# Patient Record
Sex: MALE | Race: WHITE | NOT HISPANIC OR LATINO | Employment: OTHER | URBAN - METROPOLITAN AREA
[De-identification: names, ages, dates, MRNs, and addresses within clinical notes are randomized per-mention and may not be internally consistent; named-entity substitution may affect disease eponyms.]

---

## 2020-03-04 ENCOUNTER — TELEPHONE (OUTPATIENT)
Dept: UROLOGY | Facility: MEDICAL CENTER | Age: 85
End: 2020-03-04

## 2020-03-04 NOTE — TELEPHONE ENCOUNTER
Call placed to daughter, scheduled for first available new patient visit with Dr Silva sOborn  Daughter is having records faxed to our office

## 2020-03-04 NOTE — TELEPHONE ENCOUNTER
Complaint/diagnosis:artificial sphincter issues    Insurance Medicare A and B and [employer]    History of Cancer Prostate and Skin Cancer    Previous Urologist Daughter does not remember name but will get records sent to [phone]    Outside testing/where    what kind of test    Records requested/where    Preferred Location [facility]

## 2020-04-27 ENCOUNTER — TELEPHONE (OUTPATIENT)
Dept: UROLOGY | Facility: AMBULATORY SURGERY CENTER | Age: 85
End: 2020-04-27

## 2020-04-29 ENCOUNTER — TELEMEDICINE (OUTPATIENT)
Dept: UROLOGY | Facility: AMBULATORY SURGERY CENTER | Age: 85
End: 2020-04-29
Payer: MEDICARE

## 2020-04-29 DIAGNOSIS — N39.3 STRESS INCONTINENCE: Primary | ICD-10-CM

## 2020-04-29 PROCEDURE — 99205 OFFICE O/P NEW HI 60 MIN: CPT | Performed by: UROLOGY

## 2020-05-06 ENCOUNTER — TELEPHONE (OUTPATIENT)
Dept: UROLOGY | Facility: AMBULATORY SURGERY CENTER | Age: 85
End: 2020-05-06

## 2020-05-07 ENCOUNTER — TELEPHONE (OUTPATIENT)
Dept: UROLOGY | Facility: AMBULATORY SURGERY CENTER | Age: 85
End: 2020-05-07

## 2022-10-19 ENCOUNTER — APPOINTMENT (EMERGENCY)
Dept: RADIOLOGY | Facility: HOSPITAL | Age: 87
End: 2022-10-19
Payer: MEDICARE

## 2022-10-19 ENCOUNTER — APPOINTMENT (EMERGENCY)
Dept: CT IMAGING | Facility: HOSPITAL | Age: 87
End: 2022-10-19
Payer: MEDICARE

## 2022-10-19 ENCOUNTER — HOSPITAL ENCOUNTER (EMERGENCY)
Facility: HOSPITAL | Age: 87
Discharge: HOME | End: 2022-10-19
Attending: SURGERY
Payer: MEDICARE

## 2022-10-19 VITALS
TEMPERATURE: 98 F | RESPIRATION RATE: 17 BRPM | DIASTOLIC BLOOD PRESSURE: 67 MMHG | WEIGHT: 153.66 LBS | OXYGEN SATURATION: 96 % | SYSTOLIC BLOOD PRESSURE: 144 MMHG | HEART RATE: 60 BPM | BODY MASS INDEX: 23.36 KG/M2

## 2022-10-19 DIAGNOSIS — S01.81XA FOREHEAD LACERATION, INITIAL ENCOUNTER: ICD-10-CM
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
## 2022-10-19 LAB
BASE EXCESS BLDA CALC-SCNC: 2 MMOL/L (ref -2–3)
CA-I BLD-SCNC: 1.06 MMOL/L (ref 1.12–1.32)
GLUCOSE SERPL-MCNC: 165 MG/DL (ref 65–140)
HCO3 BLDA-SCNC: 26.7 MMOL/L (ref 24–30)
HCT VFR BLD CALC: 37 % (ref 36.5–49.3)
HGB BLDA-MCNC: 12.6 G/DL (ref 12–17)
PCO2 BLD: 28 MMOL/L (ref 21–32)
PCO2 BLD: 41.2 MM HG (ref 42–50)
PH BLD: 7.42 [PH] (ref 7.3–7.4)
PO2 BLD: 24 MM HG (ref 35–45)
POTASSIUM BLD-SCNC: 4.3 MMOL/L (ref 3.5–5.3)
SAO2 % BLD FROM PO2: 45 % (ref 60–85)
SODIUM BLD-SCNC: 138 MMOL/L (ref 136–145)
SPECIMEN SOURCE: ABNORMAL

## 2022-10-19 PROCEDURE — 93308 TTE F-UP OR LMTD: CPT | Performed by: SURGERY

## 2022-10-19 PROCEDURE — 70450 CT HEAD/BRAIN W/O DYE: CPT

## 2022-10-19 PROCEDURE — 71045 X-RAY EXAM CHEST 1 VIEW: CPT

## 2022-10-19 PROCEDURE — 72125 CT NECK SPINE W/O DYE: CPT

## 2022-10-19 PROCEDURE — 82947 ASSAY GLUCOSE BLOOD QUANT: CPT

## 2022-10-19 PROCEDURE — G0168 WOUND CLOSURE BY ADHESIVE: HCPCS | Performed by: SURGERY

## 2022-10-19 PROCEDURE — 82330 ASSAY OF CALCIUM: CPT

## 2022-10-19 PROCEDURE — 99284 EMERGENCY DEPT VISIT MOD MDM: CPT

## 2022-10-19 PROCEDURE — 99284 EMERGENCY DEPT VISIT MOD MDM: CPT | Performed by: SURGERY

## 2022-10-19 PROCEDURE — NC001 PR NO CHARGE: Performed by: EMERGENCY MEDICINE

## 2022-10-19 PROCEDURE — 85014 HEMATOCRIT: CPT

## 2022-10-19 PROCEDURE — 84132 ASSAY OF SERUM POTASSIUM: CPT

## 2022-10-19 PROCEDURE — 84295 ASSAY OF SERUM SODIUM: CPT

## 2022-10-19 PROCEDURE — 76705 ECHO EXAM OF ABDOMEN: CPT | Performed by: SURGERY

## 2022-10-19 PROCEDURE — 82803 BLOOD GASES ANY COMBINATION: CPT

## 2022-10-19 PROCEDURE — 72131 CT LUMBAR SPINE W/O DYE: CPT

## 2022-10-19 NOTE — PROCEDURES
POC FAST US    Date/Time: 10/19/2022 4:29 PM  Performed by: Crissy Muller PA-C  Authorized by: Crissy Muller PA-C     Patient location:  Trauma  Procedure details:     Exam Type:  Diagnostic    Indications: blunt abdominal trauma and blunt chest trauma      Assess for:  Intra-abdominal fluid and pericardial effusion    Technique: FAST      Views obtained:  Heart - Pericardial sac, LUQ - Splenorenal space, Suprapubic - Pouch of En and RUQ - Gong's Pouch    Image quality: diagnostic      Image availability:  Images available in PACS and video obtained  FAST Findings:     RUQ (Hepatorenal) free fluid: absent      LUQ (Splenorenal) free fluid: absent      Suprapubic free fluid: absent      Cardiac wall motion: identified      Pericardial effusion: absent    Interpretation:     Impressions: negative    Laceration repair    Date/Time: 10/19/2022 4:29 PM  Performed by: Crissy Muller PA-C  Authorized by: Crissy Muller PA-C   Patient identity confirmed: verbally with patient  Body area: head/neck  Location details: forehead      Procedure Details:  Skin closure: glue

## 2022-10-19 NOTE — ED PROVIDER NOTES
Emergency Department Airway Evaluation and Management Form    History  Obtained from:  Patient  Sulfa antibiotics  No chief complaint on file  49-year-old male presents after falling at home  Patient was leaning forward to reach toilet paper when he struck his head in the bathroom  No reported loss of consciousness  Patient has a small laceration to the medial forehead and takes Eliquis  History provided by:  Patient and medical records      Past Medical History:   Diagnosis Date   • Anxiety    • Arthritis    • Coronary artery disease    • Hiatal hernia    • Hypertension    • Irregular heart beat    • Myasthenia gravis [facility])      Past Surgical History:   Procedure Laterality Date   • EYE SURGERY  2014    cataracts   • HERNIA REPAIR  1971    right inguinal hernia repair   • AK LAP,SURG,COLECTOMY, PARTIAL, W/ANAST N/A 6/3/2016    Procedure: LAPAROSCOPIC SIGMOID RESECTION ;  Surgeon: Clary Bradley MD;  [facility];  Service: Colorectal   • AK LAP,SURG,COLECTOMY,W/ANAST N/A 6/3/2016    Procedure: COLECTOMY LAPAROSCOPIC ASSISTED;  Surgeon: Clary Bradley MD;  [facility];  Service: Colorectal   • AK SIGMOIDOSCOPY,DIAGNOSTIC N/A 6/3/2016    Procedure: Fernando Brownus;  Surgeon: Clary Bradley MD;  [facility];  Service: Colorectal   • PROSTATECTOMY     • REPLACEMENT TOTAL KNEE Right    • URINARY SPHINCTER IMPLANT  2001    s/p prostatectomy- pt had urinary leakage issues     Family History   Problem Relation Age of Onset   • Diabetes Brother      Social History     Tobacco Use   • Smoking status: Former Smoker   • Tobacco comment: quit smoking when 23 y/o   Substance Use Topics   • Alcohol use: No   • Drug use: No     I have reviewed and agree with the history as documented  Review of Systems    Physical Exam  BP (!) 182/84   Pulse 64   Temp 98 °F (36 7 °C) (Oral)   Resp 18   Wt 69 7 kg (153 lb 10 6 oz)   SpO2 96%   BMI 23 36 kg/m²     Physical Exam GCS 15  Pupils are unequal, baseline per patient  Neck is supple and nontender  Equal bilateral breath sounds noted  Airway intact  2+ dorsal pedal and posterior tibial pulses  ED Medications  Medications - No data to display    Intubation  Procedures    Notes  No acute airway intervention indicated    Care relinquished to the trauma team    Final Diagnosis  Final diagnoses:   None       ED Provider  Electronically Signed by     Vinod Fonseca DO  10/19/22 5989

## 2022-10-19 NOTE — H&P
H&P - Trauma   Nelda Esquivel 80 y o  male MRN: [id number]  Unit/Bed#: ED-02 Encounter: [id number]    Trauma Alert: Level B   Model of Arrival: Self    Trauma Team: Attending Jeanine Alberto and KATHIE [address] [place] [address]  Consultants:     None     Assessment/Plan   Active Problems / Assessment:   - Mechanical fall  - Head strike, no LOC, on Eliquis  - Forehead abrasion/ laceration requiring glue repair  - Lumbar tenderness     Plan:   - CT head, C-spine, Lumbar spine no acute traumatic injuries  - Lac repair with glue  - Ambulate, tolerate a diet and discharge home with family    Spoke with patient's daughter at bedside and updated her regarding negative imaging and medically cleared for discharge home  Cervical Collar Clearance: The patient had a CT scan of the cervical spine demonstrating no acute injury  On exam, the patient had no midline point tenderness or paresthesias/numbness/weakness in the extremities  The patient had full range of motion (was then able to flex, extend, and rotate head laterally) without pain  There were no distracting injuries and the patient was not intoxicated  The patient's cervical spine was cleared radiologically and clinically  Cervical collar removed at this time  Shonda Murry  10/19/2022 5:35 PM       History of Present Illness     Chief Complaint: Fall  Mechanism:Fall     HPI:    Nelda Esquivel is a 80 y o  male who presents after a fall forehead with head abrasion/ laceration  He reports he was leaning forward to get something when he fell into the wall, striking his face  He denies loss of consciousness and reports that he takes Eliquis daily  Denies pain  Review of Systems   HENT: Positive for facial swelling and hearing loss  Eyes: Negative for photophobia and visual disturbance  Respiratory: Negative for shortness of breath  Cardiovascular: Negative for chest pain  Gastrointestinal: Negative for abdominal pain and nausea     Musculoskeletal: Negative for back pain and neck pain  Skin: Positive for wound  Neurological: Negative for dizziness, syncope, weakness, light-headedness, numbness and headaches  Psychiatric/Behavioral: Negative for confusion  All other systems reviewed and are negative  12-point, complete review of systems was reviewed and negative except as stated above  Historical Information     Past Medical History:   Diagnosis Date   • Anxiety    • Arthritis    • Coronary artery disease    • Hiatal hernia    • Hypertension    • Irregular heart beat    • Myasthenia gravis [facility])      Past Surgical History:   Procedure Laterality Date   • EYE SURGERY  2014    cataracts   • HERNIA REPAIR  1971    right inguinal hernia repair   • [place] LAP,SURG,COLECTOMY, PARTIAL, W/ANAST N/A 6/3/2016    Procedure: LAPAROSCOPIC SIGMOID RESECTION ;  Surgeon: Marija Hubbard MD;  [facility];  Service: Colorectal   • TN LAP,SURG,COLECTOMY,W/ANAST N/A 6/3/2016    Procedure: COLECTOMY LAPAROSCOPIC ASSISTED;  Surgeon: Marija Hubbard MD;  [facility];  Service: Colorectal   • TN SIGMOIDOSCOPY,DIAGNOSTIC N/A 6/3/2016    Procedure: Cherylyn Every;  Surgeon: Marija Hubbard MD;  [facility];  Service: Colorectal   • PROSTATECTOMY     • REPLACEMENT TOTAL KNEE Right    • URINARY SPHINCTER IMPLANT  2001    s/p prostatectomy- pt had urinary leakage issues        Social History     Tobacco Use   • Smoking status: Former Smoker   • Tobacco comment: quit smoking when 23 y/o   Substance Use Topics   • Alcohol use: No   • Drug use: No     Immunization History   Administered Date(s) Administered   • COVID-19 PFIZER VACCINE 0 3 ML IM 02/24/2021, 03/17/2021     Last Tetanus: reports up to date  Family History: Non-contributory    1  Before the illness or injury that brought you to the Emergency, did you need someone to help you on a regular basis? 1=Yes   2   Since the illness or injury that brought you to the Emergency, have you needed more help than usual to take care of yourself? 0=No   3  Have you been hospitalized for one or more nights during the past 6 months (excluding a stay in the Emergency Department)? 0=No   4  In general, do you see well? 1=No   5  In general, do you have serious problems with your memory? 1=Yes   6  Do you take more than three different medications everyday? 1=Yes   TOTAL   4     Did you order a geriatric consult if the score was 2 or greater?: no bc patient is discharged     Meds/Allergies   all current active meds have been reviewed     Allergies   Allergen Reactions   • Sulfa Antibiotics Rash       Objective   Initial Vitals:   Temperature: 98 °F (36 7 °C) (10/19/22 1500)  Pulse: 64 (10/19/22 1500)  Respirations: 18 (10/19/22 1500)  Blood Pressure: (!) 182/84 (10/19/22 1500)    Primary Survey:   Airway:        Status: patent;        Pre-hospital Interventions: none        Hospital Interventions: none  Breathing:        Pre-hospital Interventions: none       Effort: normal       Right breath sounds: normal       Left breath sounds: normal  Circulation:        Rhythm: regular       Rate: regular   Right Pulses Left Pulses    R radial: 2+  R femoral: 2+  R pedal: 2+  R carotid: 2+  R popliteal: 2+ L radial: 2+  L femoral: 2+  L pedal: 2+  L carotid: 2+  L popliteal: 2+   Disability:        GCS: Eye: 4; Verbal: 5 Motor: 6 Total: 15       Right Pupil: 2 mm;  round;  reactive         Left Pupil:  4 mm;  round;  reactive      R Motor Strength L Motor Strength    R : 5/5  R dorsiflex: 5/5  R plantarflex: 5/5 L : 5/5  L dorsiflex: 5/5  L plantarflex: 5/5        Sensory:  No sensory deficit  Exposure:       Completed: Yes      Secondary Survey:  Physical Exam  Vitals reviewed  Constitutional:       General: He is not in acute distress  Appearance: Normal appearance  HENT:      Head: Normocephalic        Comments: Facial ecchymosis, abrasion on forehead     Right Ear: External ear normal       Left Ear: External ear normal       Nose: Nose normal       Mouth/Throat:      Mouth: Mucous membranes are moist       Pharynx: Oropharynx is clear  Eyes:      Extraocular Movements: Extraocular movements intact  Conjunctiva/sclera: Conjunctivae normal       Pupils: Pupils are equal, round, and reactive to light  Cardiovascular:      Rate and Rhythm: Normal rate and regular rhythm  Pulses: Normal pulses  Heart sounds: Normal heart sounds  Pulmonary:      Effort: Pulmonary effort is normal  No respiratory distress  Breath sounds: Normal breath sounds  Chest:      Chest wall: No tenderness  Abdominal:      General: Abdomen is flat  Bowel sounds are normal  There is no distension  Palpations: Abdomen is soft  Tenderness: There is no abdominal tenderness  There is no guarding  Musculoskeletal:         General: No swelling, tenderness, deformity or signs of injury  Normal range of motion  Cervical back: No tenderness  Comments: Lumbar tenderness on exam   Skin:     General: Skin is warm and dry  Capillary Refill: Capillary refill takes less than 2 seconds  Findings: Bruising and lesion present  Neurological:      General: No focal deficit present  Mental Status: He is alert and oriented to person, place, and time  Mental status is at baseline  Sensory: No sensory deficit  Motor: No weakness  Psychiatric:         Mood and Affect: Mood normal          Behavior: Behavior normal          Invasive Devices  Report    Drain  Duration           Urethral Catheter Double-lumen 16 Fr  2329 days              Lab Results: Results: I have personally reviewed all pertinent laboratory/tests results    Imaging Results: I have personally reviewed pertinent reports      Chest Xray(s): negative for acute findings   FAST exam(s): negative for acute findings   CT Scan(s): negative for acute findings   Additional Xray(s): N/A     Other Studies:   TRAUMA - CT head wo contrast   Final Result by Chantel Bolanos MD (10/19 1534)      No acute intracranial abnormality  I personally discussed this study with Emely Mosqueda on 10/19/2022 at 3:33 PM I          Workstation performed: [id number]         TRAUMA - CT spine cervical wo contrast   Final Result by Chantel Bolanos MD (10/19 1534)      No cervical spine fracture or traumatic malalignment  I personally discussed this study with Emely Salazarese on 10/19/2022 at 3:33 PM I       Workstation performed: [id number]         CT spine lumbar without contrast   Final Result by Chantel Bolanos MD (10/19 1534)      No acute abnormality  Multilevel degenerative disc changes  I personally discussed this study with Emely Mosqueda on 10/19/2022 at 3:33 PM I                      Workstation performed: [id number]         XR trauma multiple    (Results Pending)   XR chest 1 view    (Results Pending)         Code Status: Prior  Advance Directive and Living Will:      Power of :    POLST:    I have spent 30 minutes with Patient and family today in which greater than 50% of this time was spent in counseling/coordination of care regarding Diagnostic results, Prognosis, Risks and benefits of tx options, Intructions for management, Patient and family education, Importance of tx compliance, Risk factor reductions and Impressions

## 2022-10-19 NOTE — CASE MANAGEMENT
CM responded to trauma alert  Patient arrived through ED waiting room and then transferred to [facility] for further evaluation  Patient responsive to medical team questions and instructions  Patient reported that daughter Gertrudis Masters in waiting room  Cm met with daughter- [phone] and placed in family ED waiting room  Daughter informed Cm that patient lives with son in a ranch style home  Daughter informed Cm that patient ambulates with RW and that brother assists patient with bathing and some dressing  Daughter explained that she has a list of patient's current meds since they moved patient from [place] to [place] and slowly working to get him part of the [employer]  Patient last known hx of inpatient rehab in 2016 and he had [facility] services after knee replacements per daughter  Cm provided information to Trauma AP  No current identified CM needs  CM will follow and update screening, assessment, and discharge planning as appropriate

## 2022-12-11 ENCOUNTER — APPOINTMENT (EMERGENCY)
Dept: RADIOLOGY | Facility: HOSPITAL | Age: 87
End: 2022-12-11

## 2022-12-11 ENCOUNTER — HOSPITAL ENCOUNTER (INPATIENT)
Facility: HOSPITAL | Age: 87
LOS: 6 days | Discharge: NURSING, REHAB OR LONG-TERM CARE | End: 2022-12-17
Attending: INTERNAL MEDICINE | Admitting: FAMILY MEDICINE

## 2022-12-11 ENCOUNTER — HOSPITAL ENCOUNTER (EMERGENCY)
Facility: HOSPITAL | Age: 87
End: 2022-12-11
Attending: EMERGENCY MEDICINE

## 2022-12-11 ENCOUNTER — APPOINTMENT (EMERGENCY)
Dept: CT IMAGING | Facility: HOSPITAL | Age: 87
End: 2022-12-11

## 2022-12-11 VITALS
RESPIRATION RATE: 16 BRPM | BODY MASS INDEX: 21.22 KG/M2 | TEMPERATURE: 97.8 F | WEIGHT: 140 LBS | HEIGHT: 68 IN | OXYGEN SATURATION: 97 % | SYSTOLIC BLOOD PRESSURE: 186 MMHG | HEART RATE: 58 BPM | DIASTOLIC BLOOD PRESSURE: 80 MMHG

## 2022-12-11 DIAGNOSIS — N39.0 URINARY TRACT INFECTION: ICD-10-CM

## 2022-12-11 DIAGNOSIS — R04.2 HEMOPTYSIS: ICD-10-CM

## 2022-12-11 DIAGNOSIS — N17.9 ACUTE KIDNEY INJURY SUPERIMPOSED ON CHRONIC KIDNEY DISEASE (HCC): ICD-10-CM

## 2022-12-11 DIAGNOSIS — T83.128A: Primary | ICD-10-CM

## 2022-12-11 DIAGNOSIS — R31.9 HEMATURIA: ICD-10-CM

## 2022-12-11 DIAGNOSIS — T83.9XXA: ICD-10-CM

## 2022-12-11 DIAGNOSIS — N18.9 ACUTE KIDNEY INJURY SUPERIMPOSED ON CHRONIC KIDNEY DISEASE (HCC): ICD-10-CM

## 2022-12-11 DIAGNOSIS — F41.9 ANXIETY: ICD-10-CM

## 2022-12-11 PROBLEM — F32.A DEPRESSION: Status: ACTIVE | Noted: 2022-12-11

## 2022-12-11 PROBLEM — I51.89 DIASTOLIC DYSFUNCTION: Status: ACTIVE | Noted: 2022-12-11

## 2022-12-11 PROBLEM — N30.01 ACUTE CYSTITIS WITH HEMATURIA: Status: ACTIVE | Noted: 2022-12-11

## 2022-12-11 LAB
ALBUMIN SERPL BCP-MCNC: 3.5 G/DL (ref 3.5–5)
ALP SERPL-CCNC: 65 U/L (ref 34–104)
ALT SERPL W P-5'-P-CCNC: 20 U/L (ref 7–52)
ANION GAP SERPL CALCULATED.3IONS-SCNC: 5 MMOL/L (ref 4–13)
APTT PPP: 47 SECONDS (ref 23–37)
AST SERPL W P-5'-P-CCNC: 28 U/L (ref 13–39)
BACTERIA UR QL AUTO: ABNORMAL /HPF
BASOPHILS # BLD AUTO: 0.09 THOUSANDS/ÂΜL (ref 0–0.1)
BASOPHILS NFR BLD AUTO: 2 % (ref 0–1)
BILIRUB SERPL-MCNC: 0.48 MG/DL (ref 0.2–1)
BILIRUB UR QL STRIP: NEGATIVE
BUN SERPL-MCNC: 24 MG/DL (ref 5–25)
CALCIUM SERPL-MCNC: 8.9 MG/DL (ref 8.4–10.2)
CHLORIDE SERPL-SCNC: 106 MMOL/L (ref 96–108)
CLARITY UR: ABNORMAL
CO2 SERPL-SCNC: 27 MMOL/L (ref 21–32)
COLOR UR: ABNORMAL
CREAT SERPL-MCNC: 1.22 MG/DL (ref 0.6–1.3)
EOSINOPHIL # BLD AUTO: 0.03 THOUSAND/ÂΜL (ref 0–0.61)
EOSINOPHIL NFR BLD AUTO: 1 % (ref 0–6)
ERYTHROCYTE [DISTWIDTH] IN BLOOD BY AUTOMATED COUNT: 13.9 % (ref 11.6–15.1)
GFR SERPL CREATININE-BSD FRML MDRD: 50 ML/MIN/1.73SQ M
GLUCOSE SERPL-MCNC: 142 MG/DL (ref 65–140)
GLUCOSE SERPL-MCNC: 156 MG/DL (ref 65–140)
GLUCOSE SERPL-MCNC: 163 MG/DL (ref 65–140)
GLUCOSE UR STRIP-MCNC: NEGATIVE MG/DL
HCT VFR BLD AUTO: 37.7 % (ref 36.5–49.3)
HGB BLD-MCNC: 12.1 G/DL (ref 12–17)
HGB UR QL STRIP.AUTO: ABNORMAL
IMM GRANULOCYTES # BLD AUTO: 0.02 THOUSAND/UL (ref 0–0.2)
IMM GRANULOCYTES NFR BLD AUTO: 0 % (ref 0–2)
INR PPP: 1.32 (ref 0.84–1.19)
KETONES UR STRIP-MCNC: NEGATIVE MG/DL
LEUKOCYTE ESTERASE UR QL STRIP: ABNORMAL
LYMPHOCYTES # BLD AUTO: 1.07 THOUSANDS/ÂΜL (ref 0.6–4.47)
LYMPHOCYTES NFR BLD AUTO: 17 % (ref 14–44)
MCH RBC QN AUTO: 28.9 PG (ref 26.8–34.3)
MCHC RBC AUTO-ENTMCNC: 32.1 G/DL (ref 31.4–37.4)
MCV RBC AUTO: 90 FL (ref 82–98)
MONOCYTES # BLD AUTO: 0.5 THOUSAND/ÂΜL (ref 0.17–1.22)
MONOCYTES NFR BLD AUTO: 8 % (ref 4–12)
NEUTROPHILS # BLD AUTO: 4.49 THOUSANDS/ÂΜL (ref 1.85–7.62)
NEUTS SEG NFR BLD AUTO: 72 % (ref 43–75)
NITRITE UR QL STRIP: NEGATIVE
NON-SQ EPI CELLS URNS QL MICRO: ABNORMAL /HPF
NRBC BLD AUTO-RTO: 0 /100 WBCS
PH UR STRIP.AUTO: 5 [PH]
PLATELET # BLD AUTO: 176 THOUSANDS/UL (ref 149–390)
PMV BLD AUTO: 10.2 FL (ref 8.9–12.7)
POTASSIUM SERPL-SCNC: 4.7 MMOL/L (ref 3.5–5.3)
PROT SERPL-MCNC: 6.6 G/DL (ref 6.4–8.4)
PROT UR STRIP-MCNC: ABNORMAL MG/DL
PROTHROMBIN TIME: 16.6 SECONDS (ref 11.6–14.5)
RBC # BLD AUTO: 4.19 MILLION/UL (ref 3.88–5.62)
RBC #/AREA URNS AUTO: ABNORMAL /HPF
SODIUM SERPL-SCNC: 138 MMOL/L (ref 135–147)
SP GR UR STRIP.AUTO: 1.02 (ref 1–1.03)
UROBILINOGEN UR STRIP-ACNC: <2 MG/DL
WBC # BLD AUTO: 6.2 THOUSAND/UL (ref 4.31–10.16)
WBC #/AREA URNS AUTO: ABNORMAL /HPF
WBC CLUMPS # UR AUTO: PRESENT /UL

## 2022-12-11 RX ORDER — FAMOTIDINE 20 MG/1
1 TABLET, FILM COATED ORAL 2 TIMES DAILY
COMMUNITY
Start: 2022-11-16

## 2022-12-11 RX ORDER — SUCRALFATE ORAL 1 G/10ML
1 SUSPENSION ORAL
Status: ON HOLD | COMMUNITY
Start: 2022-12-02 | End: 2022-12-23

## 2022-12-11 RX ORDER — ISOSORBIDE MONONITRATE 60 MG/1
60 TABLET, EXTENDED RELEASE ORAL DAILY
COMMUNITY

## 2022-12-11 RX ORDER — PANTOPRAZOLE SODIUM 20 MG/1
20 TABLET, DELAYED RELEASE ORAL 2 TIMES DAILY
Status: DISCONTINUED | OUTPATIENT
Start: 2022-12-11 | End: 2022-12-17 | Stop reason: HOSPADM

## 2022-12-11 RX ORDER — INSULIN LISPRO 100 [IU]/ML
1-5 INJECTION, SOLUTION INTRAVENOUS; SUBCUTANEOUS
Status: DISCONTINUED | OUTPATIENT
Start: 2022-12-11 | End: 2022-12-17 | Stop reason: HOSPADM

## 2022-12-11 RX ORDER — POTASSIUM CHLORIDE 20 MEQ/1
1 TABLET, EXTENDED RELEASE ORAL DAILY
COMMUNITY
Start: 2022-11-21 | End: 2022-12-17

## 2022-12-11 RX ORDER — PRAVASTATIN SODIUM 20 MG
20 TABLET ORAL DAILY
Status: DISCONTINUED | OUTPATIENT
Start: 2022-12-11 | End: 2022-12-17 | Stop reason: HOSPADM

## 2022-12-11 RX ORDER — ALPRAZOLAM 0.25 MG/1
0.25 TABLET ORAL 2 TIMES DAILY PRN
Status: DISCONTINUED | OUTPATIENT
Start: 2022-12-11 | End: 2022-12-17 | Stop reason: HOSPADM

## 2022-12-11 RX ORDER — AMIODARONE HYDROCHLORIDE 200 MG/1
TABLET ORAL
COMMUNITY
End: 2022-12-11 | Stop reason: SDUPTHER

## 2022-12-11 RX ORDER — PYRIDOSTIGMINE BROMIDE 60 MG/1
60 TABLET ORAL 4 TIMES DAILY
Status: DISCONTINUED | OUTPATIENT
Start: 2022-12-11 | End: 2022-12-17 | Stop reason: HOSPADM

## 2022-12-11 RX ORDER — FLUOXETINE HYDROCHLORIDE 20 MG/1
40 CAPSULE ORAL DAILY
Status: DISCONTINUED | OUTPATIENT
Start: 2022-12-11 | End: 2022-12-17 | Stop reason: HOSPADM

## 2022-12-11 RX ORDER — ISOSORBIDE MONONITRATE 60 MG/1
60 TABLET, EXTENDED RELEASE ORAL DAILY
Status: DISCONTINUED | OUTPATIENT
Start: 2022-12-11 | End: 2022-12-17 | Stop reason: HOSPADM

## 2022-12-11 RX ORDER — LEVOTHYROXINE SODIUM 88 UG/1
88 TABLET ORAL
Status: DISCONTINUED | OUTPATIENT
Start: 2022-12-12 | End: 2022-12-17 | Stop reason: HOSPADM

## 2022-12-11 RX ORDER — PYRAZINAMIDE 500 MG/1
TABLET ORAL
COMMUNITY
Start: 2022-12-09 | End: 2022-12-17

## 2022-12-11 RX ORDER — INSULIN LISPRO 100 [IU]/ML
1-6 INJECTION, SOLUTION INTRAVENOUS; SUBCUTANEOUS
Status: DISCONTINUED | OUTPATIENT
Start: 2022-12-11 | End: 2022-12-17 | Stop reason: HOSPADM

## 2022-12-11 RX ORDER — FAMOTIDINE 20 MG/1
20 TABLET, FILM COATED ORAL 2 TIMES DAILY
Status: DISCONTINUED | OUTPATIENT
Start: 2022-12-11 | End: 2022-12-17 | Stop reason: HOSPADM

## 2022-12-11 RX ORDER — LEVOTHYROXINE SODIUM 88 UG/1
88 TABLET ORAL
COMMUNITY
Start: 2022-11-02 | End: 2023-01-31

## 2022-12-11 RX ORDER — DICYCLOMINE HCL 20 MG
20 TABLET ORAL EVERY 6 HOURS
COMMUNITY
End: 2022-12-17

## 2022-12-11 RX ORDER — FUROSEMIDE 20 MG/1
20 TABLET ORAL DAILY
Status: DISCONTINUED | OUTPATIENT
Start: 2022-12-11 | End: 2022-12-14

## 2022-12-11 RX ORDER — DICYCLOMINE HCL 20 MG
20 TABLET ORAL 2 TIMES DAILY PRN
Status: DISCONTINUED | OUTPATIENT
Start: 2022-12-11 | End: 2022-12-17 | Stop reason: HOSPADM

## 2022-12-11 RX ORDER — PYRIDOSTIGMINE BROMIDE 60 MG/1
TABLET ORAL
COMMUNITY
End: 2022-12-17

## 2022-12-11 RX ORDER — ONDANSETRON 2 MG/ML
4 INJECTION INTRAMUSCULAR; INTRAVENOUS EVERY 6 HOURS PRN
Status: DISCONTINUED | OUTPATIENT
Start: 2022-12-11 | End: 2022-12-17 | Stop reason: HOSPADM

## 2022-12-11 RX ORDER — SUCRALFATE 1 G/1
1 TABLET ORAL
Status: DISCONTINUED | OUTPATIENT
Start: 2022-12-11 | End: 2022-12-17 | Stop reason: HOSPADM

## 2022-12-11 RX ORDER — AMIODARONE HYDROCHLORIDE 200 MG/1
200 TABLET ORAL DAILY
Status: DISCONTINUED | OUTPATIENT
Start: 2022-12-11 | End: 2022-12-17 | Stop reason: HOSPADM

## 2022-12-11 RX ORDER — PANTOPRAZOLE SODIUM 40 MG/1
20 TABLET, DELAYED RELEASE ORAL
COMMUNITY
End: 2022-12-11 | Stop reason: SDUPTHER

## 2022-12-11 RX ORDER — ACETAMINOPHEN 325 MG/1
650 TABLET ORAL EVERY 6 HOURS PRN
Status: DISCONTINUED | OUTPATIENT
Start: 2022-12-11 | End: 2022-12-17 | Stop reason: HOSPADM

## 2022-12-11 RX ORDER — FLUOXETINE HYDROCHLORIDE 40 MG/1
CAPSULE ORAL
COMMUNITY
End: 2022-12-11 | Stop reason: SDUPTHER

## 2022-12-11 RX ADMIN — SUCRALFATE 1 G: 1 TABLET ORAL at 18:13

## 2022-12-11 RX ADMIN — PYRIDOSTIGMINE BROMIDE 60 MG: 60 TABLET ORAL at 22:36

## 2022-12-11 RX ADMIN — PANTOPRAZOLE SODIUM 20 MG: 20 TABLET, DELAYED RELEASE ORAL at 18:13

## 2022-12-11 RX ADMIN — FAMOTIDINE 20 MG: 20 TABLET ORAL at 18:13

## 2022-12-11 RX ADMIN — FLUOXETINE 40 MG: 20 CAPSULE ORAL at 18:12

## 2022-12-11 RX ADMIN — IOHEXOL 100 ML: 350 INJECTION, SOLUTION INTRAVENOUS at 13:22

## 2022-12-11 RX ADMIN — PYRIDOSTIGMINE BROMIDE 60 MG: 60 TABLET ORAL at 18:13

## 2022-12-11 RX ADMIN — PRAVASTATIN SODIUM 20 MG: 20 TABLET ORAL at 18:13

## 2022-12-11 RX ADMIN — INSULIN LISPRO 1 UNITS: 100 INJECTION, SOLUTION INTRAVENOUS; SUBCUTANEOUS at 18:21

## 2022-12-11 RX ADMIN — CEFTRIAXONE 1000 MG: 1 INJECTION, POWDER, FOR SOLUTION INTRAMUSCULAR; INTRAVENOUS at 13:53

## 2022-12-11 RX ADMIN — SUCRALFATE 1 G: 1 TABLET ORAL at 22:36

## 2022-12-11 NOTE — H&P (VIEW-ONLY)
Consult - Urology   Berny Staton 6/28/1927, 80 y o  male MRN: [id number]    Unit/Bed#: ATTILA Encounter: [id number]      Displacement of artificial urinary sphincter   · Continue antibiotics   · Will require cardiac clearance   · Hold Eliquis   · Will plan for OR Tuesday after clearance obtained and discussion with family   · WBC normal, afebrile     UTI  · Per family has recurrent UTIs  · Continue antibiotics   · Urine culture pending     Subjective: Berny Staton is a 80year old male with a pmh of afib, CAD, HTN, myasthenia gravis and history of prostate cancer sp prostatectomy in 2001  Post surgery patient had significant incontinence and had AUS placed in 2002  Patient presented to our office in April of 2022 for nonfunctioning AUS and at which point it was recommended to remove device and replace  Patient was going to require cardiac clearance and taken off his blood thinners  Ultimately, family and patient decided again surgery and patient has not followed up since  Patient presented today to the ED with complaints of 2-3 episode of hematuria with no clots and 1 episode of blood streaked sputum  Patient also reported that he felt is AUS wires coming out of his scrotum  Son is at bedside and both him and patient are reporting the device has been displaced for several months  He denies any pain, fever or chills  Objective:  Vitals: Blood pressure (!) 186/80, pulse 58, temperature 97 8 °F (36 6 °C), resp  rate 16, height 5' 8" (1 727 m), weight 63 5 kg (140 lb), SpO2 97 %  ,Body mass index is 21 29 kg/m²  Physical Exam  Vitals reviewed  Constitutional:       Appearance: Normal appearance  HENT:      Head: Normocephalic and atraumatic  Cardiovascular:      Rate and Rhythm: Normal rate  Pulmonary:      Effort: Pulmonary effort is normal    Abdominal:      General: Bowel sounds are normal       Palpations: Abdomen is soft     Genitourinary:     Comments: Scab on scrotum which has  Been there and looked at by PCP    AUS protruding from right scrotum   Musculoskeletal:      Cervical back: Normal range of motion  Neurological:      Mental Status: He is alert  Imaging:  CT Right inguinal implant is identified with extension into the right inguinal canal and subsequently the scrotum with the distal aspect of the implant external to the scrotum  Imaging reviewed - both report and images personally reviewed  Labs:  Recent Labs     12/11/22  1137   WBC 6 20     Recent Labs     12/11/22  1137   HGB 12 1     Recent Labs     12/11/22  1137   CREATININE 1 22       Microbiology:  Culture pending     History:  Social History     Socioeconomic History   • Marital status:       Spouse name: None   • Number of children: None   • Years of education: None   • Highest education level: None   Occupational History   • None   Tobacco Use   • Smoking status: Former   • Smokeless tobacco: None   • Tobacco comments:     quit smoking when 25 y/o   Substance and Sexual Activity   • Alcohol use: No   • Drug use: No   • Sexual activity: None   Other Topics Concern   • None   Social History Narrative   • None     Social Determinants of Health     Financial Resource Strain: Not on file   Food Insecurity: Not on file   Transportation Needs: Not on file   Physical Activity: Not on file   Stress: Not on file   Social Connections: Not on file   Intimate Partner Violence: Not on file   Housing Stability: Not on file     Past Medical History:   Diagnosis Date   • Anxiety    • Arthritis    • Coronary artery disease    • Hiatal hernia    • Hypertension    • Irregular heart beat    • Myasthenia gravis ([facility] [address] )      Past Surgical History:   Procedure Laterality Date   • EYE SURGERY  2014    cataracts   • HERNIA REPAIR  1971    right inguinal hernia repair   • [place] LAPAROSCOPY COLECTOMY PARTIAL W/ANASTOMOSIS N/A 6/3/2016    Procedure: LAPAROSCOPIC SIGMOID RESECTION ;  Surgeon: David Tomlin MD;  [facility];  Service: Colorectal   • TN LAPS COLECTOMY PRTL W/COLOPXTSTMY LW ANAST N/A 6/3/2016    Procedure: COLECTOMY LAPAROSCOPIC ASSISTED;  Surgeon: Kanchan Balderas MD;  [facility];  Service: Colorectal   • TN SIGMOIDOSCOPY FLX DX W/COLLJ SPEC BR/WA IF PFRMD N/A 6/3/2016    Procedure: Eulalio Gallagher;  Surgeon: Kanchan Balderas MD;  [facility];  Service: Colorectal   • PROSTATECTOMY     • REPLACEMENT TOTAL KNEE Right    • URINARY SPHINCTER IMPLANT  2001    s/p prostatectomy- pt had urinary leakage issues     Family History   Problem Relation Age of Onset   • Diabetes Brother        Cortney CHARLES Rivers  Date: 12/11/2022 Time: 2:29 PM

## 2022-12-11 NOTE — CONSULTS
Consult - Urology   Ginger Rush 6/28/1927, 80 y o  male MRN: [id number]    Unit/Bed#: ATTILA Encounter: [id number]      Displacement of artificial urinary sphincter   · Continue antibiotics   · Will require cardiac clearance   · Hold Eliquis   · Will plan for OR Tuesday after clearance obtained and discussion with family   · WBC normal, afebrile     UTI  · Per family has recurrent UTIs  · Continue antibiotics   · Urine culture pending     Subjective: Ginger Rush is a 80year old male with a pmh of afib, CAD, HTN, myasthenia gravis and history of prostate cancer sp prostatectomy in 2001  Post surgery patient had significant incontinence and had AUS placed in 2002  Patient presented to our office in April of 2022 for nonfunctioning AUS and at which point it was recommended to remove device and replace  Patient was going to require cardiac clearance and taken off his blood thinners  Ultimately, family and patient decided again surgery and patient has not followed up since  Patient presented today to the ED with complaints of 2-3 episode of hematuria with no clots and 1 episode of blood streaked sputum  Patient also reported that he felt is AUS wires coming out of his scrotum  Son is at bedside and both him and patient are reporting the device has been displaced for several months  He denies any pain, fever or chills  Objective:  Vitals: Blood pressure (!) 186/80, pulse 58, temperature 97 8 °F (36 6 °C), resp  rate 16, height 5' 8" (1 727 m), weight 63 5 kg (140 lb), SpO2 97 %  ,Body mass index is 21 29 kg/m²  Physical Exam  Vitals reviewed  Constitutional:       Appearance: Normal appearance  HENT:      Head: Normocephalic and atraumatic  Cardiovascular:      Rate and Rhythm: Normal rate  Pulmonary:      Effort: Pulmonary effort is normal    Abdominal:      General: Bowel sounds are normal       Palpations: Abdomen is soft     Genitourinary:     Comments: Scab on scrotum which has  Been there and looked at by PCP    AUS protruding from right scrotum   Musculoskeletal:      Cervical back: Normal range of motion  Neurological:      Mental Status: He is alert  Imaging:  CT Right inguinal implant is identified with extension into the right inguinal canal and subsequently the scrotum with the distal aspect of the implant external to the scrotum  Imaging reviewed - both report and images personally reviewed  Labs:  Recent Labs     12/11/22  1137   WBC 6 20     Recent Labs     12/11/22  1137   HGB 12 1     Recent Labs     12/11/22  1137   CREATININE 1 22       Microbiology:  Culture pending     History:  Social History     Socioeconomic History   • Marital status:       Spouse name: None   • Number of children: None   • Years of education: None   • Highest education level: None   Occupational History   • None   Tobacco Use   • Smoking status: Former   • Smokeless tobacco: None   • Tobacco comments:     quit smoking when 23 y/o   Substance and Sexual Activity   • Alcohol use: No   • Drug use: No   • Sexual activity: None   Other Topics Concern   • None   Social History Narrative   • None     Social Determinants of Health     Financial Resource Strain: Not on file   Food Insecurity: Not on file   Transportation Needs: Not on file   Physical Activity: Not on file   Stress: Not on file   Social Connections: Not on file   Intimate Partner Violence: Not on file   Housing Stability: Not on file     Past Medical History:   Diagnosis Date   • Anxiety    • Arthritis    • Coronary artery disease    • Hiatal hernia    • Hypertension    • Irregular heart beat    • Myasthenia gravis ([facility] [address] )      Past Surgical History:   Procedure Laterality Date   • EYE SURGERY  2014    cataracts   • HERNIA REPAIR  1971    right inguinal hernia repair   • [place] LAPAROSCOPY COLECTOMY PARTIAL W/ANASTOMOSIS N/A 6/3/2016    Procedure: LAPAROSCOPIC SIGMOID RESECTION ;  Surgeon: Citlali Jacobson MD;  [facility];  Service: Colorectal   • [place] LAPS COLECTOMY PRTL W/COLOPXTSTMY LW ANAST N/A 6/3/2016    Procedure: COLECTOMY LAPAROSCOPIC ASSISTED;  Surgeon: Jeff Cortes MD;  [facility];  Service: Colorectal   • [place] SIGMOIDOSCOPY FLX DX W/COLLJ SPEC BR/WA IF PFRMD N/A 6/3/2016    Procedure: Bunny Alcala;  Surgeon: Jeff Cortes MD;  [facility];  Service: Colorectal   • PROSTATECTOMY     • REPLACEMENT TOTAL KNEE Right    • URINARY SPHINCTER IMPLANT  2001    s/p prostatectomy- pt had urinary leakage issues     Family History   Problem Relation Age of Onset   • Diabetes Brother        Riki CHARLES Wright  Date: 12/11/2022 Time: 2:29 PM

## 2022-12-11 NOTE — ED NOTES
Transfer Information:    Ambulance Squad: Ehsan Ireland Time: [address]   Accepting Physician: Melissa Stauffer   Report Number: [phone]         Katheryn Ellis RN  12/11/22 8614

## 2022-12-11 NOTE — ASSESSMENT & PLAN NOTE
Patient complained of one isolated incident of blood tinged sputum this morning, which has resolved  Negative pneumonia    Monitor

## 2022-12-11 NOTE — ASSESSMENT & PLAN NOTE
Patient with h/o prostate cancer s/p prostatectomy, resulting in urinary incontinence, s/p urinary sphincter placement 20 years ago  Patient presented to ED with c/o hematuria and wire protruding out of his scrotum  CT scan reveals: Right inguinal implant with leads extending into the right inguinal canal and subsequently into the scrotum, distal aspect of the device is external to the scrotum    Plan for removal of the artifical sphincter on Tuesday  UA positive for UTI  Start IV rocephin  Pending final UCx  Cardiology consult for OR risk stratification, pending echocardiogram, pending EKG

## 2022-12-11 NOTE — EMTALA/ACUTE CARE TRANSFER
[address] [place] [address]  Dept: [phone]      EMTALA TRANSFER CONSENT    NAME Regina Muro                                         1927                              MRN [id number]    I have been informed of my rights regarding examination, treatment, and transfer   by Dr Shelbie Mcdaniels DO    Benefits: Specialized equipment and/or services available at the receiving facility (Include comment)________________________ (urology)    Risks: Potential for delay in receiving treatment, Potential deterioration of medical condition, Loss of IV, Increased discomfort during transfer, Possible worsening of condition or death during transfer      Transfer Request   I acknowledge that my medical condition has been evaluated and explained to me by the emergency department physician or other qualified medical person and/or my attending physician who has recommended and offered to me further medical examination and treatment  I understand the Hospital's obligation with respect to the treatment and stabilization of my emergency medical condition  I nevertheless request to be transferred  I release the Hospital, the doctor, and any other persons caring for me from all responsibility or liability for any injury or ill effects that may result from my transfer and agree to accept all responsibility for the consequences of my choice to transfer, rather than receive stabilizing treatment at the Hospital  I understand that because the transfer is my request, my insurance may not provide reimbursement for the services  The Hospital will assist and direct me and my family in how to make arrangements for transfer, but the hospital is not liable for any fees charged by the transport service    In spite of this understanding, I refuse to consent to further medical examination and treatment which has been offered to me, and request transfer to Accepting Facility Name, [address] : [facility]  I authorize the performance of emergency medical procedures and treatments upon me in both transit and upon arrival at the receiving facility  Additionally, I authorize the release of any and all medical records to the receiving facility and request they be transported with me, if possible  I authorize the performance of emergency medical procedures and treatments upon me in both transit and upon arrival at the receiving facility  Additionally, I authorize the release of any and all medical records to the receiving facility and request they be transported with me, if possible  I understand that the safest mode of transportation during a medical emergency is an ambulance and that the Hospital advocates the use of this mode of transport  Risks of traveling to the receiving facility by car, including absence of medical control, life sustaining equipment, such as oxygen, and medical personnel has been explained to me and I fully understand them  (MARCIE CORRECT BOX BELOW)  [  ]  I consent to the stated transfer and to be transported by ambulance/helicopter  [  ]  I consent to the stated transfer, but refuse transportation by ambulance and accept full responsibility for my transportation by car  I understand the risks of non-ambulance transfers and I exonerate the Hospital and its staff from any deterioration in my condition that results from this refusal     X___________________________________________    DATE  22  TIME________  Signature of patient or legally responsible individual signing on patient behalf           RELATIONSHIP TO PATIENT_________________________          Provider Certification    NAME Leonor Sneed                                        [facility] 1927                              MRN [id number]    A medical screening exam was performed on the above named patient    Based on the examination:    Condition Necessitating Transfer The primary encounter diagnosis was Displacement of artificial urinary sphincter ([facility] [address] )  Diagnoses of Hematuria, Hemoptysis, Complication of urinary sphincter implant ([facility] [address] ), and Urinary tract infection were also pertinent to this visit  Patient Condition: The patient has been stabilized such that within reasonable medical probability, no material deterioration of the patient condition or the condition of the unborn child(joo) is likely to result from the transfer    Reason for Transfer: Level of Care needed not available at this facility    Transfer Requirements: Facility [facility] [place]   · Space available and qualified personnel available for treatment as acknowledged by    · Agreed to accept transfer and to provide appropriate medical treatment as acknowledged by       Dr Humaira Graves  · Appropriate medical records of the examination and treatment of the patient are provided at the time of transfer   [address] [facility], [address] _______  · Transfer will be performed by qualified personnel from    and appropriate transfer equipment as required, including the use of necessary and appropriate life support measures      Provider Certification: I have examined the patient and explained the following risks and benefits of being transferred/refusing transfer to the patient/family:  Unanticipated needs of medical equipment and personnel during transport, General risk, such as traffic hazards, adverse weather conditions, rough terrain or turbulence, possible failure of equipment (including vehicle or aircraft), or consequences of actions of persons outside the control of the transport personnel, The possibility of a transport vehicle being unavailable, Risk of worsening condition, Consent was not obtained as patient is committed to psychiatric facility and transfer is mandated      Based on these reasonable risks and benefits to the patient and/or the unborn child(joo), and based upon the information available at the time of the patient’s examination, I certify that the medical benefits reasonably to be expected from the provision of appropriate medical treatments at another medical facility outweigh the increasing risks, if any, to the individual’s medical condition, and in the case of labor to the unborn child, from effecting the transfer      X____________________________________________ DATE 12/11/22        TIME_______      ORIGINAL - SEND TO MEDICAL RECORDS   COPY - SEND WITH PATIENT DURING TRANSFER

## 2022-12-11 NOTE — ED ATTENDING ATTESTATION
12/11/2022  IFaisal DO, saw and evaluated the patient  I have discussed the patient with the resident/non-physician practitioner and agree with the resident's/non-physician practitioner's findings, Plan of Care, and MDM as documented in the resident's/non-physician practitioner's note, except where noted  All available labs and Radiology studies were reviewed  I was present for key portions of any procedure(s) performed by the resident/non-physician practitioner and I was immediately available to provide assistance  At this point I agree with the current assessment done in the Emergency Department  I have conducted an independent evaluation of this patient a history and physical is as follows:    Patient is a 80-year-old male with a history of 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 who presents with hematuria and hemoptysis  Patient states that he had not an episode of coughing this morning and noted blood in his sputum  He denies any additional episodes of coughing or hemoptysis  He denies chest pain or shortness of breath at this time  He is also noted blood and urine in his diaper  Patient family state that his artificial urinary sphincter has not been functioning for about 2 years  Urology initially recommended that it be removed  However his cardiologist and PCP recommended against any surgical procedure given his comorbidities  Therefore the device remains in place, but patient is incontinent of urine once again  He denies abdominal or flank pain  He denies fevers, chills, nausea, vomiting  On exam, patient is in no acute distress  Heart is irregularly irregular  Breath sounds normal   Abdomen is soft, nontender, nondistended  No rebound or guarding  Foul-smelling urine in diaper  Compression device and wires exit right hemiscrotum  Scrotum is not swollen, erythematous or tender to palpation      Discussed case with urology who recommends transfer due to device malfunction and risk of infection  Antibiotics started  Patient and family agreeable to transfer  Portions of the above record have been created with voice recognition software  Occasional wrong word or "sound alike" substitutions may have occurred due to the inherent limitations of voice recognition software  Read the chart carefully and recognize, using context, where substitutions may have occurred        ED Course         Critical Care Time  Procedures

## 2022-12-11 NOTE — ED PROVIDER NOTES
History  Chief Complaint   Patient presents with   • Blood in Urine     Pt complains of waking up this morning to urinating some blood as well as coughing up a scant amount of blood  Thomas Stakes is a 66-year-old male with history of A fib on Eliquis 2 5 mg bid, prostatectomy with urinary sphincter implant in 2001 presenting to the ED due to hematuria and hemoptysis which started this morning  Patient reports 2-3 episodes of hematuria, no blood clots  Also noted 1 episode of blood-streaked sputum this morning  Patient states a few weeks ago, he felt wires coming out of his scrotum  Pt went to urologist in [facility], said the urinary sphincter implant was degenerating however pt was not surgical candidate according to his PCP and cardiologist due to underlying medical conditions  Denies prior episodes  Denies chest pain, shortness of breath, nausea, vomiting, diarrhea, abdominal pain, fevers, chills  Denies dark/tarry or bloody stools  No history of kidney stones or AAA  MDM: UA showing UTI, pt given Rocephin IV 1 g, pending urine culture  Urology was consulted as patient's urinary sphincter implant was located external to the right hemiscrotum  Urology was requesting CT abdomen and pelvis which revealed right inguinal implant with leads extending into the right inguinal canal and subsequently into the scrotum, distal aspect of the device is external to the scrotum  Urology asked for pt to be transferred over to [facility] for OR on Tuesday  Urology placed cardio consult for pre-op clearance  Uro requests to hold Elliquis in the meantime  Prior to Admission Medications   Prescriptions Last Dose Informant Patient Reported? Taking? ALPRAZolam (XANAX) 0 25 mg tablet Not Taking  Yes No   Sig: Take 0 25 mg by mouth 2 (two) times a day as needed for anxiety  Patient not taking: Reported on 12/11/2022   FLUoxetine (PROzac) 40 MG capsule 12/11/2022  Yes Yes   Sig: Take 40 mg by mouth daily     Vitamin D, Cholecalciferol, 1000 UNITS CAPS 12/11/2022  Yes Yes   Sig: Take 2,000 Units by mouth daily in the early morning  acetaminophen-codeine (TYLENOL with CODEINE #3) 300-30 MG per tablet Past Week  Yes Yes   Sig: TAKE 1 TABLET BY MOUTH EVERY SIX HOURS AS NEEDED FOR PAIN   aluminum-magnesium hydroxide 200-200 MG/5ML suspension Past Week  Yes Yes   Sig: Take 15 mL by mouth every 6 (six) hours as needed for heartburn  amLODIPine (NORVASC) 10 mg tablet Not Taking  Yes No   Sig: Take 10 mg by mouth daily  Patient not taking: Reported on 12/11/2022   amiodarone 200 mg tablet 12/11/2022  Yes Yes   Sig: Take 200 mg by mouth daily  apixaban (ELIQUIS) 2 5 mg 12/11/2022  Yes Yes   Sig: Take 1 tablet by mouth every 12 (twelve) hours   dicyclomine (BENTYL) 20 mg tablet Past Week  Yes Yes   Sig: Take 20 mg by mouth every 6 (six) hours   diphenhydrAMINE (SOMINEX) 25 MG tablet Not Taking  Yes No   Sig: Take 25 mg by mouth daily at bedtime as needed for sleep  Patient not taking: Reported on 12/11/2022   famotidine (PEPCID) 20 mg tablet 12/11/2022  Yes Yes   Sig: Take 1 tablet by mouth 2 (two) times a day   furosemide (LASIX) 20 mg tablet Past Week  Yes Yes   Sig: Take 20 mg by mouth daily  imiquimod (ALDARA) 5 % cream Not Taking  Yes No   Sig: Apply 1 packet topically daily at bedtime Indications: mix with tazorac cream  Wash hands prior to and following application  Patient not taking: Reported on 12/11/2022   isosorbide mononitrate (IMDUR) 60 mg 24 hr tablet 12/11/2022  Yes Yes   Sig: Take 60 mg by mouth daily   levothyroxine 88 mcg tablet 12/11/2022  Yes Yes   Sig: Take 88 mcg by mouth   lidocaine (LIDODERM) 5 % Not Taking  Yes No   Sig: Place 1 patch on the skin daily as needed for mild pain  Remove & Discard patch within 12 hours or as directed by MD   Patient not taking: Reported on 12/11/2022   losartan (COZAAR) 50 mg tablet Not Taking  Yes No   Sig: Take 50 mg by mouth daily     Patient not taking: Reported on 2022   oxyCODONE-acetaminophen (PERCOCET) 5-325 mg per tablet Past Week  Yes Yes   Sig: Take 1 tablet by mouth every 6 (six) hours as needed for moderate pain  pantoprazole (PROTONIX) 40 mg tablet 2022  Yes Yes   Sig: Take 20 mg by mouth 2 (two) times a day  potassium chloride (K-DUR,KLOR-CON) 20 mEq tablet 2022  Yes Yes   Sig: Take 1 tablet by mouth daily   pravastatin (PRAVACHOL) 20 mg tablet 12/10/2022  Yes Yes   Sig: Take 20 mg by mouth daily  pyridostigmine (MESTINON) 60 mg tablet 2022  Yes Yes   Sig: Take 60 mg by mouth 4 (four) times a day  pyridostigmine (MESTINON) 60 mg tablet 2022  Yes Yes   Sig: Take by mouth   ranitidine (ZANTAC) 150 MG capsule Not Taking  Yes No   Sig: Take 150 mg by mouth 2 (two) times a day as needed for indigestion or heartburn  Patient not taking: Reported on 2022   saxagliptin (ONGLYZA) 2 5 MG tablet 2022  Yes Yes   Si tablet daily   sucralfate (CARAFATE) 1 g/10 mL suspension Past Week  Yes Yes   Sig: Take 1 g by mouth   tazarotene (TAZORAC) 0 05 % cream Not Taking  Yes No   Sig: Apply 1 application topically daily at bedtime Indications: mix with imiquimod  Patient not taking: Reported on 2022   warfarin (COUMADIN) 1 mg tablet Not Taking  Yes No   Sig: Take 1 mg by mouth daily     Patient not taking: Reported on 2022      Facility-Administered Medications: None       Past Medical History:   Diagnosis Date   • Anxiety    • Arthritis    • Coronary artery disease    • Hiatal hernia    • Hypertension    • Irregular heart beat    • Myasthenia gravis [facility])        Past Surgical History:   Procedure Laterality Date   • EYE SURGERY      cataracts   • HERNIA REPAIR  1971    right inguinal hernia repair   • VT LAPAROSCOPY COLECTOMY PARTIAL W/ANASTOMOSIS N/A 6/3/2016    Procedure: LAPAROSCOPIC SIGMOID RESECTION ;  Surgeon: Demetria Gosselin, MD;  [facility];  Service: Colorectal   • VT LAPS COLECTOMY PRTL W/COLOPXTSTMY LW ANAST N/A 6/3/2016    Procedure: COLECTOMY LAPAROSCOPIC ASSISTED;  Surgeon: Calvin Josue MD;  [facility];  Service: Colorectal   • WV SIGMOIDOSCOPY FLX DX W/COLLJ SPEC BR/WA IF PFRMD N/A 6/3/2016    Procedure: Latoya Montiel;  Surgeon: Calvin Josue MD;  [facility];  Service: Colorectal   • PROSTATECTOMY     • REPLACEMENT TOTAL KNEE Right    • URINARY SPHINCTER IMPLANT  2001    s/p prostatectomy- pt had urinary leakage issues       Family History   Problem Relation Age of Onset   • Diabetes Brother      I have reviewed and agree with the history as documented  E-Cigarette/Vaping     E-Cigarette/Vaping Substances     Social History     Tobacco Use   • Smoking status: Former   • Tobacco comments:     quit smoking when 25 y/o   Substance Use Topics   • Alcohol use: No   • Drug use: No        Review of Systems   Constitutional: Negative for chills and fever  HENT: Negative for ear pain and sore throat  Eyes: Negative for pain and visual disturbance  Respiratory: Negative for cough and shortness of breath  Hemoptysis   Cardiovascular: Negative for chest pain and palpitations  Gastrointestinal: Negative for abdominal pain and vomiting  Genitourinary: Positive for hematuria  Negative for dysuria  Musculoskeletal: Negative for arthralgias and back pain  Skin: Negative for color change and rash  Neurological: Negative for seizures and syncope  All other systems reviewed and are negative        Physical Exam  ED Triage Vitals   Temperature Pulse Respirations Blood Pressure SpO2   12/11/22 1027 12/11/22 1027 12/11/22 1027 12/11/22 1030 12/11/22 1027   97 8 °F (36 6 °C) 60 16 (!) 187/82 96 %      Temp src Heart Rate Source Patient Position - Orthostatic VS BP Location FiO2 (%)   -- 12/11/22 1354 12/11/22 1027 12/11/22 1027 --    Monitor Sitting Right arm       Pain Score       12/11/22 1354       No Pain             Orthostatic Vital Signs  Vitals:    12/11/22 1027 12/11/22 1030 12/11/22 1354   BP:  (!) 187/82 (!) 186/80   Pulse: 60  58   Patient Position - Orthostatic VS: Sitting  Sitting       Physical Exam  Vitals and nursing note reviewed  Exam conducted with a chaperone present  Constitutional:       General: He is not in acute distress  Appearance: He is well-developed  HENT:      Head: Normocephalic and atraumatic  Mouth/Throat:      Mouth: Mucous membranes are moist       Pharynx: Oropharynx is clear  Eyes:      Conjunctiva/sclera: Conjunctivae normal       Pupils: Pupils are equal, round, and reactive to light  Cardiovascular:      Rate and Rhythm: Normal rate and regular rhythm  Pulses: Normal pulses  Radial pulses are 2+ on the right side and 2+ on the left side  Dorsalis pedis pulses are 2+ on the right side and 2+ on the left side  Heart sounds: Normal heart sounds, S1 normal and S2 normal  No murmur heard  Pulmonary:      Effort: Pulmonary effort is normal  No respiratory distress  Breath sounds: Normal breath sounds and air entry  Abdominal:      Palpations: Abdomen is soft  Tenderness: There is no abdominal tenderness  Comments: Ab soft nontender nonperitoneal, no CVA tenderness bilaterally   Genitourinary:     Penis: Normal and circumcised  Testes: Normal  Cremasteric reflex is present  Comments: Urinary sphincter implant pump and 2 wires are external/visible from R scrotum  Musculoskeletal:         General: No swelling  Cervical back: Neck supple  Right lower leg: No edema  Left lower leg: No edema  Skin:     General: Skin is warm and dry  Capillary Refill: Capillary refill takes less than 2 seconds  Neurological:      Mental Status: He is alert     Psychiatric:         Mood and Affect: Mood normal          ED Medications  Medications   ceftriaxone (ROCEPHIN) 1 g/50 mL in dextrose IVPB (0 mg Intravenous Stopped 12/11/22 1427) iohexol (OMNIPAQUE) 350 MG/ML injection (SINGLE-DOSE) 100 mL (100 mL Intravenous Given 12/11/22 1322)       Diagnostic Studies  Results Reviewed     Procedure Component Value Units Date/Time    Urine Microscopic [[id number]]  (Abnormal) Collected: 12/11/22 1132    Lab Status: Final result Specimen: Urine, Clean Catch Updated: 12/11/22 1233     RBC, UA 10-20 /hpf      WBC, UA Innumerable /hpf      Epithelial Cells Occasional /hpf      Bacteria, UA Innumerable /hpf      WBC Clumps Present    Urine culture [[id number]] Collected: 12/11/22 1132    Lab Status:  In process Specimen: Urine, Clean Catch Updated: 12/11/22 1233    Protime-INR [[id number]]  (Abnormal) Collected: 12/11/22 1137    Lab Status: Final result Specimen: Blood from Arm, Right Updated: 12/11/22 1220     Protime 16 6 seconds      INR 1 32    APTT [[id number]]  (Abnormal) Collected: 12/11/22 1137    Lab Status: Final result Specimen: Blood from Arm, Right Updated: 12/11/22 1220     PTT 47 seconds     Comprehensive metabolic panel [[id number]]  (Abnormal) Collected: 12/11/22 1137    Lab Status: Final result Specimen: Blood from Arm, Right Updated: 12/11/22 1213     Sodium 138 mmol/L      Potassium 4 7 mmol/L      Chloride 106 mmol/L      CO2 27 mmol/L      ANION GAP 5 mmol/L      BUN 24 mg/dL      Creatinine 1 22 mg/dL      Glucose 163 mg/dL      Calcium 8 9 mg/dL      AST 28 U/L      ALT 20 U/L      Alkaline Phosphatase 65 U/L      Total Protein 6 6 g/dL      Albumin 3 5 g/dL      Total Bilirubin 0 48 mg/dL      eGFR 50 ml/min/1 73sq m     Narrative:      [place] guidelines for Chronic Kidney Disease (CKD):   •  Stage 1 with normal or high GFR (GFR > 90 mL/min/1 73 square meters)  •  Stage 2 Mild CKD (GFR = 60-89 mL/min/1 73 square meters)  •  Stage 3A Moderate CKD (GFR = 45-59 mL/min/1 73 square meters)  •  Stage 3B Moderate CKD (GFR = 30-44 mL/min/1 73 square meters)  •  Stage 4 Severe CKD (GFR = 15-29 mL/min/1 73 square meters)  •  Stage 5 End Stage CKD (GFR <15 mL/min/1 73 square meters)  Note: GFR calculation is accurate only with a steady state creatinine    UA w Reflex to Microscopic w Reflex to Culture [[id number]]  (Abnormal) Collected: 12/11/22 1132    Lab Status: Final result Specimen: Urine, Clean Catch Updated: 12/11/22 1154     Color, UA Light Yellow     Clarity, UA Turbid     Specific [place], UA 1 019     pH, UA 5 0     Leukocytes, UA Large     Nitrite, UA Negative     Protein, UA Trace mg/dl      Glucose, UA Negative mg/dl      Ketones, UA Negative mg/dl      Urobilinogen, UA <2 0 mg/dl      Bilirubin, UA Negative     Occult Blood, UA Small    CBC and differential [[id number]]  (Abnormal) Collected: 12/11/22 1137    Lab Status: Final result Specimen: Blood from Arm, Right Updated: 12/11/22 1153     WBC 6 20 Thousand/uL      RBC 4 19 Million/uL      Hemoglobin 12 1 g/dL      Hematocrit 37 7 %      MCV 90 fL      MCH 28 9 pg      MCHC 32 1 g/dL      RDW 13 9 %      MPV 10 2 fL      Platelets 823 Thousands/uL      nRBC 0 /100 WBCs      Neutrophils Relative 72 %      Immat GRANS % 0 %      Lymphocytes Relative 17 %      Monocytes Relative 8 %      Eosinophils Relative 1 %      Basophils Relative 2 %      Neutrophils Absolute 4 49 Thousands/µL      Immature Grans Absolute 0 02 Thousand/uL      Lymphocytes Absolute 1 07 Thousands/µL      Monocytes Absolute 0 50 Thousand/µL      Eosinophils Absolute 0 03 Thousand/µL      Basophils Absolute 0 09 Thousands/µL                  CT abdomen pelvis with contrast   Final Result by Sonam Cuellar MD (12/11 3731)      Right inguinal implant with leads extending into the right inguinal canal and subsequently into the scrotum, distal aspect of the device is external to the scrotum              Workstation performed: [id number]         XR chest 2 views   ED Interpretation by Raul Avila MD (12/11 8050)   No acute cardiopulmonary disease            Procedures  Procedures      ED Course  ED Course as of 12/11/22 1547   Sun Dec 11, 2022   1150 Urology AP texted for graham [place])    Leukocytes, UA(!): Large  Most recent urine culture is under CareEverywhere from 9/15/2021 with >100,000 CFU/mL of enterobacter Aerogenes  Susceptible to everything but Nitrofurantoin  Will use this urine culture for sensitivities as it is most recent one   1154 Blood, UA(!): Small   1234 Bacteria, UA(!): Innumerable   1234 WBC, UA(!): Innumerable   1238 Urology requesting CT of ab/pelvis  Transfer to [facility] for OR tomorrow  Abx and [facility] admit   1410 CT ab/pelvis: Right inguinal implant with leads extending into the right inguinal canal and subsequently into the scrotum, distal aspect of the device is external to the scrotum     [address] form signed and on pt's clipboard                                       MDM  Number of Diagnoses or Management Options     Amount and/or Complexity of Data Reviewed  Clinical lab tests: ordered and reviewed  Tests in the radiology section of CPT®: ordered and reviewed  Tests in the medicine section of CPT®: reviewed and ordered  Obtain history from someone other than the patient: yes  Review and summarize past medical records: yes  Discuss the patient with other providers: yes  Independent visualization of images, tracings, or specimens: yes    Risk of Complications, Morbidity, and/or Mortality  Presenting problems: moderate  Diagnostic procedures: moderate  Management options: moderate    Patient Progress  Patient progress: stable      Disposition  Final diagnoses:   Hematuria   Hemoptysis   Complication of urinary sphincter implant ([address] )   Urinary tract infection     Time reflects when diagnosis was documented in both MDM as applicable and the Disposition within this note     Time User Action Codes Description Comment    12/11/2022 12:37 PM Andreas Uriarte Add [X06 365A] Displacement of artificial urinary sphincter ([address] )     12/11/2022  2:15 PM Sandie Francisco 729 [R31 9] Hematuria     12/11/2022  2:15 PM Baldo Marvin Add [R04 2] Hemoptysis     12/11/2022  2:15 PM Baldo Marvin Add [T83  9XXA] Complication of urinary sphincter implant ([address] )     12/11/2022  2:15 PM Mayte Francisco Fulling Add [N39 0] Urinary tract infection       ED Disposition     ED Disposition   Transfer to Another Facility-In Network    Condition   --    Date/Time   Sun Dec 11, 2022  2:15 PM    Comment   [place] should be transferred out to [facility]             MD Documentation    Alyx Shore Most Recent Value   Patient Condition The patient has been stabilized such that within reasonable medical probability, no material deterioration of the patient condition or the condition of the unborn child(joo) is likely to result from the transfer   Reason for Transfer Level of Care needed not available at this facility   Benefits of Transfer Specialized equipment and/or services available at the receiving facility (Include comment)________________________  [urology]   Risks of Transfer Potential for delay in receiving treatment, Potential deterioration of medical condition, Loss of IV, Increased discomfort during transfer, Possible worsening of condition or death during transfer   Accepting Physician Dr Scott Pringle, [facility] MD Christina Grier MD, PGY-2   Provider Certification Unanticipated needs of medical equipment and personnel during transport, General risk, such as traffic hazards, adverse weather conditions, rough terrain or turbulence, possible failure of equipment (including vehicle or aircraft), or consequences of actions of persons outside the control of the transport personnel, The possibility of a transport vehicle being unavailable, Risk of worsening condition, Consent was not obtained as patient is committed to psychiatric facility and transfer is mandated      RN Documentation    [address] Jennyfer Pringle, [facility] [place], [place]      Follow-up Information    None         Discharge Medication List as of 12/11/2022  3:26 PM      CONTINUE these medications which have NOT CHANGED    Details   acetaminophen-codeine (TYLENOL with CODEINE #3) 300-30 MG per tablet TAKE 1 TABLET BY MOUTH EVERY SIX HOURS AS NEEDED FOR PAIN, Historical Med      aluminum-magnesium hydroxide 200-200 MG/5ML suspension Take 15 mL by mouth every 6 (six) hours as needed for heartburn   , Historical Med      amiodarone 200 mg tablet Take 200 mg by mouth daily  , Historical Med      apixaban (ELIQUIS) 2 5 mg Take 1 tablet by mouth every 12 (twelve) hours, Starting Mon 11/21/2022, Historical Med      dicyclomine (BENTYL) 20 mg tablet Take 20 mg by mouth every 6 (six) hours, Historical Med      famotidine (PEPCID) 20 mg tablet Take 1 tablet by mouth 2 (two) times a day, Starting Wed 11/16/2022, Historical Med      FLUoxetine (PROzac) 40 MG capsule Take 40 mg by mouth daily  , Historical Med      furosemide (LASIX) 20 mg tablet Take 20 mg by mouth daily  , Historical Med      isosorbide mononitrate (IMDUR) 60 mg 24 hr tablet Take 60 mg by mouth daily, Historical Med      levothyroxine 88 mcg tablet Take 88 mcg by mouth, Starting Wed 11/2/2022, Until Tue 1/31/2023 at 2359, Historical Med      oxyCODONE-acetaminophen (PERCOCET) 5-325 mg per tablet Take 1 tablet by mouth every 6 (six) hours as needed for moderate pain   , Historical Med      pantoprazole (PROTONIX) 40 mg tablet Take 20 mg by mouth 2 (two) times a day   , Historical Med      potassium chloride (K-DUR,KLOR-CON) 20 mEq tablet Take 1 tablet by mouth daily, Starting Mon 11/21/2022, Historical Med      pravastatin (PRAVACHOL) 20 mg tablet Take 20 mg by mouth daily  , Historical Med      !! pyridostigmine (MESTINON) 60 mg tablet Take 60 mg by mouth 4 (four) times a day , Historical Med      !! pyridostigmine (MESTINON) 60 mg tablet Take by mouth, Historical Med      saxagliptin (ONGLYZA) 2 5 MG tablet 1 tablet daily, Starting Thu 12/8/2022, Historical Med      sucralfate (CARAFATE) 1 g/10 mL suspension Take 1 g by mouth, Starting Fri 12/2/2022, Until Sun 1/1/2023 at 2359, Historical Med      Vitamin D, Cholecalciferol, 1000 UNITS CAPS Take 2,000 Units by mouth daily in the early morning   , Historical Med      ALPRAZolam (XANAX) 0 25 mg tablet Take 0 25 mg by mouth 2 (two) times a day as needed for anxiety  , Historical Med      amLODIPine (NORVASC) 10 mg tablet Take 10 mg by mouth daily  , Historical Med      diphenhydrAMINE (SOMINEX) 25 MG tablet Take 25 mg by mouth daily at bedtime as needed for sleep , Historical Med      imiquimod (ALDARA) 5 % cream Apply 1 packet topically daily at bedtime Indications: mix with tazorac cream  Wash hands prior to and following application  , Until Discontinued, Historical Med      lidocaine (LIDODERM) 5 % Place 1 patch on the skin daily as needed for mild pain  Remove & Discard patch within 12 hours or as directed by MD, Historical Med      losartan (COZAAR) 50 mg tablet Take 50 mg by mouth daily  , Historical Med      ranitidine (ZANTAC) 150 MG capsule Take 150 mg by mouth 2 (two) times a day as needed for indigestion or heartburn , Historical Med      tazarotene (TAZORAC) 0 05 % cream Apply 1 application topically daily at bedtime Indications: mix with imiquimod  , Until Discontinued, Historical Med      warfarin (COUMADIN) 1 mg tablet Take 1 mg by mouth daily  , Historical Med       !! - Potential duplicate medications found  Please discuss with provider  No discharge procedures on file  PDMP Review       Value Time User    PDMP Reviewed  Yes 10/19/2022  4:32 PM Annemarie Canavan, PA-C           ED Provider  Attending physically available and evaluated Valerie Salguero  JOSEFA managed the patient along with the ED Attending      Electronically Signed by         Edgardo Alexander MD  12/11/22 8231

## 2022-12-12 ENCOUNTER — APPOINTMENT (INPATIENT)
Dept: NON INVASIVE DIAGNOSTICS | Facility: HOSPITAL | Age: 87
End: 2022-12-12

## 2022-12-12 PROBLEM — E11.9 TYPE 2 DIABETES MELLITUS, WITHOUT LONG-TERM CURRENT USE OF INSULIN (HCC): Status: ACTIVE | Noted: 2022-12-12

## 2022-12-12 PROBLEM — I10 HYPERTENSION: Status: ACTIVE | Noted: 2022-12-12

## 2022-12-12 LAB
ANION GAP SERPL CALCULATED.3IONS-SCNC: 3 MMOL/L (ref 4–13)
AORTIC ROOT: 2.9 CM
AORTIC VALVE MEAN VELOCITY: 22.9 M/S
APICAL FOUR CHAMBER EJECTION FRACTION: 56 %
ATRIAL RATE: 58 BPM
AV LVOT MEAN GRADIENT: 1 MMHG
AV LVOT PEAK GRADIENT: 2 MMHG
AV MEAN GRADIENT: 23 MMHG
AV PEAK GRADIENT: 37 MMHG
AV VELOCITY RATIO: 0.26
BUN SERPL-MCNC: 19 MG/DL (ref 5–25)
CALCIUM SERPL-MCNC: 8.7 MG/DL (ref 8.3–10.1)
CHLORIDE SERPL-SCNC: 107 MMOL/L (ref 96–108)
CO2 SERPL-SCNC: 26 MMOL/L (ref 21–32)
CREAT SERPL-MCNC: 1.19 MG/DL (ref 0.6–1.3)
DOP CALC AO PEAK VEL: 3.04 M/S
DOP CALC AO VTI: 68.87 CM
DOP CALC LVOT PEAK VEL VTI: 18.78 CM
DOP CALC LVOT PEAK VEL: 0.78 M/S
E WAVE DECELERATION TIME: 218 MS
ERYTHROCYTE [DISTWIDTH] IN BLOOD BY AUTOMATED COUNT: 13.6 % (ref 11.6–15.1)
FRACTIONAL SHORTENING: 33 % (ref 28–44)
GFR SERPL CREATININE-BSD FRML MDRD: 51 ML/MIN/1.73SQ M
GLUCOSE SERPL-MCNC: 136 MG/DL (ref 65–140)
GLUCOSE SERPL-MCNC: 148 MG/DL (ref 65–140)
GLUCOSE SERPL-MCNC: 171 MG/DL (ref 65–140)
GLUCOSE SERPL-MCNC: 195 MG/DL (ref 65–140)
GLUCOSE SERPL-MCNC: 224 MG/DL (ref 65–140)
HCT VFR BLD AUTO: 36.6 % (ref 36.5–49.3)
HGB BLD-MCNC: 12 G/DL (ref 12–17)
INTERVENTRICULAR SEPTUM IN DIASTOLE (PARASTERNAL SHORT AXIS VIEW): 0.9 CM
INTERVENTRICULAR SEPTUM: 0.9 CM (ref 0.6–1.1)
LAAS-AP2: 18.5 CM2
LAAS-AP4: 23.5 CM2
LEFT ATRIUM SIZE: 4.7 CM
LEFT INTERNAL DIMENSION IN SYSTOLE: 2.8 CM (ref 2.1–4)
LEFT VENTRICULAR INTERNAL DIMENSION IN DIASTOLE: 4.2 CM (ref 3.5–6)
LEFT VENTRICULAR POSTERIOR WALL IN END DIASTOLE: 1 CM
LEFT VENTRICULAR STROKE VOLUME: 52 ML
LVSV (TEICH): 52 ML
MCH RBC QN AUTO: 29.6 PG (ref 26.8–34.3)
MCHC RBC AUTO-ENTMCNC: 32.8 G/DL (ref 31.4–37.4)
MCV RBC AUTO: 90 FL (ref 82–98)
MV E'TISSUE VEL-LAT: 8 CM/S
MV E'TISSUE VEL-SEP: 6 CM/S
MV PEAK A VEL: 0.83 M/S
MV PEAK E VEL: 127 CM/S
MV STENOSIS PRESSURE HALF TIME: 63 MS
MV VALVE AREA P 1/2 METHOD: 3.49 CM2
PLATELET # BLD AUTO: 179 THOUSANDS/UL (ref 149–390)
PMV BLD AUTO: 10.5 FL (ref 8.9–12.7)
POTASSIUM SERPL-SCNC: 4.2 MMOL/L (ref 3.5–5.3)
PR INTERVAL: 360 MS
PROCALCITONIN SERPL-MCNC: 0.06 NG/ML
QRS AXIS: 111 DEGREES
QRSD INTERVAL: 96 MS
QT INTERVAL: 506 MS
QTC INTERVAL: 484 MS
RA PRESSURE ESTIMATED: 3 MMHG
RBC # BLD AUTO: 4.06 MILLION/UL (ref 3.88–5.62)
RIGHT ATRIUM AREA SYSTOLE A4C: 16.5 CM2
SL CV LEFT ATRIUM LENGTH A2C: 5.5 CM
SL CV LV EF: 55
SL CV PED ECHO LEFT VENTRICLE DIASTOLIC VOLUME (MOD BIPLANE) 2D: 81 ML
SL CV PED ECHO LEFT VENTRICLE SYSTOLIC VOLUME (MOD BIPLANE) 2D: 29 ML
SODIUM SERPL-SCNC: 136 MMOL/L (ref 135–147)
T WAVE AXIS: 79 DEGREES
VENTRICULAR RATE: 55 BPM
WBC # BLD AUTO: 7.61 THOUSAND/UL (ref 4.31–10.16)

## 2022-12-12 RX ORDER — HYDRALAZINE HYDROCHLORIDE 20 MG/ML
5 INJECTION INTRAMUSCULAR; INTRAVENOUS EVERY 6 HOURS PRN
Status: DISCONTINUED | OUTPATIENT
Start: 2022-12-12 | End: 2022-12-17 | Stop reason: HOSPADM

## 2022-12-12 RX ADMIN — SUCRALFATE 1 G: 1 TABLET ORAL at 05:55

## 2022-12-12 RX ADMIN — PYRIDOSTIGMINE BROMIDE 60 MG: 60 TABLET ORAL at 08:35

## 2022-12-12 RX ADMIN — PRAVASTATIN SODIUM 20 MG: 20 TABLET ORAL at 08:34

## 2022-12-12 RX ADMIN — PYRIDOSTIGMINE BROMIDE 60 MG: 60 TABLET ORAL at 21:56

## 2022-12-12 RX ADMIN — FUROSEMIDE 20 MG: 20 TABLET ORAL at 08:34

## 2022-12-12 RX ADMIN — INSULIN LISPRO 2 UNITS: 100 INJECTION, SOLUTION INTRAVENOUS; SUBCUTANEOUS at 11:52

## 2022-12-12 RX ADMIN — PANTOPRAZOLE SODIUM 20 MG: 20 TABLET, DELAYED RELEASE ORAL at 08:33

## 2022-12-12 RX ADMIN — AMIODARONE HYDROCHLORIDE 200 MG: 200 TABLET ORAL at 08:33

## 2022-12-12 RX ADMIN — ISOSORBIDE MONONITRATE 60 MG: 60 TABLET, EXTENDED RELEASE ORAL at 08:34

## 2022-12-12 RX ADMIN — SUCRALFATE 1 G: 1 TABLET ORAL at 11:53

## 2022-12-12 RX ADMIN — PYRIDOSTIGMINE BROMIDE 60 MG: 60 TABLET ORAL at 11:56

## 2022-12-12 RX ADMIN — SUCRALFATE 1 G: 1 TABLET ORAL at 21:55

## 2022-12-12 RX ADMIN — INSULIN LISPRO 1 UNITS: 100 INJECTION, SOLUTION INTRAVENOUS; SUBCUTANEOUS at 17:14

## 2022-12-12 RX ADMIN — CEFTRIAXONE 1000 MG: 1 INJECTION, POWDER, FOR SOLUTION INTRAMUSCULAR; INTRAVENOUS at 13:56

## 2022-12-12 RX ADMIN — LEVOTHYROXINE SODIUM 88 MCG: 88 TABLET ORAL at 05:54

## 2022-12-12 RX ADMIN — INSULIN LISPRO 1 UNITS: 100 INJECTION, SOLUTION INTRAVENOUS; SUBCUTANEOUS at 21:58

## 2022-12-12 RX ADMIN — FAMOTIDINE 20 MG: 20 TABLET ORAL at 17:15

## 2022-12-12 RX ADMIN — PANTOPRAZOLE SODIUM 20 MG: 20 TABLET, DELAYED RELEASE ORAL at 17:15

## 2022-12-12 RX ADMIN — FAMOTIDINE 20 MG: 20 TABLET ORAL at 08:33

## 2022-12-12 RX ADMIN — PYRIDOSTIGMINE BROMIDE 60 MG: 60 TABLET ORAL at 17:15

## 2022-12-12 RX ADMIN — SUCRALFATE 1 G: 1 TABLET ORAL at 17:14

## 2022-12-12 RX ADMIN — FLUOXETINE 40 MG: 20 CAPSULE ORAL at 08:34

## 2022-12-12 NOTE — ASSESSMENT & PLAN NOTE
· Denies any chest pain or shortness of breath   · Continue statin   · Cardiology consulted for pre-operative clearance  · ECHO ordered, follow up results

## 2022-12-12 NOTE — ASSESSMENT & PLAN NOTE
No results found for: HGBA1C    Recent Labs     12/11/22  1619 12/11/22  2229 12/12/22  0556 12/12/22  1032   POCGLU 156* 142* 136 224*       Blood Sugar Average: Last 72 hrs:  (P) 180   · Pt maintained on Onglyza 2 5 mg daily as an outpatient, on hold here  · Continue SSI coverage while inpatient   · QID glucose checks  · Check hgbA1c  · Monitor and adjust regimen as needed

## 2022-12-12 NOTE — ASSESSMENT & PLAN NOTE
· UA positive for leukocyte, bacteria, pending final urine culture  · Continue IV rocephin for now pending final results and sensitivities   · Monitor, appreciate urology recommendations

## 2022-12-12 NOTE — UTILIZATION REVIEW
Initial Clinical Review    Admission: Date/Time/Statement:   Admission Orders (From admission, onward)     Ordered        12/11/22 1545  Inpatient Admission  Once                      Orders Placed This Encounter   Procedures   • Inpatient Admission     Standing Status:   Standing     Number of Occurrences:   1     Order Specific Question:   Level of Care     Answer:   Med Surg [16]     Order Specific Question:   Estimated length of stay     Answer:   More than 2 Midnights     Order Specific Question:   Certification     Answer:   I certify that inpatient services are medically necessary for this patient for a duration of greater than two midnights  See H&P and MD Progress Notes for additional information about the patient's course of treatment  ED Arrival Information     Patient not seen in ED                       Initial Presentation: 80 y o  male, transfer from [facility] ED for Urology intervention  Presented for hematuria and a wire protruding out of the scrotum  Previously Urology recommended pt for his removal, however at the time he was not cleared by Cardiology and Medicine team to undergo surgery for being high risk  However today he returns with UTI, hematuria, and worsening symptoms  He is also c/o 1 episode of isolated hemoptysis with blood-tinged sputum which has since resolved  PMH for Prostate cancer s/p post prostatectomy 20 yrs ago, resulting in 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  Diastolic dysfunction, Depression, CAD, Myasthenia gravis and Chronic A-fib on Eliquis  '  Admit Inpatient level of care for Displacement of artificial urinary sphincter, Acute cystitis with hematuria, Hemoptysis  Plan for removal of the artifical sphincter on Tuesday  UA positive for UTI  Start Iv antibiotics  Final cultures pending  Cardiology consult for preop clearance  Date: 12/12   Day 2:   Cardiology cons; Pt low risk for preop     Increased but not prohibitive cardiac risk for planned urology procedure  Paroxysmal atrial fibrillation - continue rhythm control strategy with amiodarone 200 mg daily  Contine periop  OK to hold Eliquis for 48 hours pre-op, resume after based on hemostatic considerations  Echo pending  EKG with NSR with 1st degrees AV block  HTN - goal /90, presently above goal  On Imdur 60 mg daily and lasix 20 mg daily  BP has been >160 SBP  No need to get to goal pre-op, consider adding amlodipine 5 mg daily post-op  HFpEF - relatively euvolemic, on lasix 20 mg daily at home, continue  Cannot add beta blocker due to baseline bradycardia  Progress notes; Continue Iv antibiotics  F/u urine culture  Hold Eliquis  Check Procalcitonin  Continue Lasix 20 mg daily  BP elevated throughout hospitalization  Currently on imdur 60 mg daily and lasix 20 mg daily, continue  Add PRN hydralazine for SBP > 180  Unable to add BB in setting of baseline bradycardia  Tentative for OR tomorrow 12/12 for Artificial urinary sphincter removal      12/12 Tentative OR for REMOVAL ARTIFICIAL URINARY SPINCTER BILATERAL (Bladder)   CYSTOSCOPY (Bladder)   INSERTION SUPRAPUBIC CATHETER PERCUTANEOUS (Bladder)       Vitals   Temperature Pulse Respirations Blood Pressure SpO2   12/11/22 1548 12/11/22 1548 12/11/22 1548 12/11/22 1548 12/11/22 1548   98 2 °F (36 8 °C) (!) 54 16 161/72 96 %      Temp src Heart Rate Source Patient Position - Orthostatic VS BP Location FiO2 (%)   -- -- -- -- --             Pain Score       12/11/22 1545       No Pain          Wt Readings from Last 1 Encounters:   12/12/22 63 5 kg (140 lb)     Additional Vital Signs:   12/12/22 1021 -- 59 -- 176/74   Abnormal  -- --   12/12/22 07:55:52 98 9 °F (37 2 °C) 59 -- 176/74   Abnormal  108 96 %   12/11/22 21:04:40 100 °F (37 8 °C) 62 16 166/75 105 96 %     Pertinent Labs/Diagnostic Test Results:   12/11  CXR - No acute cardiopulmonary disease      CT Abd/Pelvis - Right inguinal implant with leads extending into the right inguinal canal and subsequently into the scrotum, distal aspect of the device is external to the scrotum      EKG - Sinus bradycardia with 1st degree A-V block         Results from last 7 days   Lab Units 12/12/22  0448 12/11/22  1137   WBC Thousand/uL 7 61 6 20   HEMOGLOBIN g/dL 12 0 12 1   HEMATOCRIT % 36 6 37 7   PLATELETS Thousands/uL 179 176   NEUTROS ABS Thousands/µL  --  4 49         Results from last 7 days   Lab Units 12/12/22  0448 12/11/22  1137   SODIUM mmol/L 136 138   POTASSIUM mmol/L 4 2 4 7   CHLORIDE mmol/L 107 106   CO2 mmol/L 26 27   ANION GAP mmol/L 3* 5   BUN mg/dL 19 24   CREATININE mg/dL 1 19 1 22   EGFR ml/min/1 73sq m 51 50   CALCIUM mg/dL 8 7 8 9     Results from last 7 days   Lab Units 12/11/22  1137   AST U/L 28   ALT U/L 20   ALK PHOS U/L 65   TOTAL PROTEIN g/dL 6 6   ALBUMIN g/dL 3 5   TOTAL BILIRUBIN mg/dL 0 48     Results from last 7 days   Lab Units 12/12/22  1032 12/12/22  0556 12/11/22  2229 12/11/22  1619   POC GLUCOSE mg/dl 224* 136 142* 156*     Results from last 7 days   Lab Units 12/12/22  0448 12/11/22  1137   GLUCOSE RANDOM mg/dL 148* 163*       Results from last 7 days   Lab Units 12/11/22  1137   PROTIME seconds 16 6*   INR  1 32*   PTT seconds 47*         Results from last 7 days   Lab Units 12/12/22  0448   PROCALCITONIN ng/ml 0 06         Results from last 7 days   Lab Units 12/11/22  1132   CLARITY UA  Turbid   COLOR UA  Light Yellow   SPEC GRAV UA  1 019   PH UA  5 0   GLUCOSE UA mg/dl Negative   KETONES UA mg/dl Negative   BLOOD UA  Small*   PROTEIN UA mg/dl Trace*   NITRITE UA  Negative   BILIRUBIN UA  Negative   UROBILINOGEN UA (BE) mg/dl <2 0   LEUKOCYTES UA  Large*   WBC UA /hpf Innumerable*   RBC UA /hpf 10-20*   BACTERIA UA /hpf Innumerable*   EPITHELIAL CELLS WET PREP /hpf Occasional       Results from last 7 days   Lab Units 12/11/22  1132   URINE CULTURE  >100,000 cfu/ml Gram Negative Shahzad Enteric Like*  <10,000 cfu/ml Proteus species*       Past Medical History:   Diagnosis Date   • Anxiety    • Arthritis    • Coronary artery disease    • Hiatal hernia    • Hypertension    • Irregular heart beat    • Myasthenia gravis ([address] )      Present on Admission:  • CAD (coronary artery disease)  • Chronic a-fib (HCC)  • Myasthenia gravis (HCC)      Admitting Diagnosis: Displacement of artificial urinary sphincter (HCC)  Age/Sex: 80 y o  male     Admission Orders:  Scheduled Medications:  amiodarone, 200 mg, Oral, Daily  cefTRIAXone, 1,000 mg, Intravenous, Q24H  famotidine, 20 mg, Oral, BID  FLUoxetine, 40 mg, Oral, Daily  furosemide, 20 mg, Oral, Daily  insulin lispro, 1-5 Units, Subcutaneous, HS  insulin lispro, 1-6 Units, Subcutaneous, TID AC  isosorbide mononitrate, 60 mg, Oral, Daily  levothyroxine, 88 mcg, Oral, Early Morning  pantoprazole, 20 mg, Oral, BID  pravastatin, 20 mg, Oral, Daily  pyridostigmine, 60 mg, Oral, 4x Daily  sucralfate, 1 g, Oral, 4x Daily (AC & HS)      Continuous IV Infusions: None     PRN Meds:  acetaminophen, 650 mg, Oral, Q6H PRN  ALPRAZolam, 0 25 mg, Oral, BID PRN  dicyclomine, 20 mg, Oral, BID PRN  hydrALAZINE, 5 mg, Intravenous, Q6H PRN  ondansetron, 4 mg, Intravenous, Q6H PRN        IP CONSULT TO CARDIOLOGY    Network Utilization Review Department  ATTENTION: Please call with any questions or concerns to [phone] and carefully listen to the prompts so that you are directed to the right person  All voicemails are confidential   Teresa Michaels all requests for admission clinical reviews, approved or denied determinations and any other requests to dedicated fax number below belonging to the campus where the patient is receiving treatment   List of dedicated fax numbers for the Facilities:  [address] DENIALS (Administrative/Medical Necessity) [phone]   [address] (Maternity/NICU/Pediatrics) [phone]   [address]istry[address] [phone]   [facility] [phone] [address] [facility] [place] [address] Brett [address] [facility] [address] [facility] 310 [facility] [address] [phone]

## 2022-12-12 NOTE — ASSESSMENT & PLAN NOTE
· Continue rate control with amiodarone 200 mg daily   · Eliquis currently on hold in setting of hematuria and preparation for urologic procedure/surgery tomorrow  · Monitor

## 2022-12-12 NOTE — ASSESSMENT & PLAN NOTE
· No recent echocardiogram noted     · Obtain ECHO here, follow up results   · Currently appears euvolemic  · Maintained on lasix 20 mg daily, continue   · Appreciate cardiology recommendations

## 2022-12-12 NOTE — ASSESSMENT & PLAN NOTE
· Patient with h/o prostate cancer s/p prostatectomy, resulting in urinary incontinence, s/p artiricial urinary sphincter placement 20 years ago  · Patient presented to ED with c/o hematuria and wire protruding out of his scrotum  CT scan reveals: Right inguinal implant with leads extending into the right inguinal canal and subsequently into the scrotum, distal aspect of the device is external to the scrotum    · Transferred from [facility] to [facility] for urologic procedure  · Urology following,  · Plan for removal of the artifical sphincter tomorrow  · Cardiology consulted for clearance   · Continue IV ceftriaxone in setting of (+) UA, follow up urine culture results   · Hold Eliquis

## 2022-12-12 NOTE — CONSULTS
Consultation - Cardiology Team One  Delynn Goodell 80 y o  male MRN: [id number]  Unit/Bed#: -01 Encounter: [id number]    Inpatient consult to Cardiology  Consult performed by: CHARLES Cantu  Consult ordered by: Martha Barragan MD      Physician Requesting Consult: Farhan Cai MD  Reason for Consult / Principal Problem: pre op risk stratification     Assessment  1  Preoperative risk evaluation  No anginal sounding symptoms at baseline functional capacity (<4 METs)  Reports occasional orthopnea  No other signs or symptoms of heart failure  ECG reviewed- NSR with first-degree AV block, no concerning ischemic findings  Echocardiogram to be completed today  Claribel [place] citizen risk score 0 7% 30 day risk of MI or cardiac arrest  RCRI- 1=6% 30 day risk of death, MI, or cardiac arrest   2  UTI with urinary sphincter implant displacement  Evaluated by an outside urologist and device explantation not offered at that time  Now presenting with hematuria and UTI  Per urology's notes, would require an extensive operation for removal of the device  Patient reluctant to have surgery  On IV antibiotics  3  PAF   Maintaining NSR on amiodarone 200 mg daily  Heart rates are bradycardic at baseline (50s-60s) and patient is not on any AV beni blockers  Anticoagulation with Eliquis 2 5 mg twice daily is held preoperatively  4  HTN-elevated, average /77  On Imdur 60 mg daily and Lasix 20 mg daily  5  HLD- , HDL 59, LDL 87  On pravastatin 20 mg daily  6  Reported CAD- on medical therapy with Imdur 60 mg daily and statin  No beta blocker due to baseline bradycardia and not on ASA due to anticoagulation with Eliquis  7  Type 2 DM- A1c 7 9% in August 2022  8  CKD- Cr  stable, 1 19    Plan  1  Increased but not prohibitive cardiac risk for planned urology procedure  No anginal complaints or signs of decompensated heart failure  No ischemic changes on ECG   Will follow up on echocardiogram results but no further testing needed as it will not alter his risk  2  Continue amiodarone 200 mg daily  3  No hx of CVA, please resume Eliquis when safe to do so from a surgical standpoint  4  Continue statin and Imdur  5  Cannot add beta blocker due to baseline bradycardia     History of Present Illness   HPI: Laura Vega is a 80y o  year old male with moderate CAD on medical therapy, PAF, HTN, HLD, myasthenia gravis, and hx of prostate cancer  He tells me he does follow with a cardiologist but cannot tell me her name  He has had prior cardiac testing but does not remember exactly what or when  He denies any history of MI or CHF  He lives at home with his son and ambulates with a walker but only short distances  He denies any chest pain or SOB with exertion  He gets occasional labored breathing at night that improves with sitting up  He has no other cardiac symptoms  He presented to the [facility] ED on 12/11/22 with hematuria and hemoptysis  He had also felt wires coming out of his scrotum a few weeks ago and went to his urologist who said his urinary sphincter implant was degenerating but that he was not a surgical candidate  In the ED, his UA was positive for infection and he has been started on IV antibiotics  Urology was consulted and is planning for removal of the device on Tuesday 12/13/22  Cardiology is consulted for pre operative risk stratification  Labs: BUN 19, Cr 1 19, CBC unremarkable, INR 1 32  EKG reviewed personally: 12/11 NSR with first degree AV block    Telemetry reviewed personally: n/a    Review of Systems   Constitutional: Negative for chills, decreased appetite, diaphoresis, malaise/fatigue and weight gain  Cardiovascular: Positive for orthopnea (occasional)  Negative for chest pain, dyspnea on exertion, leg swelling, palpitations and syncope  Respiratory: Positive for hemoptysis (1 episode)  Negative for cough, shortness of breath, sleep disturbances due to breathing and sputum production  Gastrointestinal: Negative for bloating, nausea and vomiting  Genitourinary: Positive for hematuria  Neurological: Negative for dizziness, light-headedness and weakness  Psychiatric/Behavioral: Negative for altered mental status  All other systems reviewed and are negative      Historical Information   Past Medical History:   Diagnosis Date   • Anxiety    • Arthritis    • Coronary artery disease    • Hiatal hernia    • Hypertension    • Irregular heart beat    • Myasthenia gravis ([address] )      Past Surgical History:   Procedure Laterality Date   • EYE SURGERY  2014    cataracts   • HERNIA REPAIR  1971    right inguinal hernia repair   • IA LAPAROSCOPY COLECTOMY PARTIAL W/ANASTOMOSIS N/A 6/3/2016    Procedure: LAPAROSCOPIC SIGMOID RESECTION ;  Surgeon: Al Cuevas MD;  [facility];  Service: Colorectal   • IA LAPS COLECTOMY PRTL W/COLOPXTSTMY LW ANAST N/A 6/3/2016    Procedure: COLECTOMY LAPAROSCOPIC ASSISTED;  Surgeon: Al Cuevas MD;  [facility];  Service: Colorectal   • IA SIGMOIDOSCOPY FLX DX W/COLLJ SPEC BR/WA IF PFRMD N/A 6/3/2016    Procedure: Leroy Dawson;  Surgeon: Al Cuevas MD;  [facility];  Service: Colorectal   • PROSTATECTOMY     • REPLACEMENT TOTAL KNEE Right    • URINARY SPHINCTER IMPLANT  2001    s/p prostatectomy- pt had urinary leakage issues     Social History     Substance and Sexual Activity   Alcohol Use No     Social History     Substance and Sexual Activity   Drug Use No     Social History     Tobacco Use   Smoking Status Former   Smokeless Tobacco Not on file   Tobacco Comments    quit smoking when 25 y/o     Family History:   Family History   Problem Relation Age of Onset   • Diabetes Brother      Meds/Allergies   all current active meds have been reviewed and current meds:   Current Facility-Administered Medications   Medication Dose Route Frequency   • acetaminophen (TYLENOL) tablet 650 mg  650 mg Oral Q6H PRN   • ALPRAZolam Roxy Dee) tablet 0 25 mg  0 25 mg Oral BID PRN   • amiodarone tablet 200 mg  200 mg Oral Daily   • cefTRIAXone (ROCEPHIN) 1,000 mg in dextrose 5 % 50 mL IVPB  1,000 mg Intravenous Q24H   • dicyclomine (BENTYL) tablet 20 mg  20 mg Oral BID PRN   • famotidine (PEPCID) tablet 20 mg  20 mg Oral BID   • FLUoxetine (PROzac) capsule 40 mg  40 mg Oral Daily   • furosemide (LASIX) tablet 20 mg  20 mg Oral Daily   • insulin lispro (HumaLOG) 100 units/mL subcutaneous injection 1-5 Units  1-5 Units Subcutaneous HS   • insulin lispro (HumaLOG) 100 units/mL subcutaneous injection 1-6 Units  1-6 Units Subcutaneous TID AC   • isosorbide mononitrate (IMDUR) 24 hr tablet 60 mg  60 mg Oral Daily   • levothyroxine tablet 88 mcg  88 mcg Oral Early Morning   • ondansetron (ZOFRAN) injection 4 mg  4 mg Intravenous Q6H PRN   • pantoprazole (PROTONIX) EC tablet 20 mg  20 mg Oral BID   • pravastatin (PRAVACHOL) tablet 20 mg  20 mg Oral Daily   • pyridostigmine (MESTINON) tablet 60 mg  60 mg Oral 4x Daily   • sucralfate (CARAFATE) tablet 1 g  1 g Oral 4x Daily (AC & HS)          Allergies   Allergen Reactions   • Sulfa Antibiotics Rash     Objective   Vitals: Blood pressure (!) 176/74, pulse 59, temperature 98 9 °F (37 2 °C), resp  rate 16, SpO2 96 %  , There is no height or weight on file to calculate BMI ,     Systolic (53PWJ), TYC:998 , Min:161 , GUX:120     Diastolic (00ZCQ), JNQ:86, Min:72, Max:82      No intake or output data in the 24 hours ending 12/12/22 0852  Wt Readings from Last 3 Encounters:   12/11/22 63 5 kg (140 lb)   10/19/22 69 7 kg (153 lb 10 6 oz)   06/03/16 65 3 kg (144 lb)     Invasive Devices     Peripheral Intravenous Line  Duration           Peripheral IV 12/11/22 Right Antecubital <1 day              Physical Exam  Vitals reviewed  Constitutional:       General: He is not in acute distress  Comments: Pleasant, frail-appearing elderly male resting comfortably in bed on room air     Neck:      Vascular: No hepatojugular reflux or JVD  Cardiovascular:      Rate and Rhythm: Regular rhythm  Bradycardia present  Pulses: Normal pulses  Heart sounds: Murmur heard  Systolic murmur is present  No friction rub  No gallop  Pulmonary:      Effort: Pulmonary effort is normal  No respiratory distress  Breath sounds: No rales  Comments: Clear, room air  Abdominal:      General: Bowel sounds are normal  There is no distension  Palpations: Abdomen is soft  Tenderness: There is no abdominal tenderness  Musculoskeletal:         General: No tenderness  Normal range of motion  Cervical back: Neck supple  Right lower leg: No edema  Left lower leg: No edema  Skin:     General: Skin is warm and dry  Capillary Refill: Capillary refill takes less than 2 seconds  Findings: No erythema  Neurological:      Mental Status: He is alert and oriented to person, place, and time     Psychiatric:         Mood and Affect: Mood normal      LABORATORY RESULTS:      CBC with diff:   Results from last 7 days   Lab Units 12/12/22  0448 12/11/22  1137   WBC Thousand/uL 7 61 6 20   HEMOGLOBIN g/dL 12 0 12 1   HEMATOCRIT % 36 6 37 7   MCV fL 90 90   PLATELETS Thousands/uL 179 176   MCH pg 29 6 28 9   MCHC g/dL 32 8 32 1   RDW % 13 6 13 9   MPV fL 10 5 10 2   NRBC AUTO /100 WBCs  --  0     CMP:  Results from last 7 days   Lab Units 12/12/22  0448 12/11/22  1137   POTASSIUM mmol/L 4 2 4 7   CHLORIDE mmol/L 107 106   CO2 mmol/L 26 27   BUN mg/dL 19 24   CREATININE mg/dL 1 19 1 22   CALCIUM mg/dL 8 7 8 9   AST U/L  --  28   ALT U/L  --  20   ALK PHOS U/L  --  65   EGFR ml/min/1 73sq m 51 50     BMP:  Results from last 7 days   Lab Units 12/12/22  0448 12/11/22  1137   POTASSIUM mmol/L 4 2 4 7   CHLORIDE mmol/L 107 106   CO2 mmol/L 26 27   BUN mg/dL 19 24   CREATININE mg/dL 1 19 1 22   CALCIUM mg/dL 8 7 8 9     Lab Results   Component Value Date    CREATININE 1 19 12/12/2022    CREATININE 1 22 12/11/2022    CREATININE 0 90 06/08/2016     Results from last 7 days   Lab Units 12/11/22  1137   INR  1 32*     Imaging: I have personally reviewed pertinent reports  and I have personally reviewed pertinent films in PACS  XR chest 2 views    Result Date: 12/12/2022  Narrative: CHEST INDICATION:   hemoptysis  COMPARISON:  10/19/2022 EXAM PERFORMED/VIEWS:  XR CHEST PA & LATERAL Images: 2 FINDINGS: Cardiomediastinal silhouette appears unremarkable  The lungs are clear  An azygos lobe is noted incidentally  No pneumothorax or pleural effusion  Osseous structures appear within normal limits for patient age  Impression: No acute cardiopulmonary disease  Workstation performed: [id number]     CT abdomen pelvis with contrast    Result Date: 12/11/2022  Narrative: CT ABDOMEN AND PELVIS WITH IV CONTRAST INDICATION:   history of urinary sphincter implant  now with hematuria and implant external to scrotum  CT requested by urology  COMPARISON:  12/22/2015 TECHNIQUE:  CT examination of the abdomen and pelvis was performed  Axial, sagittal, and coronal 2D reformatted images were created from the source data and submitted for interpretation  Radiation dose length product (DLP) for this visit:  353 mGy-cm   This examination, like all CT scans performed in the [facility], was performed utilizing techniques to minimize radiation dose exposure, including the use of iterative reconstruction and automated exposure control  IV Contrast:  100 mL of iohexol (OMNIPAQUE) Enteric Contrast:  Enteric contrast was not administered  FINDINGS: ABDOMEN LOWER CHEST:  No clinically significant abnormality identified in the visualized lower chest  LIVER/BILIARY TREE:  Unremarkable  GALLBLADDER:  No calcified gallstones  No pericholecystic inflammatory change  SPLEEN:  Unremarkable  PANCREAS:  Unremarkable  ADRENAL GLANDS:  Unremarkable  KIDNEYS/URETERS:  No hydronephrosis or urinary tract calculus    One or more sharply circumscribed subcentimeter renal hypodensities are present, too small to accurately characterize, and statistically most likely benign findings  According to recent literature (Radiology 2019) no further workup of these findings is recommended  STOMACH AND BOWEL:  There is colonic diverticulosis without evidence of acute diverticulitis  APPENDIX:  No findings to suggest appendicitis  ABDOMINOPELVIC CAVITY:  No ascites  No pneumoperitoneum  No lymphadenopathy  VESSELS:  Unremarkable for patient's age  PELVIS REPRODUCTIVE ORGANS:  Right inguinal implant is identified with extension into the right inguinal canal and subsequently the scrotum with the distal aspect of the implant external to the scrotum  Patient is status post prostatectomy  URINARY BLADDER:  Unremarkable  ABDOMINAL WALL/INGUINAL REGIONS:  Unremarkable  OSSEOUS STRUCTURES:  No acute fracture or destructive osseous lesion  Impression: Right inguinal implant with leads extending into the right inguinal canal and subsequently into the scrotum, distal aspect of the device is external to the scrotum  Workstation performed: [id number]     Counseling / Coordination of Care  Total floor / unit time spent today 45 minutes  Greater than 50% of total time was spent with the patient and / or family counseling and / or coordination of care  A description of the counseling / coordination of care: Review of history, current assessment, development of a plan  Code Status: Level 1 - Full Code    ** Please Note: Dragon 360 Dictation voice to text software may have been used in the creation of this document   ** partnered

## 2022-12-12 NOTE — PROGRESS NOTES
Progress Note - Urology  Charis Silence 6/28/1927, 80 y o  male MRN: [id number]    Unit/Bed#: -01 Encounter: [id number]    Assessment & Plan  Prostate cancer s/p radical prostatectomy 2001  Stress urinary incontinence s/p artificial urinary sphincter device implanted 2004  Device has been nonfunctional/deactivated for at least the past 3 years, he wears pads now  Erosion of the device to the scrotal skin over the past several months  New gross hematuria with concern for urethral erosion of device, currently passively voided urine is clear yellow on the pad  Do not put in anything per urethra    NPO for OR tomorrow Tuesday 12/13/22 for explantation of device and all products via perineal incision, also planned cystourethroscopy to evaluate urethral integrity  Will discuss further with family for informed consent  Subjective: No complaints today other than leaking and condom catheter did not help because it fell off  AUS with pump eroded through scrotal skin noted which has been nonfunctional for several years  He cannot tell me the last time he cycled it  He has been wearing about 4 pads per day instead  He's not sure if he wants to have surgery, in fact he seemed to not know that was a possibility  Hematuria and hemoptysis that initially prompted hospital visit have both resolved  Review of Systems   Constitutional: Negative  Respiratory: Positive for cough  Cardiovascular: Negative  Genitourinary: Positive for hematuria  Negative for decreased urine volume, difficulty urinating, dysuria, flank pain, frequency, scrotal swelling, testicular pain and urgency  Musculoskeletal: Negative  Objective:  Vitals: Blood pressure (!) 176/74, pulse 59, temperature 98 9 °F (37 2 °C), resp  rate 16, height 5' 8" (1 727 m), weight 63 5 kg (140 lb), SpO2 96 %  ,Body mass index is 21 29 kg/m²    No intake or output data in the 24 hours ending 12/12/22 1303  Invasive Devices     Peripheral Intravenous Line  Duration           Peripheral IV 12/11/22 Right Antecubital 1 day                Physical Exam  Vitals and nursing note reviewed  Constitutional:       General: He is not in acute distress  Appearance: Normal appearance  He is well-developed  He is not diaphoretic  HENT:      Head: Normocephalic and atraumatic  Cardiovascular:      Rate and Rhythm: Normal rate and regular rhythm  Heart sounds: Normal heart sounds  No murmur heard  Pulmonary:      Effort: Pulmonary effort is normal       Breath sounds: Normal breath sounds  Comments: No cough or audible wheeze  Abdominal:      General: Bowel sounds are normal  There is no distension  Palpations: Abdomen is soft  Tenderness: There is no abdominal tenderness  There is no right CVA tenderness or left CVA tenderness  Genitourinary:     Comments: Normal phallus there is some passively draining yellow urine from patent urethral meatus  Testes descended into scrotum bilaterally  In the right scrotum there is the pump of the AUS device eroded through the skin what appears to be a chronic tract without significant drainage  Musculoskeletal:         General: Normal range of motion  Right lower leg: No edema  Left lower leg: No edema  Skin:     General: Skin is warm and dry  Capillary Refill: Capillary refill takes less than 2 seconds  Coloration: Skin is not pale  Neurological:      Mental Status: He is alert     Psychiatric:         Speech: Speech normal          Behavior: Behavior normal          Labs:  Recent Labs     12/11/22  1137 12/12/22  0448   WBC 6 20 7 61     Recent Labs     12/11/22  1137 12/12/22  0448   HGB 12 1 12 0       Recent Labs     12/11/22  1137 12/12/22  0448   CREATININE 1 22 1 19       History:    Past Medical History:   Diagnosis Date   • Anxiety    • Arthritis    • Coronary artery disease    • Hiatal hernia    • Hypertension    • Irregular heart beat    • Myasthenia gravis ([address] )      Past Surgical History:   Procedure Laterality Date   • EYE SURGERY  2014    cataracts   • HERNIA REPAIR  1971    right inguinal hernia repair   • HI LAPAROSCOPY COLECTOMY PARTIAL W/ANASTOMOSIS N/A 6/3/2016    Procedure: LAPAROSCOPIC SIGMOID RESECTION ;  Surgeon: Shobha Santiago MD;  [facility];  Service: Colorectal   • HI LAPS COLECTOMY PRTL W/COLOPXTSTMY LW ANAST N/A 6/3/2016    Procedure: COLECTOMY LAPAROSCOPIC ASSISTED;  Surgeon: Shobha Santiago MD;  [facility];  Service: Colorectal   • HI SIGMOIDOSCOPY FLX DX W/COLLJ SPEC BR/WA IF PFRMD N/A 6/3/2016    Procedure: Juanell Line;  Surgeon: Shobha Santiago MD;  [facility];  Service: Colorectal   • PROSTATECTOMY     • REPLACEMENT TOTAL KNEE Right    • URINARY SPHINCTER IMPLANT  2001    s/p prostatectomy- pt had urinary leakage issues     Family History   Problem Relation Age of Onset   • Diabetes Brother      Social History     Socioeconomic History   • Marital status:       Spouse name: Not on file   • Number of children: Not on file   • Years of education: Not on file   • Highest education level: Not on file   Occupational History   • Not on file   Tobacco Use   • Smoking status: Former   • Smokeless tobacco: Not on file   • Tobacco comments:     quit smoking when 23 y/o   Substance and Sexual Activity   • Alcohol use: No   • Drug use: No   • Sexual activity: Not on file   Other Topics Concern   • Not on file   Social History Narrative   • Not on file     Social Determinants of Health     Financial Resource Strain: Not on file   Food Insecurity: Not on file   Transportation Needs: Not on file   Physical Activity: Not on file   Stress: Not on file   Social Connections: Not on file   Intimate Partner Violence: Not on file   Housing Stability: Not on file         Russell Acosta  Date: 12/12/2022 Time: 1:03 PM

## 2022-12-12 NOTE — ASSESSMENT & PLAN NOTE
· Patient complained of one isolated incident of blood tinged sputum, which has resolved    · CXR negative  · Check procal  · Continue abx as above for UTI  · Continue to hold Eliquis as above

## 2022-12-13 ENCOUNTER — ANESTHESIA (INPATIENT)
Dept: PERIOP | Facility: HOSPITAL | Age: 87
End: 2022-12-13

## 2022-12-13 ENCOUNTER — ANESTHESIA (INPATIENT)
Dept: RADIOLOGY | Facility: HOSPITAL | Age: 87
End: 2022-12-13

## 2022-12-13 ENCOUNTER — APPOINTMENT (INPATIENT)
Dept: RADIOLOGY | Facility: HOSPITAL | Age: 87
End: 2022-12-13

## 2022-12-13 ENCOUNTER — ANESTHESIA EVENT (INPATIENT)
Dept: RADIOLOGY | Facility: HOSPITAL | Age: 87
End: 2022-12-13

## 2022-12-13 ENCOUNTER — ANESTHESIA EVENT (INPATIENT)
Dept: PERIOP | Facility: HOSPITAL | Age: 87
End: 2022-12-13

## 2022-12-13 LAB
ABO GROUP BLD: NORMAL
ANION GAP SERPL CALCULATED.3IONS-SCNC: 5 MMOL/L (ref 4–13)
BACTERIA UR CULT: ABNORMAL
BACTERIA UR CULT: ABNORMAL
BLD GP AB SCN SERPL QL: NEGATIVE
BUN SERPL-MCNC: 16 MG/DL (ref 5–25)
CALCIUM SERPL-MCNC: 9 MG/DL (ref 8.3–10.1)
CHLORIDE SERPL-SCNC: 106 MMOL/L (ref 96–108)
CO2 SERPL-SCNC: 26 MMOL/L (ref 21–32)
CREAT SERPL-MCNC: 1.36 MG/DL (ref 0.6–1.3)
ERYTHROCYTE [DISTWIDTH] IN BLOOD BY AUTOMATED COUNT: 13.5 % (ref 11.6–15.1)
EST. AVERAGE GLUCOSE BLD GHB EST-MCNC: 160 MG/DL
GFR SERPL CREATININE-BSD FRML MDRD: 43 ML/MIN/1.73SQ M
GLUCOSE SERPL-MCNC: 159 MG/DL (ref 65–140)
GLUCOSE SERPL-MCNC: 161 MG/DL (ref 65–140)
GLUCOSE SERPL-MCNC: 166 MG/DL (ref 65–140)
GLUCOSE SERPL-MCNC: 173 MG/DL (ref 65–140)
GLUCOSE SERPL-MCNC: 188 MG/DL (ref 65–140)
GLUCOSE SERPL-MCNC: 249 MG/DL (ref 65–140)
HBA1C MFR BLD: 7.2 %
HCT VFR BLD AUTO: 38.2 % (ref 36.5–49.3)
HGB BLD-MCNC: 12.5 G/DL (ref 12–17)
MCH RBC QN AUTO: 28.5 PG (ref 26.8–34.3)
MCHC RBC AUTO-ENTMCNC: 32.7 G/DL (ref 31.4–37.4)
MCV RBC AUTO: 87 FL (ref 82–98)
PLATELET # BLD AUTO: 200 THOUSANDS/UL (ref 149–390)
PMV BLD AUTO: 10.6 FL (ref 8.9–12.7)
POTASSIUM SERPL-SCNC: 4.1 MMOL/L (ref 3.5–5.3)
RBC # BLD AUTO: 4.38 MILLION/UL (ref 3.88–5.62)
RH BLD: POSITIVE
SODIUM SERPL-SCNC: 137 MMOL/L (ref 135–147)
SPECIMEN EXPIRATION DATE: NORMAL
WBC # BLD AUTO: 8.55 THOUSAND/UL (ref 4.31–10.16)

## 2022-12-13 PROCEDURE — 0T9B30Z DRAINAGE OF BLADDER WITH DRAINAGE DEVICE, PERCUTANEOUS APPROACH: ICD-10-PCS | Performed by: RADIOLOGY

## 2022-12-13 PROCEDURE — 0TJD8ZZ INSPECTION OF URETHRA, VIA NATURAL OR ARTIFICIAL OPENING ENDOSCOPIC: ICD-10-PCS | Performed by: UROLOGY

## 2022-12-13 PROCEDURE — 0TPD0LZ REMOVAL OF ARTIFICIAL SPHINCTER FROM URETHRA, OPEN APPROACH: ICD-10-PCS | Performed by: UROLOGY

## 2022-12-13 RX ORDER — LIDOCAINE HYDROCHLORIDE 10 MG/ML
INJECTION, SOLUTION EPIDURAL; INFILTRATION; INTRACAUDAL; PERINEURAL AS NEEDED
Status: DISCONTINUED | OUTPATIENT
Start: 2022-12-13 | End: 2022-12-13

## 2022-12-13 RX ORDER — SODIUM CHLORIDE, SODIUM LACTATE, POTASSIUM CHLORIDE, CALCIUM CHLORIDE 600; 310; 30; 20 MG/100ML; MG/100ML; MG/100ML; MG/100ML
20 INJECTION, SOLUTION INTRAVENOUS CONTINUOUS
Status: DISCONTINUED | OUTPATIENT
Start: 2022-12-13 | End: 2022-12-14

## 2022-12-13 RX ORDER — FENTANYL CITRATE 50 UG/ML
INJECTION, SOLUTION INTRAMUSCULAR; INTRAVENOUS AS NEEDED
Status: DISCONTINUED | OUTPATIENT
Start: 2022-12-13 | End: 2022-12-13

## 2022-12-13 RX ORDER — FENTANYL CITRATE/PF 50 MCG/ML
25 SYRINGE (ML) INJECTION
Status: DISCONTINUED | OUTPATIENT
Start: 2022-12-13 | End: 2022-12-13 | Stop reason: HOSPADM

## 2022-12-13 RX ORDER — LOPERAMIDE HYDROCHLORIDE 2 MG/1
2 CAPSULE ORAL ONCE
Status: COMPLETED | OUTPATIENT
Start: 2022-12-13 | End: 2022-12-13

## 2022-12-13 RX ORDER — ONDANSETRON 2 MG/ML
INJECTION INTRAMUSCULAR; INTRAVENOUS AS NEEDED
Status: DISCONTINUED | OUTPATIENT
Start: 2022-12-13 | End: 2022-12-13

## 2022-12-13 RX ORDER — BUPIVACAINE HYDROCHLORIDE AND EPINEPHRINE 2.5; 5 MG/ML; UG/ML
INJECTION, SOLUTION EPIDURAL; INFILTRATION; INTRACAUDAL; PERINEURAL AS NEEDED
Status: DISCONTINUED | OUTPATIENT
Start: 2022-12-13 | End: 2022-12-13 | Stop reason: HOSPADM

## 2022-12-13 RX ORDER — LIDOCAINE HYDROCHLORIDE 10 MG/ML
INJECTION, SOLUTION EPIDURAL; INFILTRATION; INTRACAUDAL; PERINEURAL AS NEEDED
Status: COMPLETED | OUTPATIENT
Start: 2022-12-13 | End: 2022-12-13

## 2022-12-13 RX ORDER — PROPOFOL 10 MG/ML
INJECTION, EMULSION INTRAVENOUS AS NEEDED
Status: DISCONTINUED | OUTPATIENT
Start: 2022-12-13 | End: 2022-12-13

## 2022-12-13 RX ORDER — SODIUM CHLORIDE, SODIUM LACTATE, POTASSIUM CHLORIDE, CALCIUM CHLORIDE 600; 310; 30; 20 MG/100ML; MG/100ML; MG/100ML; MG/100ML
INJECTION, SOLUTION INTRAVENOUS CONTINUOUS PRN
Status: DISCONTINUED | OUTPATIENT
Start: 2022-12-13 | End: 2022-12-13

## 2022-12-13 RX ORDER — ONDANSETRON 2 MG/ML
4 INJECTION INTRAMUSCULAR; INTRAVENOUS ONCE AS NEEDED
Status: DISCONTINUED | OUTPATIENT
Start: 2022-12-13 | End: 2022-12-13

## 2022-12-13 RX ORDER — HYDROMORPHONE HCL IN WATER/PF 6 MG/30 ML
0.2 PATIENT CONTROLLED ANALGESIA SYRINGE INTRAVENOUS
Status: DISCONTINUED | OUTPATIENT
Start: 2022-12-13 | End: 2022-12-13 | Stop reason: HOSPADM

## 2022-12-13 RX ORDER — PROPOFOL 10 MG/ML
INJECTION, EMULSION INTRAVENOUS CONTINUOUS PRN
Status: DISCONTINUED | OUTPATIENT
Start: 2022-12-13 | End: 2022-12-13

## 2022-12-13 RX ORDER — ONDANSETRON 2 MG/ML
4 INJECTION INTRAMUSCULAR; INTRAVENOUS ONCE AS NEEDED
Status: DISCONTINUED | OUTPATIENT
Start: 2022-12-13 | End: 2022-12-13 | Stop reason: HOSPADM

## 2022-12-13 RX ORDER — FENTANYL CITRATE/PF 50 MCG/ML
25 SYRINGE (ML) INJECTION
Status: DISCONTINUED | OUTPATIENT
Start: 2022-12-13 | End: 2022-12-13

## 2022-12-13 RX ORDER — MAGNESIUM HYDROXIDE 1200 MG/15ML
LIQUID ORAL AS NEEDED
Status: DISCONTINUED | OUTPATIENT
Start: 2022-12-13 | End: 2022-12-13 | Stop reason: HOSPADM

## 2022-12-13 RX ADMIN — SODIUM CHLORIDE, SODIUM LACTATE, POTASSIUM CHLORIDE, AND CALCIUM CHLORIDE: .6; .31; .03; .02 INJECTION, SOLUTION INTRAVENOUS at 13:34

## 2022-12-13 RX ADMIN — LEVOTHYROXINE SODIUM 88 MCG: 88 TABLET ORAL at 05:33

## 2022-12-13 RX ADMIN — PYRIDOSTIGMINE BROMIDE 60 MG: 60 TABLET ORAL at 08:42

## 2022-12-13 RX ADMIN — ALPRAZOLAM 0.25 MG: 0.25 TABLET ORAL at 01:14

## 2022-12-13 RX ADMIN — SUCRALFATE 1 G: 1 TABLET ORAL at 21:01

## 2022-12-13 RX ADMIN — PROPOFOL 10 MCG/KG/MIN: 10 INJECTION, EMULSION INTRAVENOUS at 13:41

## 2022-12-13 RX ADMIN — VANCOMYCIN HYDROCHLORIDE 750 MG: 750 INJECTION, SOLUTION INTRAVENOUS at 09:10

## 2022-12-13 RX ADMIN — PHENYLEPHRINE HYDROCHLORIDE 30 MCG/MIN: 10 INJECTION INTRAVENOUS at 13:50

## 2022-12-13 RX ADMIN — CEFTRIAXONE 1000 MG: 1 INJECTION, POWDER, FOR SOLUTION INTRAMUSCULAR; INTRAVENOUS at 16:30

## 2022-12-13 RX ADMIN — LOPERAMIDE HYDROCHLORIDE 2 MG: 2 CAPSULE ORAL at 01:42

## 2022-12-13 RX ADMIN — FAMOTIDINE 20 MG: 20 TABLET ORAL at 17:02

## 2022-12-13 RX ADMIN — PROPOFOL 20 MG: 10 INJECTION, EMULSION INTRAVENOUS at 13:45

## 2022-12-13 RX ADMIN — PANTOPRAZOLE SODIUM 20 MG: 20 TABLET, DELAYED RELEASE ORAL at 17:02

## 2022-12-13 RX ADMIN — FENTANYL CITRATE 25 MCG: 50 INJECTION INTRAMUSCULAR; INTRAVENOUS at 10:47

## 2022-12-13 RX ADMIN — INSULIN LISPRO 2 UNITS: 100 INJECTION, SOLUTION INTRAVENOUS; SUBCUTANEOUS at 21:04

## 2022-12-13 RX ADMIN — SODIUM CHLORIDE, SODIUM LACTATE, POTASSIUM CHLORIDE, AND CALCIUM CHLORIDE: .6; .31; .03; .02 INJECTION, SOLUTION INTRAVENOUS at 08:48

## 2022-12-13 RX ADMIN — PHENYLEPHRINE HYDROCHLORIDE 40 MCG/MIN: 10 INJECTION INTRAVENOUS at 09:00

## 2022-12-13 RX ADMIN — FENTANYL CITRATE 12.5 MCG: 50 INJECTION INTRAMUSCULAR; INTRAVENOUS at 09:10

## 2022-12-13 RX ADMIN — FENTANYL CITRATE 12.5 MCG: 50 INJECTION INTRAMUSCULAR; INTRAVENOUS at 08:56

## 2022-12-13 RX ADMIN — PYRIDOSTIGMINE BROMIDE 60 MG: 60 TABLET ORAL at 17:02

## 2022-12-13 RX ADMIN — PIPERACILLIN AND TAZOBACTAM 2.25 G: 2; .25 INJECTION, POWDER, FOR SOLUTION INTRAVENOUS at 10:50

## 2022-12-13 RX ADMIN — FENTANYL CITRATE 12.5 MCG: 50 INJECTION INTRAMUSCULAR; INTRAVENOUS at 13:43

## 2022-12-13 RX ADMIN — ONDANSETRON 4 MG: 2 INJECTION INTRAMUSCULAR; INTRAVENOUS at 09:38

## 2022-12-13 RX ADMIN — LIDOCAINE HYDROCHLORIDE 10 ML: 10 INJECTION, SOLUTION EPIDURAL; INFILTRATION; INTRACAUDAL; PERINEURAL at 13:56

## 2022-12-13 RX ADMIN — ACETAMINOPHEN 650 MG: 325 TABLET, FILM COATED ORAL at 20:53

## 2022-12-13 RX ADMIN — FENTANYL CITRATE 25 MCG: 50 INJECTION INTRAMUSCULAR; INTRAVENOUS at 11:27

## 2022-12-13 RX ADMIN — LIDOCAINE HYDROCHLORIDE 50 MG: 10 INJECTION, SOLUTION EPIDURAL; INFILTRATION; INTRACAUDAL at 13:41

## 2022-12-13 RX ADMIN — FENTANYL CITRATE 12.5 MCG: 50 INJECTION INTRAMUSCULAR; INTRAVENOUS at 13:37

## 2022-12-13 RX ADMIN — PROPOFOL 20 MG: 10 INJECTION, EMULSION INTRAVENOUS at 08:56

## 2022-12-13 RX ADMIN — SUCRALFATE 1 G: 1 TABLET ORAL at 17:02

## 2022-12-13 RX ADMIN — PYRIDOSTIGMINE BROMIDE 60 MG: 60 TABLET ORAL at 21:01

## 2022-12-13 RX ADMIN — INSULIN LISPRO 1 UNITS: 100 INJECTION, SOLUTION INTRAVENOUS; SUBCUTANEOUS at 16:58

## 2022-12-13 RX ADMIN — FENTANYL CITRATE 25 MCG: 50 INJECTION INTRAMUSCULAR; INTRAVENOUS at 09:35

## 2022-12-13 RX ADMIN — IOHEXOL 10 ML: 300 INJECTION, SOLUTION INTRAVENOUS at 14:16

## 2022-12-13 RX ADMIN — PIPERACILLIN AND TAZOBACTAM 2.25 G: 2; .25 INJECTION, POWDER, FOR SOLUTION INTRAVENOUS at 08:50

## 2022-12-13 NOTE — DISCHARGE INSTR - OTHER ORDERS
Skin care Plan:  1-Cleanse sacro-buttocks with soap and water  Apply Allevyn foam  [place] with T for treatment  Change every three days and PRN  2-Turn/reposition q2h or when medically stable for pressure re-distribution on skin   3-Elevate heels to offload pressure  4-Moisturize skin daily with skin nourishing cream  5-Ehob cushion in chair when out of bed  6-Hydraguard to bilateral heels BID and PRN  7-Irrigate perineum and scrotum with normal saline, gently pack 1 inch plain packing, secure tail on skin, cover with ABD, secure with hospital underwear  Change daily and PRN soilage/displacement

## 2022-12-13 NOTE — INTERVAL H&P NOTE
H&P reviewed  After examining the patient I find no changes in the patients condition since the H&P had been written  Vitals:    12/12/22 2214   BP: 136/76   Pulse: 62   Resp: 16   Temp: 98 °F (36 7 °C)   SpO2: 96%     2 OR for explantation of artificial urinary sphincter, flexible cystoscopy, and suprapubic tube placement  Informed consent obtained  We will plan for a perineal incision and a counterincision of the scrotum, along with a washout and close suction drain placement  I have ordered perioperative vancomycin and Zosyn

## 2022-12-13 NOTE — CONSULTS
e-Consult (IP[facility])  - Interventional Radiology  Delynn Goodell 80 y o  male MRN: [id number]  Unit/Bed#: OR [facility] Encounter: [id number]          Interventional Radiology has been consulted to evaluate Delynn Goodell    We were consulted by Keiko Peng concerning this patient with urethra stricture  IR Consult-(For Para, Thora, LP, PICC(for GFR>/=45) use the specific ordersets  Consult performed by: Rafael Murrieta PA-C  Consult ordered by: Ugo Pearson MD        12/13/22    Assessment/Recommendation:   Patient is a 26-year-old male with past medical history of CAD, A  fib diabetes, state cancer s/p prostatectomy s/p official urinary sphincter placement presents with erosion of the device scrotal skin hematuria  Patient to OR today for removal of device  Successful removal of infected urinary sphincter with erosion into urethra, however, unable to place urethral tube in OR due to stricture in urethra  Patient is appropriate for placement of SPT today     -Plan for placement of SPT today  -Patient to remain NPO    5-10 minutes, >50% of the total time devoted to medical consultative verbal/EMR discussion between providers  Written report will be generated in the EMR  Thank you for allowing Interventional Radiology to participate in the care of Delynn Goodell  Please don't hesitate to call or TigerText us with any questions       Rafael Murrieta PA-C

## 2022-12-13 NOTE — DISCHARGE INSTRUCTIONS
Suprapubic Cystostomy     WHAT YOU NEED TO KNOW:   Suprapubic cystostomy is surgery to create a stoma (opening) through your abdomen into your bladder  This opening is where a catheter is inserted to drain urine  You may need a cystostomy if your urine flow is blocked  DISCHARGE INSTRUCTIONS:   Resume your normal diet  Small sips of flat soda will help with mild nausea  Follow up with your healthcare provider as directed: You may need to return to have your suprapubic catheter changed or removed  Write down your questions so you remember to ask them during your visits  Empty your urine drainage bag: Empty your urine drainage bag when it is ½ to ? full, or every 8 hours  If you have a smaller leg bag, empty it every 3 to 4 hours  Do the following when you empty your urine drainage bag:  Hold the urine bag over a toilet or large container  Remove the drain spout from its sleeve at the bottom of the urine bag  Do not touch the tip of the drain spout  Open the slide valve on the spout  Let the urine flow out of the urine bag into the toilet or container  Do not let the drainage tube touch anything  Clean the end of the drain spout with alcohol when the bag is empty  Ask which cleaning solution is best to use  Close the slide valve and put the drain spout into its sleeve at the bottom of the urine bag  Write down how much urine was in your bag if you were asked to keep a record  Prevent an infection:   Clean the stoma and skin around it daily  Wash your hands before and after cystostomy care  Put on a new pair of clean medical gloves  Change your urine bag or clean reusable bags  Ask how often you need to change or clean your urine drainage bag  You may need to change your reusable bag at least once a week  Keep the bag below your waist  This will prevent urine from flowing back into your bladder and causing an infection or other problems   Also, keep the tube free of kinks so the urine will drain properly  Do not pull on the catheter  This can cause pain and bleeding and may cause the catheter to come out  Contact Interventional Radiology at [phone] Jun PATIENTS: Contact Interventional Radiology at [phone]) [facility] PATIENTS: Contact Interventional Radiology at [phone]) if any of the following occur: You have a fever or chills  Persistent nausea or vomiting  You have severe pain  You have a burning pain in your stoma  Your stoma is red, swollen, or draining pus  You have blood in your urine  You have questions or concerns about your condition or care  Seek care immediately or call 911 if:   No urine is draining into the urine bag

## 2022-12-13 NOTE — ASSESSMENT & PLAN NOTE
UA positive for leukocyte, bacteria  · Continue IV rocephin for now, culture shows Klebsiella and very low colony count of proteus  · Monitor, appreciate urology recommendations

## 2022-12-13 NOTE — ANESTHESIA PREPROCEDURE EVALUATION
Procedure:  IR SUPRAPUBIC CATHETER PLACEMENT    Relevant Problems   CARDIO   (+) CAD (coronary artery disease)   (+) Chronic a-fib (HCC)      ENDO   (+) Type 2 diabetes mellitus, without long-term current use of insulin (HCC)      MUSCULOSKELETAL   (+) Myasthenia gravis (HCC)      NEURO/PSYCH   (+) Depression      Other   (+) Diastolic dysfunction      Denies diploplia or dysphagia  Reports no ongoing sx related to MG  AM pyridostigmine dose administered earlier today  NPO STATUS CONFIRMED    1  Preoperative risk evaluation  [place] risk score 0 7% 30 day risk of MI or cardiac arrest  RCRI- 1=6% 30 day risk of death, MI, or cardiac arrest   2  UTI with urinary sphincter implant displacement  3  PAF   Maintaining NSR on amiodarone 200 mg daily  Heart rates are bradycardic at baseline (50s-60s) and patient is not on any AV beni blockers  Anticoagulation with Eliquis 2 5 mg twice daily is held preoperatively  4  HTN-elevated, average /77  On Imdur 60 mg daily and Lasix 20 mg daily  5  HLD- , HDL 59, LDL 87  On pravastatin 20 mg daily  6  Reported CAD- on medical therapy with Imdur 60 mg daily and statin  No beta blocker due to baseline bradycardia and not on ASA due to anticoagulation with Eliquis  7  Type 2 DM- A1c 7 9% in August 2022  8  CKD-   9  Myasthenia gravis, daily mestinon dose 60 qid; initial sx of dysphagia resolved/controlled    TTE 12/13/2022:  •  Left Ventricle: Left ventricular cavity size is normal  Wall thickness is normal  The left ventricular ejection fraction is 55%  Wall motion is normal  Diastolic function is moderately abnormal, consistent with grade II (pseudonormal) relaxation  •  Right Ventricle: Right ventricular cavity size is normal  Systolic function is normal   •  Left Atrium: The atrium is mildly dilated (35-41 mL/m2)  •  Aortic Valve: There is mild regurgitation  There is moderate stenosis  The aortic valve peak velocity is 3 04 m/s   The aortic valve peak gradient is 37 mmHg  The dimensionless velocity index is 0 26   •  Mitral Valve: There is trace regurgitation  EKG 12/11/2022:  Sinus bradycardia with 1st degree A-V block  Right axis deviation  Prolonged QT  Abnormal ECG      Lab Results   Component Value Date    WBC 8 55 12/13/2022    HGB 12 5 12/13/2022    HCT 38 2 12/13/2022    MCV 87 12/13/2022     12/13/2022     Lab Results   Component Value Date    SODIUM 137 12/13/2022    K 4 1 12/13/2022     12/13/2022    CO2 26 12/13/2022    BUN 16 12/13/2022    CREATININE 1 36 (H) 12/13/2022    GLUC 173 (H) 12/13/2022    CALCIUM 9 0 12/13/2022     Lab Results   Component Value Date    INR 1 32 (H) 12/11/2022    INR 1 29 (H) 06/08/2016    INR 1 27 (H) 06/07/2016    PROTIME 16 6 (H) 12/11/2022    PROTIME 15 8 (H) 06/08/2016    PROTIME 15 6 (H) 06/07/2016     Lab Results   Component Value Date    HGBA1C 7 2 (H) 12/12/2022          Physical Exam    Airway    Mallampati score: III         Dental   No notable dental hx     Cardiovascular  Cardiovascular exam normal    Pulmonary  Pulmonary exam normal     Other Findings        Anesthesia Plan  ASA Score- 4     Anesthesia Type- IV sedation with anesthesia with ASA Monitors  Additional Monitors:   Airway Plan:           Plan Factors-    Chart reviewed  EKG reviewed  Existing labs reviewed  Patient summary reviewed  Patient instructed to abstain from smoking on day of procedure  Induction- intravenous  Postoperative Plan- Plan for postoperative opioid use  Planned trial extubation    Informed Consent- Anesthetic plan and risks discussed with patient and son (Consent obtained from patient and his son)  I personally reviewed this patient with the CRNA  Discussed and agreed on the Anesthesia Plan with the CRNA  Dae Mendoza

## 2022-12-13 NOTE — ANESTHESIA PREPROCEDURE EVALUATION
Procedure:  REMOVAL ARTIFICIAL URINARY SPINCTER BILATERAL (Bladder)  CYSTOSCOPY (Bladder)  INSERTION SUPRAPUBIC CATHETER PERCUTANEOUS (Bladder)    Relevant Problems   CARDIO   (+) CAD (coronary artery disease)   (+) Chronic a-fib (HCC)   (+) Hypertension      ENDO   (+) Type 2 diabetes mellitus, without long-term current use of insulin (HCC)      MUSCULOSKELETAL   (+) Myasthenia gravis (HCC)      NEURO/PSYCH   (+) Depression      Denies diploplia or dysphagia  Reports no ongoing sx related to MG     NPO STATUS CONFIRMED    1  Preoperative risk evaluation  [place] risk score 0 7% 30 day risk of MI or cardiac arrest  RCRI- 1=6% 30 day risk of death, MI, or cardiac arrest   2  UTI with urinary sphincter implant displacement  3  PAF   Maintaining NSR on amiodarone 200 mg daily  Heart rates are bradycardic at baseline (50s-60s) and patient is not on any AV beni blockers  Anticoagulation with Eliquis 2 5 mg twice daily is held preoperatively  4  HTN-elevated, average /77  On Imdur 60 mg daily and Lasix 20 mg daily  5  HLD- , HDL 59, LDL 87  On pravastatin 20 mg daily  6  Reported CAD- on medical therapy with Imdur 60 mg daily and statin  No beta blocker due to baseline bradycardia and not on ASA due to anticoagulation with Eliquis  7  Type 2 DM- A1c 7 9% in August 2022  8  CKD-   9   Myasthenia gravis, daily mestinon dose 60 qid; initial sx of dysphagia resolved/controlled    Lab Results   Component Value Date    SODIUM 137 12/13/2022    K 4 1 12/13/2022    BUN 16 12/13/2022    CREATININE 1 36 (H) 12/13/2022    EGFR 43 12/13/2022    GLUCOSE 165 (H) 10/19/2022     Lab Results   Component Value Date    HGBA1C 7 2 (H) 12/12/2022       Lab Results   Component Value Date    HGB 12 5 12/13/2022    HGB 12 0 12/12/2022    HGB 12 1 12/11/2022     12/13/2022     12/12/2022     12/11/2022     Lab Results   Component Value Date    WBC 8 55 12/13/2022       Lab Results   Component Value Date CREATININE 1 36 (H) 12/13/2022    CREATININE 1 19 12/12/2022    CREATININE 1 22 12/11/2022       Lab Results   Component Value Date    INR 1 32 (H) 12/11/2022    INR 1 29 (H) 06/08/2016    INR 1 27 (H) 06/07/2016     Lab Results   Component Value Date    PTT 47 (H) 12/11/2022       No results found for: LACTATE      Type and Screen  A    History    Chronic Afib, CAD, Diastolic Dysfunction     Interpretation Summary       •  Left Ventricle: Left ventricular cavity size is normal  Wall thickness is normal  The left ventricular ejection fraction is 55%  Wall motion is normal  Diastolic function is moderately abnormal, consistent with grade II (pseudonormal) relaxation  •  Right Ventricle: Right ventricular cavity size is normal  Systolic function is normal   •  Left Atrium: The atrium is mildly dilated (35-41 mL/m2)  •  Aortic Valve: There is mild regurgitation  There is moderate stenosis  The aortic valve peak velocity is 3 04 m/s  The aortic valve peak gradient is 37 mmHg  The dimensionless velocity index is 0 26   •  Mitral Valve: There is trace regurgitation  Physical Exam    Airway    Mallampati score: III         Dental   No notable dental hx     Cardiovascular      Pulmonary      Other Findings        Anesthesia Plan  ASA Score- 4     Anesthesia Type- general with ASA Monitors  Additional Monitors:   Airway Plan: LMA  Comment:  MARCY Hung , have personally seen and evaluated the patient prior to anesthetic care  I have reviewed the pre-anesthetic record, and other medical records if appropriate to the anesthetic care  If a CRNA is involved in the case, I have reviewed the CRNA assessment, if present, and agree  Risks/benefits and alternatives discussed with patient including possible PONV, sore throat, and possibility of rare anesthetic and surgical emergencies  Will give AM pyrodostigmine dose now          Plan Factors-Exercise tolerance (METS): >4 METS     Chart reviewed  Existing labs reviewed  Patient summary reviewed  Patient instructed to abstain from smoking on day of procedure  There is medical exclusion for perioperative obstructive sleep apnea risk education  Induction- intravenous  Postoperative Plan- Plan for postoperative opioid use  Planned trial extubation    Informed Consent- Anesthetic plan and risks discussed with patient  I personally reviewed this patient with the CRNA  Discussed and agreed on the Anesthesia Plan with the CRNA  Skye Hernandez

## 2022-12-13 NOTE — ASSESSMENT & PLAN NOTE
Lab Results   Component Value Date    HGBA1C 7 2 (H) 12/12/2022       Recent Labs     12/12/22  1032 12/12/22  1701 12/12/22 2056 12/13/22  0612   POCGLU 224* 171* 195* 159*       Blood Sugar Average: Last 72 hrs:  (P) 177   · Pt maintained on Onglyza 2 5 mg daily as an outpatient, on hold here  · Continue SSI coverage while inpatient   · QID glucose checks  · A1C 7 2  · Monitor and adjust regimen as needed

## 2022-12-13 NOTE — ASSESSMENT & PLAN NOTE
Patient with h/o prostate cancer s/p prostatectomy, resulting in urinary incontinence, s/p artiricial urinary sphincter placement 20 years ago  · Patient presented to ED with c/o hematuria and wire protruding out of his scrotum  CT scan revealed: Right inguinal implant with leads extending into the right inguinal canal and subsequently into the scrotum, distal aspect of the device is external to the scrotum    · Transferred from [facility] to [facility] for urologic procedure  · Urology following  · Plan for removal of the artifical sphincter 12/13 with possible suprapubic cath placement  · Cardiology consulted for clearance   · Continue IV ceftriaxone in setting of (+) UA, follow up urine culture results   · Hold Eliquis--resume when ok with urology

## 2022-12-13 NOTE — ASSESSMENT & PLAN NOTE
Reported CAD- on medical therapy with Imdur 60 mg daily and statin  No beta blocker due to baseline bradycardia and not on ASA due to anticoagulation with Eliquis    · Cardiology consulted for pre-operative clearance, was cleared   · ECHO showed EF 50%, G2 diastolic dysfunction, moderate aortic stenosis

## 2022-12-13 NOTE — ASSESSMENT & PLAN NOTE
· No recent echocardiogram noted     · Echo reviewed   · Currently appears euvolemic  · Maintained on lasix 20 mg daily, continue   · Appreciate cardiology recommendations

## 2022-12-13 NOTE — CONSULTS
Consultation - Infectious Disease   Ginger Rush 80 y o  male MRN: [id number]  Unit/Bed#: -01 Encounter: [id number]      IMPRESSION & RECOMMENDATIONS:   1  Asymptomatic bacteriuria with hematuria-in the setting of an artificial urinary sphincter device that has eroded antibiotics now removed  He has had no systemic illness, has no leukocytosis, and no urinary symptoms other than the gross hematuria  The device now has been completely removed and the patient has good urinary output and his suprapubic catheter that was just placed  Urine culture is growing 2 gram-negative rods  -Monitor patient overnight for the development of any symptoms  -Continue ceftriaxone for now with a tentative plan to discontinue all antibiotics tomorrow  -Follow-up urine culture  -Recheck CBC with differential and BMP    2  Displacement of artificial urinary sphincter-no reported evidence of a complicating soft tissue infection  The device has now been removed   -No directed antibiotics for this problem  -We will further discuss with urology    3  Myasthenia gravis- patient being restarted on pyridostigmine    4  Urinary retention- now with a suprapubic catheter in place    Have discussed the above management plan in detail with the primary service    Extensive review of the medical records in epic including review of the notes, radiographs, and laboratory results     HISTORY OF PRESENT ILLNESS:  Reason for Consult: UTI  HPI: Ginger Rush is a 80y o  year old male with atrial fibrillation, coronary artery disease, hypertension, myasthenia gravis 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 device placement in 2002 admitted [facility] in [place] after suffering from erosion of the device through his scrotum we are asked to assist with management of antibiotics    He had a device placed 20 years ago and had been doing fine until he developed hematuria and therefore went to the ER for further evaluation  He apparently had reported to his son previously that he felt something funny on the side of his scrotum  In the emergency department the patient was found to have erosion of the wires through the scrotum and therefore he was transferred to [place] [address] in [facility] for definitive surgical management  He has undergone complete explantation of the device and a placement of a suprapubic catheter  He initially had urine cultures obtained and was started on ceftriaxone and received perioperative dose of vancomycin and Zosyn  He has not have any fever chills or sweats, no nausea vomiting or diarrhea, no cough or shortness of breath  He has not been on any antibiotics recently  He is currently somnolent and therefore much of the history is through discussion with the patient's children and the urology service  He apparently has not been having any recent dysuria  He has not a recent fever or chills  REVIEW OF SYSTEMS:  A complete review of systems is negative other than that noted in the HPI      PAST MEDICAL HISTORY:  Past Medical History:   Diagnosis Date   • Anxiety    • Arthritis    • Coronary artery disease    • Hiatal hernia    • Hypertension    • Irregular heart beat    • Myasthenia gravis [facility])      Past Surgical History:   Procedure Laterality Date   • EYE SURGERY  2014    cataracts   • HERNIA REPAIR  1971    right inguinal hernia repair   • OK LAP,SURG,COLECTOMY, PARTIAL, W/ANAST N/A 6/3/2016    Procedure: LAPAROSCOPIC SIGMOID RESECTION ;  Surgeon: Diania Mcburney, MD;  Location: [facility];  Service: Colorectal   • OK LAP,SURG,COLECTOMY,W/ANAST N/A 6/3/2016    Procedure: COLECTOMY LAPAROSCOPIC ASSISTED;  Surgeon: Diania Mcburney, MD;  Location: [facility];  Service: Colorectal   • OK SIGMOIDOSCOPY FLX DX W/COLLJ SPEC BR/WA IF PFRMD N/A 6/3/2016    Procedure: Robin Champagne;  Surgeon: Diania Mcburney, MD;  Location: [facility];  Service: Colorectal   • PROSTATECTOMY     • REPLACEMENT TOTAL KNEE Right    • URINARY SPHINCTER IMPLANT      s/p prostatectomy- pt had urinary leakage issues       FAMILY HISTORY:  Non-contributory    SOCIAL HISTORY:  Social History   Social History     Substance and Sexual Activity   Alcohol Use No     Social History     Substance and Sexual Activity   Drug Use No     Social History     Tobacco Use   Smoking Status Former   Smokeless Tobacco Not on file   Tobacco Comments    quit smoking when 23 y/o       ALLERGIES:  Allergies   Allergen Reactions   • Sulfa Antibiotics Rash       MEDICATIONS:  All current active medications have been reviewed  Abxs:CTX, 1 dose vanc/zosyn    PHYSICAL EXAM:  Temp:  [97 8 °F (36 6 °C)-98 °F (36 7 °C)] 97 8 °F (36 6 °C)  HR:  [52-62] 54  Resp:  [16-24] 17  BP: (136-201)/(65-80) 172/73  SpO2:  [95 %-100 %] 100 %  Temp (24hrs), Av 9 °F (36 6 °C), Min:97 8 °F (36 6 °C), Max:98 °F (36 7 °C)  Current: Temperature: 97 8 °F (36 6 °C)    Intake/Output Summary (Last 24 hours) at 2022 1539  Last data filed at 2022 1405  Gross per 24 hour   Intake 450 ml   Output 425 ml   Net 25 ml       General Appearance:  Elderly, somnolent, nontoxic, and in no distress   Head:  Normocephalic, without obvious abnormality, atraumatic   Eyes:  Conjunctiva pink and sclera anicteric, both eyes   Nose: Nares normal, mucosa normal, no drainage   Throat: Oropharynx moist without lesions   Neck: Supple, symmetrical, no adenopathy, no tenderness/mass/nodules   Back:   Symmetric, no curvature, ROM normal, no CVA tenderness   Lungs:   Clear to auscultation bilaterally, respirations unlabored   Chest Wall:  No tenderness or deformity   Heart:  Bharathi; no murmur, rub or gallop   Abdomen:   Soft, non-tender, non-distended, positive bowel sounds  SP tube In place  Drains in place   Extremities: No cyanosis, clubbing or edema   Skin: No rashes or lesions  No draining wounds noted     Lymph nodes: Cervical, supraclavicular nodes normal   Neurologic: Alert and oriented times 3, extremity strength 5/5 and symmetric       LABS, IMAGING, & OTHER STUDIES:  Lab Results:  I have personally reviewed pertinent labs    Results from last 7 days   Lab Units 12/13/22 0456 12/12/22 0448 12/11/22  1137   WBC Thousand/uL 8 55 7 61 6 20   HEMOGLOBIN g/dL 12 5 12 0 12 1   PLATELETS Thousands/uL 200 179 176     Results from last 7 days   Lab Units 12/13/22 0456 12/12/22 0448 12/11/22  1137   SODIUM mmol/L 137 136 138   POTASSIUM mmol/L 4 1 4 2 4 7   CHLORIDE mmol/L 106 107 106   CO2 mmol/L 26 26 27   BUN mg/dL 16 19 24   CREATININE mg/dL 1 36* 1 19 1 22   EGFR ml/min/1 73sq m 43 51 50   CALCIUM mg/dL 9 0 8 7 8 9   AST U/L  --   --  28   ALT U/L  --   --  20   ALK PHOS U/L  --   --  65     Results from last 7 days   Lab Units 12/11/22  1132   URINE CULTURE  >100,000 cfu/ml Klebsiella aerogenes*  <10,000 cfu/ml Proteus species*     Results from last 7 days   Lab Units 12/12/22  0448   PROCALCITONIN ng/ml 0 06                   Imaging Studies:     CT abd/pelvis-right inguinal implant with leads extending out of scrotum    Images personally reviewed by me in PACS

## 2022-12-13 NOTE — ANESTHESIA POSTPROCEDURE EVALUATION
Post-Op Assessment Note    CV Status:  Stable  Pain Score: 0    Pain management: adequate     Mental Status:  Alert and awake   Hydration Status:  Euvolemic   PONV Controlled:  Controlled   Airway Patency:  Patent      Post Op Vitals Reviewed: Yes      Staff: CRNA   Comments: Pt awake, alert, able to maintain own airway, VSS, report to recovery RN        No notable events documented      BP   201/80   Temp   97 8   Pulse  55   Resp   16   SpO2   98% RA

## 2022-12-13 NOTE — ANESTHESIA POSTPROCEDURE EVALUATION
Post-Op Assessment Note    CV Status:  Stable    Pain management: adequate     Mental Status:  Alert and awake   Hydration Status:  Euvolemic   PONV Controlled:  Controlled   Airway Patency:  Patent      Post Op Vitals Reviewed: Yes            No notable events documented      BP   152/74   Temp      Pulse  49   Resp   14   SpO2   98% 4L fm

## 2022-12-13 NOTE — CASE MANAGEMENT
Case Management Assessment & Discharge Planning Note    Patient name Charis Skinner  Location [address] [place] [address] 764/-75 MRN [id number]  : 1927 Date 2022       Current Admission Date: 2022  Current Admission Diagnosis:Displacement of artificial urinary sphincter [facility])   Patient Active Problem List    Diagnosis Date Noted   • Type 2 diabetes mellitus, without long-term current use of insulin ([place] [address] ) 2022   • Displacement of artificial urinary sphincter ([place] [address] ) 2022   • Depression    • Diastolic dysfunction    • Hemoptysis 2022   • Acute cystitis with hematuria 2022   • Chronic a-fib ([place] [address] ) 2016   • Myasthenia gravis ([place] [address] ) 2016   • CAD (coronary artery disease) 2016   • Hypomagnesemia 2016      LOS (days): 2  Geometric Mean LOS (GMLOS) (days): 3 20  Days to GMLOS:1 2     OBJECTIVE:    Risk of Unplanned Readmission Score: 15 04         Current admission status: Inpatient       Preferred Pharmacy:   [facility] [address]  [facility]  [address] [place] [address]  Phone: [phone] Fax: [phone]    Primary Care Provider: Eliana Quiñonez MD    Primary Insurance: [facility]  Secondary Insurance:     ASSESSMENT:  James Iglesias Proxies    There are no active Health Care Proxies on file                   Readmission Root Cause  30 Day Readmission: No    Patient Information  Admitted from[de-identified] Home  Mental Status: Alert  During Assessment patient was accompanied by: Daughter (Called daughter Patricia Brody)  [address] [facility] Name[de-identified] Mary Anne Francis  Caregiver's Relationship to Patient[de-identified] Family Member  Caregiver's Telephone Number[de-identified] [phone]  Support Systems: Daughter, Children  Type of Current Residence: [facility]  In the last 12 months, was there a time when you were not able to pay the mortgage or rent on time?: No  In the last 12 months, how many places have you lived?: 2  In the last 12 months, was there a time when you did not have a steady place to sleep or slept in a shelter (including now)?: No  Homeless/housing insecurity resource given?: N/A  Living Arrangements: Lives w/ Son  Is patient a ?: Yes  Is patient active with [facility])?: Yes  Is patient service connected?:  (unknown)    Activities of Daily Living Prior to Admission  Functional Status: Assistance  Completes ADLs independently?: No  Ambulates independently?: Yes  Does patient use assisted devices?: Yes  Assisted Devices (DME) used: Reda Dotter  Does patient currently own DME?: Yes  What DME does the patient currently own?: Reda Dotter  Does patient have a history of Outpatient Therapy (PT/OT)?: No  Does the patient have a history of Short-Term Rehab?: Yes  Does patient have a history of HHC?: No  Does patient currently have [facility]?: No                   Means of Transportation  Means of Transport to Appts[de-identified] Family transport  Was application for public transport provided?: N/A        DISCHARGE DETAILS:    Discharge planning discussed with[de-identified] Nick Bustillo (daughter)  Freedom of Choice: No  Comments - Freedom of Choice: Daughter requesting VNA CM explained referrals would need to be made to find out which agencies can accept  Rerrals sent in Aidin  CM contacted family/caregiver?: Yes  Were Treatment Team discharge recommendations reviewed with patient/caregiver?: Yes  Did patient/caregiver verbalize understanding of patient care needs?: Yes  Were patient/caregiver advised of the risks associated with not following Treatment Team discharge recommendations?: Yes    Contacts  Patient Contacts:  Nick Bustillo daughter  Relationship to Patient[de-identified] Family  Contact Method: Phone  Phone Number: [phone]  Reason/Outcome: Continuity of Care, Emergency Contact, Discharge [address]         Is the patient interested in [facility] at discharge?: Yes  [address] requested[de-identified] Nursing, [address], Physical [address] Provider[de-identified] PCP  Home Health Services Needed[de-identified] Strengthening/Theraputic Exercises to Improve Function, Evaluate Functional Status and Safety  Homebound Criteria Met[de-identified] Requires the Assistance of Another Person for Safe Ambulation or to Leave the Home, Uses an Assist Device (i e  cane, walker, etc)  Supporting Clincal Findings[de-identified] Limited Endurance                   Treatment Team Recommendation: Home with [address] notified Pt's daughter that when the patient see's his [facility] doctor at his next appointment to find out if the patient is Service connected and inquire if the patient qualifies for a A

## 2022-12-13 NOTE — ASSESSMENT & PLAN NOTE
Patient complained of one isolated incident of blood tinged sputum, which has resolved    · CXR negative  · procal negative   · Continue abx as above for UTI  · Continue to hold Eliquis as above, resume when ok with urology

## 2022-12-13 NOTE — BRIEF OP NOTE (RAD/CATH)
INTERVENTIONAL RADIOLOGY PROCEDURE NOTE    Date: 12/13/2022    Procedure: IR SUPRAPUBIC CATHETER PLACEMENT     Preoperative diagnosis:   1  Displacement of artificial urinary sphincter (HCC)         Postoperative diagnosis: Same  Surgeon: Jose Ramon Montano MD     Assistant: None  No qualified resident was available  Blood loss: minimal    Specimens: none    Findings: successful suprapubic tube placement    Complications: None immediate      Anesthesia: MAC sedation

## 2022-12-13 NOTE — SEDATION DOCUMENTATION
Suprapubic catheter placement completed by Dr Job Martinez without complications  Anesthesia present for procedure  Gauze and paper tape over insertion site  Bedrest start time 6770  Report given to bedside RN

## 2022-12-13 NOTE — UTILIZATION REVIEW
Continued Stay Review    Date: 12/13                         Current Patient Class: Inpatient  Current Level of Care: Med Surg    HPI:89 y o  male initially admitted on 12/11    Assessment/Plan:   12/13  OR - S/p 1) explantation of eroded artificial urinary sphincter  2  Scrotal exploration  3  Diagnostic cystoscopy  FINDINGS:  -Diagnostic cystoscopy at the beginning of the case did demonstrate significant erosion of urethra with a large amount of visualized left material present within the urethra  -The entirety of the artificial urinary sphincter including the reasonable cuff, scrotal pump which had eroded through the skin, and the pressure regulating balloon in the right preperitoneal space were completely removed        12/13   S/p IR;  Successful suprapubic tube placement      Vital Signs:   12/13/22 14:42:17 -- 54   Abnormal  -- 172/73   Abnormal  106 100 % -- --   12/13/22 1245 -- 56 17 161/65 96 97 % None (Room air) X   12/13/22 1230 -- 54   Abnormal  22 151/66 95 -- None (Room air) X   12/13/22 1215 -- 52   Abnormal  23   Abnormal  167/75 128 95 % None (Room air) X   12/13/22 1200 97 8 °F (36 6 °C) 56 24   Abnormal  201/80   Abnormal  136 100 % None (Room air) WDL     Pertinent Labs/Diagnostic Results:       Results from last 7 days   Lab Units 12/13/22  0456 12/12/22  0448 12/11/22  1137   WBC Thousand/uL 8 55 7 61 6 20   HEMOGLOBIN g/dL 12 5 12 0 12 1   HEMATOCRIT % 38 2 36 6 37 7   PLATELETS Thousands/uL 200 179 176   NEUTROS ABS Thousands/µL  --   --  4 49         Results from last 7 days   Lab Units 12/13/22  0456 12/12/22  0448 12/11/22  1137   SODIUM mmol/L 137 136 138   POTASSIUM mmol/L 4 1 4 2 4 7   CHLORIDE mmol/L 106 107 106   CO2 mmol/L 26 26 27   ANION GAP mmol/L 5 3* 5   BUN mg/dL 16 19 24   CREATININE mg/dL 1 36* 1 19 1 22   EGFR ml/min/1 73sq m 43 51 50   CALCIUM mg/dL 9 0 8 7 8 9     Results from last 7 days   Lab Units 12/11/22  1137   AST U/L 28   ALT U/L 20   ALK PHOS U/L 65   TOTAL PROTEIN g/dL 6 6   ALBUMIN g/dL 3 5   TOTAL BILIRUBIN mg/dL 0 48     Results from last 7 days   Lab Units 12/13/22  1253 12/13/22  1203 12/13/22  0612 12/12/22  2056 12/12/22  1701 12/12/22  1032 12/12/22  0556 12/11/22  2229 12/11/22  1619   POC GLUCOSE mg/dl 188* 166* 159* 195* 171* 224* 136 142* 156*     Results from last 7 days   Lab Units 12/13/22  0456 12/12/22  0448 12/11/22  1137   GLUCOSE RANDOM mg/dL 173* 148* 163*         Results from last 7 days   Lab Units 12/12/22  0448   HEMOGLOBIN A1C % 7 2*   EAG mg/dl 160       Results from last 7 days   Lab Units 12/11/22  1137   PROTIME seconds 16 6*   INR  1 32*   PTT seconds 47*         Results from last 7 days   Lab Units 12/12/22  0448   PROCALCITONIN ng/ml 0 06           Results from last 7 days   Lab Units 12/11/22  1132   CLARITY UA  Turbid   COLOR UA  Light Yellow   SPEC GRAV UA  1 019   PH UA  5 0   GLUCOSE UA mg/dl Negative   KETONES UA mg/dl Negative   BLOOD UA  Small*   PROTEIN UA mg/dl Trace*   NITRITE UA  Negative   BILIRUBIN UA  Negative   UROBILINOGEN UA (BE) mg/dl <2 0   LEUKOCYTES UA  Large*   WBC UA /hpf Innumerable*   RBC UA /hpf 10-20*   BACTERIA UA /hpf Innumerable*   EPITHELIAL CELLS WET PREP /hpf Occasional         Results from last 7 days   Lab Units 12/11/22  1132   URINE CULTURE  >100,000 cfu/ml Klebsiella aerogenes*  <10,000 cfu/ml Proteus species*       Medications:   Scheduled Medications:  amiodarone, 200 mg, Oral, Daily  cefTRIAXone, 1,000 mg, Intravenous, Q24H  famotidine, 20 mg, Oral, BID  FLUoxetine, 40 mg, Oral, Daily  furosemide, 20 mg, Oral, Daily  insulin lispro, 1-5 Units, Subcutaneous, HS  insulin lispro, 1-6 Units, Subcutaneous, TID AC  isosorbide mononitrate, 60 mg, Oral, Daily  levothyroxine, 88 mcg, Oral, Early Morning  pantoprazole, 20 mg, Oral, BID  pravastatin, 20 mg, Oral, Daily  pyridostigmine, 60 mg, Oral, 4x Daily  sucralfate, 1 g, Oral, 4x Daily (AC & HS)      Continuous IV Infusions:  lactated ringers, 20 mL/hr, Intravenous, Continuous      PRN Meds:  acetaminophen, 650 mg, Oral, Q6H PRN  ALPRAZolam, 0 25 mg, Oral, BID PRN  dicyclomine, 20 mg, Oral, BID PRN  hydrALAZINE, 5 mg, Intravenous, Q6H PRN  ondansetron, 4 mg, Intravenous, Q6H PRN        Discharge Plan: D    Network Utilization Review Department  ATTENTION: Please call with any questions or concerns to [phone] and carefully listen to the prompts so that you are directed to the right person  All voicemails are confidential   Rosa Kapoor all requests for admission clinical reviews, approved or denied determinations and any other requests to dedicated fax number below belonging to the campus where the patient is receiving treatment   List of dedicated fax numbers for the Facilities:  [address] DENIALS (Administrative/Medical Necessity) [phone]   [address] (Maternity/NICU/Pediatrics) [phone]   [address] [phone]   [facility]  [phone]   [address] [place] [address] [address] [address] [address] [phone]   [address] [place] [facility] [employer] [address] [phone]

## 2022-12-13 NOTE — OP NOTE
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, and urethra  2) remote history of prostate cancer status post open radical retropubic prostatectomy    PROCEDURES PERFORMED:  1) explantation of eroded artificial urinary sphincter  2  Scrotal exploration  3  Diagnostic cystoscopy    SURGEON:  Brandyn Payne MD    FINDINGS:  -Diagnostic cystoscopy at the beginning of the case did demonstrate significant erosion of urethra with a large amount of visualized left material present within the urethra  -The entirety of the artificial urinary sphincter including the reasonable cuff, scrotal pump which had eroded through the skin, and the pressure regulating balloon in the right preperitoneal space were completely removed   -No gross purulence was encountered in the scrotum, perineum, or preperitoneal space  -I did perform cystoscopy in an attempt to place a suprapubic tube under vision to divert the patient's urine above his newly traumatized urethra which I do not believe is salvageable  However a dense stricture and heavy edema were present in this location I was unable to visualize the proximal urethra or bladder    As such the patient was sent to interventional radiology for percutaneous image guided suprapubic tube placement  -All surgical compartments were thoroughly irrigated and to close suction drains were placed at the conclusion of the case, positioned in the right subinguinal location, and the left periurethral location      Specimen(s):  ID Type Source Tests Collected by Time Destination   1 : eroded artificial urinary spincter, completely explanted Tissue Foreign body TISSUE EXAM Corby Mayo MD 12/13/2022 1055    A : would culture adjacent to prosthetic Tissue Other ANAEROBIC CULTURE AND GRAM STAIN, CULTURE, TISSUE AND GRAM STAIN Corby Mayo MD 12/13/2022 1031              NOTE:  There were no qualified teaching residents to assist with this case    ANESTHESIA: General     COMPLICATIONS:   None    ANTIBIOTICS:  Comycin, Zosyn    INTRAOPERATIVE THROMBOEMBOLISM PROPHYLAXIS:  Pneumatic compression stockings     INDICATIONS FOR PROCEDURE:  Silvio Ford is an 80 y o  old male with a remote history of a radical prostatectomy for prostate cancer followed by an 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  His procedures were performed at outside facilities  The patient was brought to the emergency department by his son who noticed that a portion of the AUS had completely eroded through the scrotal skin  Patient had no signs of sepsis  I evaluated him with a CT both to evaluate for any pelvic fluid collections and also for surgical planning  He has been evaluated in a multidisciplinary fashion including with cardiology for perioperative optimization and clearance  I recommended proceeding to the operating room for complete explantation of the artificial urinary sphincter is standard of care in the scenario along with cystoscopy and ideally suprapubic tube placement  Discussed with the patient and his family that the suprapubic tube would likely be a permanent modality both to mitigate his risk of perineal infection and also to help manage his expected postprocedural incontinence  Patient did elect to move forward the procedure and provided informed consent  We discussed the procedure in detail, the alternatives, and the risks, and they signed informed consent to proceed  PROCEDURE IN DETAIL:   The patient was identified and brought to the OR  Antibiotic prophylaxis and DVT prophylaxis were administered    They were placed in the comfortable supine position with care to pad all pressure points  The scrotal hair was clipped and they were prepped and draped in the usual sterile fashion using ChloraPrep  A surgical time out was performed with all in the room in agreement with the correct patient, procedure, indications, and laterality  Exam under anesthesia confirmed complete erosion of the scrotal pump through the skin of the right anterior hemiscrotum  The pressure regulating balloon and tubing were palpable in the right lower quadrant  Began with a cystoscopy using a flexible cystoscope  Clearly visualized within the bulbar urethral is a high degree of erosion  Specifically there is a significant amount of foreign body visualized within the urethra  I estimate that greater than 180 degrees of urethra has been completely eroded from this device  I began by introducing a perineal incision  This was carried down deeply through the subcutaneous fat  I was able to identify the urethra  I mobilized the periurethral tissue which was divided using electrocautery, exposed to the urethral cuff and its connecting tubing  The urethral cuff was deactivated, and subsequently unfurled and released completely from the urethra  Hemostasis is assured using electrocautery  Introduced a counterincision in the right hemiscrotum superior to the site of erosion  I mobilized the connection tubing proximally towards the pressure regulating balloon which was removed completely following desufflate and approximately half of its contents  The extruded scrotal pump was removed as well through the same clean incision  I confirmed on the back table, and photographed, that all of the standard components of the artificial urinary sphincter, and verified with the patient CT scan, were removed completely with no risk for retained foreign body      Prior to removing the implants, I did collect cultures from the periprosthetic space around the urethral cuff and the pressure regulating balloon  At this point extensive washout of all surgical quadrants was performed  No purulence was encountered in any location  I turned my attention back to the cystoscopic portion of the case  I attempted to advance the cystoscope past the region of erosion/transection of the urethra however there was significant edema and stenosis present in this location  I also gently attempted to pass the endoscope through the perineal incision into the urethra but this also was unsuccessful due to the significant amount of trauma that the urethra had sustained from erosion of this device  At this point it was clear that the most prudent course of action would be to consult with interventional radiology to place a suprapubic tube percutaneously  Closed suction drains were placed in the above named locations  I did close the clean perineal incision in multiple layers, and loosely reapproximated the counterincision within the scrotum using interrupted nylon sutures  I did not close the tract through which the scrotal pump had extruded  Instead this area after copiously irrigating was packed with iodoform gauze  All needle and instrument counts were correct  The patient was placed back supine, awakened from general anesthesia and brought to recovery room in stable condition  ESTIMATED BLOOD LOSS:  Minimal      DRAINS:   Closed/Suction Drain Right Groin Bulb (Active)   Site Description Unable to view 12/13/22 1230   Dressing Status Clean;Dry; Intact 12/13/22 1230   Status Suction-low continuous 12/13/22 1230       Closed/Suction Drain  Bulb (Active)   Site Description Unable to view 12/13/22 1230   Dressing Status Clean;Dry; Intact 12/13/22 1230   Drainage Appearance Bloody 12/13/22 1201   Status Suction-low continuous 12/13/22 1230   Output (mL) 75 mL 12/13/22 1341       Suprapubic Catheter 18 Fr  (Active)   Site Assessment Clean; Intact 12/13/22 1404   Dressing Status Clean;Dry; Intact 12/13/22 1404   Dressing Type Dry dressing 12/13/22 1404   Collection Container Standard drainage bag 26/88/35 7182       COMPLICATIONS:   None    DISPOSITION: PACU      PLAN:  Findings of the time of surgery discussed with patient's family in the recovery room  Plan will be to place a suprapubic catheter and I have spoken with interventional radiology about this  We will plan to maintain this indefinitely  I will place consultations to infectious disease as well as wound care    Ultimately I suspect that his close suction LOGAN drains can be removed prior to discharge

## 2022-12-13 NOTE — PROGRESS NOTES
[facility]  Progress Note - Cinthia Dumas 6/28/1927, 80 y o  male MRN: [id number]  Unit/Bed#: [facility] [place] Encounter: [id number]  Primary Care Provider: Carmella Mchugh MD   Date and time admitted to hospital: 12/11/2022  3:36 PM    * Displacement of artificial urinary sphincter [facility])  Assessment & Plan  Patient with h/o prostate cancer s/p prostatectomy, resulting in urinary incontinence, s/p artiricial urinary sphincter placement 20 years ago  · Patient presented to ED with c/o hematuria and wire protruding out of his scrotum  CT scan revealed: Right inguinal implant with leads extending into the right inguinal canal and subsequently into the scrotum, distal aspect of the device is external to the scrotum    · Transferred from [address] [place] [address] to [facility] for urologic procedure  · Urology following  · Plan for removal of the artifical sphincter 12/13 with possible suprapubic cath placement  · Cardiology consulted for clearance   · Continue IV ceftriaxone in setting of (+) UA, follow up urine culture results   · Hold Eliquis--resume when ok with urology    Acute cystitis with hematuria  Assessment & Plan  UA positive for leukocyte, bacteria  · Continue IV rocephin for now, culture shows Klebsiella and very low colony count of proteus  · Monitor, appreciate urology recommendations    Type 2 diabetes mellitus, without long-term current use of insulin [facility])  Assessment & Plan  Lab Results   Component Value Date    HGBA1C 7 2 (H) 12/12/2022       Recent Labs     12/12/22  1032 12/12/22  1701 12/12/22  2056 12/13/22  0612   POCGLU 224* 171* 195* 159*       Blood Sugar Average: Last 72 hrs:  (P) 177   · Pt maintained on Onglyza 2 5 mg daily as an outpatient, on hold here  · Continue SSI coverage while inpatient   · QID glucose checks  · A1C 7 2  · Monitor and adjust regimen as needed    Hemoptysis  Assessment & Plan  Patient complained of one isolated incident of blood tinged sputum, which has resolved  · CXR negative  · procal negative   · Continue abx as above for UTI  · Continue to hold Eliquis as above, resume when ok with urology    Diastolic dysfunction  Assessment & Plan  · No recent echocardiogram noted  · Echo reviewed   · Currently appears euvolemic  · Maintained on lasix 20 mg daily, continue   · Appreciate cardiology recommendations    Depression  Assessment & Plan  · Mood currently stable   · Continue Prozac 40 mg daily and PRN Xanax  · Monitor     CAD (coronary artery disease)  Assessment & Plan  Reported CAD- on medical therapy with Imdur 60 mg daily and statin  No beta blocker due to baseline bradycardia and not on ASA due to anticoagulation with Eliquis  · Cardiology consulted for pre-operative clearance, was cleared   · ECHO showed EF 22%, G2 diastolic dysfunction, moderate aortic stenosis    Myasthenia gravis ([facility] [address] )  Assessment & Plan  · Continue pyridostigmine 60 mg QID  · Currently stable     Chronic a-fib (HCC)  Assessment & Plan  Continue rate control with amiodarone 200 mg daily   · Eliquis currently on hold in setting of hematuria and preparation for urologic procedure/surgery--resume when ok from urology standpoint   · Monitor         VTE Pharmacologic Prophylaxis: VTE Score: 5 eliquis held, resume when ok with urology      Patient Centered Rounds: I performed bedside rounds with nursing staff today  Discussions with Specialists or Other Care Team Provider: appreciate urology input, d/w CM    Education and Discussions with Family / Patient: d/w son and daughter at bedside   Time Spent for Care: 30 minutes  More than 50% of total time spent on counseling and coordination of care as described above      Current Length of Stay: 2 day(s)  Current Patient Status: Inpatient   Certification Statement: The patient will continue to require additional inpatient hospital stay due to post procedure monitoring, resuming of [facility], PT/OT evaluations   Discharge Plan: Anticipate discharge in 48-72 hrs to discharge location to be determined pending rehab evaluations  Code Status: Level 1 - Full Code    Subjective:   Seen post procedure  Went well per family's discussion with urology and IR  Objective:     Vitals:   Temp (24hrs), Av 9 °F (36 6 °C), Min:97 8 °F (36 6 °C), Max:98 °F (36 7 °C)    Temp:  [97 8 °F (36 6 °C)-98 °F (36 7 °C)] 97 8 °F (36 6 °C)  HR:  [52-67] 54  Resp:  [16-24] 17  BP: (136-201)/(65-80) 172/73  SpO2:  [95 %-100 %] 100 %  Body mass index is 21 29 kg/m²  Input and Output Summary (last 24 hours): Intake/Output Summary (Last 24 hours) at 2022 1512  Last data filed at 2022 1405  Gross per 24 hour   Intake 450 ml   Output 425 ml   Net 25 ml       Physical Exam:   Physical Exam  Vitals and nursing note reviewed  Constitutional:       General: He is not in acute distress  Comments: On facemask, sats stable  [place], awakens to voice    Cardiovascular:      Rate and Rhythm: Normal rate  Abdominal:      General: There is no distension  Comments: SPT in place    Musculoskeletal:      Right lower leg: No edema  Left lower leg: No edema  Additional Data:     Labs:  Results from last 7 days   Lab Units 22  1137   WBC Thousand/uL 8 55   < > 6 20   HEMOGLOBIN g/dL 12 5   < > 12 1   HEMATOCRIT % 38 2   < > 37 7   PLATELETS Thousands/uL 200   < > 176   NEUTROS PCT %  --   --  72   LYMPHS PCT %  --   --  17   MONOS PCT %  --   --  8   EOS PCT %  --   --  1    < > = values in this interval not displayed       Results from last 7 days   Lab Units 22  1137   SODIUM mmol/L 137   < > 138   POTASSIUM mmol/L 4 1   < > 4 7   CHLORIDE mmol/L 106   < > 106   CO2 mmol/L 26   < > 27   BUN mg/dL 16   < > 24   CREATININE mg/dL 1 36*   < > 1 22   ANION GAP mmol/L 5   < > 5   CALCIUM mg/dL 9 0   < > 8 9   ALBUMIN g/dL  --   --  3 5   TOTAL BILIRUBIN mg/dL  --   --  0 48 ALK PHOS U/L  --   --  65   ALT U/L  --   --  20   AST U/L  --   --  28   GLUCOSE RANDOM mg/dL 173*   < > 163*    < > = values in this interval not displayed  Results from last 7 days   Lab Units 12/11/22  1137   INR  1 32*     Results from last 7 days   Lab Units 12/13/22  1253 12/13/22  1203 12/13/22  0612 12/12/22  2056 12/12/22  1701 12/12/22  1032 12/12/22  0556 12/11/22  2229 12/11/22  1619   POC GLUCOSE mg/dl 188* 166* 159* 195* 171* 224* 136 142* 156*     Results from last 7 days   Lab Units 12/12/22  0448   HEMOGLOBIN A1C % 7 2*     Results from last 7 days   Lab Units 12/12/22  0448   PROCALCITONIN ng/ml 0 06       Lines/Drains:  Invasive Devices     Peripheral Intravenous Line  Duration           Peripheral IV 12/11/22 Right Antecubital 2 days    Peripheral IV 12/13/22 Left Arm <1 day          Drain  Duration           Closed/Suction Drain  Bulb <1 day    Closed/Suction Drain Right Groin Bulb <1 day    Suprapubic Catheter 18 Fr  <1 day                      Imaging: No pertinent imaging reviewed      Recent Cultures (last 7 days):   Results from last 7 days   Lab Units 12/11/22  1132   URINE CULTURE  >100,000 cfu/ml Klebsiella aerogenes*  <10,000 cfu/ml Proteus species*       Last 24 Hours Medication List:   Current Facility-Administered Medications   Medication Dose Route Frequency Provider Last Rate   • acetaminophen  650 mg Oral Q6H PRN Falguni Jacob MD     • ALPRAZolam  0 25 mg Oral BID PRN Falguni Jacob MD     • amiodarone  200 mg Oral Daily Falguni Jacob MD     • cefTRIAXone  1,000 mg Intravenous Q24H Falguni Jacob MD 1,000 mg (12/12/22 1356)   • dicyclomine  20 mg Oral BID PRHADLEY Jacob MD     • famotidine  20 mg Oral BID Falguni Jacob MD     • FLUoxetine  40 mg Oral Daily Falguni Jacob MD     • furosemide  20 mg Oral Daily Falguni Jacob MD     • hydrALAZINE  5 mg Intravenous Q6H PRHADLEY Jacob MD     • insulin lispro  1-5 Units Subcutaneous HS Antoinette Lauren MD     • insulin lispro  1-6 Units Subcutaneous TID AC Antoinette Lauren MD     • isosorbide mononitrate  60 mg Oral Daily Antoinette Lauren MD     • lactated ringers  20 mL/hr Intravenous Continuous Colten Godwin MD     • levothyroxine  88 mcg Oral Early Morning Antoinette Lauren MD     • ondansetron  4 mg Intravenous Q6H PRN Antoinette Lauren MD     • pantoprazole  20 mg Oral BID Antoinette Lauren MD     • pravastatin  20 mg Oral Daily Antoinette Lauren MD     • pyridostigmine  60 mg Oral 4x Daily Antoinette Lauren MD     • sucralfate  1 g Oral 4x Daily (AC & HS) Antoinette Lauren MD          Today, Patient Was Seen By: Delores Sanchez PA-C    **Please Note: This note may have been constructed using a voice recognition system  **

## 2022-12-13 NOTE — WOUND OSTOMY CARE
Consult Note - Wound   Regina Muro 80 y o  male MRN: [id number]  Unit/Bed#: -01 Encounter: [id number]        History and Present Illness:  Patient is a 81 yo male that was admitted to the hospital for treatment of displacement of artifical urinary sphincter  Patient has a PMH of afib, CAD, HTN, myasthenia gravis and history of prostate cancer sp prostatectomy in 2001  Patient is a moderate assist for turning and repositioning  Family present at bedside for assessment  Patient is incontinent at times of bowel and bladder managed by suprapubic catheter that was placed on 12/13  On assessment, patient is lying supine on regular mattress  Wound Care was consulted for buttocks wound and scrotal packing  Assessment Findings:   B/L heels are dry intact and fili with no skin loss or wounds present  Recommend preventative Hydraguard Cream and proper offloading/ repositioning  Dr Jacquelin Shell contacted and verbal orders placed for scrotal packing  1  POA Left Sacro- Buttocks Healing Stage 3 Pressure Injury: Wound ed is partial thickness skin loss with a non-blanchable erythematic wound bed  Randi-wound is hyperpigmented  Right buttocks with area of intact scar tissue surrounded by hyperpigmentation  No drainage noted  Allevyn Foam applied to area  No induration, fluctuance, odor, warmth/temperature differences, redness, or purulence noted to the above noted wounds and skin areas assessed  New dressings applied per orders listed below  Patient tolerated well- no s/s of non-verbal pain or discomfort observed during the encounter  Bedside nurse aware of plan of care  See flow sheets for more detailed assessment findings  Orders listed below and wound care will continue to follow, call or tiger text with questions  Skin care Plan:  1-Cleanse sacro-buttocks with soap and water  Apply Allevyn foam  [place] with T for treatment  Change every three days and PRN     2-Turn/reposition q2h or when medically stable for pressure re-distribution on skin   3-Elevate heels to offload pressure  4-Moisturize skin daily with skin nourishing cream  5-Ehob cushion in chair when out of bed  6-Hydraguard to bilateral heels BID and PRN  7-Irrigate perineum and scrotum with normal saline, gently pack 1 inch plain packing, secure tail on skin, cover with ABD, secure with hospital underwear  Change daily and PRN soilage/displacement  Wounds:  Wound 12/12/22 Pressure Injury Sacrum Left (Active)   Wound Image   12/13/22 1604   Wound Description Non-blanchable erythema;Granulation tissue 12/13/22 1604   Pressure Injury Stage 3 12/13/22 1604   Randi-wound Assessment Hyperpigmented 12/13/22 1604   Wound Length (cm) 1 5 cm 12/13/22 1604   Wound Width (cm) 0 2 cm 12/13/22 1604   Wound Depth (cm) 0 1 cm 12/13/22 1604   Wound Surface Area (cm^2) 0 3 cm^2 12/13/22 1604   Wound Volume (cm^3) 0 03 cm^3 12/13/22 1604   Calculated Wound Volume (cm^3) 0 03 cm^3 12/13/22 1604   Drainage Amount None 12/13/22 1604   Non-staged Wound Description Partial thickness 12/13/22 1604   Treatments Cleansed;Site care 12/13/22 1604   Dressing Foam, Silicon (eg  Allevyn, etc) 12/13/22 1604   Dressing Changed New 12/13/22 1604   Patient Tolerance Tolerated well 12/13/22 1604   Dressing Status Clean;Dry; Intact 12/13/22 1604                   Bethanie Sharma RN, BSN

## 2022-12-13 NOTE — PLAN OF CARE
Problem: Potential for Falls  Goal: Patient will remain free of falls  Description: INTERVENTIONS:  - Educate patient/family on patient safety including physical limitations  - Instruct patient to call for assistance with activity   - Consult OT/PT to assist with strengthening/mobility   - Keep Call bell within reach  - Keep bed low and locked with side rails adjusted as appropriate  - Keep care items and personal belongings within reach  - Initiate and maintain comfort rounds  - Make Fall Risk Sign visible to staff  - Offer Toileting every 1 Hours, in advance of need  - Initiate/Maintain bed alarm  - Apply yellow socks and bracelet for high fall risk patients  - Consider moving patient to room near nurses station  Outcome: Progressing

## 2022-12-13 NOTE — ASSESSMENT & PLAN NOTE
Continue rate control with amiodarone 200 mg daily   · Eliquis currently on hold in setting of hematuria and preparation for urologic procedure/surgery--resume when ok from urology standpoint   · Monitor

## 2022-12-14 ENCOUNTER — TELEPHONE (OUTPATIENT)
Dept: UROLOGY | Facility: CLINIC | Age: 87
End: 2022-12-14

## 2022-12-14 PROBLEM — N18.9 CKD (CHRONIC KIDNEY DISEASE): Status: ACTIVE | Noted: 2022-12-14

## 2022-12-14 PROBLEM — R79.89 ELEVATED SERUM CREATININE: Status: ACTIVE | Noted: 2022-12-14

## 2022-12-14 LAB
ANION GAP SERPL CALCULATED.3IONS-SCNC: 11 MMOL/L (ref 4–13)
BASOPHILS # BLD AUTO: 0.11 THOUSANDS/ÂΜL (ref 0–0.1)
BASOPHILS NFR BLD AUTO: 1 % (ref 0–1)
BUN SERPL-MCNC: 21 MG/DL (ref 5–25)
CALCIUM SERPL-MCNC: 8.7 MG/DL (ref 8.3–10.1)
CHLORIDE SERPL-SCNC: 104 MMOL/L (ref 96–108)
CO2 SERPL-SCNC: 23 MMOL/L (ref 21–32)
CREAT SERPL-MCNC: 1.54 MG/DL (ref 0.6–1.3)
EOSINOPHIL # BLD AUTO: 0 THOUSAND/ÂΜL (ref 0–0.61)
EOSINOPHIL NFR BLD AUTO: 0 % (ref 0–6)
ERYTHROCYTE [DISTWIDTH] IN BLOOD BY AUTOMATED COUNT: 14 % (ref 11.6–15.1)
GFR SERPL CREATININE-BSD FRML MDRD: 37 ML/MIN/1.73SQ M
GLUCOSE SERPL-MCNC: 139 MG/DL (ref 65–140)
GLUCOSE SERPL-MCNC: 176 MG/DL (ref 65–140)
GLUCOSE SERPL-MCNC: 194 MG/DL (ref 65–140)
GLUCOSE SERPL-MCNC: 205 MG/DL (ref 65–140)
GLUCOSE SERPL-MCNC: 206 MG/DL (ref 65–140)
HCT VFR BLD AUTO: 41.7 % (ref 36.5–49.3)
HGB BLD-MCNC: 13.2 G/DL (ref 12–17)
IMM GRANULOCYTES # BLD AUTO: 0.07 THOUSAND/UL (ref 0–0.2)
IMM GRANULOCYTES NFR BLD AUTO: 1 % (ref 0–2)
LYMPHOCYTES # BLD AUTO: 2.05 THOUSANDS/ÂΜL (ref 0.6–4.47)
LYMPHOCYTES NFR BLD AUTO: 14 % (ref 14–44)
MCH RBC QN AUTO: 28.7 PG (ref 26.8–34.3)
MCHC RBC AUTO-ENTMCNC: 31.7 G/DL (ref 31.4–37.4)
MCV RBC AUTO: 91 FL (ref 82–98)
MONOCYTES # BLD AUTO: 1.49 THOUSAND/ÂΜL (ref 0.17–1.22)
MONOCYTES NFR BLD AUTO: 10 % (ref 4–12)
NEUTROPHILS # BLD AUTO: 10.93 THOUSANDS/ÂΜL (ref 1.85–7.62)
NEUTS SEG NFR BLD AUTO: 74 % (ref 43–75)
NRBC BLD AUTO-RTO: 0 /100 WBCS
PLATELET # BLD AUTO: 221 THOUSANDS/UL (ref 149–390)
PMV BLD AUTO: 10.7 FL (ref 8.9–12.7)
POTASSIUM SERPL-SCNC: 3.8 MMOL/L (ref 3.5–5.3)
RBC # BLD AUTO: 4.6 MILLION/UL (ref 3.88–5.62)
SODIUM SERPL-SCNC: 138 MMOL/L (ref 135–147)
WBC # BLD AUTO: 14.65 THOUSAND/UL (ref 4.31–10.16)

## 2022-12-14 RX ORDER — SODIUM CHLORIDE 9 MG/ML
50 INJECTION, SOLUTION INTRAVENOUS CONTINUOUS
Status: DISPENSED | OUTPATIENT
Start: 2022-12-14 | End: 2022-12-14

## 2022-12-14 RX ORDER — INSULIN LISPRO 100 [IU]/ML
3 INJECTION, SOLUTION INTRAVENOUS; SUBCUTANEOUS
Status: DISCONTINUED | OUTPATIENT
Start: 2022-12-14 | End: 2022-12-17 | Stop reason: HOSPADM

## 2022-12-14 RX ADMIN — APIXABAN 2.5 MG: 2.5 TABLET, FILM COATED ORAL at 17:30

## 2022-12-14 RX ADMIN — CEFTRIAXONE 1000 MG: 1 INJECTION, POWDER, FOR SOLUTION INTRAMUSCULAR; INTRAVENOUS at 13:20

## 2022-12-14 RX ADMIN — PANTOPRAZOLE SODIUM 20 MG: 20 TABLET, DELAYED RELEASE ORAL at 17:30

## 2022-12-14 RX ADMIN — PYRIDOSTIGMINE BROMIDE 60 MG: 60 TABLET ORAL at 08:28

## 2022-12-14 RX ADMIN — SODIUM CHLORIDE 75 ML/HR: 0.9 INJECTION, SOLUTION INTRAVENOUS at 09:21

## 2022-12-14 RX ADMIN — FAMOTIDINE 20 MG: 20 TABLET ORAL at 08:28

## 2022-12-14 RX ADMIN — AMIODARONE HYDROCHLORIDE 200 MG: 200 TABLET ORAL at 08:27

## 2022-12-14 RX ADMIN — SUCRALFATE 1 G: 1 TABLET ORAL at 21:09

## 2022-12-14 RX ADMIN — PYRIDOSTIGMINE BROMIDE 60 MG: 60 TABLET ORAL at 21:13

## 2022-12-14 RX ADMIN — SUCRALFATE 1 G: 1 TABLET ORAL at 17:30

## 2022-12-14 RX ADMIN — PANTOPRAZOLE SODIUM 20 MG: 20 TABLET, DELAYED RELEASE ORAL at 08:28

## 2022-12-14 RX ADMIN — FLUOXETINE 40 MG: 20 CAPSULE ORAL at 08:28

## 2022-12-14 RX ADMIN — FAMOTIDINE 20 MG: 20 TABLET ORAL at 17:30

## 2022-12-14 RX ADMIN — INSULIN LISPRO 3 UNITS: 100 INJECTION, SOLUTION INTRAVENOUS; SUBCUTANEOUS at 17:29

## 2022-12-14 RX ADMIN — INSULIN LISPRO 2 UNITS: 100 INJECTION, SOLUTION INTRAVENOUS; SUBCUTANEOUS at 08:27

## 2022-12-14 RX ADMIN — LEVOTHYROXINE SODIUM 88 MCG: 88 TABLET ORAL at 06:35

## 2022-12-14 RX ADMIN — APIXABAN 2.5 MG: 2.5 TABLET, FILM COATED ORAL at 11:36

## 2022-12-14 RX ADMIN — PRAVASTATIN SODIUM 20 MG: 20 TABLET ORAL at 08:27

## 2022-12-14 RX ADMIN — PYRIDOSTIGMINE BROMIDE 60 MG: 60 TABLET ORAL at 17:31

## 2022-12-14 RX ADMIN — SUCRALFATE 1 G: 1 TABLET ORAL at 11:36

## 2022-12-14 RX ADMIN — ALPRAZOLAM 0.25 MG: 0.25 TABLET ORAL at 21:08

## 2022-12-14 RX ADMIN — INSULIN LISPRO 1 UNITS: 100 INJECTION, SOLUTION INTRAVENOUS; SUBCUTANEOUS at 21:11

## 2022-12-14 RX ADMIN — INSULIN LISPRO 2 UNITS: 100 INJECTION, SOLUTION INTRAVENOUS; SUBCUTANEOUS at 17:28

## 2022-12-14 RX ADMIN — ACETAMINOPHEN 650 MG: 325 TABLET, FILM COATED ORAL at 21:09

## 2022-12-14 RX ADMIN — SUCRALFATE 1 G: 1 TABLET ORAL at 08:37

## 2022-12-14 RX ADMIN — PYRIDOSTIGMINE BROMIDE 60 MG: 60 TABLET ORAL at 11:36

## 2022-12-14 NOTE — PROGRESS NOTES
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, and urethral erosion    He is now postop day 1 explantation of artificial urinary sphincter, scrotal exploration, cystoscopy  A suprapubic tube was inserted by IR immediately afterward as an alternate to manage his urinary incontinence moving forward  No fevers or chills  Monitoring his leukocytosis postoperatively  Discussed with the infectious disease team no further antibiotics or plans, operative site appears clean without purulence in any perioperative stage  Also discussed with the internal medicine team, he may resume his blood thinners  We will plan to remove his two LOGAN drains from the scrotum tomorrow, prior to discharge (total outputs in past 24 hrs: right subinguinal 20cc, left periurethral 125cc)    Subjective: no complaints this morning  Comfortable  Urine draining via SPT, becoming familiar with his new situation  Review of Systems   Constitutional: Negative  Respiratory: Negative  Cardiovascular: Negative  Genitourinary: Negative for decreased urine volume, dysuria, flank pain, frequency, hematuria, penile discharge, penile pain, penile swelling, scrotal swelling and urgency  Musculoskeletal: Negative  Objective:  Vitals: Blood pressure (!) 105/45, pulse 74, temperature 99 2 °F (37 3 °C), resp  rate 17, height 5' 8" (1 727 m), weight 63 5 kg (140 lb), SpO2 93 %  ,Body mass index is 21 29 kg/m²      Intake/Output Summary (Last 24 hours) at 12/14/2022 1147  Last data filed at 12/14/2022 1001  Gross per 24 hour   Intake 500 ml   Output 945 ml   Net -445 ml     Invasive Devices     Peripheral Intravenous Line  Duration           Peripheral IV 12/11/22 Right Antecubital 3 days    Peripheral IV 12/13/22 Left Arm 1 day          Drain  Duration           Closed/Suction Drain  Bulb 1 day    Closed/Suction Drain Right Groin Bulb 1 day    Suprapubic Catheter 18 Fr  <1 day                Physical Exam  Vitals and nursing note reviewed  Constitutional:       Appearance: He is well-developed  HENT:      Head: Normocephalic and atraumatic  Cardiovascular:      Rate and Rhythm: Normal rate and regular rhythm  Heart sounds: Normal heart sounds  No murmur heard  Pulmonary:      Effort: Pulmonary effort is normal       Breath sounds: Normal breath sounds  Abdominal:      General: Bowel sounds are normal       Palpations: Abdomen is soft  Genitourinary:     Comments: Circumcised penis, normal phallus  No bleeding or discharge per urethra  Scrotum also no edema or erythema  Right subinguinal incision is well approximated with a LOGAN bulb drain in place, scant sanguinous output  Perineal incision well approximated, intact  Left periurethral LOGAN bulb drain also with scant sanguinous output  Musculoskeletal:         General: Normal range of motion  Skin:     General: Skin is warm and dry  Capillary Refill: Capillary refill takes less than 2 seconds  Coloration: Skin is not pale  Neurological:      Mental Status: He is alert and oriented to person, place, and time           Labs:  Recent Labs     12/12/22  0448 12/13/22  0456 12/14/22  0500   WBC 7 61 8 55 14 65*     Recent Labs     12/12/22  0448 12/13/22  0456 12/14/22  0500   HGB 12 0 12 5 13 2       Recent Labs     12/12/22  0448 12/13/22  0456 12/14/22  0500   CREATININE 1 19 1 36* 1 54*       History:    Past Medical History:   Diagnosis Date   • Anxiety    • Arthritis    • Coronary artery disease    • Hiatal hernia    • Hypertension    • Irregular heart beat    • Myasthenia gravis [facility])      Past Surgical History:   Procedure Laterality Date   • EYE SURGERY  2014    cataracts   • HERNIA REPAIR  1971    right inguinal hernia repair   • IR SUPRAPUBIC CATHETER PLACEMENT  12/13/2022   • LA LAP,SURG,COLECTOMY, PARTIAL, W/ANAST N/A 6/3/2016    Procedure: LAPAROSCOPIC SIGMOID RESECTION ;  Surgeon: Odin Manley MD;  [facility];  Service: Colorectal   • LA LAP,SURG,COLECTOMY,W/ANAST N/A 6/3/2016    Procedure: COLECTOMY LAPAROSCOPIC ASSISTED;  Surgeon: Odin Manley MD;  [facility];  Service: Colorectal   • LA SIGMOIDOSCOPY FLX DX W/COLLJ SPEC BR/WA IF PFRMD N/A 6/3/2016    Procedure: Blossom Harden;  Surgeon: Odin Manley MD;  [facility];  Service: Colorectal   • PROSTATECTOMY     • REPLACEMENT TOTAL KNEE Right    • URINARY SPHINCTER IMPLANT  2001    s/p prostatectomy- pt had urinary leakage issues     Family History   Problem Relation Age of Onset   • Diabetes Brother      Social History     Socioeconomic History   • Marital status:       Spouse name: None   • Number of children: None   • Years of education: None   • Highest education level: None   Occupational History   • None   Tobacco Use   • Smoking status: Former   • Smokeless tobacco: None   • Tobacco comments:     quit smoking when 25 y/o   Substance and Sexual Activity   • Alcohol use: No   • Drug use: No   • Sexual activity: None   Other Topics Concern   • None   Social History Narrative   • None     Social Determinants of Health     Financial Resource Strain: Not on file   Food Insecurity: Not on file   Transportation Needs: Not on file   Physical Activity: Not on file   Stress: Not on file   Social Connections: Not on file   Intimate Partner Violence: Not on file   Housing Stability: Low Risk    • Unable to Pay for Housing in the Last Year: No   • Number of [place] in the Last Year: 2   • Unstable Housing in the Last Year: No         Russell Jimenez  Date: 12/14/2022 Time: 11:47 AM

## 2022-12-14 NOTE — PROGRESS NOTES
Progress Note - Infectious Disease   Edy Husbands 80 y o  male MRN: [id number]  Unit/Bed#: -01 Encounter: [id number]      Impression/Plan:  1  Asymptomatic bacteriuria with hematuria-in the setting of an artificial urinary sphincter device that has eroded antibiotics now removed  He has had no systemic illness, has no leukocytosis, and no urinary symptoms other than the gross hematuria  The device now has been completely removed and the patient has good urinary output and his suprapubic catheter that was just placed  Urine culture is growing 2 gram-negative rods  -Monitor patient overnight for the development of any symptoms  -Discontinue ceftriaxone  -Follow-up final urine culture  -Recheck CBC with differential and BMP     2  Displacement of artificial urinary sphincter-no reported evidence of a complicating soft tissue infection  The device has now been removed   -No directed antibiotics for this problem  -We will further discuss with urology     3  Myasthenia gravis- patient being restarted on pyridostigmine     4   Urinary retention- now with a suprapubic catheter in place    Discussed the above management plan with the primary service    Antibiotics:  Ceftriaxone  POD 1    Subjective:  Patient has no fever, chills, sweats; no nausea, vomiting, diarrhea; no cough, shortness of breath; no pain  No new symptoms  Objective:  Vitals:  Temp:  [97 8 °F (36 6 °C)-99 2 °F (37 3 °C)] 99 2 °F (37 3 °C)  HR:  [52-76] 74  Resp:  [17-24] 17  BP: ()/(45-80) 105/45  SpO2:  [92 %-100 %] 93 %  Temp (24hrs), Av 5 °F (36 9 °C), Min:97 8 °F (36 6 °C), Max:99 2 °F (37 3 °C)  Current: Temperature: 99 2 °F (37 3 °C)    Physical Exam:   General Appearance:  Alert, interactive, nontoxic, no acute distress  Throat: Oropharynx moist without lesions      Lungs:   Clear to auscultation bilaterally; no wheezes, rhonchi or rales; respirations unlabored   Heart:  RRR; no murmur, rub or gallop   Abdomen:   Soft, non-tender, non-distended, positive bowel sounds  Extremities: No clubbing, cyanosis or edema   Skin: No new rashes or lesions  No draining wounds noted         Labs, Imaging, & Other studies:   All pertinent labs and imaging studies were personally reviewed  Results from last 7 days   Lab Units 12/14/22  0500 12/13/22  0456 12/12/22  0448   WBC Thousand/uL 14 65* 8 55 7 61   HEMOGLOBIN g/dL 13 2 12 5 12 0   PLATELETS Thousands/uL 221 200 179     Results from last 7 days   Lab Units 12/14/22  0500 12/13/22  0456 12/12/22  0448 12/11/22  1137   SODIUM mmol/L 138 137 136 138   POTASSIUM mmol/L 3 8 4 1 4 2 4 7   CHLORIDE mmol/L 104 106 107 106   CO2 mmol/L 23 26 26 27   BUN mg/dL 21 16 19 24   CREATININE mg/dL 1 54* 1 36* 1 19 1 22   EGFR ml/min/1 73sq m 37 43 51 50   CALCIUM mg/dL 8 7 9 0 8 7 8 9   AST U/L  --   --   --  28   ALT U/L  --   --   --  20   ALK PHOS U/L  --   --   --  65     Results from last 7 days   Lab Units 12/13/22  1031 12/11/22  1132   GRAM STAIN RESULT  Rare Polys  No bacteria seen  --    URINE CULTURE   --  >100,000 cfu/ml Klebsiella aerogenes*  <10,000 cfu/ml Proteus species*     Results from last 7 days   Lab Units 12/12/22  0448   PROCALCITONIN ng/ml 0 06

## 2022-12-14 NOTE — ASSESSMENT & PLAN NOTE
Lab Results   Component Value Date    HGBA1C 7 2 (H) 12/12/2022       Recent Labs     12/13/22  1253 12/13/22  1639 12/13/22  2101 12/14/22  0617   POCGLU 188* 161* 249* 205*       Blood Sugar Average: Last 72 hrs:  (P) 185 4   · Pt maintained on Onglyza 2 5 mg daily as an outpatient, on hold here  · Continue SSI coverage while inpatient   · QID glucose checks  · A1C 7 2  · Monitor and adjust regimen as needed

## 2022-12-14 NOTE — ASSESSMENT & PLAN NOTE
· No recent echocardiogram noted     · Echo reviewed   · Currently appears euvolemic  · Maintained on lasix 20 mg daily--will hold 12/14 given rise in creatinine  · Appreciate cardiology recommendations

## 2022-12-14 NOTE — PLAN OF CARE
Problem: Nutrition/Hydration-ADULT  Goal: Nutrient/Hydration intake appropriate for improving, restoring or maintaining nutritional needs  Description: Monitor and assess patient's nutrition/hydration status for malnutrition  Collaborate with interdisciplinary team and initiate plan and interventions as ordered  Monitor patient's weight and dietary intake as ordered or per policy  Utilize nutrition screening tool and intervene as necessary  Determine patient's food preferences and provide high-protein, high-caloric foods as appropriate       INTERVENTIONS:  - Monitor oral intake, urinary output, labs, and treatment plans  - Assess nutrition and hydration status and recommend course of action  - Evaluate amount of meals eaten  - Assist patient with eating if necessary   - Allow adequate time for meals  - Recommend/ encourage appropriate diets, oral nutritional supplements, and vitamin/mineral supplements  - Order, calculate, and assess calorie counts as needed  - Recommend, monitor, and adjust tube feedings and TPN/PPN based on assessed needs  - Assess need for intravenous fluids  - Provide specific nutrition/hydration education as appropriate  - Include patient/family/caregiver in decisions related to nutrition  Outcome: Progressing     Problem: MOBILITY - ADULT  Goal: Maintain or return to baseline ADL function  Description: INTERVENTIONS:  -  Assess patient's ability to carry out ADLs; assess patient's baseline for ADL function and identify physical deficits which impact ability to perform ADLs (bathing, care of mouth/teeth, toileting, grooming, dressing, etc )  - Assess/evaluate cause of self-care deficits   - Assess range of motion  - Assess patient's mobility; develop plan if impaired  - Assess patient's need for assistive devices and provide as appropriate  - Encourage maximum independence but intervene and supervise when necessary  - Involve family in performance of ADLs  - Assess for home care needs following discharge   - Consider OT consult to assist with ADL evaluation and planning for discharge  - Provide patient education as appropriate  Outcome: Progressing  Goal: Maintains/Returns to pre admission functional level  Description: INTERVENTIONS:  - Perform BMAT or MOVE assessment daily    - Set and communicate daily mobility goal to care team and patient/family/caregiver  - Collaborate with rehabilitation services on mobility goals if consulted  - Perform Range of Motion      times a day  - Reposition patient every      hours    - Dangle patient    times a day  - Stand patient    times a day  - Ambulate patient      times a day  - Out of bed to chair      times a day   - Out of bed for meals    times a day  - Out of bed for toileting  - Record patient progress and toleration of activity level   Outcome: Progressing     Problem: Potential for Falls  Goal: Patient will remain free of falls  Description: INTERVENTIONS:  - Educate patient/family on patient safety including physical limitations  - Instruct patient to call for assistance with activity   - Consult OT/PT to assist with strengthening/mobility   - Keep Call bell within reach  - Keep bed low and locked with side rails adjusted as appropriate  - Keep care items and personal belongings within reach  - Initiate and maintain comfort rounds  - Make Fall Risk Sign visible to staff  - Offer Toileting every    Hours, in advance of need  - Initiate/Maintain     alarm  - Obtain necessary fall risk management equipment:  - Apply yellow socks and bracelet for high fall risk patients  - Consider moving patient to room near nurses station  Outcome: Progressing     Problem: Prexisting or High Potential for Compromised Skin Integrity  Goal: Skin integrity is maintained or improved  Description: INTERVENTIONS:  - Identify patients at risk for skin breakdown  - Assess and monitor skin integrity  - Assess and monitor nutrition and hydration status  - Monitor labs   - Assess for incontinence   - Turn and reposition patient  - Assist with mobility/ambulation  - Relieve pressure over bony prominences  - Avoid friction and shearing  - Provide appropriate hygiene as needed including keeping skin clean and dry  - Evaluate need for skin moisturizer/barrier cream  - Collaborate with interdisciplinary team   - Patient/family teaching  - Consider wound care consult   Outcome: Progressing

## 2022-12-14 NOTE — ASSESSMENT & PLAN NOTE
Patient complained of one isolated incident of blood tinged sputum, which has resolved    · CXR negative  · procal negative   · Continue to hold Eliquis as above, resume when ok with urology

## 2022-12-14 NOTE — ASSESSMENT & PLAN NOTE
Reported CAD- on medical therapy with Imdur 60 mg daily and statin  No beta blocker due to baseline bradycardia and not on ASA due to anticoagulation with Eliquis    · Cardiology consulted for pre-operative clearance, was cleared   · ECHO showed EF 97%, G2 diastolic dysfunction, moderate aortic stenosis

## 2022-12-14 NOTE — OCCUPATIONAL THERAPY NOTE
Occupational Therapy Evaluation     Patient Name: Sadie Gong  XMCUS'I Date: 12/14/2022  Problem List  Principal Problem:    Displacement of artificial urinary sphincter ([facility] [address] )  Active Problems:    Chronic a-fib (HCC)    Myasthenia gravis ([facility] [address] )    CAD (coronary artery disease)    Depression    Diastolic dysfunction    Hemoptysis    Asymptomatic Bacteriuria with hematuria     Type 2 diabetes mellitus, without long-term current use of insulin (HCC)    CKD (chronic kidney disease)    Past Medical History  Past Medical History:   Diagnosis Date    Anxiety     Arthritis     Coronary artery disease     Hiatal hernia     Hypertension     Irregular heart beat     Myasthenia gravis ([facility] [address] )      Past Surgical History  Past Surgical History:   Procedure Laterality Date    EYE SURGERY  2014    cataracts    HERNIA REPAIR  1971    right inguinal hernia repair    IR SUPRAPUBIC CATHETER PLACEMENT  12/13/2022    OH LAP,SURG,COLECTOMY, PARTIAL, W/ANAST N/A 6/3/2016    Procedure: LAPAROSCOPIC SIGMOID RESECTION ;  Surgeon: Shonna Guardado MD;  [facility];  Service: Colorectal    OH LAP,SURG,COLECTOMY,W/ANAST N/A 6/3/2016    Procedure: COLECTOMY LAPAROSCOPIC ASSISTED;  Surgeon: Shonna Guardado MD;  [facility];  Service: Colorectal    OH SIGMOIDOSCOPY FLX DX W/COLLJ SPEC BR/WA IF PFRMD N/A 6/3/2016    Procedure: Nick Ahumada;  Surgeon: Shonna Guardado MD;  [facility];  Service: Colorectal    PROSTATECTOMY      REPLACEMENT TOTAL KNEE Right     URINARY SPHINCTER IMPLANT  2001    s/p prostatectomy- pt had urinary leakage issues         12/14/22 1007   OT Last Visit   OT Visit Date 12/14/22   Note Type   Note type Evaluation   Pain Assessment   Pain Assessment Tool FLACC   Pain Location/Orientation Location: Groin   Effect of Pain on Daily Activities Impacts ability to engage in valued occupations   Hospital Pain Intervention(s) Repositioned; Ambulation/increased activity; Emotional support Pain Rating: FLACC (Rest) - Face 0   Pain Rating: FLACC (Rest) - Legs 0   Pain Rating: FLACC (Rest) - Activity 0   Pain Rating: FLACC (Rest) - Cry 0   Pain Rating: FLACC (Rest) - Consolability 0   Score: FLACC (Rest) 0   Pain Rating: FLACC (Activity) - Face 1   Pain Rating: FLACC (Activity) - Legs 0   Pain Rating: FLACC (Activity) - Activity 0   Pain Rating: FLACC (Activity) - Cry 1   Pain Rating: FLACC (Activity) - Consolability 0   Score: FLACC (Activity) 2   Restrictions/Precautions   Weight Bearing Precautions Per Order No   Other Precautions Bed Alarm;Multiple lines; Fall Risk;Pain;Hard of hearing  (2 LOGAN drains, cardenas)   Home Living   Type of [address] [place] [address] One level;Performs ADLs on one level; Able to live on main level with bedroom/bathroom; Access   Bathroom Shower/Tub Tub/shower unit   Bathroom Equipment Shower chair   [address] [facility]   Additional Comments Per chart review, pt resides in [address] [facility] home w/ his son; reports using RW for functional mobility prn, reporting "not as much as I should "   Prior Function   Level of [place] Needs assistance with ADLs; Needs assistance with functional mobility; Needs assistance with IADLS   Lives With Son   Receives Help From Family   IADLs Family/Friend/Other provides medication management; Family/Friend/Other provides meals; Family/Friend/Other provides transportation   Falls in the last 6 months 0   Vocational Retired   Comments (-) driving; pt reporting that his son assists w/ all ADLs/IADLs   Lifestyle   Autonomy PTA, pt reports requiring assistance w/ ADLs, IADLs, and functional mobility w/ RW; (-) driving   Reciprocal Relationships Lives with his son   Service to Others Retired, reports previously working as    Intrinsic Gratification Enjoys cooking   Subjective   Subjective "My son helps me with everything "   ADL   Where Assessed Edge of bed   Eating Assistance 5  Supervision/Setup   Grooming Assistance 5  Supervision/Setup   UB Bathing Assistance 4  Minimal Assistance   LB Bathing Assistance 3  Moderate Assistance   UB Dressing Assistance 4  Minimal [address]  Moderate Assistance   Toileting Assistance  4  Minimal Assistance   Functional Assistance 4  Minimal Assistance   Bed Mobility   Supine to Sit 3  Moderate assistance   Additional items Assist x 1;HOB elevated; Bedrails; Increased time required;Verbal cues;LE management   Sit to Supine 3  Moderate assistance   Additional items Assist x 1;HOB elevated; Bedrails; Increased time required;Verbal cues;LE management   Additional Comments Pt performing supine <> sit with Mod A x1 for UB postural support/LE management and sat EOB with varying CGA/supervision level; @ end of session, pt left lying supine in bed with all functional needs in reach   Transfers   Sit to Stand 3  Moderate assistance   Additional items Assist x 1; Increased time required;Verbal cues   Stand to Sit 3  Moderate assistance   Additional items Assist x 1; Increased time required;Verbal cues   Toilet transfer 3  Moderate assistance   Additional items Assist x 1; Increased time required;Verbal cues;Standard toilet   Additional Comments w/ RW for safety and support; verbal cues for proper technique/hand placement/safety   Functional Mobility   Functional Mobility 3  Moderate assistance   Additional Comments Pt completed functional mobility <> bathroom w/ Mod A x1 w/ RW for safety and support   Additional items Rolling walker   Balance   Static Sitting Fair   Dynamic Sitting Fair -   Static Standing Poor +   Dynamic Standing Poor   Ambulatory Poor   Activity Tolerance   Activity Tolerance Patient tolerated treatment well;Patient limited by pain   Medical Staff Made Aware CM updated   RUE Assessment   RUE Assessment WFL  (grossly 3-/5 MMT, observed during functional activity)   LUE Assessment   LUE Assessment WFL  (grossly 3-/5 MMT, observed during functional activity) Hand Function   Gross Motor Coordination Functional   Fine Motor Coordination Functional   Vision - Complex Assessment   Ocular Range of Motion Intact   Psychosocial   Psychosocial (WDL)    Perception   Inattention/Neglect Appears intact   Cognition   Overall Cognitive Status WFL   Arousal/Participation Cooperative   Attention Attends with cues to redirect   Orientation Level Oriented X4  (Simultaneous filing  User may not have seen previous data )   Memory Within functional limits   Following Commands Follows one step commands with increased time or repetition   Comments Pt overall pleasant and cooperative; hard of hearing; brightened affect   Assessment   Limitation Decreased ADL status; Decreased UE strength;Decreased endurance;Decreased self-care trans;Decreased high-level ADLs   Prognosis Fair   Assessment Pt is a 79 yo male who presented to [address] [place] [address] with hematuria and wire protruding out of his scrotum in which pt transferred to [facility] for further management  Pt dx w/ displacement of artificial urinary sphincter  Pt s/p removal artifical urinary sphincter bilateral, cystoscopy on 12/13  Pt  has a past medical history of Anxiety, Arthritis, Coronary artery disease, Hiatal hernia, Hypertension, Irregular heart beat, and Myasthenia gravis ([facility] [address] )  Pt with active OT orders and OT consulted to assess pt's functional status and occupational performance to determine safe d/c needs  Pt lives with his son in a 1 story ranch home  PTA, pt requires assistance w/ all ADLs/IADLs and functional mobility w/ RW  (-) driving  Currently, pt performing bed mobility w/ Mod A, functional transfers/mobility w  Mod A x1 w/ RW, UB ADLs w/ Min A, and LB ADLs w/ Mod A  Pt demonstrates the following limitations/impairments which impact the pt's ability to engage in valued occupations: balance, endurance/activity tolerance, standing tolerance, functional reach, postural/trunk control, strength, pain, and safety awareness   From an OT standpoint, recommend discharge to post-acute rehab, pending functional progress and availability of support/assistance @ home to meet current functional needs, once medically stable  The patient's raw score on the AM-PAC Daily Activity inpatient short form is 17, standardized score is 37 26, less than 39 4  Patients at this level are likely to benefit from discharge to post-acute rehabilitation services  Please refer to the recommendation of the Occupational Therapist for safe discharge planning  Pt would benefit from skilled OT services 2-4x/wk to address immediate acute care needs and underlying performance skills to promote safety, decrease fall risk, and enhance occupational performance to return to OF  Goals to be met within the next 10-14 days  Goals   Patient Goals To go to the bathroom   LTG Time Frame 10-14   Long Term Goal #1 See OT goals listed below   Plan   Treatment Interventions ADL retraining;Functional transfer training;UE strengthening/ROM; Endurance training;Patient/family training;Equipment evaluation/education; Compensatory technique education;Continued evaluation; Energy conservation; Activityengagement   Goal Expiration Date 12/28/22   OT Frequency Other (comment)  (2-4x/wk)   Recommendation   OT Discharge Recommendation Post acute rehabilitation services  (Pending functional progress/availablity/support @ home to meet current functional needs)   Equipment Recommended Other (comment)  (TBD)   AM-PAC Daily Activity Inpatient   Lower Body Dressing 2   Bathing 2   Toileting 2   Upper Body Dressing 3   Grooming 4   Eating 4   Daily Activity Raw Score 17   Daily Activity Standardized Score (Calc for Raw Score >=11) 37 26   AM-PAC Applied Cognition Inpatient   Following a Speech/Presentation 2   Understanding Ordinary Conversation 4   Taking Medications 3   Remembering Where Things Are Placed or Put Away 3   Remembering List of 4-5 Errands 3   Taking Care of Complicated Tasks 3   Applied Cognition Raw Score 18   Applied Cognition Standardized Score 38 07   End of Consult   Education Provided Yes   Patient Position at End of Consult Supine;Bed/Chair alarm activated; All needs within reach   Nurse Communication Nurse aware of consult       OT GOALS:    Pt will improve functional mobility during ADL/IADL/leisure tasks with S using AE/DME prn  Pt will improve activity tolerance/functional endurance during ADL/IADL/leisure tasks for at least 20 minutes to improve occupational performance and engagement in valued occupations using AE/DME prn  Pt will engage in ongoing functional/formal cognitive assessments to assist with safe d/c planning and increase safety during functional tasks  Pt will participate in simulated IADL management task w/ Min A to increase independence and engagement in valued occupations w/ good balance/safety  Pt will improve dynamic standing balance for at least 10 minutes with S during functional tasks to decrease fall risk and improve independence and engagement in ADL/IADL/leisure activities  Pt will follow 100% of multi-step commands in ADL/IADL/leisure activities to improve functional cognition used in functional daily routines  Pt will complete functional transfers on and off all surfaces used in daily routines with S for safety to maximize functional/occupational performance  Pt will complete all bed mobility tasks with Mod I to serve as a prerequisite for EOB/OOB ADL/IADL/leisure tasks, optimize positioning/comfort, and increase functional independence  Pt will independently demonstrate good carryover of safety precautions and education/training during ADL/IADL/leisure tasks with energy conservation techniques s/p skilled instruction without verbal cues  Pt will complete UB ADL tasks with Mod I using AE/DME prn to increase functional independence in ADL/IADL/leisure tasks      Pt will complete LB ADL tasks with S using AE/DME prn to increase functional independence in ADL/IADL/leisure tasks  Pt will complete toileting tasks with S and good hygiene/thoroughness using AE/DME prn to increase functional independence  Pt will independently identify and utilize 2-3 positive coping strategies to enhance overall wellbeing and engagement in valued occupations      Chaitanya Burger MS, OTR/L

## 2022-12-14 NOTE — ASSESSMENT & PLAN NOTE
UA positive for leukocyte, bacteria however patient without any systemic illness   · Continue IV rocephin for now, culture shows Klebsiella and very low colony count of proteus  · Monitor, appreciate urology recommendations  · Appreciate ID input

## 2022-12-14 NOTE — ASSESSMENT & PLAN NOTE
Patient's baseline creatinine in Care[facility]where appears around 1 3-1 5  · Upon arrival creat was 1 2 and has increased to 1 5 which appears still within baseline  · Will hold eliquis today given soft BPs and mild rise  · Place on gentle IVF x12 hours  · Recheck AM BMP  · Ensure suprapubic functioning well

## 2022-12-14 NOTE — PLAN OF CARE
Problem: Nutrition/Hydration-ADULT  Goal: Nutrient/Hydration intake appropriate for improving, restoring or maintaining nutritional needs  Description: Monitor and assess patient's nutrition/hydration status for malnutrition  Collaborate with interdisciplinary team and initiate plan and interventions as ordered  Monitor patient's weight and dietary intake as ordered or per policy  Utilize nutrition screening tool and intervene as necessary  Determine patient's food preferences and provide high-protein, high-caloric foods as appropriate       INTERVENTIONS:  - Monitor oral intake, urinary output, labs, and treatment plans  - Assess nutrition and hydration status and recommend course of action  - Evaluate amount of meals eaten  - Assist patient with eating if necessary   - Allow adequate time for meals  - Recommend/ encourage appropriate diets, oral nutritional supplements, and vitamin/mineral supplements  - Order, calculate, and assess calorie counts as needed  - Recommend, monitor, and adjust tube feedings and TPN/PPN based on assessed needs  - Assess need for intravenous fluids  - Provide specific nutrition/hydration education as appropriate  - Include patient/family/caregiver in decisions related to nutrition  12/14/2022 0055 by Candice Garza RN  Outcome: Progressing  12/14/2022 0049 by Candice Garza RN  Outcome: Progressing     Problem: MOBILITY - ADULT  Goal: Maintain or return to baseline ADL function  Description: INTERVENTIONS:  -  Assess patient's ability to carry out ADLs; assess patient's baseline for ADL function and identify physical deficits which impact ability to perform ADLs (bathing, care of mouth/teeth, toileting, grooming, dressing, etc )  - Assess/evaluate cause of self-care deficits   - Assess range of motion  - Assess patient's mobility; develop plan if impaired  - Assess patient's need for assistive devices and provide as appropriate  - Encourage maximum independence but intervene and supervise when necessary  - Involve family in performance of ADLs  - Assess for home care needs following discharge   - Consider OT consult to assist with ADL evaluation and planning for discharge  - Provide patient education as appropriate  12/14/2022 0055 by Marbella Mares RN  Outcome: Progressing  12/14/2022 0049 by Marbella Mares RN  Outcome: Progressing  Goal: Maintains/Returns to pre admission functional level  Description: INTERVENTIONS:  - Perform BMAT or MOVE assessment daily    - Set and communicate daily mobility goal to care team and patient/family/caregiver  - Collaborate with rehabilitation services on mobility goals if consulted  - Perform Range of Motion    times a day  - Reposition patient every    hours    - Dangle patient      times a day  - Stand patient      times a day  - Ambulate patient    times a day  - Out of bed to chair      times a day   - Out of bed for meals      times a day  - Out of bed for toileting  - Record patient progress and toleration of activity level   12/14/2022 0055 by Marbella Mares RN  Outcome: Progressing  12/14/2022 0049 by Marbella Mares RN  Outcome: Progressing     Problem: Potential for Falls  Goal: Patient will remain free of falls  Description: INTERVENTIONS:  - Educate patient/family on patient safety including physical limitations  - Instruct patient to call for assistance with activity   - Consult OT/PT to assist with strengthening/mobility   - Keep Call bell within reach  - Keep bed low and locked with side rails adjusted as appropriate  - Keep care items and personal belongings within reach  - Initiate and maintain comfort rounds  - Make Fall Risk Sign visible to staff  - Offer Toileting every    Hours, in advance of need  - Initiate/Maintain alarm  - Obtain necessary fall risk management equipment:     - Apply yellow socks and bracelet for high fall risk patients  - Consider moving patient to room near nurses station  12/14/2022 0055 by Marbella Mares RN  Outcome: Progressing  12/14/2022 0049 by Graham Singh RN  Outcome: Progressing     Problem: Prexisting or High Potential for Compromised Skin Integrity  Goal: Skin integrity is maintained or improved  Description: INTERVENTIONS:  - Identify patients at risk for skin breakdown  - Assess and monitor skin integrity  - Assess and monitor nutrition and hydration status  - Monitor labs   - Assess for incontinence   - Turn and reposition patient  - Assist with mobility/ambulation  - Relieve pressure over bony prominences  - Avoid friction and shearing  - Provide appropriate hygiene as needed including keeping skin clean and dry  - Evaluate need for skin moisturizer/barrier cream  - Collaborate with interdisciplinary team   - Patient/family teaching  - Consider wound care consult   12/14/2022 0055 by Graham Singh RN  Outcome: Progressing  12/14/2022 0049 by Graham Singh RN  Outcome: Progressing

## 2022-12-14 NOTE — PROGRESS NOTES
[address] [facility]  Progress Note - Regina Muro 6/28/1927, 80 y o  male MRN: [id number]  Unit/Bed#: -Hunter Encounter: [id number]  Primary Care Provider: Mario Phalen, MD   Date and time admitted to hospital: 12/11/2022  3:36 PM    Per d/w daughter, patient does not take lasix daily  Takes about 3x weekly  Also d/w urology, ok to resume patient's lasix today  * Displacement of artificial urinary sphincter ([facility] [address] )  Assessment & Plan  Patient with h/o prostate cancer s/p prostatectomy, resulting in urinary incontinence, s/p artiricial urinary sphincter placement 20 years ago  · Patient presented to ED with c/o hematuria and wire protruding out of his scrotum  CT scan revealed: Right inguinal implant with leads extending into the right inguinal canal and subsequently into the scrotum, distal aspect of the device is external to the scrotum    · Transferred from [address] [place] [address] to [facility] for urologic procedure  · Urology following  · S/P removal of eroded artifical urinary sphincter, scrotal exploration, diagnostic cysto and suprapubic catheter placement on 12/13  · Cardiology consulted for clearance   · Continue IV ceftriaxone in setting of (+) Urine cx however per ID most likely represents asymptomatic bacteriuria  · Appreciate ID input   · Hold Eliquis--resume when ok with urology--reached out 12/14, awaiting response  · PT/OT pending     CKD (chronic kidney disease)  Assessment & Plan  [facility]  · Upon arrival creat was 1 2 and has increased to 1 5 which appears still within baseline  · Will hold eliquis today given soft BPs and mild rise  · Place on gentle IVF x12 hours  · Recheck AM BMP  · Ensure suprapubic functioning well     Asymptomatic Bacteriuria with hematuria   Assessment & Plan  UA positive for leukocyte, bacteria however patient without any systemic illness   · Continue IV rocephin for now, culture shows Klebsiella and very low colony count of proteus  · Monitor, appreciate urology recommendations  · Appreciate ID input     Type 2 diabetes mellitus, without long-term current use of insulin [facility])  Assessment & Plan  Lab Results   Component Value Date    HGBA1C 7 2 (H) 12/12/2022       Recent Labs     12/13/22  1253 12/13/22  1639 12/13/22  2101 12/14/22  0617   POCGLU 188* 161* 249* 205*       Blood Sugar Average: Last 72 hrs:  (P) 185 4   · Pt maintained on Onglyza 2 5 mg daily as an outpatient, on hold here  · Continue SSI coverage while inpatient   · QID glucose checks  · A1C 7 2  · Monitor and adjust regimen as needed    Hemoptysis  Assessment & Plan  Patient complained of one isolated incident of blood tinged sputum, which has resolved  · CXR negative  · procal negative   · Continue to hold Eliquis as above, resume when ok with urology    Diastolic dysfunction  Assessment & Plan  · No recent echocardiogram noted  · Echo reviewed   · Currently appears euvolemic  · Maintained on lasix 20 mg daily--will hold 12/14 given rise in creatinine  · Appreciate cardiology recommendations    Depression  Assessment & Plan  · Mood currently stable   · Continue Prozac 40 mg daily and PRN Xanax  · Monitor     CAD (coronary artery disease)  Assessment & Plan  Reported CAD- on medical therapy with Imdur 60 mg daily and statin  No beta blocker due to baseline bradycardia and not on ASA due to anticoagulation with Eliquis    · Cardiology consulted for pre-operative clearance, was cleared   · ECHO showed EF 35%, G2 diastolic dysfunction, moderate aortic stenosis    Myasthenia gravis ([facility] [address] )  Assessment & Plan  Continue pyridostigmine 60 mg QID  · Currently stable     Chronic a-fib (HCC)  Assessment & Plan  Continue rate control with amiodarone 200 mg daily   · Eliquis was placed on hold in setting of urologic procedure/surgery--resume when ok from urology standpoint   · Monitor           VTE Pharmacologic Prophylaxis: VTE Score: 5 Eliquis held--awaiting urology input on whether or not we can resume     Patient Centered Rounds: I performed bedside rounds with nursing staff today  Discussions with Specialists or Other Care Team Provider: appreciate urology and ID input  KAREN JONES    Education and Discussions with Family / Patient: Updated  (daughter) via phone  Time Spent for Care: 20 minutes  More than 50% of total time spent on counseling and coordination of care as described above  Current Length of Stay: 3 day(s)  Current Patient Status: Inpatient   Certification Statement: The patient will continue to require additional inpatient hospital stay due to ongoing urologic input, ID input, PT/OT, lab monitoring   Discharge Plan: Anticipate discharge tomorrow to discharge location to be determined pending rehab evaluations  Code Status: Level 1 - Full Code    Subjective:   Pt reports feeling well today  Has no acute complaints  SPC draining well  Objective:     Vitals:   Temp (24hrs), Av 5 °F (36 9 °C), Min:97 8 °F (36 6 °C), Max:99 2 °F (37 3 °C)    Temp:  [97 8 °F (36 6 °C)-99 2 °F (37 3 °C)] 99 2 °F (37 3 °C)  HR:  [52-76] 74  Resp:  [17-24] 17  BP: ()/(45-80) 105/45  SpO2:  [92 %-100 %] 93 %  Body mass index is 21 29 kg/m²  Input and Output Summary (last 24 hours): Intake/Output Summary (Last 24 hours) at 2022 0929  Last data filed at 2022 0504  Gross per 24 hour   Intake 450 ml   Output 945 ml   Net -495 ml       Physical Exam:   Physical Exam  Vitals and nursing note reviewed  Constitutional:       General: He is not in acute distress  Comments: [place], appears younger than stated age    Cardiovascular:      Rate and Rhythm: Normal rate  Rhythm irregular  Pulmonary:      Effort: No respiratory distress  Breath sounds: No wheezing  Abdominal:      General: There is no distension  Tenderness: There is no abdominal tenderness     Genitourinary:     Comments: Suprapubic cathter draining dark yellow urine, no clots/blood   Musculoskeletal:      Right lower leg: No edema  Left lower leg: No edema  Skin:     Coloration: Skin is pale  Neurological:      Mental Status: He is oriented to person, place, and time  Psychiatric:         Mood and Affect: Mood normal       Comments: Pleasant           Additional Data:     Labs:  Results from last 7 days   Lab Units 12/14/22  0500   WBC Thousand/uL 14 65*   HEMOGLOBIN g/dL 13 2   HEMATOCRIT % 41 7   PLATELETS Thousands/uL 221   NEUTROS PCT % 74   LYMPHS PCT % 14   MONOS PCT % 10   EOS PCT % 0     Results from last 7 days   Lab Units 12/14/22  0500 12/12/22  0448 12/11/22  1137   SODIUM mmol/L 138   < > 138   POTASSIUM mmol/L 3 8   < > 4 7   CHLORIDE mmol/L 104   < > 106   CO2 mmol/L 23   < > 27   BUN mg/dL 21   < > 24   CREATININE mg/dL 1 54*   < > 1 22   ANION GAP mmol/L 11   < > 5   CALCIUM mg/dL 8 7   < > 8 9   ALBUMIN g/dL  --   --  3 5   TOTAL BILIRUBIN mg/dL  --   --  0 48   ALK PHOS U/L  --   --  65   ALT U/L  --   --  20   AST U/L  --   --  28   GLUCOSE RANDOM mg/dL 194*   < > 163*    < > = values in this interval not displayed       Results from last 7 days   Lab Units 12/11/22  1137   INR  1 32*     Results from last 7 days   Lab Units 12/14/22  0617 12/13/22  2101 12/13/22  1639 12/13/22  1253 12/13/22  1203 12/13/22  0612 12/12/22  2056 12/12/22  1701 12/12/22  1032 12/12/22  0556 12/11/22  2229 12/11/22  1619   POC GLUCOSE mg/dl 205* 249* 161* 188* 166* 159* 195* 171* 224* 136 142* 156*     Results from last 7 days   Lab Units 12/12/22  0448   HEMOGLOBIN A1C % 7 2*     Results from last 7 days   Lab Units 12/12/22  0448   PROCALCITONIN ng/ml 0 06       Lines/Drains:  Invasive Devices     Peripheral Intravenous Line  Duration           Peripheral IV 12/11/22 Right Antecubital 2 days    Peripheral IV 12/13/22 Left Arm 1 day          Drain  Duration           Closed/Suction Drain  Bulb <1 day    Closed/Suction Drain Right Groin Bulb <1 day    Suprapubic Catheter 18 Fr  <1 day                      Imaging: No pertinent imaging reviewed      Recent Cultures (last 7 days):   Results from last 7 days   Lab Units 12/13/22  1031 12/11/22  1132   GRAM STAIN RESULT  Rare Polys  No bacteria seen  --    URINE CULTURE   --  >100,000 cfu/ml Klebsiella aerogenes*  <10,000 cfu/ml Proteus species*       Last 24 Hours Medication List:   Current Facility-Administered Medications   Medication Dose Route Frequency Provider Last Rate   • acetaminophen  650 mg Oral Q6H PRN Elba Simms MD     • ALPRAZolam  0 25 mg Oral BID PRN Elba Simms MD     • amiodarone  200 mg Oral Daily Elba Simms MD     • cefTRIAXone  1,000 mg Intravenous Q24H Elba Simms MD 1,000 mg (12/13/22 1630)   • dicyclomine  20 mg Oral BID PRN Elba Simms MD     • famotidine  20 mg Oral BID Elba Simms MD     • FLUoxetine  40 mg Oral Daily Elba Simms MD     • hydrALAZINE  5 mg Intravenous Q6H PRN Elba Simms MD     • insulin lispro  1-5 Units Subcutaneous HS Elba Simms MD     • insulin lispro  1-6 Units Subcutaneous TID AC Elba Simms MD     • insulin lispro  3 Units Subcutaneous TID With Meals Rashaun Cline PA-C     • isosorbide mononitrate  60 mg Oral Daily Elba Simms MD     • lactated ringers  20 mL/hr Intravenous Continuous Alex Merritt MD     • levothyroxine  88 mcg Oral Early Morning Elba Simms MD     • ondansetron  4 mg Intravenous Q6H PRN Elba Simms MD     • pantoprazole  20 mg Oral BID Elba Simms MD     • pravastatin  20 mg Oral Daily Elba Simms MD     • pyridostigmine  60 mg Oral 4x Daily Elba Simms MD     • sodium chloride  50 mL/hr Intravenous Continuous Rashaun Cline PA-C 75 mL/hr (12/14/22 3401)   • sucralfate  1 g Oral 4x Daily (AC & HS) Elba Simms MD          Today, Patient Was Seen By: Robert Farrar PA-C    **Please Note: This note may have been constructed using a voice recognition system  **

## 2022-12-14 NOTE — PLAN OF CARE
Problem: OCCUPATIONAL THERAPY ADULT  Goal: Performs self-care activities at highest level of function for planned discharge setting  See evaluation for individualized goals  Description: Treatment Interventions: ADL retraining, Functional transfer training, UE strengthening/ROM, Endurance training, Patient/family training, Equipment evaluation/education, Compensatory technique education, Continued evaluation, Energy conservation, Activityengagement  Equipment Recommended: Other (comment) (TBD)       See flowsheet documentation for full assessment, interventions and recommendations  Note: Limitation: Decreased ADL status, Decreased UE strength, Decreased endurance, Decreased self-care trans, Decreased high-level ADLs  Prognosis: Fair  Assessment: Pt is a 79 yo male who presented to [address] [place] [address] with hematuria and wire protruding out of his scrotum in which pt transferred to [facility] for further management  Pt dx w/ displacement of artificial urinary sphincter  Pt s/p removal artifical urinary sphincter bilateral, cystoscopy on 12/13  Pt  has a past medical history of Anxiety, Arthritis, Coronary artery disease, Hiatal hernia, Hypertension, Irregular heart beat, and Myasthenia gravis ([facility] [address] )  Pt with active OT orders and OT consulted to assess pt's functional status and occupational performance to determine safe d/c needs  Pt lives with his son in a 1 story ranch home  PTA, pt requires assistance w/ all ADLs/IADLs and functional mobility w/ RW  (-) driving  Currently, pt performing bed mobility w/ Mod A, functional transfers/mobility w  Mod A x1 w/ RW, UB ADLs w/ Min A, and LB ADLs w/ Mod A  Pt demonstrates the following limitations/impairments which impact the pt's ability to engage in valued occupations: balance, endurance/activity tolerance, standing tolerance, functional reach, postural/trunk control, strength, pain, and safety awareness   From an OT standpoint, recommend discharge to post-acute rehab, pending functional progress and availability of support/assistance @ home to meet current functional needs, once medically stable  The patient's raw score on the AM-PAC Daily Activity inpatient short form is 17, standardized score is 37 26, less than 39 4  Patients at this level are likely to benefit from discharge to post-acute rehabilitation services  Please refer to the recommendation of the Occupational Therapist for safe discharge planning  Pt would benefit from skilled OT services 2-4x/wk to address immediate acute care needs and underlying performance skills to promote safety, decrease fall risk, and enhance occupational performance to return to PLOF  Goals to be met within the next 10-14 days       OT Discharge Recommendation: Post acute rehabilitation services (Pending functional progress/availablity/support @ home to meet current functional needs)

## 2022-12-14 NOTE — ASSESSMENT & PLAN NOTE
Continue rate control with amiodarone 200 mg daily   · Eliquis was placed on hold in setting of urologic procedure/surgery--resume when ok from urology standpoint   · Monitor

## 2022-12-14 NOTE — ASSESSMENT & PLAN NOTE
Patient with h/o prostate cancer s/p prostatectomy, resulting in urinary incontinence, s/p artiricial urinary sphincter placement 20 years ago  · Patient presented to ED with c/o hematuria and wire protruding out of his scrotum  CT scan revealed: Right inguinal implant with leads extending into the right inguinal canal and subsequently into the scrotum, distal aspect of the device is external to the scrotum    · Transferred from [address] [place] [address] to [facility] for urologic procedure  · Urology following  · S/P removal of eroded artifical urinary sphincter, scrotal exploration, diagnostic cysto and suprapubic catheter placement on 12/13  · Cardiology consulted for clearance   · Continue IV ceftriaxone in setting of (+) Urine cx however per ID most likely represents asymptomatic bacteriuria  · Appreciate ID input   · Hold Eliquis--resume when ok with urology--reached out 12/14, awaiting response  · PT/OT pending

## 2022-12-14 NOTE — TELEPHONE ENCOUNTER
This is a 26-year-old male who is status post removal of an eroded artificial urinary sphincter (eroded through the scrotal skin as well as the urethra), while a suprapubic tube placement      Please schedule a follow-up visit with me in 2 to 3 weeks for a wound check and physical exam     Patient will also need routine exchanges of his suprapubic tube (RN visit in 6 weeks)    Please contact the patient's son to schedule after hospital discharge

## 2022-12-15 PROBLEM — R04.2 HEMOPTYSIS: Status: RESOLVED | Noted: 2022-12-11 | Resolved: 2022-12-15

## 2022-12-15 PROBLEM — E44.0 MODERATE PROTEIN-CALORIE MALNUTRITION (HCC): Status: ACTIVE | Noted: 2022-12-15

## 2022-12-15 LAB
ANION GAP SERPL CALCULATED.3IONS-SCNC: 4 MMOL/L (ref 4–13)
BACTERIA SPEC ANAEROBE CULT: NORMAL
BASOPHILS # BLD AUTO: 0.08 THOUSANDS/ÂΜL (ref 0–0.1)
BASOPHILS NFR BLD AUTO: 1 % (ref 0–1)
BUN SERPL-MCNC: 27 MG/DL (ref 5–25)
CALCIUM SERPL-MCNC: 8.5 MG/DL (ref 8.3–10.1)
CHLORIDE SERPL-SCNC: 105 MMOL/L (ref 96–108)
CO2 SERPL-SCNC: 27 MMOL/L (ref 21–32)
CREAT SERPL-MCNC: 1.62 MG/DL (ref 0.6–1.3)
EOSINOPHIL # BLD AUTO: 0.02 THOUSAND/ÂΜL (ref 0–0.61)
EOSINOPHIL NFR BLD AUTO: 0 % (ref 0–6)
ERYTHROCYTE [DISTWIDTH] IN BLOOD BY AUTOMATED COUNT: 13.8 % (ref 11.6–15.1)
GFR SERPL CREATININE-BSD FRML MDRD: 35 ML/MIN/1.73SQ M
GLUCOSE SERPL-MCNC: 142 MG/DL (ref 65–140)
GLUCOSE SERPL-MCNC: 168 MG/DL (ref 65–140)
GLUCOSE SERPL-MCNC: 186 MG/DL (ref 65–140)
GLUCOSE SERPL-MCNC: 241 MG/DL (ref 65–140)
GLUCOSE SERPL-MCNC: 256 MG/DL (ref 65–140)
HCT VFR BLD AUTO: 37.7 % (ref 36.5–49.3)
HGB BLD-MCNC: 12 G/DL (ref 12–17)
IMM GRANULOCYTES # BLD AUTO: 0.06 THOUSAND/UL (ref 0–0.2)
IMM GRANULOCYTES NFR BLD AUTO: 1 % (ref 0–2)
LYMPHOCYTES # BLD AUTO: 1.62 THOUSANDS/ÂΜL (ref 0.6–4.47)
LYMPHOCYTES NFR BLD AUTO: 15 % (ref 14–44)
MCH RBC QN AUTO: 29.2 PG (ref 26.8–34.3)
MCHC RBC AUTO-ENTMCNC: 31.8 G/DL (ref 31.4–37.4)
MCV RBC AUTO: 92 FL (ref 82–98)
MONOCYTES # BLD AUTO: 1.15 THOUSAND/ÂΜL (ref 0.17–1.22)
MONOCYTES NFR BLD AUTO: 11 % (ref 4–12)
NEUTROPHILS # BLD AUTO: 7.98 THOUSANDS/ÂΜL (ref 1.85–7.62)
NEUTS SEG NFR BLD AUTO: 72 % (ref 43–75)
NRBC BLD AUTO-RTO: 0 /100 WBCS
PLATELET # BLD AUTO: 180 THOUSANDS/UL (ref 149–390)
PMV BLD AUTO: 10.5 FL (ref 8.9–12.7)
POTASSIUM SERPL-SCNC: 3.7 MMOL/L (ref 3.5–5.3)
RBC # BLD AUTO: 4.11 MILLION/UL (ref 3.88–5.62)
SODIUM SERPL-SCNC: 136 MMOL/L (ref 135–147)
WBC # BLD AUTO: 10.91 THOUSAND/UL (ref 4.31–10.16)

## 2022-12-15 RX ORDER — SODIUM CHLORIDE 9 MG/ML
50 INJECTION, SOLUTION INTRAVENOUS CONTINUOUS
Status: DISPENSED | OUTPATIENT
Start: 2022-12-15 | End: 2022-12-15

## 2022-12-15 RX ADMIN — PANTOPRAZOLE SODIUM 20 MG: 20 TABLET, DELAYED RELEASE ORAL at 09:16

## 2022-12-15 RX ADMIN — INSULIN LISPRO 3 UNITS: 100 INJECTION, SOLUTION INTRAVENOUS; SUBCUTANEOUS at 11:38

## 2022-12-15 RX ADMIN — ISOSORBIDE MONONITRATE 60 MG: 60 TABLET, EXTENDED RELEASE ORAL at 09:16

## 2022-12-15 RX ADMIN — APIXABAN 2.5 MG: 2.5 TABLET, FILM COATED ORAL at 17:04

## 2022-12-15 RX ADMIN — PYRIDOSTIGMINE BROMIDE 60 MG: 60 TABLET ORAL at 11:38

## 2022-12-15 RX ADMIN — INSULIN LISPRO 2 UNITS: 100 INJECTION, SOLUTION INTRAVENOUS; SUBCUTANEOUS at 21:31

## 2022-12-15 RX ADMIN — INSULIN LISPRO 1 UNITS: 100 INJECTION, SOLUTION INTRAVENOUS; SUBCUTANEOUS at 09:16

## 2022-12-15 RX ADMIN — PYRIDOSTIGMINE BROMIDE 60 MG: 60 TABLET ORAL at 09:16

## 2022-12-15 RX ADMIN — AMIODARONE HYDROCHLORIDE 200 MG: 200 TABLET ORAL at 09:16

## 2022-12-15 RX ADMIN — FAMOTIDINE 20 MG: 20 TABLET ORAL at 17:04

## 2022-12-15 RX ADMIN — ACETAMINOPHEN 650 MG: 325 TABLET, FILM COATED ORAL at 20:27

## 2022-12-15 RX ADMIN — SODIUM CHLORIDE 50 ML/HR: 0.9 INJECTION, SOLUTION INTRAVENOUS at 09:17

## 2022-12-15 RX ADMIN — PYRIDOSTIGMINE BROMIDE 60 MG: 60 TABLET ORAL at 21:31

## 2022-12-15 RX ADMIN — FAMOTIDINE 20 MG: 20 TABLET ORAL at 09:16

## 2022-12-15 RX ADMIN — SUCRALFATE 1 G: 1 TABLET ORAL at 06:12

## 2022-12-15 RX ADMIN — PYRIDOSTIGMINE BROMIDE 60 MG: 60 TABLET ORAL at 17:04

## 2022-12-15 RX ADMIN — APIXABAN 2.5 MG: 2.5 TABLET, FILM COATED ORAL at 09:16

## 2022-12-15 RX ADMIN — SUCRALFATE 1 G: 1 TABLET ORAL at 17:04

## 2022-12-15 RX ADMIN — SUCRALFATE 1 G: 1 TABLET ORAL at 11:38

## 2022-12-15 RX ADMIN — PRAVASTATIN SODIUM 20 MG: 20 TABLET ORAL at 09:16

## 2022-12-15 RX ADMIN — PANTOPRAZOLE SODIUM 20 MG: 20 TABLET, DELAYED RELEASE ORAL at 17:04

## 2022-12-15 RX ADMIN — SUCRALFATE 1 G: 1 TABLET ORAL at 21:31

## 2022-12-15 RX ADMIN — ALPRAZOLAM 0.25 MG: 0.25 TABLET ORAL at 20:27

## 2022-12-15 RX ADMIN — FLUOXETINE 40 MG: 20 CAPSULE ORAL at 09:17

## 2022-12-15 RX ADMIN — LEVOTHYROXINE SODIUM 88 MCG: 88 TABLET ORAL at 06:12

## 2022-12-15 NOTE — ASSESSMENT & PLAN NOTE
Patient with h/o prostate cancer s/p prostatectomy, resulting in urinary incontinence, s/p artiricial urinary sphincter placement 20 years ago  · Patient presented to ED with c/o hematuria and wire protruding out of his scrotum  CT scan revealed: Right inguinal implant with leads extending into the right inguinal canal and subsequently into the scrotum, distal aspect of the device is external to the scrotum    · Transferred from [facility] to [facility] for urologic procedure  · Urology following  · S/P removal of eroded artifical urinary sphincter, scrotal exploration, diagnostic cysto and suprapubic catheter placement on 12/13  · Drains to be removed prior to discharge, tentatively 12/16  · S/P IV ceftriaxone in setting of (+) Urine cx however per ID most likely represents asymptomatic bacteriuria and IV abx discontinued   · Eliquis resumed 12/14 after d/w urology  · PT/OT recommending rehab, CM following

## 2022-12-15 NOTE — ASSESSMENT & PLAN NOTE
Continue rate control with amiodarone 200 mg daily   · Eliquis was placed on hold in setting of urologic procedure/surgery--resumed 12/14  · Monitor

## 2022-12-15 NOTE — CONSULTS
Consultation - Nephrology   Valerie Salguero 80 y o  male MRN: [id number]  Unit/Bed#: -01 Encounter: [id number]    ASSESSMENT/PLAN:   1  CKD stage III: Baseline creatinine around 1 2-1 5 (care everywhere)  Creatinine 1 22 on admission and trending up slowly to 1 62 today  · Increased creatinine possibly related to contrast CT on 12/11 + hemodyamic changes with large shifts in BP over the past 48 hours   · CT on admission without hydronephrosis  · Lasix placed on hold (last dose 12/12)  · Started on gentle fluids by SLIM, normal saline at 50cc/h x 12 hours   2  Hypertension: Blood pressure elevated on 12/13 as high as 201/80 and quickly dropped to a low of 84/46 on 12/14  · Lasix currently on hold  · Pressure acceptable today  3  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  · Now with suprapubic catheter in place  4  Asymptomatic bacteriuria and hematuria: Initially on Rocephin  ID felt this was not a true infection and now off antibiotics  5  History diastolic CHF: on lasix 67TW po qdaily at home  Currently on hold and examines euvolemic to slightly dry   · Monitor volume status on gentle trial of IV fluid    HISTORY OF PRESENT ILLNESS:  Requesting Physician: Skip Kendall DO  Reason for Consult: elevated creatinine     Valerie Salguero is a 80y o  year old male who was admitted to [facility] with hematuria and a wire protruding out of his scrotum  He has a history of artificial urinary sphincter placement 20 years ago and CT revealed the right inguinal implant with leads extending into the right inguinal canal and the scrotum  On 12/13 he underwent removal of the artificial urinary sphincter, scrotal exploration, diagnostic cystoscopy and suprapubic catheter placement  He was treated with ceftriaxone for positive urine culture  Creatinine today trended up slightly to 1 6 so nephrology was consulted  Patient reports feeling okay    He denies any chest pain, shortness of breath, nausea, vomiting or diarrhea  He is feeling little tired and reports a poor appetite        PAST MEDICAL HISTORY:  Past Medical History:   Diagnosis Date   • Anxiety    • Arthritis    • Coronary artery disease    • Hiatal hernia    • Hypertension    • Irregular heart beat    • Myasthenia gravis ([facility] [address] )        PAST SURGICAL HISTORY:  Past Surgical History:   Procedure Laterality Date   • CYSTECTOMY, RADICAL WITH ILEOCONDUIT N/A 12/13/2022    Procedure: REMOVAL ARTIFICIAL URINARY SPINCTER BILATERAL;  Surgeon: Zena Baker MD;  [facility]: [facility];  Service: Urology   • CYSTOSCOPY N/A 12/13/2022    Procedure: Sena Donate;  Surgeon: Zena Baker MD;  [facility];  Service: Urology   • EYE SURGERY  2014    cataracts   • HERNIA REPAIR  1971    right inguinal hernia repair   • IR SUPRAPUBIC CATHETER PLACEMENT  12/13/2022   • DC LAP,SURG,COLECTOMY, PARTIAL, W/ANAST N/A 6/3/2016    Procedure: LAPAROSCOPIC SIGMOID RESECTION ;  Surgeon: Abbie Valencia MD;  [facility];  Service: Colorectal   • DC LAP,SURG,COLECTOMY,W/ANAST N/A 6/3/2016    Procedure: COLECTOMY LAPAROSCOPIC ASSISTED;  Surgeon: Abbie Valencia MD;  [facility];  Service: Colorectal   • DC SIGMOIDOSCOPY FLX DX W/COLLJ SPEC BR/WA IF PFRMD N/A 6/3/2016    Procedure: Lm Saleh;  Surgeon: Abbie Valencia MD;  [facility];  Service: Colorectal   • PROSTATECTOMY     • REPLACEMENT TOTAL KNEE Right    • URINARY SPHINCTER IMPLANT  2001    s/p prostatectomy- pt had urinary leakage issues       ALLERGIES:  Allergies   Allergen Reactions   • Sulfa Antibiotics Rash       SOCIAL HISTORY:  Social History     Substance and Sexual Activity   Alcohol Use No     Social History     Substance and Sexual Activity   Drug Use No     Social History     Tobacco Use   Smoking Status Former   Smokeless Tobacco Not on file   Tobacco Comments    quit smoking when 23 y/o       FAMILY HISTORY:  Family History Problem Relation Age of Onset   • Diabetes Brother        MEDICATIONS:  Scheduled Meds:  Current Facility-Administered Medications   Medication Dose Route Frequency Provider Last Rate   • acetaminophen  650 mg Oral Q6H PRN Warren Lozada MD     • ALPRAZolam  0 25 mg Oral BID PRN Warren Lozada MD     • amiodarone  200 mg Oral Daily Warren Lozada MD     • apixaban  2 5 mg Oral BID Ibis Nuñez PA-C     • dicyclomine  20 mg Oral BID PRN Warren Lozada MD     • famotidine  20 mg Oral BID Warren Lozada MD     • FLUoxetine  40 mg Oral Daily Warren Lozada MD     • hydrALAZINE  5 mg Intravenous Q6H PRN Warren Lozada MD     • insulin lispro  1-5 Units Subcutaneous HS Warren Lozada MD     • insulin lispro  1-6 Units Subcutaneous TID AC Warren Lozada MD     • insulin lispro  3 Units Subcutaneous TID With Meals Ibis Nuñez PA-C     • isosorbide mononitrate  60 mg Oral Daily Warren Lozada MD     • levothyroxine  88 mcg Oral Early Morning Warren Lozada MD     • ondansetron  4 mg Intravenous Q6H PRN Warren Lozada MD     • pantoprazole  20 mg Oral BID Warren Lozada MD     • pravastatin  20 mg Oral Daily Warren Lozada MD     • pyridostigmine  60 mg Oral 4x Daily Warren Lozada MD     • sodium chloride  50 mL/hr Intravenous Continuous Ibis Nuñez PA-C 50 mL/hr (12/15/22 0469)   • sucralfate  1 g Oral 4x Daily (AC & HS) Warren Lozada MD         PRN Meds: •  acetaminophen  •  ALPRAZolam  •  dicyclomine  •  hydrALAZINE  •  ondansetron    Continuous Infusions:sodium chloride, 50 mL/hr, Last Rate: 50 mL/hr (12/15/22 0917)        REVIEW OF SYSTEMS:  A complete review of systems was done  Pertinent positives and negatives noted in the HPI but otherwise the review of systems is negative      PHYSICAL EXAM:  Current Weight: Weight - Scale: 63 5 kg (140 lb)  First Weight: Weight - Scale: 63 5 kg (140 lb)  Vitals:    12/15/22 0921   BP: 118/69   Pulse: 75   Resp:    Temp:    SpO2: 96%       Intake/Output Summary (Last 24 hours) at 12/15/2022 1156  Last data filed at 12/15/2022 0620  Gross per 24 hour   Intake --   Output 620 ml   Net -620 ml     General:  appears comfortable and in no acute distress   Skin:  No rash, warm, dry  Eyes:  Sclerae anicteric, no periorbital edema   ENT:  Moist mucous membranes  Neck:  Trachea midline, symmetric   Chest:  Clear to auscultation bilaterally with no wheezes, rales or rhonchi  CVS:  Regular rate and rhythm  Abdomen:  Soft, nontender, nondistended  Neuro:  Awake and alert  Psych:  Appropriate affect  Extremities: no lower extremity edema   : SPT in place with dark urine output     Lab Results:   Results from last 7 days   Lab Units 12/15/22  0500 12/14/22  0500 12/13/22  0456 12/12/22  0448 12/11/22  1137   WBC Thousand/uL 10 91* 14 65* 8 55 7 61 6 20   HEMOGLOBIN g/dL 12 0 13 2 12 5 12 0 12 1   HEMATOCRIT % 37 7 41 7 38 2 36 6 37 7   PLATELETS Thousands/uL 180 221 200 179 176   SODIUM mmol/L 136 138 137 136 138   POTASSIUM mmol/L 3 7 3 8 4 1 4 2 4 7   CHLORIDE mmol/L 105 104 106 107 106   CO2 mmol/L 27 23 26 26 27   BUN mg/dL 27* 21 16 19 24   CREATININE mg/dL 1 62* 1 54* 1 36* 1 19 1 22   CALCIUM mg/dL 8 5 8 7 9 0 8 7 8 9       Radiology Results:   IR suprapubic catheter placement   Final Result by Alejandrina Malik MD (12/13 1549)   Successful 18-[place] suprapubic catheter placement        Workstation performed: [id number]

## 2022-12-15 NOTE — PROGRESS NOTES
Progress Note - Infectious Disease   Brook Long 80 y o  male MRN: [id number]  Unit/Bed#: -01 Encounter: [id number]      Impression/Plan:  1   Asymptomatic bacteriuria with hematuria-in the setting of an artificial urinary sphincter device that has eroded antibiotics now removed  Miguelangel Prom has had no systemic illness, has no leukocytosis, and no urinary symptoms other than the gross hematuria   The device now has been completely removed and the patient has good urinary output and his suprapubic catheter that was just placed   Urine culture is growing 2 gram-negative rods  He remained stable off all antibiotics  -Monitor off all antibiotics  -No additional ID work-up or treatment needed     2   Displacement of artificial urinary sphincter-no reported evidence of a complicating soft tissue infection   The device has now been removed   -No directed antibiotics for this problem  -We will further discuss with urology     3  Myasthenia gravis- patient being restarted on pyridostigmine     4   Urinary retention- now with a suprapubic catheter in place    Discussed the above management plan with the primary service    No active infectious disease issues  We will sign off  Please call if questions  Antibiotics:  Off antibiotics 2  Postop day 2    Subjective:  Patient has no fever, chills, sweats; no nausea, vomiting, diarrhea; no cough, shortness of breath; no pain  No new symptoms  He seems in good spirits  Objective:  Vitals:  Temp:  [98 3 °F (36 8 °C)] 98 3 °F (36 8 °C)  HR:  [66-75] 75  BP: (118-126)/(57-69) 118/69  SpO2:  [96 %-97 %] 96 %  Temp (24hrs), Av 3 °F (36 8 °C), Min:98 3 °F (36 8 °C), Max:98 3 °F (36 8 °C)  Current: Temperature: 98 3 °F (36 8 °C)    Physical Exam:   General Appearance:  Alert, interactive, nontoxic, no acute distress  Throat: Oropharynx moist without lesions      Lungs:   Clear to auscultation bilaterally; no wheezes, rhonchi or rales; respirations unlabored   Heart:  RRR; no murmur, rub or gallop   Abdomen:   Soft, non-tender, non-distended, positive bowel sounds  Suprapubic catheter in place  Drains in place  No erythema or purulence  Extremities: No clubbing, cyanosis or edema   Skin: No new rashes or lesions  No draining wounds noted  Labs, Imaging, & Other studies:   All pertinent labs and imaging studies were personally reviewed  Results from last 7 days   Lab Units 12/15/22  0500 12/14/22  0500 12/13/22  0456   WBC Thousand/uL 10 91* 14 65* 8 55   HEMOGLOBIN g/dL 12 0 13 2 12 5   PLATELETS Thousands/uL 180 221 200     Results from last 7 days   Lab Units 12/15/22  0500 12/14/22  0500 12/13/22  0456 12/12/22  0448 12/11/22  1137   SODIUM mmol/L 136 138 137   < > 138   POTASSIUM mmol/L 3 7 3 8 4 1   < > 4 7   CHLORIDE mmol/L 105 104 106   < > 106   CO2 mmol/L 27 23 26   < > 27   BUN mg/dL 27* 21 16   < > 24   CREATININE mg/dL 1 62* 1 54* 1 36*   < > 1 22   EGFR ml/min/1 73sq m 35 37 43   < > 50   CALCIUM mg/dL 8 5 8 7 9 0   < > 8 9   AST U/L  --   --   --   --  28   ALT U/L  --   --   --   --  20   ALK PHOS U/L  --   --   --   --  65    < > = values in this interval not displayed       Results from last 7 days   Lab Units 12/13/22  1031 12/11/22  1132   GRAM STAIN RESULT  Rare Polys  No bacteria seen  --    URINE CULTURE   --  >100,000 cfu/ml Klebsiella aerogenes*  <10,000 cfu/ml Proteus species*     Results from last 7 days   Lab Units 12/12/22  0448   PROCALCITONIN ng/ml 0 06

## 2022-12-15 NOTE — ASSESSMENT & PLAN NOTE
UA positive for leukocyte, bacteria however patient without any systemic illness   · S/P several days of IV Rocephin, culture shows Klebsiella and very low colony count of proteus  · ID felt was not true infection  · Now monitoring off abx

## 2022-12-15 NOTE — UTILIZATION REVIEW
Continued Stay Review    PROVIDER Reinier Jernigan # [id number]      Date: 12/15/2022                          Current Patient Class: Inpatient  Current Level of Care: Med/Surg    HPI:89 y o  male initially admitted on 12/11/2022    Assessment/Plan: Displacement of artificial urinary sphincter  CKD (chronic kidney disease)  Asymptomatic Bacteriuria with hematuria  Moderate protein-calorie malnutrition  Has mild pain in suprapubic region but cath draining  Monitor off Abx  Maintain right groin drain to full bulb suction  Maintain 2nd bulb drain  Maintain suprapubic catheter  Lasix on hold  Gentle IV flds  Consult Nephrology - Cr increasing  12/15/2022  Consult Nephrology:  Elevated creatinine with history of CKD stage III with a baseline creatinine 1 2-1 5, etiology could be secondary to previous contrast exposure, intravascular volume depletion and recent surgery  Chronic kidney disease, stage III Baseline creatinine 1 2-1 5  Hypertension, blood pressure overall fluctuant recently appears stable  Displacement of artificial urinary sphincter status post removal with suprapubic catheter in place  Diastolic heart failure, volume status currently appears stable, continue hold diuretic therapy  With gentle IV fluids  Continue to hold diuretic therapy,  No other changes in current regimen        Vital Signs:   Date/Time Temp Pulse Resp BP MAP (mmHg) SpO2 O2 Device Cardiac (WDL)   12/15/22 09:21:42 -- 75 -- 118/69 85 96 % -- --     Date and Time Eye Opening Best Verbal Response Best Motor Response Lucius Coma Scale Score   12/14/22 0000 4 5 6 15     Pertinent Labs/Diagnostic Results:     Results from last 7 days   Lab Units 12/15/22  0500 12/14/22  0500 12/13/22  0456 12/12/22  0448 12/11/22  1137   WBC Thousand/uL 10 91* 14 65* 8 55 7 61 6 20   HEMOGLOBIN g/dL 12 0 13 2 12 5 12 0 12 1   HEMATOCRIT % 37 7 41 7 38 2 36 6 37 7   PLATELETS Thousands/uL 180 221 200 179 176   NEUTROS ABS Thousands/µL 7 98* 10 93*  --   --  4 49     Results from last 7 days   Lab Units 12/15/22  0500 12/14/22  0500 12/13/22  0456 12/12/22  0448 12/11/22  1137   SODIUM mmol/L 136 138 137 136 138   POTASSIUM mmol/L 3 7 3 8 4 1 4 2 4 7   CHLORIDE mmol/L 105 104 106 107 106   CO2 mmol/L 27 23 26 26 27   ANION GAP mmol/L 4 11 5 3* 5   BUN mg/dL 27* 21 16 19 24   CREATININE mg/dL 1 62* 1 54* 1 36* 1 19 1 22   EGFR ml/min/1 73sq m 35 37 43 51 50   CALCIUM mg/dL 8 5 8 7 9 0 8 7 8 9     Results from last 7 days   Lab Units 12/11/22  1137   AST U/L 28   ALT U/L 20   ALK PHOS U/L 65   TOTAL PROTEIN g/dL 6 6   ALBUMIN g/dL 3 5   TOTAL BILIRUBIN mg/dL 0 48     Results from last 7 days   Lab Units 12/15/22  1607 12/15/22  1054 12/15/22  0614 12/14/22  2112 12/14/22  1628 12/14/22  1100 12/14/22  0617 12/13/22  2101 12/13/22  1639 12/13/22  1253 12/13/22  1203 12/13/22  0612   POC GLUCOSE mg/dl 142* 256* 168* 176* 206* 139 205* 249* 161* 188* 166* 159*     Results from last 7 days   Lab Units 12/15/22  0500 12/14/22  0500 12/13/22  0456 12/12/22  0448 12/11/22  1137   GLUCOSE RANDOM mg/dL 186* 194* 173* 148* 163*     Results from last 7 days   Lab Units 12/12/22  0448   HEMOGLOBIN A1C % 7 2*   EAG mg/dl 160     Results from last 7 days   Lab Units 12/11/22  1137   PROTIME seconds 16 6*   INR  1 32*   PTT seconds 47*     Results from last 7 days   Lab Units 12/12/22  0448   PROCALCITONIN ng/ml 0 06     Results from last 7 days   Lab Units 12/11/22  1132   CLARITY UA  Turbid   COLOR UA  Light Yellow   SPEC GRAV UA  1 019   PH UA  5 0   GLUCOSE UA mg/dl Negative   KETONES UA mg/dl Negative   BLOOD UA  Small*   PROTEIN UA mg/dl Trace*   NITRITE UA  Negative   BILIRUBIN UA  Negative   UROBILINOGEN UA (BE) mg/dl <2 0   LEUKOCYTES UA  Large*   WBC UA /hpf Innumerable*   RBC UA /hpf 10-20*   BACTERIA UA /hpf Innumerable*   EPITHELIAL CELLS WET PREP /hpf Occasional     Results from last 7 days   Lab Units 12/13/22  1031 12/11/22  1132 GRAM STAIN RESULT  Rare Polys  No bacteria seen  --    URINE CULTURE   --  >100,000 cfu/ml Klebsiella aerogenes*  <10,000 cfu/ml Proteus species*     Medications:   Scheduled Medications:  amiodarone, 200 mg, Oral, Daily  apixaban, 2 5 mg, Oral, BID  famotidine, 20 mg, Oral, BID  FLUoxetine, 40 mg, Oral, Daily  insulin lispro, 1-5 Units, Subcutaneous, HS  insulin lispro, 1-6 Units, Subcutaneous, TID AC  insulin lispro, 3 Units, Subcutaneous, TID With Meals  isosorbide mononitrate, 60 mg, Oral, Daily  levothyroxine, 88 mcg, Oral, Early Morning  pantoprazole, 20 mg, Oral, BID  pravastatin, 20 mg, Oral, Daily  pyridostigmine, 60 mg, Oral, 4x Daily  sucralfate, 1 g, Oral, 4x Daily (AC & HS)      Continuous IV Infusions:  sodium chloride, 50 mL/hr, Intravenous, Continuous      PRN Meds:  acetaminophen, 650 mg, Oral, Q6H PRN  ALPRAZolam, 0 25 mg, Oral, BID PRN  dicyclomine, 20 mg, Oral, BID PRN  hydrALAZINE, 5 mg, Intravenous, Q6H PRN  ondansetron, 4 mg, Intravenous, Q6H PRN        Discharge Plan: D    Network Utilization Review Department  ATTENTION: Please call with any questions or concerns to [phone] and carefully listen to the prompts so that you are directed to the right person  All voicemails are confidential   Vera Clements all requests for admission clinical reviews, approved or denied determinations and any other requests to dedicated fax number below belonging to the campus where the patient is receiving treatment   List of dedicated fax numbers for the Facilities:  [address] DENIALS (Administrative/Medical Necessity) [phone]   [address] (Maternity/NICU/Pediatrics) [phone]   91 Soraida Lim [phone]   [facility] [phone] [address] [place] [address] [address] [place] [address] Otilio Valadez [facility] [address] [facility] [address] [phone]

## 2022-12-15 NOTE — ASSESSMENT & PLAN NOTE
Lab Results   Component Value Date    HGBA1C 7 2 (H) 12/12/2022       Recent Labs     12/14/22  1100 12/14/22  1628 12/14/22  2112 12/15/22  0614   POCGLU 139 206* 176* 168*       Blood Sugar Average: Last 72 hrs:  (P) 181 [id number]   · Pt maintained on Onglyza 2 5 mg daily as an outpatient, on hold here  · Continue SSI coverage while inpatient   · QID glucose checks  · A1C 7 2  · Monitor and adjust regimen as needed

## 2022-12-15 NOTE — ASSESSMENT & PLAN NOTE
Patient complained of one isolated incident of blood tinged sputum, which has resolved    · CXR negative  · procal negative   · Resumed eliquis

## 2022-12-15 NOTE — PROGRESS NOTES
UROLOGY PROGRESS NOTE   Patient Identifiers: Jasmin Dumont (MRN [id number])  Date of Service: 12/15/2022    Subjective:     24 HR EVENTS:   no significant events  Patient has  no complaints  Objective:     VITALS:    Vitals:    12/14/22 2139   BP: 118/58   Pulse: 72   Resp:    Temp: 98 3 °F (36 8 °C)   SpO2: 96%       INS & OUTS:  I/O last 24 hours:   In: 500 [P O :500]  Out: 620 [Urine:600; Drains:20]    LABS:  Lab Results   Component Value Date    HGB 12 0 12/15/2022    HCT 37 7 12/15/2022    WBC 10 91 (H) 12/15/2022     12/15/2022   ]    Lab Results   Component Value Date    K 3 7 12/15/2022     12/15/2022    CO2 27 12/15/2022    BUN 27 (H) 12/15/2022    CREATININE 1 62 (H) 12/15/2022    CALCIUM 8 5 12/15/2022    GLUCOSE 165 (H) 10/19/2022   ]    INPATIENT MEDS:    Current Facility-Administered Medications:   •  acetaminophen (TYLENOL) tablet 650 mg, 650 mg, Oral, Q6H PRN, Jed Mcdermott MD, 650 mg at 12/14/22 2109  •  ALPRAZolam Loman Nannette) tablet 0 25 mg, 0 25 mg, Oral, BID PRN, Jed Mcdermott MD, 0 25 mg at 12/14/22 2108  •  amiodarone tablet 200 mg, 200 mg, Oral, Daily, Jed Mcdermott MD, 200 mg at 12/15/22 3361  •  apixaban (ELIQUIS) tablet 2 5 mg, 2 5 mg, Oral, BID, Luis Leonardo PA-C, 2 5 mg at 12/15/22 4182  •  dicyclomine (BENTYL) tablet 20 mg, 20 mg, Oral, BID PRN, Jed Mcdermott MD  •  famotidine (PEPCID) tablet 20 mg, 20 mg, Oral, BID, Jed Mcdermott MD, 20 mg at 12/15/22 3521  •  FLUoxetine (PROzac) capsule 40 mg, 40 mg, Oral, Daily, Jed Mcdermott MD, 40 mg at 12/15/22 3675  •  hydrALAZINE (APRESOLINE) injection 5 mg, 5 mg, Intravenous, Q6H PRN, Jed Mcdermott MD  •  insulin lispro (HumaLOG) 100 units/mL subcutaneous injection 1-5 Units, 1-5 Units, Subcutaneous, HS, Jed Mcdermott MD, 1 Units at 12/14/22 2111  •  insulin lispro (HumaLOG) 100 units/mL subcutaneous injection 1-6 Units, 1-6 Units, Subcutaneous, TID AC, 1 Units at 12/15/22 0916 **AND** Fingerstick Glucose (POCT), , , TID AC, Brandyn Payne MD  •  insulin lispro (HumaLOG) 100 units/mL subcutaneous injection 3 Units, 3 Units, Subcutaneous, TID With Meals, Mark Anthony Stokes PA-C, 3 Units at 12/14/22 1729  •  isosorbide mononitrate (IMDUR) 24 hr tablet 60 mg, 60 mg, Oral, Daily, Brandyn Payne MD, 60 mg at 12/15/22 7076  •  levothyroxine tablet 88 mcg, 88 mcg, Oral, Early Morning, Brandyn Payne MD, 88 mcg at 12/15/22 0612  •  ondansetron (ZOFRAN) injection 4 mg, 4 mg, Intravenous, Q6H PRN, Brandyn Payne MD  •  pantoprazole (PROTONIX) EC tablet 20 mg, 20 mg, Oral, BID, Brandyn Payne MD, 20 mg at 12/15/22 9864  •  pravastatin (PRAVACHOL) tablet 20 mg, 20 mg, Oral, Daily, Brandyn Payne MD, 20 mg at 12/15/22 9341  •  pyridostigmine (MESTINON) tablet 60 mg, 60 mg, Oral, 4x Daily, Brandyn Payne MD, 60 mg at 12/15/22 8060  •  sodium chloride 0 9 % infusion, 50 mL/hr, Intravenous, Continuous, Mark Anthony Stokes PA-C, Last Rate: 50 mL/hr at 12/15/22 0917, 50 mL/hr at 12/15/22 7584  •  sucralfate (CARAFATE) tablet 1 g, 1 g, Oral, 4x Daily (AC & HS), Brandyn Payne MD, 1 g at 12/15/22 0612      Physical Exam:     GEN: alert and oriented x 3    RESP: breathing comfortably with no accessory muscle use, lungs clear  CV: RRR, pulses palpable   ABD: soft, non-tender, non-distended   INCISION: no erythema, induration, or drainage   EXT: no significant peripheral edema   DRAINS: Right subinguinal and left periurethral JPs with scant serosanguineous  SPT: in place draining clear yellow urine          Assessment:   27-year-old male with history of prostate cancer status post retropubic prostatectomy in 2001 and artificial urinary sphincter inserted a few years following surgery  He has no POD #2 for explantation of artificial urinary sphincter, scrotal exploration, and cystoscopy for scrotal/urethral erosion    Suprapubic catheter was inserted by IR immediately after surgery  Patient remains afebrile, vital signs stable  Suprapubic catheter in place draining clear, yellow, urine  Right subinguinal and left periurethral JPs intact with scant serosanguineous output  WBC 10 91 and creatinine 1 62  Plan:   -Maintain suprapubic catheter to gravity drainage  Plan for first SPT exchange in our office in 6 weeks   -Plan to remove LOGAN drains prior to discharge  Consider home health upon discharge  Patient resides with son  -Appreciate input from infectious disease  No antibiotics at this time  WBC continues to trend downward  -Slight increase in creatinine up to 1 62  Consider nephrology consult/follow up outpatient    Our office will arrange outpatient follow-up in the next 2 to 3 weeks for wound check with Dr Flower Abdirahman  No additional urologic intervention required at this time  RN participated in rounds with AP  Plan of care discussed with patient and RN  - improving with humalog  - give lantus tonight

## 2022-12-15 NOTE — PHYSICAL THERAPY NOTE
Physical Therapy Evaluation    Patient's Name: Aaron Telles    Admitting Diagnosis  Displacement of artificial urinary sphincter ([facility] )    Problem List  Patient Active Problem List   Diagnosis    Chronic a-fib ([facility] )    Myasthenia gravis ([facility] )    CAD (coronary artery disease)    Hypomagnesemia    Displacement of artificial urinary sphincter (HCC)    Depression    Diastolic dysfunction    Asymptomatic Bacteriuria with hematuria     Type 2 diabetes mellitus, without long-term current use of insulin (HCC)    CKD (chronic kidney disease)    Moderate protein-calorie malnutrition ([facility] )       Past Medical History  Past Medical History:   Diagnosis Date    Anxiety     Arthritis     Coronary artery disease     Hiatal hernia     Hypertension     Irregular heart beat     Myasthenia gravis ([facility] )        Past Surgical History  Past Surgical History:   Procedure Laterality Date    CYSTECTOMY, RADICAL WITH ILEOCONDUIT N/A 12/13/2022    Procedure: REMOVAL ARTIFICIAL URINARY SPINCTER BILATERAL;  Surgeon: Leanna Og MD;  [facility];  Service: Urology    CYSTOSCOPY N/A 12/13/2022    Procedure: Garth Galan;  Surgeon: Leanna Og MD;  [facility];  Service: Urology    EYE SURGERY  2014    cataracts    HERNIA REPAIR  1971    right inguinal hernia repair    IR SUPRAPUBIC CATHETER PLACEMENT  12/13/2022    ME LAP,SURG,COLECTOMY, PARTIAL, W/ANAST N/A 6/3/2016    Procedure: LAPAROSCOPIC SIGMOID RESECTION ;  Surgeon: Janelle Menjivar MD;  [facility];  Service: Colorectal    ME LAP,SURG,COLECTOMY,W/ANAST N/A 6/3/2016    Procedure: COLECTOMY LAPAROSCOPIC ASSISTED;  Surgeon: Janelle Menjivar MD;  [facility];  Service: Colorectal    ME SIGMOIDOSCOPY FLX DX W/COLLJ SPEC BR/WA IF PFRMD N/A 6/3/2016    Procedure: Shereen Hugo;  Surgeon: Janelle Menjivar MD;  [facility];  Service: Colorectal    PROSTATECTOMY      REPLACEMENT TOTAL KNEE Right     URINARY SPHINCTER IMPLANT  2001    s/p prostatectomy- pt had urinary leakage issues        12/15/22 1258   PT Last Visit   PT Visit Date 12/15/22   Note Type   Note type Evaluation   Pain Assessment   Pain Assessment Tool 0-10   Pain Score 6   Pain Location/Orientation Location: Groin   Hospital Pain Intervention(s) Ambulation/increased activity; Emotional support   Restrictions/Precautions   Weight Bearing Precautions Per Order No   Other Precautions Cognitive; Chair Alarm; Bed Alarm;Multiple lines; Fall Risk;Pain;Hard of hearing  (2 LOGAN drains)   Home Living   Type of [address] [facility] One level  (2-3 SURESH)   Bathroom Shower/Tub Tub/shower unit   Bathroom Equipment Shower chair   Home Equipment Walker   Prior Function   Level of [address] with functional mobility  (ambulatory w/ RW per pt)   Lives With Son  (pt reports son works during the day + he is alone "more or less")   Receives Help From Family   IADLs Family/Friend/Other provides medication management; Family/Friend/Other provides meals; Family/Friend/Other provides transportation   Falls in the last 6 months 0   General   Family/Caregiver Present No   Cognition   Arousal/Participation Alert   Orientation Level Oriented to person;Oriented to place;Oriented to time;Oriented to situation  (general place "hospital", unable to recall name of hospital, "the 13th December" for time, unable to recall the year, "I just had surgery" for situation)   Comments pleasant + cooperative, poor safety + insight   Subjective   Subjective Pt agreeable to mobilize  RLE Assessment   RLE Assessment   (3+/5)   LLE Assessment   LLE Assessment   (3+/5)   Coordination   Movements are Fluid and Coordinated 1   Bed Mobility   Supine to Sit 4  Minimal assistance   Additional items Assist x 1;HOB elevated; Increased time required;Verbal cues   Sit to Supine Unable to assess   Additional Comments Pt greeted in supine     Transfers   Sit to Stand 4  Minimal assistance   Additional items Assist x 1; Increased time required;Verbal cues   Stand to Sit 4  Minimal assistance   Additional items Assist x 1; Increased time required;Verbal cues   Additional Comments RW   Ambulation/Elevation   Gait pattern Excessively slow; Short stride; Inconsistent farida; Foward flexed;Decreased foot clearance   Gait Assistance 3  Moderate assist   Additional items Assist x 1;Verbal cues; Tactile cues   Assistive Device Rolling walker   Distance 15'x2   Stair Management Assistance Not tested   Ambulation/Elevation Additional Comments Pt with excessive forward trunk flexion w/ ambulation, max manual + verbal cues to maintain CoM within [place] w/ poor carryover by pt  Balance   Static Sitting Fair   Dynamic Sitting Fair -   Static Standing Poor +   Dynamic Standing Poor   Ambulatory Poor  (RW)   Endurance Deficit   Endurance Deficit Yes   Endurance Deficit Description weakness, fatigue   Activity Tolerance   Activity Tolerance Patient limited by fatigue   Medical Staff Made Aware CM Veronica   Assessment   Prognosis Fair   Problem List Decreased strength;Decreased endurance; Impaired balance;Decreased mobility; Decreased cognition; Impaired judgement;Decreased safety awareness; Impaired hearing;Pain   Assessment Pt is 80 y o  male seen for a high complexity PT evaluation s/p admit to [facility] on 12/11/2022  Pt presenting w/ principal dx of displacement of artificial urinary sphincter, now s/p explantation of eroded artificial urinary sphincter, scrotal exploration, + diagnostic cystoscopy 12/13/22  Please see above for other active problem list / PMH  PT now consulted to assess functional mobility and needs for safe d/c planning  Prior to admission, pt was ambulatory w/ RW, lives w/ son in a 1 SH w/ 1-2 SURESH  Currently pt requires MinAx1 for bed skills; MinAX1 for functional transfers; ModAx1 for ambulation w/ RW   Pt presents functioning below baseline and w/ overall mobility deficits 2* to: decreased LE strength; generalized weakness/deconditioning; decreased endurance; impaired balance; gait deviations; pain; fatigue; impaired safety and judgement; limited insight into current deficits; bed/chair alarms; multiple lines  Pt currently at risk for falls  (Please find additional objective findings from PT assessment regarding body systems outlined above )     Pt will continue to benefit from skilled PT interventions to address stated impairments; to maximize functional potential; for ongoing pt/ family training; and DME needs  PT is currently recommending rehab  Barriers to Discharge Inaccessible home environment;Decreased caregiver support   Goals   Patient Goals to try to walk   STG Expiration Date 12/29/22   Short Term Goal #1 In 14 days pt will complete: 1) Bed mobility skills with Elaine to facilitate safe return to previous living environment  2) Functional transfers with Elaine to facilitate safe return to previous living environment  3) Ambulation with ' w/ Elaine without LOB for safe ambulation in home/community environment  4) Improve balance scores by 1 grade to decrease fall risk  5) Improve LE strength grades by 1 to increase independence w/ all functional mobility, transfers and gait  6) PT for ongoing pt and family education; DME needs and D/C planning to promote highest level of function in least restrictive environment  7) Stair training up/ down 2 steps with most appropriate technique and CGA for safe access to previous living environment and to increase community access  PT Treatment Day 0   Plan   Treatment/Interventions Functional transfer training;LE strengthening/ROM; Elevations; Therapeutic exercise; Endurance training;Patient/family training;Equipment eval/education; Bed mobility;Gait training; Compensatory technique education;Spoke to case management  (balance training)   PT Frequency   (2-5x/wk)   Recommendation   PT Discharge Recommendation Post acute rehabilitation services   AM-PAC Basic Mobility Inpatient   Turning in Bed Without Bedrails 3   Lying on Back to Sitting on Edge of Flat Bed 3   Moving Bed to Chair 2   Standing Up From Chair 3   Walk in Room 2   Climb 3-5 Stairs 1   Basic Mobility Inpatient Raw Score 14   Basic Mobility Standardized Score 35 55   Highest Level Of Mobility   -HLM Goal 4: Move to chair/commode   -HLM Achieved 6: Walk 10 steps or more   End of Consult   Patient Position at End of Consult Bedside chair;Bed/Chair alarm activated; All needs within reach  (on waffle cushion, all lines in tact)     Sofya Miranda, PT, DPT

## 2022-12-15 NOTE — ASSESSMENT & PLAN NOTE
Patient's baseline creatinine in [facility] appears around 1 3-1 5?    · Upon arrival creat was 1 2 and has increased to 1 5 which appeared still within baseline  · Lasix held 12/14 and placed on gentle IVF  · Creat still increased  · Will get neprhology input   · Ensure suprapubic functioning well

## 2022-12-15 NOTE — ASSESSMENT & PLAN NOTE
Reported CAD- on medical therapy with Imdur 60 mg daily and statin  No beta blocker due to baseline bradycardia and not on ASA due to anticoagulation with Eliquis    · Cardiology consulted for pre-operative clearance, was cleared   · ECHO showed EF 06%, G2 diastolic dysfunction, moderate aortic stenosis

## 2022-12-15 NOTE — PLAN OF CARE
Problem: PHYSICAL THERAPY ADULT  Goal: Performs mobility at highest level of function for planned discharge setting  See evaluation for individualized goals  Description: Treatment/Interventions: Functional transfer training, LE strengthening/ROM, Elevations, Therapeutic exercise, Endurance training, Patient/family training, Equipment eval/education, Bed mobility, Gait training, Compensatory technique education, Spoke to case management (balance training)          See flowsheet documentation for full assessment, interventions and recommendations  Note: Prognosis: Fair  Problem List: Decreased strength, Decreased endurance, Impaired balance, Decreased mobility, Decreased cognition, Impaired judgement, Decreased safety awareness, Impaired hearing, Pain  Assessment: Pt is 80 y o  male seen for a high complexity PT evaluation s/p admit [facility] on 12/11/2022  Pt presenting w/ principal dx of displacement of artificial urinary sphincter, now s/p explantation of eroded artificial urinary sphincter, scrotal exploration, + diagnostic cystoscopy 12/13/22  Please see above for other active problem list / PMH  PT now consulted to assess functional mobility and needs for safe d/c planning  Prior to admission, pt was ambulatory w/ RW, lives w/ son in a 1 SH w/ 1-2 SURESH  Currently pt requires MinAx1 for bed skills; MinAX1 for functional transfers; ModAx1 for ambulation w/ RW  Pt presents functioning below baseline and w/ overall mobility deficits 2* to: decreased LE strength; generalized weakness/deconditioning; decreased endurance; impaired balance; gait deviations; pain; fatigue; impaired safety and judgement; limited insight into current deficits; bed/chair alarms; multiple lines  Pt currently at risk for falls   (Please find additional objective findings from PT assessment regarding body systems outlined above ) Pt will continue to benefit from skilled PT interventions to address stated impairments; to maximize functional potential; for ongoing pt/ family training; and DME needs  PT is currently recommending rehab  Barriers to Discharge: Inaccessible home environment, Decreased caregiver support     PT Discharge Recommendation: Post acute rehabilitation services    See flowsheet documentation for full assessment

## 2022-12-15 NOTE — CASE MANAGEMENT
Case Management Discharge Planning Note    Patient name Berny Staton  Location [address] [id number] MRN [id number]  : 1927 Date 12/15/2022       Current Admission Date: 2022  Current Admission Diagnosis:Displacement of artificial urinary sphincter [facility])   Patient Active Problem List    Diagnosis Date Noted   • Moderate protein-calorie malnutrition ([facility] ) 12/15/2022   • CKD (chronic kidney disease) 2022   • Type 2 diabetes mellitus, without long-term current use of insulin [facility] ) 2022   • Displacement of artificial urinary sphincter ([facility] ) 2022   • Depression    • Diastolic dysfunction    • Asymptomatic Bacteriuria with hematuria  2022   • Chronic a-fib ([facility] ) 2016   • Myasthenia gravis ([place] [address] ) 2016   • CAD (coronary artery disease) 2016   • Hypomagnesemia 2016      LOS (days): 4  Geometric Mean LOS (GMLOS) (days): 3 70  Days to GMLOS:-0 2     OBJECTIVE:  Risk of Unplanned Readmission Score: 16 07         Current admission status: Inpatient   Preferred Pharmacy:   [address] [facility]  [address] [place] [address]  Phone: [phone] Fax: [phone]    Primary Care Provider: Shanta Titus MD    Primary Insurance: [facility]  Secondary Insurance:     DISCHARGE DETAILS:    Discharge planning discussed with<[facility]> Denise Sorenson of Choice: Yes  Comments - Freedom of Choice: [facility] offered choice of Care [facility] or [facility] Amanda Crow agreed with [facility])  Also discussed possiblities of [facility] [employer] referrals to be sent and then CM will call daughter and offer choice    CM contacted family/caregiver?: Yes  Were Treatment Team discharge recommendations reviewed with patient/caregiver?: Yes               [address] Agency Name[de-identified] [facility]

## 2022-12-15 NOTE — ASSESSMENT & PLAN NOTE
Echo reviewed   · Currently appears euvolemic to dry  · Maintained on lasix 20 mg AS NEEDED (per son)--placed on hold 12/14 given rise in creatinine and started on gentle IVF   · Appreciate cardiology recommendations

## 2022-12-15 NOTE — PROGRESS NOTES
[facility]  Progress Note - Pete Areas 6/28/1927, 80 y o  male MRN: [id number]  Unit/Bed#: -01 Encounter: [id number]  Primary Care Provider: Rimma Bryant MD   Date and time admitted to hospital: 12/11/2022  3:36 PM    Daughter updated at 10:56 AM      * Displacement of artificial urinary sphincter [facility])  Assessment & Plan  Patient with h/o prostate cancer s/p prostatectomy, resulting in urinary incontinence, s/p artiricial urinary sphincter placement 20 years ago  · Patient presented to ED with c/o hematuria and wire protruding out of his scrotum  CT scan revealed: Right inguinal implant with leads extending into the right inguinal canal and subsequently into the scrotum, distal aspect of the device is external to the scrotum  · Transferred from [address] [place] [address] to [facility] for urologic procedure  · Urology following  · S/P removal of eroded artifical urinary sphincter, scrotal exploration, diagnostic cysto and suprapubic catheter placement on 12/13  · Drains to be removed prior to discharge, tentatively 12/16  · S/P IV ceftriaxone in setting of (+) Urine cx however per ID most likely represents asymptomatic bacteriuria and IV abx discontinued   · Eliquis resumed 12/14 after d/w urology  · PT/OT recommending rehab, CM following    CKD (chronic kidney disease)  Assessment & Plan  [facility]?    · Upon arrival creat was 1 2 and has increased to 1 5 which appeared still within baseline  · Lasix held 12/14 and placed on gentle IVF  · Creat still increased  · Will get neprhology input   · Ensure suprapubic functioning well     Asymptomatic Bacteriuria with hematuria   Assessment & Plan  UA positive for leukocyte, bacteria however patient without any systemic illness   · S/P several days of IV Rocephin, culture shows Klebsiella and very low colony count of proteus  · ID felt was not true infection  · Now monitoring off abx     Moderate protein-calorie malnutrition (HCC)  Assessment & Plan  Malnutrition Findings:   Adult Malnutrition type: Chronic illness  Adult Degree of Malnutrition: Malnutrition of moderate degree  Malnutrition Characteristics: Fat loss, Muscle loss, Inadequate energy                  360 Statement: related to disease/condition fraility with aging, evidenced by <75% intake versus needs for > 1 month exhibiting moderate orbital fat pads loss and moderate temporalis muscle loss  Treating with Regular diet and Glucerna BID po supplements  BMI Findings: Body mass index is 21 29 kg/m²  Appreciate nutrition input     Type 2 diabetes mellitus, without long-term current use of insulin [facility])  Assessment & Plan  Lab Results   Component Value Date    HGBA1C 7 2 (H) 12/12/2022       Recent Labs     12/14/22  1100 12/14/22  1628 12/14/22  2112 12/15/22  0614   POCGLU 139 206* 176* 168*       Blood Sugar Average: Last 72 hrs:  (P) 181 [id number]   · Pt maintained on Onglyza 2 5 mg daily as an outpatient, on hold here  · Continue SSI coverage while inpatient   · QID glucose checks  · A1C 7 2  · Monitor and adjust regimen as needed    Diastolic dysfunction  Assessment & Plan  Echo reviewed   · Currently appears euvolemic to dry  · Maintained on lasix 20 mg AS NEEDED (per son)--placed on hold 12/14 given rise in creatinine and started on gentle IVF   · Appreciate cardiology recommendations    Depression  Assessment & Plan  · Mood currently stable   · Continue Prozac 40 mg daily and PRN Xanax  · Monitor     CAD (coronary artery disease)  Assessment & Plan  Reported CAD- on medical therapy with Imdur 60 mg daily and statin  No beta blocker due to baseline bradycardia and not on ASA due to anticoagulation with Eliquis    · Cardiology consulted for pre-operative clearance, was cleared   · ECHO showed EF 09%, G2 diastolic dysfunction, moderate aortic stenosis    Myasthenia gravis ([address] )  Assessment & Plan  Continue pyridostigmine 60 mg QID  · Currently stable     Chronic a-fib (HCC)  Assessment & Plan  Continue rate control with amiodarone 200 mg daily   · Eliquis was placed on hold in setting of urologic procedure/surgery--resumed   · Monitor       Hemoptysis-resolved as of 12/15/2022  Assessment & Plan  Patient complained of one isolated incident of blood tinged sputum, which has resolved  · CXR negative  · procal negative   · Resumed eliquis         VTE Pharmacologic Prophylaxis: VTE Score: 5 Moderate Risk (Score 3-4) - Pharmacological DVT Prophylaxis Ordered: apixaban (Eliquis)  Patient Centered Rounds: I performed bedside rounds with nursing staff today  Discussions with Specialists or Other Care Team Provider: texted nephrology, CM, urology, ID    Education and Discussions with Family / Patient: Attempted to update  (daughter) via phone  Left voicemail  Time Spent for Care: 20 minutes  More than 50% of total time spent on counseling and coordination of care as described above  Current Length of Stay: 4 day(s)  Current Patient Status: Inpatient   Certification Statement: The patient will continue to require additional inpatient hospital stay due to creat monitoring, urologic drains in place, safe discharge planning   Discharge Plan: Anticipate discharge tomorrow to either [facility] with son vs rehab (as recommended)--awaiting callback from daughter and CM coordination     Code Status: Level 1 - Full Code    Subjective:   Doing okay today  Has mild pain in suprapubic region but cath draining  Not much appetite today  No other complaints  Objective:     Vitals:   Temp (24hrs), Av 3 °F (36 8 °C), Min:98 3 °F (36 8 °C), Max:98 3 °F (36 8 °C)    Temp:  [98 3 °F (36 8 °C)] 98 3 °F (36 8 °C)  HR:  [66-75] 75  BP: (118-126)/(57-69) 118/69  SpO2:  [96 %-97 %] 96 %  Body mass index is 21 29 kg/m²  Input and Output Summary (last 24 hours):      Intake/Output Summary (Last 24 hours) at 12/15/2022 1028  Last data filed at 12/15/2022 0620  Gross per 24 hour   Intake --   Output 620 ml   Net -620 ml       Physical Exam:   Physical Exam  Vitals and nursing note reviewed  Constitutional:       General: He is not in acute distress  Comments: On RA, appears younger than stated age  Ute    Cardiovascular:      Rate and Rhythm: Normal rate  Rhythm irregular  Pulmonary:      Effort: No respiratory distress  Breath sounds: No wheezing  Abdominal:      General: There is no distension  Tenderness: There is no abdominal tenderness  Comments: Suprapubic cath noted    Genitourinary:     Comments: 2 LOGAN drains noted in scrotum   Musculoskeletal:      Right lower leg: No edema  Left lower leg: No edema  Skin:     Coloration: Skin is pale  Neurological:      Mental Status: He is oriented to person, place, and time  Psychiatric:         Mood and Affect: Mood normal           Additional Data:     Labs:  Results from last 7 days   Lab Units 12/15/22  0500   WBC Thousand/uL 10 91*   HEMOGLOBIN g/dL 12 0   HEMATOCRIT % 37 7   PLATELETS Thousands/uL 180   NEUTROS PCT % 72   LYMPHS PCT % 15   MONOS PCT % 11   EOS PCT % 0     Results from last 7 days   Lab Units 12/15/22  0500 12/12/22  0448 12/11/22  1137   SODIUM mmol/L 136   < > 138   POTASSIUM mmol/L 3 7   < > 4 7   CHLORIDE mmol/L 105   < > 106   CO2 mmol/L 27   < > 27   BUN mg/dL 27*   < > 24   CREATININE mg/dL 1 62*   < > 1 22   ANION GAP mmol/L 4   < > 5   CALCIUM mg/dL 8 5   < > 8 9   ALBUMIN g/dL  --   --  3 5   TOTAL BILIRUBIN mg/dL  --   --  0 48   ALK PHOS U/L  --   --  65   ALT U/L  --   --  20   AST U/L  --   --  28   GLUCOSE RANDOM mg/dL 186*   < > 163*    < > = values in this interval not displayed       Results from last 7 days   Lab Units 12/11/22  1137   INR  1 32*     Results from last 7 days   Lab Units 12/15/22  0614 12/14/22  2112 12/14/22  1628 12/14/22  1100 12/14/22  0617 12/13/22  2101 12/13/22  1639 12/13/22  1253 12/13/22  1203 12/13/22  0612 12/12/22  2056 12/12/22  1701   POC GLUCOSE mg/dl 168* 176* 206* 139 205* 249* 161* 188* 166* 159* 195* 171*     Results from last 7 days   Lab Units 12/12/22  0448   HEMOGLOBIN A1C % 7 2*     Results from last 7 days   Lab Units 12/12/22  0448   PROCALCITONIN ng/ml 0 06       Lines/Drains:  Invasive Devices     Peripheral Intravenous Line  Duration           Peripheral IV 12/11/22 Right Antecubital 3 days    Peripheral IV 12/13/22 Left Arm 2 days          Drain  Duration           Closed/Suction Drain  Bulb 1 day    Closed/Suction Drain Right Groin Bulb 1 day    Suprapubic Catheter 18 Fr  1 day                      Imaging: No pertinent imaging reviewed      Recent Cultures (last 7 days):   Results from last 7 days   Lab Units 12/13/22  1031 12/11/22  1132   GRAM STAIN RESULT  Rare Polys  No bacteria seen  --    URINE CULTURE   --  >100,000 cfu/ml Klebsiella aerogenes*  <10,000 cfu/ml Proteus species*       Last 24 Hours Medication List:   Current Facility-Administered Medications   Medication Dose Route Frequency Provider Last Rate   • acetaminophen  650 mg Oral Q6H PRN Jocy Berg MD     • ALPRAZolam  0 25 mg Oral BID PRN Jocy Berg MD     • amiodarone  200 mg Oral Daily Jocy Berg MD     • apixaban  2 5 mg Oral BID Alfred Savage PA-C     • dicyclomine  20 mg Oral BID PRN Jocy Berg MD     • famotidine  20 mg Oral BID Jocy Berg MD     • FLUoxetine  40 mg Oral Daily Jocy Berg MD     • hydrALAZINE  5 mg Intravenous Q6H PRN Jocy Berg MD     • insulin lispro  1-5 Units Subcutaneous HS Jocy Berg MD     • insulin lispro  1-6 Units Subcutaneous TID AC Jocy Berg MD     • insulin lispro  3 Units Subcutaneous TID With Meals Alfred Savage PA-C     • isosorbide mononitrate  60 mg Oral Daily Jocy Berg MD     • levothyroxine  88 mcg Oral Early Morning Jocy Berg MD • ondansetron  4 mg Intravenous Q6H PRN Abiola Conway MD     • pantoprazole  20 mg Oral BID Abiola Conway MD     • pravastatin  20 mg Oral Daily Abiola Conway MD     • pyridostigmine  60 mg Oral 4x Daily Abiola Conway MD     • sodium chloride  50 mL/hr Intravenous Continuous Lorain Skiff, PA-C 50 mL/hr (12/15/22 1915)   • sucralfate  1 g Oral 4x Daily (AC & HS) Abiola Conway MD          Today, Patient Was Seen By: Lorain Skiff, PA-C    **Please Note: This note may have been constructed using a voice recognition system  **

## 2022-12-15 NOTE — ASSESSMENT & PLAN NOTE
Malnutrition Findings:   Adult Malnutrition type: Chronic illness  Adult Degree of Malnutrition: Malnutrition of moderate degree  Malnutrition Characteristics: Fat loss, Muscle loss, Inadequate energy                  360 Statement: related to disease/condition fraility with aging, evidenced by <75% intake versus needs for > 1 month exhibiting moderate orbital fat pads loss and moderate temporalis muscle loss  Treating with Regular diet and Glucerna BID po supplements  BMI Findings: Body mass index is 21 29 kg/m²     Appreciate nutrition input

## 2022-12-16 LAB
ANION GAP SERPL CALCULATED.3IONS-SCNC: 2 MMOL/L (ref 4–13)
BACTERIA TISS AEROBE CULT: ABNORMAL
BASOPHILS # BLD AUTO: 0.08 THOUSANDS/ÂΜL (ref 0–0.1)
BASOPHILS NFR BLD AUTO: 1 % (ref 0–1)
BUN SERPL-MCNC: 26 MG/DL (ref 5–25)
CALCIUM SERPL-MCNC: 8.3 MG/DL (ref 8.3–10.1)
CHLORIDE SERPL-SCNC: 109 MMOL/L (ref 96–108)
CO2 SERPL-SCNC: 26 MMOL/L (ref 21–32)
CREAT SERPL-MCNC: 1.42 MG/DL (ref 0.6–1.3)
EOSINOPHIL # BLD AUTO: 0.12 THOUSAND/ÂΜL (ref 0–0.61)
EOSINOPHIL NFR BLD AUTO: 1 % (ref 0–6)
ERYTHROCYTE [DISTWIDTH] IN BLOOD BY AUTOMATED COUNT: 13.9 % (ref 11.6–15.1)
GFR SERPL CREATININE-BSD FRML MDRD: 41 ML/MIN/1.73SQ M
GLUCOSE SERPL-MCNC: 176 MG/DL (ref 65–140)
GLUCOSE SERPL-MCNC: 183 MG/DL (ref 65–140)
GLUCOSE SERPL-MCNC: 203 MG/DL (ref 65–140)
GLUCOSE SERPL-MCNC: 231 MG/DL (ref 65–140)
GLUCOSE SERPL-MCNC: 234 MG/DL (ref 65–140)
GRAM STN SPEC: ABNORMAL
GRAM STN SPEC: ABNORMAL
HCT VFR BLD AUTO: 37.7 % (ref 36.5–49.3)
HGB BLD-MCNC: 12.1 G/DL (ref 12–17)
IMM GRANULOCYTES # BLD AUTO: 0.04 THOUSAND/UL (ref 0–0.2)
IMM GRANULOCYTES NFR BLD AUTO: 0 % (ref 0–2)
LYMPHOCYTES # BLD AUTO: 1.82 THOUSANDS/ÂΜL (ref 0.6–4.47)
LYMPHOCYTES NFR BLD AUTO: 20 % (ref 14–44)
MCH RBC QN AUTO: 28.5 PG (ref 26.8–34.3)
MCHC RBC AUTO-ENTMCNC: 32.1 G/DL (ref 31.4–37.4)
MCV RBC AUTO: 89 FL (ref 82–98)
MONOCYTES # BLD AUTO: 0.84 THOUSAND/ÂΜL (ref 0.17–1.22)
MONOCYTES NFR BLD AUTO: 9 % (ref 4–12)
NEUTROPHILS # BLD AUTO: 6.12 THOUSANDS/ÂΜL (ref 1.85–7.62)
NEUTS SEG NFR BLD AUTO: 69 % (ref 43–75)
NRBC BLD AUTO-RTO: 0 /100 WBCS
PLATELET # BLD AUTO: 206 THOUSANDS/UL (ref 149–390)
PMV BLD AUTO: 10.5 FL (ref 8.9–12.7)
POTASSIUM SERPL-SCNC: 4.4 MMOL/L (ref 3.5–5.3)
RBC # BLD AUTO: 4.24 MILLION/UL (ref 3.88–5.62)
SODIUM SERPL-SCNC: 137 MMOL/L (ref 135–147)
WBC # BLD AUTO: 9.02 THOUSAND/UL (ref 4.31–10.16)

## 2022-12-16 RX ORDER — OXYCODONE HYDROCHLORIDE 5 MG/1
2.5 TABLET ORAL EVERY 4 HOURS PRN
Status: DISCONTINUED | OUTPATIENT
Start: 2022-12-16 | End: 2022-12-17 | Stop reason: HOSPADM

## 2022-12-16 RX ORDER — OXYCODONE HYDROCHLORIDE 5 MG/1
5 TABLET ORAL EVERY 4 HOURS PRN
Status: DISCONTINUED | OUTPATIENT
Start: 2022-12-16 | End: 2022-12-17 | Stop reason: HOSPADM

## 2022-12-16 RX ADMIN — FAMOTIDINE 20 MG: 20 TABLET ORAL at 17:16

## 2022-12-16 RX ADMIN — INSULIN LISPRO 2 UNITS: 100 INJECTION, SOLUTION INTRAVENOUS; SUBCUTANEOUS at 17:17

## 2022-12-16 RX ADMIN — ACETAMINOPHEN 650 MG: 325 TABLET, FILM COATED ORAL at 15:27

## 2022-12-16 RX ADMIN — INSULIN LISPRO 2 UNITS: 100 INJECTION, SOLUTION INTRAVENOUS; SUBCUTANEOUS at 21:20

## 2022-12-16 RX ADMIN — SUCRALFATE 1 G: 1 TABLET ORAL at 15:27

## 2022-12-16 RX ADMIN — PANTOPRAZOLE SODIUM 20 MG: 20 TABLET, DELAYED RELEASE ORAL at 08:53

## 2022-12-16 RX ADMIN — AMIODARONE HYDROCHLORIDE 200 MG: 200 TABLET ORAL at 08:53

## 2022-12-16 RX ADMIN — APIXABAN 2.5 MG: 2.5 TABLET, FILM COATED ORAL at 08:53

## 2022-12-16 RX ADMIN — FLUOXETINE 40 MG: 20 CAPSULE ORAL at 08:53

## 2022-12-16 RX ADMIN — PYRIDOSTIGMINE BROMIDE 60 MG: 60 TABLET ORAL at 21:16

## 2022-12-16 RX ADMIN — PYRIDOSTIGMINE BROMIDE 60 MG: 60 TABLET ORAL at 08:53

## 2022-12-16 RX ADMIN — INSULIN LISPRO 3 UNITS: 100 INJECTION, SOLUTION INTRAVENOUS; SUBCUTANEOUS at 11:42

## 2022-12-16 RX ADMIN — LEVOTHYROXINE SODIUM 88 MCG: 88 TABLET ORAL at 06:13

## 2022-12-16 RX ADMIN — PYRIDOSTIGMINE BROMIDE 60 MG: 60 TABLET ORAL at 11:42

## 2022-12-16 RX ADMIN — PANTOPRAZOLE SODIUM 20 MG: 20 TABLET, DELAYED RELEASE ORAL at 17:16

## 2022-12-16 RX ADMIN — SUCRALFATE 1 G: 1 TABLET ORAL at 11:42

## 2022-12-16 RX ADMIN — APIXABAN 2.5 MG: 2.5 TABLET, FILM COATED ORAL at 17:16

## 2022-12-16 RX ADMIN — SUCRALFATE 1 G: 1 TABLET ORAL at 06:13

## 2022-12-16 RX ADMIN — DICYCLOMINE HYDROCHLORIDE 20 MG: 20 TABLET ORAL at 17:20

## 2022-12-16 RX ADMIN — INSULIN LISPRO 3 UNITS: 100 INJECTION, SOLUTION INTRAVENOUS; SUBCUTANEOUS at 17:19

## 2022-12-16 RX ADMIN — PRAVASTATIN SODIUM 20 MG: 20 TABLET ORAL at 08:53

## 2022-12-16 RX ADMIN — ISOSORBIDE MONONITRATE 60 MG: 60 TABLET, EXTENDED RELEASE ORAL at 08:53

## 2022-12-16 RX ADMIN — INSULIN LISPRO 3 UNITS: 100 INJECTION, SOLUTION INTRAVENOUS; SUBCUTANEOUS at 08:53

## 2022-12-16 RX ADMIN — SUCRALFATE 1 G: 1 TABLET ORAL at 21:17

## 2022-12-16 RX ADMIN — ACETAMINOPHEN 650 MG: 325 TABLET, FILM COATED ORAL at 21:17

## 2022-12-16 RX ADMIN — PYRIDOSTIGMINE BROMIDE 60 MG: 60 TABLET ORAL at 17:17

## 2022-12-16 RX ADMIN — FAMOTIDINE 20 MG: 20 TABLET ORAL at 08:53

## 2022-12-16 RX ADMIN — INSULIN LISPRO 1 UNITS: 100 INJECTION, SOLUTION INTRAVENOUS; SUBCUTANEOUS at 08:53

## 2022-12-16 NOTE — PROGRESS NOTES
Progress Note - Urology  Jacqueline Escalante 6/28/1927, 80 y o  male MRN: [id number]    Unit/Bed#: -01 Encounter: [id number]    * Displacement of artificial urinary sphincter ([address] )  Assessment & Plan  · POD #3 explantation of artificial urinary sphincter with noted protal/urethral erosion  · Bilateral drains removed today, 1 displaced already on my exam  · Maintain suprapubic tube with plan for first change in office in 6 weeks  · Recommend home health care for catheter teaching at home and possible future changes  · Appreciate input from infectious disease, no antibiotics warranted at this time, WBC continues to trend downward and is currently 9 02  · Creatinine stable 1 42  · Follow-up outpatient in 2 to 3 weeks for wound check with Dr Tee Peralta:   80-year-old male with history of prostate cancer status post retropubic prostatectomy in 2001 and artificial urinary sphincter inserted a few years following surgery  He has no POD #3 for explantation of artificial urinary sphincter, scrotal exploration, and cystoscopy for scrotal/urethral erosion  Suprapubic catheter was inserted by IR immediately after surgery due to inability to place prepubic tube in the OR  Patient remains afebrile, vital signs stable  Suprapubic catheter in place draining clear, yellow, urine  Right subinguinal and left periurethral JPs intact with scant serosanguineous output  Drain in left scrotum was displaced prior to my arrival today, sutures were removed  Second drain was removed today  WBC 9 02 and creatinine 1 42     24 HR EVENTS:   no significant events  Patient has  no complaints  Review of Systems   Constitutional: Negative for activity change, appetite change, chills, fatigue, fever and unexpected weight change  HENT: Positive for hearing loss  Negative for facial swelling  Eyes: Negative for discharge  Respiratory: Negative  Negative for cough and shortness of breath      Cardiovascular: Negative for chest pain and leg swelling  Gastrointestinal: Negative  Negative for abdominal distention, abdominal pain, constipation, diarrhea, nausea and vomiting  Endocrine: Negative  Genitourinary: Positive for difficulty urinating  Negative for decreased urine volume, dysuria, enuresis, flank pain, frequency, genital sores, hematuria, penile pain, penile swelling, scrotal swelling, testicular pain and urgency  Musculoskeletal: Negative for back pain and myalgias  Skin: Negative for pallor and rash  Allergic/Immunologic: Negative  Negative for immunocompromised state  Neurological: Negative for facial asymmetry and speech difficulty  Psychiatric/Behavioral: Positive for confusion  Negative for agitation  Objective:  Nursing Rounds: LENNIE  Vitals: Blood pressure (!) 174/73, pulse 68, temperature 97 6 °F (36 4 °C), resp  rate 17, height 5' 8" (1 727 m), weight 63 5 kg (140 lb), SpO2 98 %  ,Body mass index is 21 29 kg/m²  INS & OUTS:  I/O last 24 hours: In: 300 [P O :300]  Out: 900 [Urine:900]    Physical Exam  Vitals and nursing note reviewed  Constitutional:       General: He is not in acute distress  Appearance: Normal appearance  He is normal weight  He is not ill-appearing, toxic-appearing or diaphoretic  Comments: Chronically ill-appearing   HENT:      Head: Normocephalic  Pulmonary:      Effort: Pulmonary effort is normal  No respiratory distress  Abdominal:      General: Abdomen is flat  There is no distension  Tenderness: There is no abdominal tenderness  There is no guarding or rebound  Genitourinary:     Testes:         Right: Mass, tenderness or swelling not present  Left: Mass, tenderness or swelling not present  Epididymis:      Right: Not inflamed  No tenderness  Left: Not inflamed  No tenderness  Musculoskeletal:         General: No swelling  Skin:     General: Skin is warm and dry     Neurological:      General: No focal deficit present  Mental Status: He is alert and oriented to person, place, and time  Psychiatric:         Mood and Affect: Mood normal          Behavior: Behavior normal          Imaging:    Imaging reviewed - both report and images personally reviewed       Labs:  Recent Labs     12/14/22  0500 12/15/22  0500 12/16/22  0519   WBC 14 65* 10 91* 9 02       Recent Labs     12/14/22  0500 12/15/22  0500 12/16/22  0519   HGB 13 2 12 0 12 1     Recent Labs     12/14/22  0500 12/15/22  0500 12/16/22  0519   HCT 41 7 37 7 37 7     Recent Labs     12/14/22  0500 12/15/22  0500 12/16/22  0519   CREATININE 1 54* 1 62* 1 42*     Lab Results   Component Value Date    HGB 12 1 12/16/2022    HCT 37 7 12/16/2022    WBC 9 02 12/16/2022     12/16/2022     Lab Results   Component Value Date    K 4 4 12/16/2022     (H) 12/16/2022    CO2 26 12/16/2022    BUN 26 (H) 12/16/2022    CREATININE 1 42 (H) 12/16/2022    CALCIUM 8 3 12/16/2022    GLUCOSE 165 (H) 10/19/2022     Urinalysis: not done  Urine Culture: Growth: Klebsiella noted on culture prior to arrival, being followed by infectious disease    History:    Past Medical History:   Diagnosis Date   • Anxiety    • Arthritis    • Coronary artery disease    • Hiatal hernia    • Hypertension    • Irregular heart beat    • Myasthenia gravis ([facility] [address] )      Past Surgical History:   Procedure Laterality Date   • CYSTECTOMY, RADICAL WITH ILEOCONDUIT N/A 12/13/2022    Procedure: REMOVAL ARTIFICIAL URINARY SPINCTER BILATERAL;  Surgeon: Marquita Pringle MD;  [facility];  Service: Urology   • CYSTOSCOPY N/A 12/13/2022    Procedure: Wilson Poplin;  Surgeon: Marquita Pringle MD;  [facility];  Service: Urology   • EYE SURGERY  2014    cataracts   • HERNIA REPAIR  1971    right inguinal hernia repair   • IR SUPRAPUBIC CATHETER PLACEMENT  12/13/2022   • [place] LAP,SURG,COLECTOMY, PARTIAL, W/ANAST N/A 6/3/2016    Procedure: LAPAROSCOPIC SIGMOID RESECTION ;  Surgeon: Haritha Horton Wilian Riley MD;  Location[facility];  Service: Colorectal   • ME LAP,SURG,COLECTOMY,W/ANAST N/A 6/3/2016    Procedure: COLECTOMY LAPAROSCOPIC ASSISTED;  Surgeon: Gaston Abdi MD;  Location[facility];  Service: Colorectal   • ME SIGMOIDOSCOPY FLX DX W/COLLJ SPEC BR/WA IF PFRMD N/A 6/3/2016    Procedure: Monique Blandon;  Surgeon: Gaston Abdi MD;  Location[facility];  Service: Colorectal   • PROSTATECTOMY     • REPLACEMENT TOTAL KNEE Right    • URINARY SPHINCTER IMPLANT  2001    s/p prostatectomy- pt had urinary leakage issues     Family History   Problem Relation Age of Onset   • Diabetes Brother      Social History     Socioeconomic History   • Marital status:       Spouse name: None   • Number of children: None   • Years of education: None   • Highest education level: None   Occupational History   • None   Tobacco Use   • Smoking status: Former   • Smokeless tobacco: None   • Tobacco comments:     quit smoking when 23 y/o   Substance and Sexual Activity   • Alcohol use: No   • Drug use: No   • Sexual activity: None   Other Topics Concern   • None   Social History Narrative   • None     Social Determinants of Health     Financial Resource Strain: Not on file   Food Insecurity: Not on file   Transportation Needs: Not on file   Physical Activity: Not on file   Stress: Not on file   Social Connections: Not on file   Intimate Partner Violence: Not on file   Housing Stability: Low Risk    • Unable to Pay for Housing in the Last Year: No   • Number of Places Lived in the Last Year: 2   • Unstable Housing in the Last Year: No       The following portions of the patient's history were reviewed and updated as appropriate: allergies, current medications, past family history, past medical history, past social history, past surgical history and problem list    CHARLES Sandoval  Date: 12/16/2022 Time: 2:46 PM

## 2022-12-16 NOTE — PROGRESS NOTES
NEPHROLOGY PROGRESS NOTE   Classie Hazard 80 y o  male MRN: [id number]  Unit/Bed#: -01 Encounter: [id number]      ASSESSMENT/PLAN:  1  CKD stage III: Baseline creatinine around 1 2-1 5 (care everywhere)  · Creatinine 1 22 on admission and trended up to 1 62 yesterday likely related to contrast CT on 12/11 + hemodyamic changes with large shifts in BP over the past 48 hours + recent surgery   · S/p trial of IVF yesterday   · Creatinine improved to 1 42 today   • CT on admission without hydronephrosis  • Continue to hold lasix   2  Hypertension: Blood pressure labile   • Lasix currently on hold  3  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  • Now with suprapubic catheter in place  4  Asymptomatic bacteriuria and hematuria: Initially on Rocephin  ID felt this was not a true infection and now off antibiotics  5  History diastolic CHF: on lasix 98XK po qdaily at home  Currently on hold and examines euvolemic     Plan Summary:   • Ok to d/c from renal standpoint when cleared by primary team     SUBJECTIVE:  Feeling ok with no chest pain, shortness of breath, nausea, vomiting or diarrhea  Reports poor appetite       OBJECTIVE:  Current Weight: Weight - Scale: 63 5 kg (140 lb)  Vitals:    12/16/22 0754   BP: (!) 174/73   Pulse: 68   Resp:    Temp: 97 6 °F (36 4 °C)   SpO2: 98%       Intake/Output Summary (Last 24 hours) at 12/16/2022 2313  Last data filed at 12/16/2022 0526  Gross per 24 hour   Intake --   Output 900 ml   Net -900 ml     General:  appears comfortable and in no acute distress   Skin:  No rash, warm, good skin turgor   Eyes:  Sclerae anicteric, no periorbital edema   ENT:  Moist mucous membranes  Neck:  Trachea midline, symmetric   Chest:  Clear to auscultation bilaterally with no wheezes, rales or rhonchi  CVS:  Regular rate and rhythm  Abdomen:  Soft, nontender, nondistended  Neuro:  Awake and alert  Psych:  Appropriate affect  Extremities: no lower extremity edema   : suprapubic catheter with clear urine output     Medications:  Scheduled Meds:  Current Facility-Administered Medications   Medication Dose Route Frequency Provider Last Rate   • acetaminophen  650 mg Oral Q6H PRN Abiola Conway MD     • ALPRAZolam  0 25 mg Oral BID PRN Abiola Conway MD     • amiodarone  200 mg Oral Daily Abiola Conway MD     • apixaban  2 5 mg Oral BID Lorain Skiff, PA-C     • dicyclomine  20 mg Oral BID PRN Abiola Conway MD     • famotidine  20 mg Oral BID Abiola Conway MD     • FLUoxetine  40 mg Oral Daily Abiola Conway MD     • hydrALAZINE  5 mg Intravenous Q6H PRN Abiola Conway MD     • insulin lispro  1-5 Units Subcutaneous HS Abiola Conway MD     • insulin lispro  1-6 Units Subcutaneous TID AC Abiola Conway MD     • insulin lispro  3 Units Subcutaneous TID With Meals Lorain Skiff, PA-C     • isosorbide mononitrate  60 mg Oral Daily Abiola Conway MD     • levothyroxine  88 mcg Oral Early Morning Abiola Conway MD     • ondansetron  4 mg Intravenous Q6H PRN Abiola Conway MD     • pantoprazole  20 mg Oral BID Abiola Conway MD     • pravastatin  20 mg Oral Daily Abiola Conway MD     • pyridostigmine  60 mg Oral 4x Daily Abiola Conway MD     • sucralfate  1 g Oral 4x Daily (AC & HS) Abiola Conway MD         PRN Meds: •  acetaminophen  •  ALPRAZolam  •  dicyclomine  •  hydrALAZINE  •  ondansetron    Laboratory Results:  Results from last 7 days   Lab Units 12/16/22  0519 12/15/22  0500 12/14/22  0500 12/13/22  0456 12/12/22  0448 12/11/22  1137   WBC Thousand/uL 9 02 10 91* 14 65* 8 55 7 61 6 20   HEMOGLOBIN g/dL 12 1 12 0 13 2 12 5 12 0 12 1   HEMATOCRIT % 37 7 37 7 41 7 38 2 36 6 37 7   PLATELETS Thousands/uL 206 180 221 200 179 176   SODIUM mmol/L 137 136 138 137 136 138   POTASSIUM mmol/L 4 4 3 7 3 8 4 1 4 2 4 7   CHLORIDE mmol/L 109* 105 104 106 107 106   CO2 mmol/L 26 27 23 26 26 27   BUN mg/dL 26* 27* 21 16 19 24   CREATININE mg/dL 1 42* 1 62* 1 54* 1 36* 1 19 1 22   CALCIUM mg/dL 8 3 8 5 8 7 9 0 8 7 8 9

## 2022-12-16 NOTE — CASE MANAGEMENT
Case Management Discharge Planning Note    Patient name Laura Vega  Location [address] 764/-47 MRN [id number]  : 1927 Date 2022       Current Admission Date: 2022  Current Admission Diagnosis:Displacement of artificial urinary sphincter [facility])   Patient Active Problem List    Diagnosis Date Noted   • Moderate [facility]calorie malnutrition [facility] ) 12/15/2022   • CKD (chronic kidney disease) 2022   • Type 2 diabetes mellitus, without long-term current use of insulin ([facility]  ) 2022   • Displacement of artificial urinary sphincter ([place] [address] ) 2022   • Depression    • Diastolic dysfunction    • Asymptomatic Bacteriuria with hematuria  2022   • Chronic a-fib ([place] [address] ) 2016   • Myasthenia gravis ([place] [address] ) 2016   • CAD (coronary artery disease) 2016   • Hypomagnesemia 2016      LOS (days): 5  Geometric Mean LOS (GMLOS) (days): 3 70  Days to GMLOS:-1 2     OBJECTIVE:  Risk of Unplanned Readmission Score: 16 13         Current admission status: Inpatient   Preferred Pharmacy:   [address]  [place] [address]  [address] [place], [place] [address]  Phone: [phone] Fax: [phone]    Primary Care Provider: Nathen Ward MD    Primary Insurance: [facility]  Secondary Insurance:     DISCHARGE DETAILS:    Discharge planning discussed with[de-identified] Angelita Arias daughter  Freedom of Choice: Yes  Comments - Freedom of Choice: Offered Choice of STR  Daughter requested [place]   Requested authoriztion from d/c support team   CM contacted family/caregiver?: Yes  Were Treatment Team discharge recommendations reviewed with patient/caregiver?: Yes  Did patient/caregiver verbalize understanding of patient care needs?: N/A- going to facility  Were patient/caregiver advised of the risks associated with not following Treatment Team discharge recommendations?: Yes                             Treatment Team Recommendation: Short Term Rehab  Discharge Destination Plan[de-identified] Short Term Rehab

## 2022-12-16 NOTE — PROGRESS NOTES
[facility]  Progress Note - Valerie Salguero 6/28/1927, 80 y o  male MRN: [id number]  Unit/Bed#: -01 Encounter: [id number]  Primary Care Provider: Mindy Kaiser MD   Date and time admitted to hospital: 12/11/2022  3:36 PM    * Displacement of artificial urinary sphincter [facility])  Assessment & Plan  Patient with h/o prostate cancer s/p prostatectomy, resulting in urinary incontinence, s/p artiricial urinary sphincter placement 20 years ago  Patient presented to ED with c/o hematuria and wire protruding out of his scrotum  CT scan revealed: Right inguinal implant with leads extending into the right inguinal canal and subsequently into the scrotum, distal aspect of the device is external to the scrotum  Transferred [facility] for urologic procedure  · Urology following  · S/P removal of eroded artifical urinary sphincter, scrotal exploration, diagnostic cysto and suprapubic catheter placement on 12/13  · B/L LOGAN drains to be removed today, tentatively 12/16  · S/P IV ceftriaxone in setting of (+) Urine cx however per ID most likely represents asymptomatic bacteriuria and IV abx discontinued   · Eliquis resumed 12/14 after d/w urology  · PT/OT recommending rehab, CM following    Asymptomatic Bacteriuria with hematuria   Assessment & Plan  UA positive for leukocyte, bacteria however patient without any systemic illness   · S/P several days of IV Rocephin, culture shows Klebsiella and very low colony count of proteus  · ID felt was not true infection  · Now monitoring off abx     CKD (chronic kidney disease)  Assessment & Plan  Patient's baseline creatinine in CareEverywhere appears around 1 3-1 5? · Lasix held 12/14 and placed on gentle IVF  · Creatinine improved  · Nephrology following, continuing to hold Lasix  · Suprapubic tube is draining  · Avoid nephrotoxins and hypotension  · BMP in a m      Type 2 diabetes mellitus, without long-term current use of insulin [facility])  Assessment & Plan  Lab Results   Component Value Date    HGBA1C 7 2 (H) 12/12/2022       Recent Labs     12/15/22  1607 12/15/22  2053 12/16/22  0647 12/16/22  1056   POCGLU 142* 241* 183* 234*       · Pt maintained on Onglyza 2 5 mg daily as an outpatient, on hold here  · Continue Humalog 3 units TID with meals + SSI  · QID glucose checks  · A1C 7 2  · Monitor and adjust regimen as needed    Myasthenia gravis (HCC)  Assessment & Plan  Continue pyridostigmine 60 mg QID  · Currently stable     CAD (coronary artery disease)  Assessment & Plan  Reported CAD- on medical therapy with Imdur 60 mg daily and statin  No beta blocker due to baseline bradycardia and not on ASA due to anticoagulation with Eliquis  · Cardiology saw patient preop, was cleared  · ECHO showed EF 89%, G2 diastolic dysfunction, moderate aortic stenosis    Chronic a-fib (HCC)  Assessment & Plan  Continue rate control with amiodarone 200 mg daily   · Eliquis was placed on hold in setting of urologic procedure/surgery--resumed 12/14  · Monitor       Depression  Assessment & Plan  · Mood currently stable   · Continue Prozac 40 mg daily and PRN Xanax  · Monitor     Diastolic dysfunction  Assessment & Plan  Echo reviewed   · Currently appears euvolemic to dry  · Maintained on lasix 20 mg AS NEEDED (per son)--placed on hold 12/14 given rise in creatinine and started on gentle IVF     Moderate protein-calorie malnutrition (HCC)  Assessment & Plan  Malnutrition Findings:   Adult Malnutrition type: Chronic illness  Adult Degree of Malnutrition: Malnutrition of moderate degree  Malnutrition Characteristics: Fat loss, Muscle loss, Inadequate energy                  360 Statement: related to disease/condition fraility with aging, evidenced by <75% intake versus needs for > 1 month exhibiting moderate orbital fat pads loss and moderate temporalis muscle loss  Treating with Regular diet and Glucerna BID po supplements      BMI Findings: Body mass index is 21 29 kg/m²  Appreciate nutrition input       VTE Pharmacologic Prophylaxis: VTE Score: 5 Moderate Risk (Score 3-4) - Pharmacological DVT Prophylaxis Ordered: apixaban (Eliquis)  Patient Centered Rounds: I performed bedside rounds with nursing staff today  Discussions with Specialists or Other Care Team Provider: nursing, case management     Education and Discussions with Family / Patient: Attempted to update  (daughter) via phone  Left voicemail  Time Spent for Care: 30 minutes  More than 50% of total time spent on counseling and coordination of care as described above  Current Length of Stay: 5 day(s)    Current Patient Status: Inpatient     Certification Statement: The patient will continue to require additional inpatient hospital stay due to pending discharge plan     Discharge Plan: Anticipate discharge in 24-48 hrs to rehab facility  Code Status: Level 1 - Full Code    Subjective:   Patient offers no acute complaints  Denies pain  Anxious about rehab, would prefer to go home  Objective:     Vitals:   Temp (24hrs), Av 8 °F (36 6 °C), Min:97 6 °F (36 4 °C), Max:98 °F (36 7 °C)    Temp:  [97 6 °F (36 4 °C)-98 °F (36 7 °C)] 97 6 °F (36 4 °C)  HR:  [68-79] 68  BP: (132-174)/(70-73) 174/73  SpO2:  [94 %-98 %] 98 %  Body mass index is 21 29 kg/m²  Input and Output Summary (last 24 hours): Intake/Output Summary (Last 24 hours) at 2022 1117  Last data filed at 2022 0900  Gross per 24 hour   Intake 300 ml   Output 900 ml   Net -600 ml       Physical Exam:   Physical Exam  Vitals and nursing note reviewed  Constitutional:       General: He is not in acute distress  Cardiovascular:      Rate and Rhythm: Normal rate  Rhythm irregular  Pulmonary:      Breath sounds: Normal breath sounds  Abdominal:      Tenderness: There is no abdominal tenderness     Genitourinary:     Comments: SPT draining yellow urine, b/l scrotal LOGAN drains with minimal serosanguinous fluid noted   Musculoskeletal:         General: No swelling  Skin:     General: Skin is warm  Neurological:      Mental Status: He is alert  Mental status is at baseline  Comments: Hard of hearing   Psychiatric:         Mood and Affect: Mood is anxious  Additional Data:     Labs:  Results from last 7 days   Lab Units 12/16/22  0519   WBC Thousand/uL 9 02   HEMOGLOBIN g/dL 12 1   HEMATOCRIT % 37 7   PLATELETS Thousands/uL 206   NEUTROS PCT % 69   LYMPHS PCT % 20   MONOS PCT % 9   EOS PCT % 1     Results from last 7 days   Lab Units 12/16/22  0519 12/12/22  0448 12/11/22  1137   SODIUM mmol/L 137   < > 138   POTASSIUM mmol/L 4 4   < > 4 7   CHLORIDE mmol/L 109*   < > 106   CO2 mmol/L 26   < > 27   BUN mg/dL 26*   < > 24   CREATININE mg/dL 1 42*   < > 1 22   ANION GAP mmol/L 2*   < > 5   CALCIUM mg/dL 8 3   < > 8 9   ALBUMIN g/dL  --   --  3 5   TOTAL BILIRUBIN mg/dL  --   --  0 48   ALK PHOS U/L  --   --  65   ALT U/L  --   --  20   AST U/L  --   --  28   GLUCOSE RANDOM mg/dL 176*   < > 163*    < > = values in this interval not displayed       Results from last 7 days   Lab Units 12/11/22  1137   INR  1 32*     Results from last 7 days   Lab Units 12/16/22  1056 12/16/22  0647 12/15/22  2053 12/15/22  1607 12/15/22  1054 12/15/22  0614 12/14/22  2112 12/14/22  1628 12/14/22  1100 12/14/22  0617 12/13/22  2101 12/13/22  1639   POC GLUCOSE mg/dl 234* 183* 241* 142* 256* 168* 176* 206* 139 205* 249* 161*     Results from last 7 days   Lab Units 12/12/22  0448   HEMOGLOBIN A1C % 7 2*     Results from last 7 days   Lab Units 12/12/22  0448   PROCALCITONIN ng/ml 0 06       Lines/Drains:  Invasive Devices     Peripheral Intravenous Line  Duration           Peripheral IV 12/13/22 Left Arm 3 days          Drain  Duration           Closed/Suction Drain  Bulb 3 days    Closed/Suction Drain Right Groin Bulb 3 days    Suprapubic Catheter 18 Fr  2 days                      Imaging: No pertinent imaging reviewed  Recent Cultures (last 7 days):   Results from last 7 days   Lab Units 12/13/22  1031 12/11/22  1132   GRAM STAIN RESULT  Rare Polys  No bacteria seen  --    URINE CULTURE   --  >100,000 cfu/ml Klebsiella aerogenes*  <10,000 cfu/ml Proteus species*       Last 24 Hours Medication List:   Current Facility-Administered Medications   Medication Dose Route Frequency Provider Last Rate   • acetaminophen  650 mg Oral Q6H PRN Tania Lewis MD     • ALPRAZolam  0 25 mg Oral BID PRN Tania Lewis MD     • amiodarone  200 mg Oral Daily Tania Lewis MD     • apixaban  2 5 mg Oral BID Yenny Khan PA-C     • dicyclomine  20 mg Oral BID PRN Tania Lewis MD     • famotidine  20 mg Oral BID Tania Lewis MD     • FLUoxetine  40 mg Oral Daily Tania Lewis MD     • hydrALAZINE  5 mg Intravenous Q6H PRN Tania Lewis MD     • insulin lispro  1-5 Units Subcutaneous HS Tania Lewis MD     • insulin lispro  1-6 Units Subcutaneous TID AC Tania Lewis MD     • insulin lispro  3 Units Subcutaneous TID With Meals Yenny Khan PA-C     • isosorbide mononitrate  60 mg Oral Daily Tania Lewis MD     • levothyroxine  88 mcg Oral Early Morning Tania Lewis MD     • ondansetron  4 mg Intravenous Q6H PRN Tania Lewis MD     • pantoprazole  20 mg Oral BID Tania Lewis MD     • pravastatin  20 mg Oral Daily Tania Lewis MD     • pyridostigmine  60 mg Oral 4x Daily Tania Lewis MD     • sucralfate  1 g Oral 4x Daily (Haritha Esposito ) Tania Lewis MD          Today, Patient Was Seen By: CHARLES Foster    **Please Note: This note may have been constructed using a voice recognition system  **

## 2022-12-16 NOTE — CASE MANAGEMENT
Case Management Discharge Planning Note    [facility]  Location [address] 764/-61 MRN [id number]  : 1927 Date 2022       Current Admission Date: 2022  Current Admission Diagnosis:Displacement of artificial urinary sphincter [facility])   Patient Active Problem List    Diagnosis Date Noted   • Moderate protein[facility]calorie malnutrition [facility] ) 12/15/2022   • CKD (chronic kidney disease) 2022   • Type 2 diabetes mellitus, without long-term current use of insulin ([place] [address] ) 2022   • Displacement of artificial urinary sphincter ([place] [address] ) 2022   • Depression    • Diastolic dysfunction 23/10/5417   • Asymptomatic Bacteriuria with hematuria  2022   • Chronic a-fib ([facility] ) 2016   • Myasthenia gravis ([place] [address] ) 2016   • CAD (coronary artery disease) 2016   • Hypomagnesemia 2016      LOS (days): 5  Geometric Mean LOS (GMLOS) (days): 3 70  Days to GMLOS:-1 3     OBJECTIVE:  Risk of Unplanned Readmission Score: 16 51         Current admission status: Inpatient   [facility] Pharmacy:   [place] [facility] [address] [place] [address]  Phone: [phone] Fax: [phone]    Primary Care Provider: Rimma Bryant MD    Primary Insurance: [address] Number: [id number]

## 2022-12-16 NOTE — ASSESSMENT & PLAN NOTE
Patient's baseline creatinine in CareEverywhere appears around 1 3-1 5? · Lasix held 12/14 and placed on gentle IVF  · Creatinine improved  · Nephrology following, continuing to hold Lasix  · Suprapubic tube is draining  · Avoid nephrotoxins and hypotension  · BMP in a m

## 2022-12-16 NOTE — ASSESSMENT & PLAN NOTE
Echo reviewed   · Currently appears euvolemic to dry  · Maintained on lasix 20 mg AS NEEDED (per son)--placed on hold 12/14 given rise in creatinine and started on gentle IVF

## 2022-12-16 NOTE — ASSESSMENT & PLAN NOTE
Lab Results   Component Value Date    HGBA1C 7 2 (H) 12/12/2022       Recent Labs     12/15/22  1607 12/15/22  2053 12/16/22  0647 12/16/22  1056   POCGLU 142* 241* 183* 234*       · Pt maintained on Onglyza 2 5 mg daily as an outpatient, on hold here  · Continue Humalog 3 units TID with meals + SSI  · QID glucose checks  · A1C 7 2  · Monitor and adjust regimen as needed

## 2022-12-16 NOTE — CASE MANAGEMENT
Umu Sethi 50 received request for authorization from Care Manager    Authorization request for: [facility]  Facility Name: [facility]  NPI: [id number]  Facility MD:   Dr Mikal Tsang: [id number]  Authorization initiated by contacting: Sinai Rodrigues: Availity  Pending Reference #: [id number]   Clinicals submitted via: Annabelle Martin

## 2022-12-16 NOTE — CASE MANAGEMENT
Umu Sethi 50 has received approved authorization from:   Pete Cronin 6 received for: [facility]  Facility: [address] [place] [address] #: [id number]  Start of Care: 12/16  Next Review Date: 12/21  Care Coordinator: Conner BUCAHNAN#: [phone] [id number]  Submit next review to: [phone]   Care Manager notified: Migue Tolliver

## 2022-12-16 NOTE — ASSESSMENT & PLAN NOTE
· POD #3 explantation of artificial urinary sphincter with noted protal/urethral erosion  · Bilateral drains removed today, 1 displaced already on my exam  · Maintain suprapubic tube with plan for first change in office in 6 weeks  · Recommend home health care for catheter teaching at home and possible future changes  · Appreciate input from infectious disease, no antibiotics warranted at this time, WBC continues to trend downward and is currently 9 02  · Creatinine stable 1 42  · Follow-up outpatient in 2 to 3 weeks for wound check with Dr Jessica Carey

## 2022-12-16 NOTE — ASSESSMENT & PLAN NOTE
Reported CAD- on medical therapy with Imdur 60 mg daily and statin  No beta blocker due to baseline bradycardia and not on ASA due to anticoagulation with Eliquis  · Cardiology saw patient preop, was cleared    · ECHO showed EF 26%, G2 diastolic dysfunction, moderate aortic stenosis

## 2022-12-16 NOTE — ASSESSMENT & PLAN NOTE
Patient with h/o prostate cancer s/p prostatectomy, resulting in urinary incontinence, s/p artiricial urinary sphincter placement 20 years ago  Patient presented to ED with c/o hematuria and wire protruding out of his scrotum  CT scan revealed: Right inguinal implant with leads extending into the right inguinal canal and subsequently into the scrotum, distal aspect of the device is external to the scrotum   Transferred from [address] [place] [address] to [facility] for urologic procedure  · Urology following  · S/P removal of eroded artifical urinary sphincter, scrotal exploration, diagnostic cysto and suprapubic catheter placement on 12/13  · B/L LOGAN drains to be removed today, tentatively 12/16  · S/P IV ceftriaxone in setting of (+) Urine cx however per ID most likely represents asymptomatic bacteriuria and IV abx discontinued   · Eliquis resumed 12/14 after d/w urology  · PT/OT recommending rehab, CM following

## 2022-12-17 VITALS
OXYGEN SATURATION: 95 % | WEIGHT: 140 LBS | DIASTOLIC BLOOD PRESSURE: 66 MMHG | HEIGHT: 68 IN | TEMPERATURE: 97.8 F | HEART RATE: 81 BPM | SYSTOLIC BLOOD PRESSURE: 123 MMHG | BODY MASS INDEX: 21.22 KG/M2 | RESPIRATION RATE: 16 BRPM

## 2022-12-17 LAB
ANION GAP SERPL CALCULATED.3IONS-SCNC: 4 MMOL/L (ref 4–13)
BASOPHILS # BLD AUTO: 0.08 THOUSANDS/ÂΜL (ref 0–0.1)
BASOPHILS NFR BLD AUTO: 1 % (ref 0–1)
BUN SERPL-MCNC: 24 MG/DL (ref 5–25)
CALCIUM SERPL-MCNC: 8.5 MG/DL (ref 8.3–10.1)
CHLORIDE SERPL-SCNC: 110 MMOL/L (ref 96–108)
CO2 SERPL-SCNC: 25 MMOL/L (ref 21–32)
CREAT SERPL-MCNC: 1.33 MG/DL (ref 0.6–1.3)
EOSINOPHIL # BLD AUTO: 0.09 THOUSAND/ÂΜL (ref 0–0.61)
EOSINOPHIL NFR BLD AUTO: 1 % (ref 0–6)
ERYTHROCYTE [DISTWIDTH] IN BLOOD BY AUTOMATED COUNT: 13.7 % (ref 11.6–15.1)
FLUAV RNA RESP QL NAA+PROBE: NEGATIVE
FLUBV RNA RESP QL NAA+PROBE: NEGATIVE
GFR SERPL CREATININE-BSD FRML MDRD: 45 ML/MIN/1.73SQ M
GLUCOSE SERPL-MCNC: 172 MG/DL (ref 65–140)
GLUCOSE SERPL-MCNC: 180 MG/DL (ref 65–140)
GLUCOSE SERPL-MCNC: 254 MG/DL (ref 65–140)
HCT VFR BLD AUTO: 39.4 % (ref 36.5–49.3)
HGB BLD-MCNC: 12.9 G/DL (ref 12–17)
IMM GRANULOCYTES # BLD AUTO: 0.04 THOUSAND/UL (ref 0–0.2)
IMM GRANULOCYTES NFR BLD AUTO: 1 % (ref 0–2)
LYMPHOCYTES # BLD AUTO: 1.66 THOUSANDS/ÂΜL (ref 0.6–4.47)
LYMPHOCYTES NFR BLD AUTO: 20 % (ref 14–44)
MCH RBC QN AUTO: 29.1 PG (ref 26.8–34.3)
MCHC RBC AUTO-ENTMCNC: 32.7 G/DL (ref 31.4–37.4)
MCV RBC AUTO: 89 FL (ref 82–98)
MONOCYTES # BLD AUTO: 0.67 THOUSAND/ÂΜL (ref 0.17–1.22)
MONOCYTES NFR BLD AUTO: 8 % (ref 4–12)
NEUTROPHILS # BLD AUTO: 5.79 THOUSANDS/ÂΜL (ref 1.85–7.62)
NEUTS SEG NFR BLD AUTO: 69 % (ref 43–75)
NRBC BLD AUTO-RTO: 0 /100 WBCS
PLATELET # BLD AUTO: 255 THOUSANDS/UL (ref 149–390)
PMV BLD AUTO: 10.3 FL (ref 8.9–12.7)
POTASSIUM SERPL-SCNC: 3.8 MMOL/L (ref 3.5–5.3)
RBC # BLD AUTO: 4.44 MILLION/UL (ref 3.88–5.62)
RSV RNA RESP QL NAA+PROBE: NEGATIVE
SARS-COV-2 RNA RESP QL NAA+PROBE: NEGATIVE
SODIUM SERPL-SCNC: 139 MMOL/L (ref 135–147)
WBC # BLD AUTO: 8.33 THOUSAND/UL (ref 4.31–10.16)

## 2022-12-17 RX ORDER — DICYCLOMINE HCL 20 MG
20 TABLET ORAL 2 TIMES DAILY PRN
Refills: 0
Start: 2022-12-17

## 2022-12-17 RX ORDER — INSULIN LISPRO 100 [IU]/ML
1-6 INJECTION, SOLUTION INTRAVENOUS; SUBCUTANEOUS
Refills: 0 | Status: ON HOLD
Start: 2022-12-17 | End: 2022-12-23

## 2022-12-17 RX ORDER — OXYCODONE HYDROCHLORIDE 5 MG/1
TABLET ORAL
Qty: 20 TABLET | Refills: 0 | Status: ON HOLD | OUTPATIENT
Start: 2022-12-17 | End: 2022-12-23

## 2022-12-17 RX ORDER — ACETAMINOPHEN 325 MG/1
650 TABLET ORAL EVERY 6 HOURS PRN
Refills: 0
Start: 2022-12-17

## 2022-12-17 RX ORDER — FUROSEMIDE 20 MG/1
20 TABLET ORAL DAILY PRN
Refills: 0 | Status: ON HOLD
Start: 2022-12-17 | End: 2022-12-23

## 2022-12-17 RX ORDER — INSULIN LISPRO 100 [IU]/ML
3 INJECTION, SOLUTION INTRAVENOUS; SUBCUTANEOUS
Refills: 0 | Status: ON HOLD
Start: 2022-12-17 | End: 2022-12-23

## 2022-12-17 RX ORDER — INSULIN LISPRO 100 [IU]/ML
1-5 INJECTION, SOLUTION INTRAVENOUS; SUBCUTANEOUS
Refills: 0 | Status: ON HOLD
Start: 2022-12-17 | End: 2022-12-23

## 2022-12-17 RX ORDER — ALPRAZOLAM 0.25 MG/1
0.25 TABLET ORAL 2 TIMES DAILY PRN
Qty: 10 TABLET | Refills: 0 | Status: ON HOLD | OUTPATIENT
Start: 2022-12-17 | End: 2022-12-23

## 2022-12-17 RX ADMIN — INSULIN LISPRO 3 UNITS: 100 INJECTION, SOLUTION INTRAVENOUS; SUBCUTANEOUS at 08:17

## 2022-12-17 RX ADMIN — INSULIN LISPRO 1 UNITS: 100 INJECTION, SOLUTION INTRAVENOUS; SUBCUTANEOUS at 08:17

## 2022-12-17 RX ADMIN — PANTOPRAZOLE SODIUM 20 MG: 20 TABLET, DELAYED RELEASE ORAL at 08:12

## 2022-12-17 RX ADMIN — PRAVASTATIN SODIUM 20 MG: 20 TABLET ORAL at 08:12

## 2022-12-17 RX ADMIN — ACETAMINOPHEN 650 MG: 325 TABLET, FILM COATED ORAL at 08:12

## 2022-12-17 RX ADMIN — ONDANSETRON 4 MG: 2 INJECTION INTRAMUSCULAR; INTRAVENOUS at 11:30

## 2022-12-17 RX ADMIN — INSULIN LISPRO 3 UNITS: 100 INJECTION, SOLUTION INTRAVENOUS; SUBCUTANEOUS at 11:32

## 2022-12-17 RX ADMIN — SUCRALFATE 1 G: 1 TABLET ORAL at 05:41

## 2022-12-17 RX ADMIN — PYRIDOSTIGMINE BROMIDE 60 MG: 60 TABLET ORAL at 08:11

## 2022-12-17 RX ADMIN — APIXABAN 2.5 MG: 2.5 TABLET, FILM COATED ORAL at 08:12

## 2022-12-17 RX ADMIN — FAMOTIDINE 20 MG: 20 TABLET ORAL at 08:12

## 2022-12-17 RX ADMIN — AMIODARONE HYDROCHLORIDE 200 MG: 200 TABLET ORAL at 08:12

## 2022-12-17 RX ADMIN — SUCRALFATE 1 G: 1 TABLET ORAL at 11:32

## 2022-12-17 RX ADMIN — ISOSORBIDE MONONITRATE 60 MG: 60 TABLET, EXTENDED RELEASE ORAL at 08:13

## 2022-12-17 RX ADMIN — FLUOXETINE 40 MG: 20 CAPSULE ORAL at 08:12

## 2022-12-17 RX ADMIN — LEVOTHYROXINE SODIUM 88 MCG: 88 TABLET ORAL at 05:41

## 2022-12-17 NOTE — ASSESSMENT & PLAN NOTE
Patient with h/o prostate cancer s/p prostatectomy, resulting in urinary incontinence, s/p artiricial urinary sphincter placement 20 years ago  Patient presented to ED with c/o hematuria and wire protruding out of his scrotum  CT scan revealed: Right inguinal implant with leads extending into the right inguinal canal and subsequently into the scrotum, distal aspect of the device is external to the scrotum  Transferred from [address] [place] [address] to [facility] for urologic procedure  · Urology following  · S/P removal of eroded artifical urinary sphincter, scrotal exploration, diagnostic cysto and suprapubic catheter placement on 12/13  · B/L LOGAN drains removed 12/16  · Urology signed off, needs f/u in 2-3 weeks for wound check and SPT change with Dr Silvestre Eduardo     · S/P IV ceftriaxone in setting of (+) Urine cx however per ID most likely represents asymptomatic bacteriuria and IV abx discontinued   · PT/OT recommending rehab, CM following

## 2022-12-17 NOTE — PROGRESS NOTES
NEPHROLOGY PROGRESS NOTE   Ayan York 80 y o  male MRN: [id number]  Unit/Bed#: -01 Encounter: [id number]  Reason for Consult: Chronic kidney disease    ASSESSMENT/PLAN:  1  Chronic kidney disease, stage III, baseline creatinine appears between 1 2-1 5  2  Hypertension, overall blood pressure appears stable  3  Recent removal of artificial urinary sphincter given erosion, management as per urology  4  Diastolic heart failure, volume status currently appears stable, continue to hold diuretic therapy    PLAN:  · Overall renal function has continued to improve with a creatinine of 1 33  · Volume status blood pressure overall appears stable  · No changes from nephrology standpoint  · Will sign off at this time, please call if questions or concerns  SUBJECTIVE:  Seen and examined  Patient awake and alert  Offers no new complaints  Appetite appears stable  Does not appear short of breath  Denies any chest pain  Review of Systems    OBJECTIVE:  Current Weight: Weight - Scale: 63 5 kg (140 lb)  Vitals:    12/16/22 0754 12/16/22 1510 12/16/22 2112 12/17/22 0811   BP: (!) 174/73 138/66 130/61 123/66   Pulse: 68 72 68 81   Resp:  16 16    Temp: 97 6 °F (36 4 °C) 98 1 °F (36 7 °C) 98 1 °F (36 7 °C) 97 8 °F (36 6 °C)   SpO2: 98% 94% 96% 95%   Weight:       Height:           Intake/Output Summary (Last 24 hours) at 12/17/2022 1033  Last data filed at 12/17/2022 0300  Gross per 24 hour   Intake 180 ml   Output 1025 ml   Net -845 ml       Physical Exam  Constitutional:       Appearance: He is not ill-appearing  Eyes:      General: No scleral icterus  Cardiovascular:      Rate and Rhythm: Normal rate and regular rhythm  Pulmonary:      Effort: Pulmonary effort is normal       Breath sounds: Normal breath sounds  Abdominal:      General: There is no distension  Palpations: Abdomen is soft  Musculoskeletal:      Right lower leg: No edema  Left lower leg: No edema     Skin:     General: Skin is warm and dry  Neurological:      Mental Status: He is alert  Mental status is at baseline           Medications:    Current Facility-Administered Medications:   •  acetaminophen (TYLENOL) tablet 650 mg, 650 mg, Oral, Q6H PRN, Nasir Mazariegos MD, 650 mg at 12/17/22 7658  •  ALPRAZolam [place]) tablet 0 25 mg, 0 25 mg, Oral, BID PRN, Nasir Mazariegos MD, 0 25 mg at 12/15/22 2027  •  amiodarone tablet 200 mg, 200 mg, Oral, Daily, Nasir Mazariegos MD, 200 mg at 12/17/22 2906  •  apixaban (ELIQUIS) tablet 2 5 mg, 2 5 mg, Oral, BID, Bridget Ware PA-C, 2 5 mg at 12/17/22 5011  •  dicyclomine (BENTYL) tablet 20 mg, 20 mg, Oral, BID PRN, Nasir Mazariegos MD, 20 mg at 12/16/22 1720  •  famotidine (PEPCID) tablet 20 mg, 20 mg, Oral, BID, Nasir Mazariegos MD, 20 mg at 12/17/22 6316  •  FLUoxetine (PROzac) capsule 40 mg, 40 mg, Oral, Daily, Nasir Mazariegos MD, 40 mg at 12/17/22 3312  •  hydrALAZINE (APRESOLINE) injection 5 mg, 5 mg, Intravenous, Q6H PRN, Nasir Mazariegos MD  •  insulin lispro (HumaLOG) 100 units/mL subcutaneous injection 1-5 Units, 1-5 Units, Subcutaneous, HS, Nasir Mazariegos MD, 2 Units at 12/16/22 2120  •  insulin lispro (HumaLOG) 100 units/mL subcutaneous injection 1-6 Units, 1-6 Units, Subcutaneous, TID AC, 1 Units at 12/17/22 0817 **AND** Fingerstick Glucose (POCT), , , TID AC, Nasir Mazariegos MD  •  insulin lispro (HumaLOG) 100 units/mL subcutaneous injection 3 Units, 3 Units, Subcutaneous, TID With Meals, Bridget Ware PA-C, 3 Units at 12/17/22 3189  •  isosorbide mononitrate (IMDUR) 24 hr tablet 60 mg, 60 mg, Oral, Daily, Nasir Mazariegos MD, 60 mg at 12/17/22 0813  •  levothyroxine tablet 88 mcg, 88 mcg, Oral, Early Morning, Nasir Mazariegos MD, 88 mcg at 12/17/22 0541  •  ondansetron (ZOFRAN) injection 4 mg, 4 mg, Intravenous, Q6H PRN, Nasir Mazariegos MD  •  oxyCODONE (ROXICODONE) IR tablet 2 5 mg, 2 5 mg, Oral, Q4H PRN, CHARLES Sánchez  • oxyCODONE (ROXICODONE) IR tablet 5 mg, 5 mg, Oral, Q4H PRN, CHARLES Batista  •  pantoprazole (PROTONIX) EC tablet 20 mg, 20 mg, Oral, BID, Marquita Pringle MD, 20 mg at 12/17/22 0404  •  pravastatin (PRAVACHOL) tablet 20 mg, 20 mg, Oral, Daily, Marquita Pringle MD, 20 mg at 12/17/22 8286  •  pyridostigmine (MESTINON) tablet 60 mg, 60 mg, Oral, 4x Daily, Marquita Pringle MD, 60 mg at 12/17/22 8543  •  sucralfate (CARAFATE) tablet 1 g, 1 g, Oral, 4x Daily (AC & HS), Marquita Pringle MD, 1 g at 12/17/22 0541    Laboratory Results:  Results from last 7 days   Lab Units 12/17/22  0539 12/16/22  0519 12/15/22  0500 12/14/22  0500 12/13/22  0456 12/12/22  0448 12/11/22  1137   WBC Thousand/uL 8 33 9 02 10 91* 14 65* 8 55 7 61 6 20   HEMOGLOBIN g/dL 12 9 12 1 12 0 13 2 12 5 12 0 12 1   HEMATOCRIT % 39 4 37 7 37 7 41 7 38 2 36 6 37 7   PLATELETS Thousands/uL 255 206 180 221 200 179 176   POTASSIUM mmol/L 3 8 4 4 3 7 3 8 4 1 4 2 4 7   CHLORIDE mmol/L 110* 109* 105 104 106 107 106   CO2 mmol/L 25 26 27 23 26 26 27   BUN mg/dL 24 26* 27* 21 16 19 24   CREATININE mg/dL 1 33* 1 42* 1 62* 1 54* 1 36* 1 19 1 22   CALCIUM mg/dL 8 5 8 3 8 5 8 7 9 0 8 7 8 9

## 2022-12-17 NOTE — CASE MANAGEMENT
Case Management Discharge Planning Note    Patient name Laura Vega  Location [address] 764/-59 MRN [id number]  : 1927 Date 2022       Current Admission Date: 2022  Current Admission Diagnosis:Displacement of artificial urinary sphincter [facility])   Patient Active Problem List    Diagnosis Date Noted   • Moderate protein-calorie malnutrition ([facility] ) 12/15/2022   • CKD (chronic kidney disease) 2022   • Type 2 diabetes mellitus, without long-term current use of insulin ([place] [address] ) 2022   • Displacement of artificial urinary sphincter ([place] [address] ) 2022   • Depression    • Diastolic dysfunction    • Asymptomatic Bacteriuria with hematuria  2022   • Chronic a-fib ([place] [address] ) 2016   • Myasthenia gravis ([place] [address] ) 2016   • CAD (coronary artery disease) 2016   • Hypomagnesemia 2016      LOS (days): 6  Geometric Mean LOS (GMLOS) (days): 3 70  Days to GMLOS:-2 1     OBJECTIVE:  Risk of Unplanned Readmission Score: 14 78         Current admission status: Inpatient   Preferred Pharmacy:   [address]  [address] [facility] [address]  Phone: [phone] Fax: [address] [facility],[address], [address]  [address] [place] [address]  Phone: [phone] Fax: [phone]    Primary Care Provider: Nathen Ward MD    Primary Insurance: [facility]  Secondary Insurance:     DISCHARGE DETAILS:    Accepting Facility Name, [facility] : [address]  Receiving Facility/Agency Phone Number: [phone], fax [phone]  Facility/Agency Fax Number: [phone]       CM spoke to patients daughter Angelita Arias  The patients on will drive him to [place]  CM requested Covid swab order from [employer] with [facility]  Updated RN [employer] that swab needs done

## 2022-12-17 NOTE — ASSESSMENT & PLAN NOTE
Reported CAD- on medical therapy with Imdur 60 mg daily and statin  No beta blocker due to baseline bradycardia and not on ASA due to anticoagulation with Eliquis  · Cardiology saw patient preop, was cleared    · ECHO showed EF 90%, G2 diastolic dysfunction, moderate aortic stenosis

## 2022-12-17 NOTE — DISCHARGE SUMMARY
Discharge Summary - [facility] Internal Medicine    Patient Information: Emmanuel Larry 80 y o  male MRN: [id number]  Unit/Bed#: -01 Encounter: [id number]    Discharging Physician / Practitioner: CHARLES Tavera  PCP: Flaco Mckee MD  Admission Date: 12/11/2022  Discharge Date: 12/17/22    Reason for Admission: Displacement of artificial urinary sphincter status post removal and placement of suprapubic catheter 12/13/2022    Discharge Diagnoses:     Principal Problem:    Displacement of artificial urinary sphincter (HCC)  Active Problems:    Asymptomatic Bacteriuria with hematuria     CKD (chronic kidney disease)    Type 2 diabetes mellitus, without long-term current use of insulin (HCC)    Myasthenia gravis (HCC)    CAD (coronary artery disease)    Chronic a-fib (HCC)    Depression    Diastolic dysfunction    Moderate protein-calorie malnutrition ([address] )  Resolved Problems:    Hemoptysis      Consultations During Hospital Stay:  · Urology  · Infectious disease  · Urology  · Cardiology  · PT/OT  · This management    Procedures Performed:     ? S/P removal of eroded artifical urinary sphincter, scrotal exploration, diagnostic cysto and suprapubic catheter placement on 12/13 (Dr Casper Roger)    Significant Findings / Test Results:     IR suprapubic catheter placement   Final Result by Jerilyn Echavarria MD (12/13 2348)   Successful 18-[place] suprapubic catheter placement  Workstation performed: [id number]         · Chest x-ray negative  · CT abdomen pelvis with contrast Right inguinal implant with leads extending into the right inguinal canal and subsequently into the scrotum, distal aspect of the device is external to the scrotum  Incidental Findings:   · None    Test Results Pending at Discharge (will require follow up):    · None     Outpatient Tests Requested:  · Outpatient follow-up with PCP  · Outpatient follow-up with urology Dr Casper Roger in 2 to 3 weeks    Complications: None    Hospital Course: Leonor Sneed is a 80 y o  male patient with a past medical history of prostate cancer status post prostatectomy with subsequent artificial urinary sphincter placement, CKD stage III with a baseline creatinine of 1 2-1 5, A  fib on Eliquis, hypertension,diastolic heart failure, myasthenia gravis, type 2 diabetes who originally presented to the hospital on 12/11/2022 due to hematuria and wire protruding from his scrotum  CT scan showed right inguinal implant with leads extending into the right inguinal canal and subsequently into the scrotum, distal aspect of the artificial urinary sphincter external to the scrotum  She was transferred from [facility] to [place] for urologic procedure where he underwent removal of eroded artificial urinary sphincter, scrotal exploration, diagnostic cystoscopy and suprapubic catheter placement 12/13  Patient had bilateral groin LOGAN drains which were removed on 12/16  He was seen by infectious disease given positive urine culture however per ID, this likely represents asymptomatic bacteriuria and antibiotics were discontinued  Patient was also seen by nephrology secondary to elevated creatinine however this appears to be patient's baseline of around 1 3-1 5  He was given some gentle IV fluids  This point, patient is stable for discharge  He does continue to have some tenderness around the suprapubic catheter site which is expected at this point  His urine remains clear back on his Eliquis for which he takes for A  fib  He will transition to [facility] today for rehabilitation  Plan of care discussed with daughter over the phone  Condition at Discharge: stable     Discharge Day Visit / Exam:     Subjective: Patient offers no new complaints, continues to have some tenderness in the suprapubic region    Vitals: Blood Pressure: 123/66 (12/17/22 0811)  Pulse: 81 (12/17/22 0811)  Temperature: 97 8 °F (36 6 °C) (12/17/22 0811)  Respirations: 16 (12/16/22 2112)  Height: 5' 8" (172 7 cm) (12/12/22 1021)  Weight - Scale: 63 5 kg (140 lb) (12/12/22 1021)  SpO2: 95 % (12/17/22 0811)     Exam:   Physical Exam  Vitals and nursing note reviewed  Constitutional:       General: He is not in acute distress  Cardiovascular:      Rate and Rhythm: Normal rate  Rhythm irregular  Pulmonary:      Breath sounds: Normal breath sounds  Abdominal:      General: Bowel sounds are normal       Palpations: Abdomen is soft  Tenderness: There is abdominal tenderness (surrounding SPT)  Genitourinary:     Comments: SPT draining clear yellow urine  Musculoskeletal:         General: No swelling  Skin:     General: Skin is warm  Neurological:      Mental Status: He is alert and oriented to person, place, and time  Mental status is at baseline  Comments: Very [place]   Psychiatric:         Mood and Affect: Mood normal       Comments: Can be anxious at times               Discussion with Family: Patient and daughter over the phone    Discharge instructions/Information to patient and family:   See after visit summary for information provided to patient and family  Provisions for Follow-Up Care:  See after visit summary for information related to follow-up care and any pertinent home health orders  Disposition:     Acute Rehab at [address] [place] [address] to [facility] SNF:   · Not Applicable to this Patient - Not Applicable to this Patient    Planned Readmission: no     Discharge Statement:  I spent 45 minutes discharging the patient  This time was spent on the day of discharge  I had direct contact with the patient on the day of discharge  Greater than 50% of the total time was spent examining patient, answering all patient questions, arranging and discussing plan of care with patient as well as directly providing post-discharge instructions  Additional time then spent on discharge activities      Discharge Medications:  See after visit summary for reconciled discharge medications provided to patient and family        ** Please Note: This note has been constructed using a voice recognition system **

## 2022-12-19 ENCOUNTER — NURSING HOME VISIT (OUTPATIENT)
Dept: GERIATRICS | Facility: OTHER | Age: 87
End: 2022-12-19

## 2022-12-19 VITALS
RESPIRATION RATE: 18 BRPM | DIASTOLIC BLOOD PRESSURE: 80 MMHG | HEART RATE: 71 BPM | SYSTOLIC BLOOD PRESSURE: 138 MMHG | TEMPERATURE: 97.2 F | BODY MASS INDEX: 21.94 KG/M2 | OXYGEN SATURATION: 90 % | WEIGHT: 144.3 LBS

## 2022-12-19 DIAGNOSIS — E11.65 TYPE 2 DIABETES MELLITUS WITH HYPERGLYCEMIA, WITHOUT LONG-TERM CURRENT USE OF INSULIN (HCC): ICD-10-CM

## 2022-12-19 DIAGNOSIS — N18.30 STAGE 3 CHRONIC KIDNEY DISEASE, UNSPECIFIED WHETHER STAGE 3A OR 3B CKD (HCC): ICD-10-CM

## 2022-12-19 DIAGNOSIS — R26.2 AMBULATORY DYSFUNCTION: ICD-10-CM

## 2022-12-19 DIAGNOSIS — T83.128A: Primary | ICD-10-CM

## 2022-12-19 DIAGNOSIS — I48.20 CHRONIC A-FIB (HCC): Chronic | ICD-10-CM

## 2022-12-19 DIAGNOSIS — F32.A DEPRESSION, UNSPECIFIED DEPRESSION TYPE: ICD-10-CM

## 2022-12-19 DIAGNOSIS — G70.00 MYASTHENIA GRAVIS (HCC): Chronic | ICD-10-CM

## 2022-12-19 DIAGNOSIS — I51.89 DIASTOLIC DYSFUNCTION: ICD-10-CM

## 2022-12-19 NOTE — ASSESSMENT & PLAN NOTE
Patient with h/o prostate cancer s/p prostatectomy, resulting in urinary incontinence, s/p artiricial urinary sphincter placement 20 years ago  Patient presented to ED with c/o hematuria and wire protruding out of his scrotum  CT scan revealed: Right inguinal implant with leads extending into the right inguinal canal and subsequently into the scrotum, distal aspect of the device is external to the scrotum  Transferred from [address] [place] [address] to [facility] for urologic procedure  • Urology following  ? S/P removal of eroded artifical urinary sphincter, scrotal exploration, diagnostic cysto and suprapubic catheter placement on 12/13  - B/L LOGAN drains removed 12/16   f/u in 2-3 weeks for wound check and SPT change with Dr Merrick De León     ? S/P IV ceftriaxone in setting of (+) Urine cx however per ID most likely represents asymptomatic bacteriuria and IV abx discontinued   • Will PT/OT   • Continue to monitor CBC CMP

## 2022-12-19 NOTE — PROGRESS NOTES
[facility]    History and Physical  POS: 31    Records Reviewed include: Hospital records      Chief Complaint/ Reason for Admission:   Displacement of artificial urinary sphincter, SP placement    History of Present Illness:       Mr Blanca Choi is a 66-year-old who was recently admitted to [facility] due to hematuria was found to have displacement of artificial urinary sphincter and underwent scrotal exploration diagnostic cystoscopy and suprapubic catheter placement on 12/13  Currently he is admitted to [facility] transitional care unit for short-term rehab  At the time of encounter he denies abdominal pain and urinary symptoms  No fever or chills  Denies chest pain shortness of breath cough palpitations  States that his appetite is good and denies difficulty sleeping  No nausea or vomiting diarrhea or constipation  No dizziness or lightheadedness  Patient is alert oriented x2 at the time of encounter  States he lives at home with his son  Uses a walker for ambulation and denies any recent falls                      Allergies    Allergies   Allergen Reactions   • Sulfa Antibiotics Rash       Past Medical History  Past Medical History:   Diagnosis Date   • Anxiety    • Arthritis    • Coronary artery disease    • Hiatal hernia    • Hypertension    • Irregular heart beat    • Myasthenia gravis [facility])         Past Surgical History:   Procedure Laterality Date   • CYSTECTOMY, RADICAL WITH ILEOCONDUIT N/A 12/13/2022    Procedure: REMOVAL ARTIFICIAL URINARY SPINCTER BILATERAL;  Surgeon: Marquita Pringle MD;  [facility]: [facility];  Service: Urology   • CYSTOSCOPY N/A 12/13/2022    Procedure: Wilson Poplin;  Surgeon: Marquita Pringle MD;  [facility];  Service: Urology   • EYE SURGERY  2014    cataracts   • HERNIA REPAIR  1971    right inguinal hernia repair   • IR SUPRAPUBIC CATHETER PLACEMENT  12/13/2022   • TX LAP,SURG,COLECTOMY, PARTIAL, W/ANAST N/A 6/3/2016    Procedure: LAPAROSCOPIC SIGMOID RESECTION ;  Surgeon: Jami Ruano MD;  Location[facility];  Service: Colorectal   • RI LAP,SURG,COLECTOMY,W/ANAST N/A 6/3/2016    Procedure: COLECTOMY LAPAROSCOPIC ASSISTED;  Surgeon: Jami Ruano MD;  [facility];  Service: Colorectal   • RI SIGMOIDOSCOPY FLX DX W/COLLJ SPEC BR/WA IF PFRMD N/A 6/3/2016    Procedure: Craig Hope;  Surgeon: Jami Ruano MD;  Location[facility];  Service: Colorectal   • PROSTATECTOMY     • REPLACEMENT TOTAL KNEE Right    • URINARY SPHINCTER IMPLANT  2001    s/p prostatectomy- pt had urinary leakage issues       Family History  Family History   Problem Relation Age of Onset   • Diabetes Brother        Social History  Social History     Tobacco Use   Smoking Status Former   Smokeless Tobacco Not on file   Tobacco Comments    quit smoking when 25 y/o      Social History     Substance and Sexual Activity   Alcohol Use No      Social History     Substance and Sexual Activity   Drug Use No        Lives: Home,  Social Support: family  Fall in the past 12 months: no  Use of assistance Device: Walker    Physical Exam    Vital Signs    Vitals:    12/19/22 1320   BP: 138/80   Pulse: 71   Resp: 18   Temp: (!) 97 2 °F (36 2 °C)   SpO2: 90%           Constitutional: Frail appearing patient       Physical Exam  Vitals and nursing note reviewed  Constitutional:       General: He is not in acute distress  Appearance: He is not diaphoretic  Comments: Frail looking   HENT:      Head: Normocephalic and atraumatic  Ears:      Comments: Very [place]     Mouth/Throat:      Mouth: Mucous membranes are dry  Eyes:      General:         Right eye: No discharge  Left eye: No discharge  Pupils: Pupils are equal, round, and reactive to light  Cardiovascular:      Rate and Rhythm: Normal rate and regular rhythm  Heart sounds: Murmur heard  Pulmonary:      Effort: Pulmonary effort is normal  No respiratory distress        Breath sounds: Normal breath sounds  No wheezing  Abdominal:      Palpations: Abdomen is soft  Tenderness: There is no abdominal tenderness  There is no guarding or rebound  Genitourinary:     Comments: SP cath  Musculoskeletal:         General: No tenderness  Cervical back: Normal range of motion and neck supple  Right lower leg: No edema  Left lower leg: No edema  Skin:     General: Skin is warm and dry  Neurological:      Mental Status: He is alert  Mental status is at baseline  Comments: AAOx2   Psychiatric:         Behavior: Behavior normal          Review of Systems:  Review of Systems   Constitutional: Negative for chills, fatigue and fever  HENT: Positive for hearing loss  Negative for congestion, rhinorrhea and sore throat  Respiratory: Negative for cough, shortness of breath and wheezing  Cardiovascular: Negative for chest pain and leg swelling  Gastrointestinal: Negative for abdominal pain, constipation and nausea  Genitourinary: Negative for dysuria and hematuria  Musculoskeletal: Positive for gait problem  Skin: Negative for rash and wound  Allergic/Immunologic: Negative for environmental allergies  Neurological: Negative for dizziness and syncope  Hematological: Does not bruise/bleed easily  Psychiatric/Behavioral: Negative for behavioral problems and sleep disturbance  List of Current Medications:    Medication reviewed  All orders signed  Complete list is in the paper chart  Allergies    Allergies   Allergen Reactions   • Sulfa Antibiotics Rash       Labs/Diagnostics (reviewed by this provider): I personally reviewed lab results and imaging studies  Full reports are in the paper chart  Assessment/Plan:    Displacement of artificial urinary sphincter (HCC)  Patient with h/o prostate cancer s/p prostatectomy, resulting in urinary incontinence, s/p artiricial urinary sphincter placement 20 years ago   Patient presented to ED with c/o hematuria and wire protruding out of his scrotum  CT scan revealed: Right inguinal implant with leads extending into the right inguinal canal and subsequently into the scrotum, distal aspect of the device is external to the scrotum  Transferred from [address] [place] [address] to [facility] for urologic procedure  • Urology following  ? S/P removal of eroded artifical urinary sphincter, scrotal exploration, diagnostic cysto and suprapubic catheter placement on 12/13  - B/L LOGAN drains removed 12/16   f/u in 2-3 weeks for wound check and SPT change with Dr Fabian Gallegos  ? S/P IV ceftriaxone in setting of (+) Urine cx however per ID most likely represents asymptomatic bacteriuria and IV abx discontinued   • Will PT/OT   • Continue to monitor CBC CMP    CKD (chronic kidney disease)  Lab Results   Component Value Date    EGFR 45 12/17/2022    EGFR 41 12/16/2022    EGFR 35 12/15/2022    CREATININE 1 33 (H) 12/17/2022    CREATININE 1 42 (H) 12/16/2022    CREATININE 1 62 (H) 12/15/2022     Creatinine stable  Will avoid nephrotoxic medication  Encourage po hydration  Will monitor BMP  Chronic a-fib (HCC)  Currently rate controlled, asymptomatic  Continue same regimen and monitor  Continue anticoagulation with eliquis    Type 2 diabetes mellitus, without long-term current use of insulin (HCC)    Lab Results   Component Value Date    HGBA1C 7 2 (H) 12/12/2022     Will continue saxagliptin and insulin sliding scale  Monitor Accu-Cheks and adjust regimen as needed  Avoid hypoglycemia  Continue diabetic diet    Myasthenia gravis ([facility] )  We will continue pyridostigmine 60 mg 4 times daily    Depression  Depression with anxiety  Currently stable  Continue Prozac and Xanax as needed  Provide supportive care    Diastolic dysfunction  Currently stable  Patient is on Lasix 20 mg as needed  We will continue to monitor CMP  Monitor daily weight    Ambulatory dysfunction  Will continue PT/OT    Patient uses walker for ambulation at baseline  Placed on fall precautions    The goal is to return home       Pain: no  Rehab Potential:Good  Patient Informed of Medical Condition: yes   Patient is Capable of Understanding Their Right: yes   Discharge Plan: home  Vaccination:   Immunization History   Administered Date(s) Administered   • COVID-19 PFIZER VACCINE 0 3 ML IM 02/24/2021, 03/17/2021       Code status:Full Code  PCP: MD Maribel Bacon MD  Geriatric Medicine  12/19/20221:21 PM

## 2022-12-19 NOTE — ASSESSMENT & PLAN NOTE
Currently stable  Patient is on Lasix 20 mg as needed  We will continue to monitor CMP  Monitor daily weight

## 2022-12-19 NOTE — ASSESSMENT & PLAN NOTE
Lab Results   Component Value Date    HGBA1C 7 2 (H) 12/12/2022     Will continue saxagliptin and insulin sliding scale  Monitor Accu-Cheks and adjust regimen as needed  Avoid hypoglycemia  Continue diabetic diet

## 2022-12-19 NOTE — ASSESSMENT & PLAN NOTE
Depression with anxiety  Currently stable  Continue Prozac and Xanax as needed  Provide supportive care

## 2022-12-19 NOTE — ASSESSMENT & PLAN NOTE
Will continue PT/OT  Patient uses walker for ambulation at baseline  Placed on fall precautions    The goal is to return home

## 2022-12-20 NOTE — UTILIZATION REVIEW
NOTIFICATION OF ADMISSION DISCHARGE   This is a Notification of Discharge from [address]  Please be advised that this patient has been discharge from our facility  Below you will find the admission and discharge date and time including the patient’s disposition  UTILIZATION REVIEW CONTACT:  Ayo Martinez  Utilization   Network Utilization Review Department  Phone: [phone] x carefully listen to the prompts  All voicemails are confidential   Email: [email or web address]     ADMISSION INFORMATION  PRESENTATION DATE: 12/11/2022  3:36 PM  OBERVATION ADMISSION DATE:   INPATIENT ADMISSION DATE: 12/11/22  3:36 PM   DISCHARGE DATE: 12/17/2022  1:42 PM   DISPOSITION:Released to SNF/TCU/SNU Facility    IMPORTANT INFORMATION:  Send all requests for admission clinical reviews, approved or denied determinations and any other requests to dedicated fax number below belonging to the campus where the patient is receiving treatment   List of dedicated fax numbers:  [address] DENIALS (Administrative/Medical Necessity) [phone]   [address] (Maternity/NICU/Pediatrics) [phone]   [facility] [phone]   [facility] 87 [phone]   [address] [phone]   220 [facility] [phone]   [address] [facility] [phone]   [address] [place], [place] [address] [phone]   [facility]  [phone]   [facility] [phone]   412 [facility] 850 [address] [facility] [phone]

## 2022-12-21 ENCOUNTER — NURSING HOME VISIT (OUTPATIENT)
Dept: GERIATRICS | Facility: OTHER | Age: 87
End: 2022-12-21

## 2022-12-21 VITALS
TEMPERATURE: 96.9 F | RESPIRATION RATE: 18 BRPM | DIASTOLIC BLOOD PRESSURE: 81 MMHG | OXYGEN SATURATION: 100 % | HEART RATE: 72 BPM | SYSTOLIC BLOOD PRESSURE: 155 MMHG

## 2022-12-21 DIAGNOSIS — T83.128A: Primary | ICD-10-CM

## 2022-12-21 DIAGNOSIS — E11.65 TYPE 2 DIABETES MELLITUS WITH HYPERGLYCEMIA, WITHOUT LONG-TERM CURRENT USE OF INSULIN (HCC): ICD-10-CM

## 2022-12-21 DIAGNOSIS — N18.30 STAGE 3 CHRONIC KIDNEY DISEASE, UNSPECIFIED WHETHER STAGE 3A OR 3B CKD (HCC): ICD-10-CM

## 2022-12-21 DIAGNOSIS — F32.A DEPRESSION, UNSPECIFIED DEPRESSION TYPE: ICD-10-CM

## 2022-12-21 DIAGNOSIS — I48.20 CHRONIC A-FIB (HCC): Chronic | ICD-10-CM

## 2022-12-21 NOTE — ASSESSMENT & PLAN NOTE
· With history of prostate cancer status post prostatectomy, resulting in urinary incontinence, status post artificial urinary sphincter placement 20 years ago  · Patient presented to ER with hematuria and wire protruding out of his scrotum  · CT scan showed right inguinal implant with leads extending into the right inguinal canal and subsequently into the scrotum, distal aspect of the devices external to the scrotum  · Was seen by urology while hospitalized, status post removal of eroded artificial urinary sphincter, scrotal exploration, diagnostic cystoscopy and suprapubic cath placement on 12/13/2022  · Patient had LOGAN drain was removed on 12/16/2022  · Scrotal wound was packed with packing, removed by wound care today-and repacked per previous wound care orders at hospital  · Received IV ceftriaxone while hospitalized due to positive urine culture, has since been discontinued per ID as it is likely asymptomatic bacteriuria  · Will need close follow-up with Dr Karli Fagan, discussed with director of nursing Phil-to schedule appointment ASAP  · Repeat lab work tomorrow  · Continue to monitor

## 2022-12-21 NOTE — PROGRESS NOTES
[facility]  [address]  [phone]  Senior Care SNF List: [facility]  - TCU   Code 31      NAME: Pete Lee  AGE: 80 y o   SEX: male    DATE OF ENCOUNTER: 12/21/2022    Assessment and Plan     Problem List Items Addressed This Visit        Endocrine    Type 2 diabetes mellitus, without long-term current use of insulin ([facility] [address] )       Lab Results   Component Value Date    HGBA1C 7 2 (H) 12/12/2022 ·   Blood sugar log reviewed, fasting blood sugar this morning was 170  · Blood sugars have trended between 110 and 218 is currently on Humalog 3 units with meals, sliding scale insulin, and saxagliptin  · Will increase with meals insulin to 5 units  · Avoid hypoglycemia  · Encourage diabetic diet  · Continue to monitor adjust medication regimen as needed            Cardiovascular and Mediastinum    Chronic a-fib (HCC) (Chronic)     · Currently rate controlled and asymptomatic  · Heart rate this morning 72  · Heart rate trending between 60s and 80s  · Continue Eliquis for anticoagulation  · Continue amiodarone daily  · Continue to monitor            Genitourinary    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     · With history of prostate cancer status post prostatectomy, resulting in urinary incontinence, status post artificial urinary sphincter placement 20 years ago  · Patient presented to ER with hematuria and wire protruding out of his scrotum  · CT scan showed right inguinal implant with leads extending into the right inguinal canal and subsequently into the scrotum, distal aspect of the devices external to the scrotum  · Was seen by urology while hospitalized, status post removal of eroded artificial urinary sphincter, scrotal exploration, diagnostic cystoscopy and suprapubic cath placement on 12/13/2022  · Patient had LOGAN drain was removed on 12/16/2022  · Scrotal wound was packed with packing, removed by wound care today-and repacked per previous wound care orders at hospital  · Received IV ceftriaxone while hospitalized due to positive urine culture, has since been discontinued per ID as it is likely asymptomatic bacteriuria  · Will need close follow-up with Dr Ching Gary, discussed with director of nursing Phil-to schedule appointment ASAP  · Repeat lab work tomorrow  · Continue to monitor         CKD (chronic kidney disease)     Lab Results   Component Value Date    EGFR 39 12/20/2022    EGFR 45 12/17/2022    EGFR 41 12/16/2022    CREATININE 1 47 12/20/2022    CREATININE 1 33 (H) 12/17/2022    CREATININE 1 42 (H) 12/16/2022 ·   Creatinine stable  · Avoid nephrotoxic medication  · Rectus medications as needed  · Encourage adequate p o  hydration  · Periodic lab work to monitor renal function  · Continue to monitor            Other    Depression     · Currently stable  · Continue Prozac and as needed Xanax  · Continue to provide emotional support            No orders of the defined types were placed in this encounter       - Counseling Documentation: patient was counseled regarding: impressions and importance of compliance with treatment    Chief Complaint     STR follow up  History of Present Illness     Cinthia Dumas is a 51-year-old male at [address] [place] [address] for short-term and after recent hospitalization for hematuria was found to have displacement of artificial urinary sphincter and underwent scrotal exploration, diagnostic cystoscopy, and suprapubic catheter placement on 12/13/2020  He is being seen today for follow-up on his acute on chronic medical conditions  His comorbidities include type 2 diabetes, depression, CKD, A  fib, and ambulatory function  On examination patient was out of the chair, resting  He appeared comfortable and was in no acute distress  He said he is feeling well and offers no acute complaints  His appetite is stable  He slept well last night  He had no complaints of pain    He denies headaches, dizziness, lightheadedness, chest pain, shortness breath, cough, abdominal pain, and GI/ upset  Per nursing no acute concerns or issues at this time          The following portions of the patient's history were reviewed and updated as appropriate: allergies, current medications, past family history, past medical history, past social history, past surgical history and problem list     Review of Systems     Review of Systems   Constitutional: Positive for fatigue  Negative for activity change, appetite change, chills and fever  HENT: Positive for hearing loss  Respiratory: Negative for cough and shortness of breath  Cardiovascular: Negative for chest pain and palpitations  Gastrointestinal: Negative for abdominal pain, constipation, diarrhea, nausea and vomiting  Genitourinary: Negative for difficulty urinating, dysuria, frequency, genital sores and hematuria  Musculoskeletal: Positive for arthralgias and gait problem  Negative for back pain  Skin: Negative for color change and rash  Neurological: Positive for weakness  Negative for dizziness, seizures, syncope and headaches  Active Problem List     Patient Active Problem List   Diagnosis   • Chronic a-fib ([facility])   • Myasthenia gravis ([facility] [address] )   • CAD (coronary artery disease)   • Hypomagnesemia   • Displacement of artificial urinary sphincter ([facility])   • Depression   • Diastolic dysfunction   • Asymptomatic Bacteriuria with hematuria    • Type 2 diabetes mellitus, without long-term current use of insulin ([facility])   • CKD (chronic kidney disease)   • Moderate protein-calorie malnutrition ([facility])   • Ambulatory dysfunction       Objective     /81   Pulse 72   Temp (!) 96 9 °F (36 1 °C)   Resp 18   SpO2 100%     Physical Exam  Constitutional:       General: He is not in acute distress  Appearance: He is obese  He is not ill-appearing  Comments: Frail-appearing   HENT:      Head: Normocephalic and atraumatic        Ears:      Comments: [place]     Mouth/Throat: Mouth: Mucous membranes are dry  Pharynx: No oropharyngeal exudate or posterior oropharyngeal erythema  Cardiovascular:      Rate and Rhythm: Normal rate and regular rhythm  Pulses: Normal pulses  Heart sounds: Murmur heard  Pulmonary:      Effort: Pulmonary effort is normal  No respiratory distress  Breath sounds: Normal breath sounds  No wheezing  Abdominal:      General: Bowel sounds are normal  There is no distension  Palpations: Abdomen is soft  Tenderness: There is no abdominal tenderness  Comments: Suprapubic catheter   Musculoskeletal:         General: No tenderness  Right lower leg: No edema  Left lower leg: No edema  Skin:     General: Skin is warm and dry  Coloration: Skin is not jaundiced or pale  Findings: Wound (Wound on scrotum, stage III sacral ulcer) present  Neurological:      General: No focal deficit present  Mental Status: He is alert and oriented to person, place, and time  Mental status is at baseline  Motor: Weakness present  Gait: Gait abnormal    Psychiatric:         Mood and Affect: Mood normal          Behavior: Behavior normal          Pertinent Laboratory/Diagnostic Studies:  Labs reviewed in facility paper chart  Jarrell SOTO    Please note:  Voice-recognition software may have been used in the preparation of this document  Occasional wrong word or "sound-alike" substitutions may have occurred due to the inherent limitations of voice recognition software  Interpretation should be guided by context

## 2022-12-21 NOTE — ASSESSMENT & PLAN NOTE
Lab Results   Component Value Date    HGBA1C 7 2 (H) 12/12/2022   · Blood sugar log reviewed, fasting blood sugar this morning was 170  · Blood sugars have trended between 110 and 218 is currently on Humalog 3 units with meals, sliding scale insulin, and saxagliptin  · Will increase with meals insulin to 5 units  · Avoid hypoglycemia  · Encourage diabetic diet  · Continue to monitor adjust medication regimen as needed

## 2022-12-21 NOTE — ASSESSMENT & PLAN NOTE
· Currently rate controlled and asymptomatic  · Heart rate this morning 72  · Heart rate trending between 60s and 80s  · Continue Eliquis for anticoagulation  · Continue amiodarone daily  · Continue to monitor

## 2022-12-21 NOTE — ASSESSMENT & PLAN NOTE
Lab Results   Component Value Date    EGFR 39 12/20/2022    EGFR 45 12/17/2022    EGFR 41 12/16/2022    CREATININE 1 47 12/20/2022    CREATININE 1 33 (H) 12/17/2022    CREATININE 1 42 (H) 12/16/2022   · Creatinine stable  · Avoid nephrotoxic medication  · Rectus medications as needed  · Encourage adequate p o  hydration  · Periodic lab work to monitor renal function  · Continue to monitor

## 2022-12-22 ENCOUNTER — HOME HEALTH ADMISSION (OUTPATIENT)
Dept: HOME HEALTH SERVICES | Facility: HOME HEALTHCARE | Age: 87
End: 2022-12-22

## 2022-12-23 ENCOUNTER — NURSING HOME VISIT (OUTPATIENT)
Dept: GERIATRICS | Facility: OTHER | Age: 87
End: 2022-12-23

## 2022-12-23 VITALS
DIASTOLIC BLOOD PRESSURE: 55 MMHG | WEIGHT: 138.8 LBS | OXYGEN SATURATION: 94 % | TEMPERATURE: 97.6 F | RESPIRATION RATE: 18 BRPM | BODY MASS INDEX: 21.1 KG/M2 | HEART RATE: 81 BPM | SYSTOLIC BLOOD PRESSURE: 119 MMHG

## 2022-12-23 DIAGNOSIS — T83.128A: Primary | ICD-10-CM

## 2022-12-23 DIAGNOSIS — I51.89 DIASTOLIC DYSFUNCTION: ICD-10-CM

## 2022-12-23 DIAGNOSIS — E11.65 TYPE 2 DIABETES MELLITUS WITH HYPERGLYCEMIA, WITHOUT LONG-TERM CURRENT USE OF INSULIN (HCC): ICD-10-CM

## 2022-12-23 DIAGNOSIS — R26.2 AMBULATORY DYSFUNCTION: ICD-10-CM

## 2022-12-23 DIAGNOSIS — I48.20 CHRONIC A-FIB (HCC): Chronic | ICD-10-CM

## 2022-12-23 DIAGNOSIS — N18.30 STAGE 3 CHRONIC KIDNEY DISEASE, UNSPECIFIED WHETHER STAGE 3A OR 3B CKD (HCC): ICD-10-CM

## 2022-12-23 DIAGNOSIS — F32.A DEPRESSION, UNSPECIFIED DEPRESSION TYPE: ICD-10-CM

## 2022-12-23 RX ORDER — BLOOD-GLUCOSE METER
1 KIT MISCELLANEOUS AS NEEDED
COMMUNITY

## 2022-12-23 RX ORDER — FUROSEMIDE 20 MG/1
20 TABLET ORAL SEE ADMIN INSTRUCTIONS
COMMUNITY

## 2022-12-23 NOTE — ASSESSMENT & PLAN NOTE
At a baseline ambulates with rolling walker  PT/OT following  Fall precautions  Out of bed as tolerated  Encourage early and frequent mobilization  Encourage adequate hydration and nutrition  Provide adequate pain management   Goal is to discharge home with family  · Continue with PT/OT for continued strength and balance training

## 2022-12-23 NOTE — ASSESSMENT & PLAN NOTE
· Rate controlled and asymptomatic  · Heart rate this morning is 81  · Heart rates trending between 60s and 80s  · Continue eliquis for anticoagulation  · Continue amiodarone daily  · Follow up with pcp and cardiology on discharge

## 2022-12-23 NOTE — ASSESSMENT & PLAN NOTE
· Currently stable  · Continue Prozac daily  · Xanax discontinued due to nonuse, per son patient does not take Xanax at home  · Continue to provide emotional support  · Follow-up with PCP on discharge

## 2022-12-23 NOTE — PROGRESS NOTES
[facility]  [address]  [phone]  Senior Care SNF List: [address] [facility] [address]   Code 31  Discharge Summary      NAME: Yvtete Cobian  AGE: 80 y o  SEX: male    DATE OF ENCOUNTER: 12/23/2022    Assessment and Plan     Problem List Items Addressed This Visit        Endocrine    Type 2 diabetes mellitus, without long-term current use of insulin (HCC)       Lab Results   Component Value Date    HGBA1C 7 2 (H) 12/12/2022 ·   Blood sugar log reviewed blood sugars trending between 134 and 201  · Blood sugar this morning on labwork 155  · Was previously on with meals Humalog and saxagliptin  · Discussed blood sugars and A1C with Dr Sanam Contreras- per dr Padmaja blake to discontinue insulin and continue home regimen of saxagliptin- to check blood sugars 2-3x per day and keep a log and take to follow up with PCP  · Avoid hypoglycemia  · Encourage diabetic diet  · Follow-up with PCP on discharge            Cardiovascular and Mediastinum    Chronic a-fib (HCC) (Chronic)     · Rate controlled and asymptomatic  · Heart rate this morning is 81  · Heart rates trending between 60s and 80s  · Continue eliquis for anticoagulation  · Continue amiodarone daily  · Follow up with pcp and cardiology on discharge             Genitourinary    Displacement of artificial urinary sphincter ([address] ) - Primary     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  · Presented to ER with hematuria and wire protruding out of the scrotum  · CT scan showed-  Right inguinal implant with leads extending into the right inguinal canal and subsequently into the scrotum, distal aspect of the device is external to the scrotum    · Urology was consulted while hospitalized, status post removal of eroded artificial urinary sphincter, scrotal exploration, diagnostic cystoscopy, and suprapubic cath placement on 12/13/2022  · Patient initially had LOGAN drains placed after surgery, were removed on 12/16/2022  · Received IV ceftriaxone while hospitalized due to positive urine culture, has since been discontinued per ID as likely asymptomatic bacteriuria  · Patient is having scrotal wound packed daily and as needed for soiling  · Family wishing to take patient home, education provided by 2 RNs to son how to do daily packing and scrotum-son was able to do packing today with nurse  · Son was also educated on how to provide suprapubic catheter care, reviewed again today by nursing  · Patient to follow-up with urology ASAP, recommended appointment next week with family  · Prescription advised to repeat lab work drawn next week by [facility] with results faxed to PCP  · Discussed assessment findings, lab work (including bun, creatinine, anion gap), history, and exam with Dr Arian Burdick-  Per dr Davian castilloay to discharge home with family with follow-up with labwork for early next week, PCP follow up, and urology  · To follow-up with PCP and urology on discharge         CKD (chronic kidney disease)     Lab Results   Component Value Date    EGFR 45 12/23/2022    EGFR 39 12/20/2022    EGFR 45 12/17/2022    CREATININE 1 33 12/23/2022    CREATININE 1 47 12/20/2022    CREATININE 1 33 (H) 12/17/2022   Creatinine 1 33, BUN slightly up at 31  Avoid nephrotoxic medication  Renal dose medications as needed  Encourage adequate p o   Hydration  Avoid hypotension  Periodic lab work to monitor renal function  · Continue to monitor  · Care provider to have repeat lab work drawn next week by [facility] with results faxed to PCP           Relevant Medications    furosemide (LASIX) 20 mg tablet       Other    Depression     · Currently stable  · Continue Prozac daily  · Xanax discontinued due to nonuse, per son patient does not take Xanax at home  · Continue to provide emotional support  · Follow-up with PCP on discharge         Diastolic dysfunction     · Currently stable, weight today 138 8, down from admission weight of 143 pounds  · Is on Lasix every Monday Wednesday Friday  · Continue to monitor daily weights on discharge  · Encourage cardiac low-sodium diet  · Follow-up with PCP on discharge         Ambulatory dysfunction     At a baseline ambulates with rolling walker  PT/OT following  Fall precautions  Out of bed as tolerated  Encourage early and frequent mobilization  Encourage adequate hydration and nutrition  Provide adequate pain management   Goal is to discharge home with family  · Continue with PT/OT for continued strength and balance training               No orders of the defined types were placed in this encounter       - Counseling Documentation: patient was counseled regarding: impressions and importance of compliance with treatment    Chief Complaint     STR follow up and discharge  History of Present Illness     Sadie Gong is a 44-year-old male at [address] [place] [address] for short-term and after recent hospitalization for hematuria was found to have displacement of artificial urinary sphincter and underwent scrotal exploration, diagnostic cystoscopy, and suprapubic catheter placement on 12/13/2020  Lorenzo Hopper is being seen today for follow-up on his acute on chronic medical conditions and for pending discharge home with family  Lorenzo Hopper is ambulating with a rolling walker  His family will provide assistance with ADLs and IADLs  Education provided by nursing on suprapubic catheter teaching and how to pack wound  Discussed with son that he must take blood sugars 2-3 times per day and recorded log and take to PCP for further management diabetes  Order for glucometer called in verbally to [facility] pharmacy- daughter Joni Bergman aware  Patient to follow-up with PCP and urology on discharge   his comorbidities include type 2 diabetes, depression, CKD, A  fib, and ambulatory function      On examination patient was out of the chair, resting   He appeared comfortable and was in no acute distress   He said he is feeling well and offers no acute complaints   His appetite is stable   He slept well last night   He had no complaints of pain   He denies headaches, dizziness, lightheadedness, chest pain, shortness of breath, cough, abdominal pain, and GI/ upset   Per nursing no acute concerns or issues at this time  The following portions of the patient's history were reviewed and updated as appropriate: allergies, current medications, past family history, past medical history, past social history, past surgical history and problem list     Review of Systems     Review of Systems   Constitutional: Positive for fatigue  Negative for activity change, appetite change, chills and fever  HENT: Positive for hearing loss  Respiratory: Negative for cough and shortness of breath  Cardiovascular: Negative for chest pain and palpitations  Gastrointestinal: Negative for abdominal pain, constipation, diarrhea, nausea and vomiting  Genitourinary: Negative for difficulty urinating, dysuria, frequency, genital sores and hematuria  Musculoskeletal: Positive for arthralgias and gait problem  Negative for back pain  Skin: Negative for color change and rash  Neurological: Positive for weakness  Negative for dizziness, seizures, syncope and headaches  Psychiatric/Behavioral: Negative for dysphoric mood and sleep disturbance  The patient is not nervous/anxious          Active Problem List     Patient Active Problem List   Diagnosis   • Chronic a-fib ([facility])   • [facility]   • CAD (coronary artery disease)   • Hypomagnesemia   • Displacement of artificial urinary sphincter ([facility])   • Depression   • Diastolic dysfunction   • Asymptomatic Bacteriuria with hematuria    • Type 2 diabetes mellitus, without long-term current use of insulin ([facility])   • CKD (chronic kidney disease)   • Moderate protein-calorie malnutrition ([facility] )   • Ambulatory dysfunction       Objective     /55   Pulse 81 Temp 97 6 °F (36 4 °C)   Resp 18   Wt 63 kg (138 lb 12 8 oz)   SpO2 94%   BMI 21 10 kg/m²     Physical Exam  Constitutional:       General: He is not in acute distress  Appearance: He is normal weight  He is not ill-appearing  Comments: Frail-appearing   HENT:      Head: Normocephalic and atraumatic  Ears:      Comments: [place]     Mouth/Throat:      Mouth: Mucous membranes are dry  Pharynx: No oropharyngeal exudate or posterior oropharyngeal erythema  Cardiovascular:      Rate and Rhythm: Normal rate and regular rhythm  Pulses: Normal pulses  Heart sounds: Murmur heard  Pulmonary:      Effort: Pulmonary effort is normal  No respiratory distress  Breath sounds: Normal breath sounds  No wheezing  Abdominal:      General: Bowel sounds are normal  There is no distension  Palpations: Abdomen is soft  Tenderness: There is no abdominal tenderness  Comments: Suprapubic catheter   Musculoskeletal:         General: No tenderness  Right lower leg: No edema  Left lower leg: No edema  Skin:     General: Skin is warm and dry  Coloration: Skin is not jaundiced or pale  Findings: Wound (Wound on scrotum, stage III sacral ulcer) present  Neurological:      General: No focal deficit present  Mental Status: He is alert and oriented to person, place, and time  Mental status is at baseline  Motor: Weakness present  Gait: Gait abnormal    Psychiatric:         Mood and Affect: Mood normal          Behavior: Behavior normal          Pertinent Laboratory/Diagnostic Studies:  Labs reviewed in facility paper chart      Current Medications   Acetaminophen 650 mg Oral Every 6 hours PRN  Aluminum & Magnesium Hydroxide 200-200 MG/5ML 15 mL Oral Every 6 hours PRN  Amiodarone HCl 200 mg Oral Daily  Apixaban 2 5 mg 1 tablet Oral Every 12 hours  Cholecalciferol 2,000 Units Oral Daily (early morning)  Dicyclomine HCl 20 mg Oral 2 times daily PRN  Famotidine 20 mg 1 tablet Oral 2 times daily  FLUoxetine HCl 40 mg Oral Daily  Furosemide 20 mg Oral See admin instructions, Every Monday, Wednesday, and Friday  Isosorbide Mononitrate 60 mg Oral Daily  Levothyroxine Sodium 88 mcg Oral  Pantoprazole Sodium 20 mg Oral 2 times daily  Pravastatin Sodium 20 mg Oral Daily  Pyridostigmine Bromide 60 mg Oral 4 times daily  sAXagliptin HCl 2 5 MG 1 tablet Daily      Danny SOTO    Please note:  Voice-recognition software may have been used in the preparation of this document  Occasional wrong word or "sound-alike" substitutions may have occurred due to the inherent limitations of voice recognition software  Interpretation should be guided by context

## 2022-12-23 NOTE — ASSESSMENT & PLAN NOTE
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  · Presented to ER with hematuria and wire protruding out of the scrotum  · CT scan showed-  Right inguinal implant with leads extending into the right inguinal canal and subsequently into the scrotum, distal aspect of the device is external to the scrotum    · Urology was consulted while hospitalized, status post removal of eroded artificial urinary sphincter, scrotal exploration, diagnostic cystoscopy, and suprapubic cath placement on 12/13/2022  · Patient initially had LOGAN drains placed after surgery, were removed on 12/16/2022  · Received IV ceftriaxone while hospitalized due to positive urine culture, has since been discontinued per ID as likely asymptomatic bacteriuria  · Patient is having scrotal wound packed daily and as needed for soiling  · Family wishing to take patient home, education provided by 2 RNs to son how to do daily packing and scrotum-son was able to do packing today with nurse  · Son was also educated on how to provide suprapubic catheter care, reviewed again today by nursing  · Patient to follow-up with urology ASAP, recommended appointment next week with family  · Prescription advised to repeat lab work drawn next week by [facility] with results faxed to PCP  · Discussed assessment findings, lab work (including bun, creatinine, anion gap), history, and exam with Dr Manuel Ramirez-  Per dr Paige blake to discharge home with family with follow-up with labwork for early next week, PCP follow up, and urology  · To follow-up with PCP and urology on discharge

## 2022-12-23 NOTE — ASSESSMENT & PLAN NOTE
Lab Results   Component Value Date    EGFR 45 12/23/2022    EGFR 39 12/20/2022    EGFR 45 12/17/2022    CREATININE 1 33 12/23/2022    CREATININE 1 47 12/20/2022    CREATININE 1 33 (H) 12/17/2022   Creatinine 1 33, BUN slightly up at 31  Avoid nephrotoxic medication  Renal dose medications as needed  Encourage adequate p o   Hydration  Avoid hypotension  Periodic lab work to monitor renal function  · Continue to monitor  · Care provider to have repeat lab work drawn next week by [facility] with results faxed to PCP

## 2022-12-23 NOTE — ASSESSMENT & PLAN NOTE
Lab Results   Component Value Date    HGBA1C 7 2 (H) 12/12/2022   · Blood sugar log reviewed blood sugars trending between 134 and 201  · Blood sugar this morning on labwork 155  · Was previously on with meals Humalog and saxagliptin  · Discussed blood sugars and A1C with Dr Davin Arana- per dr Salma blake to discontinue insulin and continue home regimen of saxagliptin- to check blood sugars 2-3x per day and keep a log and take to follow up with PCP  · Avoid hypoglycemia  · Encourage diabetic diet  · Follow-up with PCP on discharge

## 2022-12-25 ENCOUNTER — HOME CARE VISIT (OUTPATIENT)
Dept: HOME HEALTH SERVICES | Facility: HOME HEALTHCARE | Age: 87
End: 2022-12-25

## 2022-12-26 ENCOUNTER — HOME CARE VISIT (OUTPATIENT)
Dept: HOME HEALTH SERVICES | Facility: HOME HEALTHCARE | Age: 87
End: 2022-12-26

## 2022-12-26 VITALS
OXYGEN SATURATION: 92 % | SYSTOLIC BLOOD PRESSURE: 126 MMHG | TEMPERATURE: 97 F | DIASTOLIC BLOOD PRESSURE: 60 MMHG | RESPIRATION RATE: 20 BRPM | WEIGHT: 138 LBS | HEART RATE: 64 BPM | BODY MASS INDEX: 20.98 KG/M2

## 2022-12-26 VITALS — RESPIRATION RATE: 16 BRPM

## 2022-12-27 ENCOUNTER — HOME CARE VISIT (OUTPATIENT)
Dept: HOME HEALTH SERVICES | Facility: HOME HEALTHCARE | Age: 87
End: 2022-12-27

## 2022-12-28 ENCOUNTER — HOME CARE VISIT (OUTPATIENT)
Dept: HOME HEALTH SERVICES | Facility: HOME HEALTHCARE | Age: 87
End: 2022-12-28

## 2022-12-28 ENCOUNTER — APPOINTMENT (OUTPATIENT)
Dept: LAB | Facility: CLINIC | Age: 87
End: 2022-12-28

## 2022-12-28 ENCOUNTER — TRANSCRIBE ORDERS (OUTPATIENT)
Dept: LAB | Facility: CLINIC | Age: 87
End: 2022-12-28

## 2022-12-28 VITALS
TEMPERATURE: 97.4 F | HEART RATE: 62 BPM | SYSTOLIC BLOOD PRESSURE: 132 MMHG | DIASTOLIC BLOOD PRESSURE: 68 MMHG | RESPIRATION RATE: 18 BRPM | OXYGEN SATURATION: 99 %

## 2022-12-28 VITALS
DIASTOLIC BLOOD PRESSURE: 68 MMHG | TEMPERATURE: 97.7 F | OXYGEN SATURATION: 98 % | HEART RATE: 62 BPM | RESPIRATION RATE: 18 BRPM | SYSTOLIC BLOOD PRESSURE: 140 MMHG

## 2022-12-28 DIAGNOSIS — N18.30 STAGE 3 CHRONIC KIDNEY DISEASE, UNSPECIFIED WHETHER STAGE 3A OR 3B CKD (HCC): ICD-10-CM

## 2022-12-28 DIAGNOSIS — I12.9 HYPERTENSIVE NEPHROPATHY: Primary | ICD-10-CM

## 2022-12-28 LAB
ALBUMIN SERPL BCP-MCNC: 2.7 G/DL (ref 3.5–5)
ALP SERPL-CCNC: 77 U/L (ref 46–116)
ALT SERPL W P-5'-P-CCNC: 68 U/L (ref 12–78)
ANION GAP SERPL CALCULATED.3IONS-SCNC: 5 MMOL/L (ref 4–13)
AST SERPL W P-5'-P-CCNC: 63 U/L (ref 5–45)
BILIRUB SERPL-MCNC: 0.45 MG/DL (ref 0.2–1)
BUN SERPL-MCNC: 29 MG/DL (ref 5–25)
CALCIUM ALBUM COR SERPL-MCNC: 10.1 MG/DL (ref 8.3–10.1)
CALCIUM SERPL-MCNC: 9.1 MG/DL (ref 8.3–10.1)
CHLORIDE SERPL-SCNC: 107 MMOL/L (ref 96–108)
CO2 SERPL-SCNC: 27 MMOL/L (ref 21–32)
CREAT SERPL-MCNC: 1.37 MG/DL (ref 0.6–1.3)
ERYTHROCYTE [DISTWIDTH] IN BLOOD BY AUTOMATED COUNT: 13.9 % (ref 11.6–15.1)
GFR SERPL CREATININE-BSD FRML MDRD: 43 ML/MIN/1.73SQ M
GLUCOSE SERPL-MCNC: 235 MG/DL (ref 65–140)
HCT VFR BLD AUTO: 37.7 % (ref 36.5–49.3)
HGB BLD-MCNC: 11.9 G/DL (ref 12–17)
MCH RBC QN AUTO: 28.9 PG (ref 26.8–34.3)
MCHC RBC AUTO-ENTMCNC: 31.6 G/DL (ref 31.4–37.4)
MCV RBC AUTO: 92 FL (ref 82–98)
PLATELET # BLD AUTO: 258 THOUSANDS/UL (ref 149–390)
PMV BLD AUTO: 10.3 FL (ref 8.9–12.7)
POTASSIUM SERPL-SCNC: 4.4 MMOL/L (ref 3.5–5.3)
PROT SERPL-MCNC: 5.8 G/DL (ref 6.4–8.4)
RBC # BLD AUTO: 4.12 MILLION/UL (ref 3.88–5.62)
SODIUM SERPL-SCNC: 139 MMOL/L (ref 135–147)
WBC # BLD AUTO: 6.72 THOUSAND/UL (ref 4.31–10.16)

## 2022-12-29 ENCOUNTER — HOME CARE VISIT (OUTPATIENT)
Dept: HOME HEALTH SERVICES | Facility: HOME HEALTHCARE | Age: 87
End: 2022-12-29

## 2022-12-29 VITALS
DIASTOLIC BLOOD PRESSURE: 64 MMHG | SYSTOLIC BLOOD PRESSURE: 126 MMHG | TEMPERATURE: 97.2 F | HEART RATE: 88 BPM | OXYGEN SATURATION: 97 % | RESPIRATION RATE: 18 BRPM

## 2022-12-29 NOTE — CASE COMMUNICATION
Faxed Dr Sandra Irwin regarding the delay in initiating OT within 7 days  Anticipated date to begin is 1/6/23 or sooner  OT to contact the patient to schedule the visit

## 2022-12-30 ENCOUNTER — HOME CARE VISIT (OUTPATIENT)
Dept: HOME HEALTH SERVICES | Facility: HOME HEALTHCARE | Age: 87
End: 2022-12-30

## 2022-12-30 ENCOUNTER — TELEPHONE (OUTPATIENT)
Dept: OTHER | Facility: OTHER | Age: 87
End: 2022-12-30

## 2022-12-30 VITALS
SYSTOLIC BLOOD PRESSURE: 118 MMHG | TEMPERATURE: 98 F | OXYGEN SATURATION: 97 % | DIASTOLIC BLOOD PRESSURE: 58 MMHG | HEART RATE: 58 BPM | RESPIRATION RATE: 18 BRPM

## 2022-12-30 NOTE — TELEPHONE ENCOUNTER
A nurse Hailee Bautista called [facility], requested orders for suprapubic catheter change, fr size, frequency of catheter change, catheter flush to be faxed at [phone]

## 2022-12-30 NOTE — TELEPHONE ENCOUNTER
· Routine SPT exchange, every 4- 6 weeks, [id number], 10 mL balloon SILICONE,Routine catheter care  · May irrigate with 60 mL Normal strength saline for clog, sediment, hematuria  · May change catheter PRN for clog or dislodged catheter  · Call the office with any urological concerns     · Next routine catheter change due approximately 1/10-24/23

## 2022-12-31 ENCOUNTER — HOME CARE VISIT (OUTPATIENT)
Dept: HOME HEALTH SERVICES | Facility: HOME HEALTHCARE | Age: 87
End: 2022-12-31

## 2022-12-31 VITALS
TEMPERATURE: 97.5 F | HEART RATE: 56 BPM | SYSTOLIC BLOOD PRESSURE: 116 MMHG | RESPIRATION RATE: 20 BRPM | DIASTOLIC BLOOD PRESSURE: 60 MMHG

## 2023-01-01 ENCOUNTER — HOME CARE VISIT (OUTPATIENT)
Dept: HOME HOSPICE | Facility: HOSPICE | Age: 88
End: 2023-01-01
Payer: MEDICARE

## 2023-01-01 ENCOUNTER — APPOINTMENT (OUTPATIENT)
Dept: WOUND CARE | Facility: HOSPITAL | Age: 88
End: 2023-01-01
Payer: COMMERCIAL

## 2023-01-01 ENCOUNTER — HOME CARE VISIT (OUTPATIENT)
Dept: HOME HEALTH SERVICES | Facility: HOME HEALTHCARE | Age: 88
End: 2023-01-01

## 2023-01-01 ENCOUNTER — TRANSITIONAL CARE MANAGEMENT (OUTPATIENT)
Dept: INTERNAL MEDICINE CLINIC | Facility: OTHER | Age: 88
End: 2023-01-01

## 2023-01-01 ENCOUNTER — TELEPHONE (OUTPATIENT)
Dept: INTERNAL MEDICINE CLINIC | Facility: OTHER | Age: 88
End: 2023-01-01

## 2023-01-01 DIAGNOSIS — N32.89 BLADDER SPASMS: Primary | ICD-10-CM

## 2023-01-01 LAB
DME PARACHUTE DELIVERY DATE ACTUAL: NORMAL
DME PARACHUTE DELIVERY DATE ACTUAL: NORMAL
DME PARACHUTE DELIVERY DATE EXPECTED: NORMAL
DME PARACHUTE DELIVERY DATE EXPECTED: NORMAL
DME PARACHUTE DELIVERY DATE REQUESTED: NORMAL
DME PARACHUTE DELIVERY DATE REQUESTED: NORMAL
DME PARACHUTE ITEM DESCRIPTION: NORMAL
DME PARACHUTE ITEM DESCRIPTION: NORMAL
DME PARACHUTE ORDER STATUS: NORMAL
DME PARACHUTE ORDER STATUS: NORMAL
DME PARACHUTE SUPPLIER NAME: NORMAL
DME PARACHUTE SUPPLIER NAME: NORMAL
DME PARACHUTE SUPPLIER PHONE: NORMAL
DME PARACHUTE SUPPLIER PHONE: NORMAL

## 2023-01-01 PROCEDURE — 10330090 VN HOSPICE ROOM AND BOARD

## 2023-01-02 ENCOUNTER — HOME CARE VISIT (OUTPATIENT)
Dept: HOME HEALTH SERVICES | Facility: HOME HEALTHCARE | Age: 88
End: 2023-01-02

## 2023-01-02 VITALS
TEMPERATURE: 97.7 F | DIASTOLIC BLOOD PRESSURE: 64 MMHG | HEART RATE: 55 BPM | SYSTOLIC BLOOD PRESSURE: 138 MMHG | OXYGEN SATURATION: 97 %

## 2023-01-02 VITALS
TEMPERATURE: 97.3 F | OXYGEN SATURATION: 97 % | SYSTOLIC BLOOD PRESSURE: 122 MMHG | DIASTOLIC BLOOD PRESSURE: 70 MMHG | RESPIRATION RATE: 18 BRPM | HEART RATE: 58 BPM

## 2023-01-03 ENCOUNTER — HOME CARE VISIT (OUTPATIENT)
Dept: HOME HEALTH SERVICES | Facility: HOME HEALTHCARE | Age: 88
End: 2023-01-03

## 2023-01-03 ENCOUNTER — OFFICE VISIT (OUTPATIENT)
Dept: UROLOGY | Facility: CLINIC | Age: 88
End: 2023-01-03

## 2023-01-03 VITALS
SYSTOLIC BLOOD PRESSURE: 145 MMHG | HEART RATE: 52 BPM | RESPIRATION RATE: 18 BRPM | OXYGEN SATURATION: 96 % | DIASTOLIC BLOOD PRESSURE: 66 MMHG | TEMPERATURE: 98.6 F

## 2023-01-03 VITALS — DIASTOLIC BLOOD PRESSURE: 52 MMHG | HEART RATE: 57 BPM | SYSTOLIC BLOOD PRESSURE: 131 MMHG | OXYGEN SATURATION: 97 %

## 2023-01-03 VITALS
BODY MASS INDEX: 21.22 KG/M2 | HEIGHT: 68 IN | DIASTOLIC BLOOD PRESSURE: 68 MMHG | SYSTOLIC BLOOD PRESSURE: 128 MMHG | WEIGHT: 140 LBS

## 2023-01-03 DIAGNOSIS — T83.128A: ICD-10-CM

## 2023-01-03 NOTE — CASE COMMUNICATION
VENESSA stevenson completed this date and Plan of Care established for 2week3 to focus on ADL training, ADL transfer training, BUE ther ex with HEP for UB strengthening, patient and caregiver education

## 2023-01-03 NOTE — PROGRESS NOTES
Referring Physician: Aranza President, MD  A copy of this note was sent to the referring physician  Diagnoses and all orders for this visit:    Displacement of artificial urinary sphincter ([address] )  -     Ambulatory Referral to Urology            Assessment and plan:       1  Recent history of artificial urinary sphincter with erosion through the scrotal skin and into the urethra  -Status post complex explantation of eroded artificial urinary sphincter with scrotal exploration and diagnostic cystoscopy performed December 13, 2022    Patient is doing incredibly well  His scrotal incision had healed completely  I removed the nylon sutures today  The nail incision is also well-healed I removed the all amount of packing and elected not to replace it  The patient and his son understand that the suprapubic tube will continued indefinitely and we will plan for exchanges every 4 to 6 weeks  He will follow-up as scheduled first for suprapubic tube exchange  If the exchanges are uneventful we will likely attempt to organize changes with his home health nurse in his ambulatory dysfunction and difficulty getting out of the house for appointments      Lucie Michaels MD      Chief [address] [facility] follow-up      History of Present Illness     Arvind Mi is a 80 y o  returns in follow-up status post explantation of artificial urinary sphincter which had eroded into the urethra and through the scrotal skin  A suprapubic tube was placed following the procedure  Detailed Urologic History     - please refer to HPI    Review of Systems     Review of Systems   Constitutional: Negative for activity change and fatigue  HENT: Negative for congestion  Eyes: Negative for visual disturbance  Respiratory: Negative for shortness of breath and wheezing  Cardiovascular: Negative for chest pain and leg swelling  Gastrointestinal: Negative for abdominal pain  Endocrine: Negative for polyuria  Genitourinary: Negative for dysuria, flank pain, hematuria and urgency  Musculoskeletal: Negative for back pain  Allergic/Immunologic: Negative for immunocompromised state  Neurological: Negative for dizziness and numbness  Psychiatric/Behavioral: Negative for dysphoric mood  All other systems reviewed and are negative  Allergies     Allergies   Allergen Reactions   • Levofloxacin Other (See Comments) and Rash   • Sulfa Antibiotics Rash       Physical Exam       Physical Exam  Constitutional:       General: He is not in acute distress  Appearance: He is well-developed  HENT:      Head: Normocephalic and atraumatic  Cardiovascular:      Comments: Negative lower extremity edema  Pulmonary:      Effort: Pulmonary effort is normal       Breath sounds: Normal breath sounds  Abdominal:      Palpations: Abdomen is soft  Musculoskeletal:         General: Normal range of motion  Cervical back: Normal range of motion  Skin:     General: Skin is warm  Neurological:      Mental Status: He is alert and oriented to person, place, and time  Psychiatric:         Behavior: Behavior normal      Scrotal incision is completely well approximated with no surrounding erythema or drainage  I removed all of the nylon sutures  I removed the packing and elected not to replace it      Vital Signs  Vitals:    01/03/23 1424   BP: 128/68   BP Location: Left arm   Patient Position: Sitting   Cuff Size: Standard   Weight: 63 5 kg (140 lb)   Height: 5' 8" (1 727 m)         Current Medications       Current Outpatient Medications:   •  acetaminophen (TYLENOL) 325 mg tablet, Take 2 tablets (650 mg total) by mouth every 6 (six) hours as needed for mild pain, Disp: , Rfl: 0  •  acetaminophen-codeine (TYLENOL #3) 300-30 mg per tablet, Take 1 tablet by mouth every 6 (six) hours as needed for moderate pain   Indications: Mild to Moderate Pain, Disp: , Rfl:   •  aluminum-magnesium hydroxide 200-200 MG/5ML suspension, Take 15 mL by mouth every 6 (six) hours as needed for heartburn   , Disp: , Rfl:   •  amiodarone 200 mg tablet, Take 200 mg by mouth daily  , Disp: , Rfl:   •  apixaban (ELIQUIS) 2 5 mg, Take 1 tablet by mouth every 12 (twelve) hours, Disp: , Rfl:   •  dicyclomine (BENTYL) 20 mg tablet, Take 1 tablet (20 mg total) by mouth 2 (two) times a day as needed (cramping), Disp: , Rfl: 0  •  famotidine (PEPCID) 20 mg tablet, Take 1 tablet by mouth 2 (two) times a day, Disp: , Rfl:   •  FLUoxetine (PROzac) 40 MG capsule, Take 40 mg by mouth daily  , Disp: , Rfl:   •  furosemide (LASIX) 20 mg tablet, Take 20 mg by mouth see administration instructions Every Monday, Wednesday, and Friday, Disp: , Rfl:   •  glucose monitoring kit (FREESTYLE) monitoring kit, Use 1 each as needed, Disp: , Rfl:   •  isosorbide mononitrate (IMDUR) 60 mg 24 hr tablet, Take 60 mg by mouth daily, Disp: , Rfl:   •  levothyroxine 88 mcg tablet, Take 88 mcg by mouth, Disp: , Rfl:   •  pantoprazole (PROTONIX) 40 mg tablet, Take 20 mg by mouth 2 (two) times a day   , Disp: , Rfl:   •  pravastatin (PRAVACHOL) 20 mg tablet, Take 20 mg by mouth daily  , Disp: , Rfl:   •  pyridostigmine (MESTINON) 60 mg tablet, Take 60 mg by mouth 4 (four) times a day , Disp: , Rfl:   •  saxagliptin (ONGLYZA) 2 5 MG tablet, 1 tablet daily, Disp: , Rfl:   •  Vitamin D, Cholecalciferol, 1000 UNITS CAPS, Take 2,000 Units by mouth daily in the early morning   , Disp: , Rfl:       Active Problems     Patient Active Problem List   Diagnosis   • Chronic a-fib ([facility] )   • Myasthenia gravis ([facility] )   • CAD (coronary artery disease)   • Hypomagnesemia   • Displacement of artificial urinary sphincter ([facility])   • Depression   • Diastolic dysfunction   • Asymptomatic Bacteriuria with hematuria    • Type 2 diabetes mellitus, without long-term current use of insulin (HCC)   • CKD (chronic kidney disease)   • Moderate protein-calorie malnutrition ([facility] )   • Ambulatory dysfunction Past Medical History     Past Medical History:   Diagnosis Date   • Anxiety    • Arthritis    • Coronary artery disease    • Hiatal hernia    • Hypertension    • Irregular heart beat    • Myasthenia gravis [facility])          Surgical History     Past Surgical History:   Procedure Laterality Date   • CYSTECTOMY, RADICAL WITH ILEOCONDUIT N/A 12/13/2022    Procedure: REMOVAL ARTIFICIAL URINARY SPINCTER BILATERAL;  Surgeon: Kari Savage MD;  [facility];  Service: Urology   • CYSTOSCOPY N/A 12/13/2022    Procedure: Disha Hi;  Surgeon: Kari Savage MD;  [facility];  Service: Urology   • EYE SURGERY  2014    cataracts   • HERNIA REPAIR  1971    right inguinal hernia repair   • IR SUPRAPUBIC CATHETER PLACEMENT  12/13/2022   • AL LAPAROSCOPY COLECTOMY PARTIAL W/ANASTOMOSIS N/A 6/3/2016    Procedure: LAPAROSCOPIC SIGMOID RESECTION ;  Surgeon: Autumn Monroy MD;  [facility];  Service: Colorectal   • AL LAPS COLECTOMY PRTL W/COLOPXTSTMY LW ANAST N/A 6/3/2016    Procedure: COLECTOMY LAPAROSCOPIC ASSISTED;  Surgeon: Autumn Monroy MD;  [facility];  Service: Colorectal   • AL SIGMOIDOSCOPY FLX DX W/COLLJ SPEC BR/WA IF PFRMD N/A 6/3/2016    Procedure: Hari Mendez;  Surgeon: Autumn Monroy MD;  [facility];  Service: Colorectal   • PROSTATECTOMY     • REPLACEMENT TOTAL KNEE Right    • URINARY SPHINCTER IMPLANT  2001    s/p prostatectomy- pt had urinary leakage issues         Family History     Family History   Problem Relation Age of Onset   • Diabetes Brother          Social History     Social History     Social History     Tobacco Use   Smoking Status Former   Smokeless Tobacco Not on file   Tobacco Comments    quit smoking when 23 y/o         Pertinent Lab Values     Lab Results   Component Value Date    CREATININE 1 37 (H) 12/28/2022       No results found for: PSA    @RESULTRCNT(1H])@      Pertinent Imaging      - n/a    Portions of the record may have been created with voice recognition software  Occasional wrong word or "sound a like" substitutions may have occurred due to the inherent limitations of voice recognition software  Read the chart carefully and recognize, using context, where substitutions have occurred

## 2023-01-04 ENCOUNTER — HOME CARE VISIT (OUTPATIENT)
Dept: HOME HEALTH SERVICES | Facility: HOME HEALTHCARE | Age: 88
End: 2023-01-04

## 2023-01-05 ENCOUNTER — HOME CARE VISIT (OUTPATIENT)
Dept: HOME HEALTH SERVICES | Facility: HOME HEALTHCARE | Age: 88
End: 2023-01-05

## 2023-01-05 VITALS
RESPIRATION RATE: 16 BRPM | OXYGEN SATURATION: 96 % | DIASTOLIC BLOOD PRESSURE: 60 MMHG | HEART RATE: 60 BPM | SYSTOLIC BLOOD PRESSURE: 136 MMHG

## 2023-01-05 NOTE — CASE COMMUNICATION
[employer] Text received from [facility] this date advising that patient son would like BSC ordered as recmmended  Order placed via [place] for PCP [place]

## 2023-01-06 ENCOUNTER — TELEPHONE (OUTPATIENT)
Dept: UROLOGY | Facility: MEDICAL CENTER | Age: 88
End: 2023-01-06

## 2023-01-06 ENCOUNTER — HOME CARE VISIT (OUTPATIENT)
Dept: HOME HEALTH SERVICES | Facility: HOME HEALTHCARE | Age: 88
End: 2023-01-06

## 2023-01-06 VITALS
HEART RATE: 62 BPM | TEMPERATURE: 98.9 F | OXYGEN SATURATION: 98 % | SYSTOLIC BLOOD PRESSURE: 140 MMHG | DIASTOLIC BLOOD PRESSURE: 64 MMHG | RESPIRATION RATE: 20 BRPM

## 2023-01-06 NOTE — TELEPHONE ENCOUNTER
Per office note from YARELI's last visit     "Patient is doing incredibly well  His scrotal incision had healed completely  I removed the nylon sutures today  The nail incision is also well-healed I removed the all amount of packing and elected not to replace it "     Yared Gonzalez from the [facility] of this  Advised she can place gauze of top to help keep it clean until it heals, but no packing is necessary  She is in agreement   No further questions

## 2023-01-06 NOTE — TELEPHONE ENCOUNTER
Patient of Dr Jaky Mota at [facility] from [facility] called stating she is at the patient's home and  patient has a wound on his scrotum which it needing packing  It was removed during the visit with Dr Jaky Mota on 01/03 and she wants to know if it should remain un covered       Alirio Mxa can be reached [phone]

## 2023-01-06 NOTE — HOME HEALTH
Axel Irizarry, RN from Urology returned call to RN regarding scrotal wound dressing changes  No more packing  Wound can remain SANDEE  If drainage, can put gauze in underwear if desired  Per Urology RN, patient only needs to cover wound with gauze if gross incontinence of stool  Patient is not usually incontinent of stool and will leave SANDEE

## 2023-01-10 ENCOUNTER — HOME CARE VISIT (OUTPATIENT)
Dept: HOME HEALTH SERVICES | Facility: HOME HEALTHCARE | Age: 88
End: 2023-01-10

## 2023-01-10 VITALS
RESPIRATION RATE: 16 BRPM | SYSTOLIC BLOOD PRESSURE: 150 MMHG | OXYGEN SATURATION: 95 % | DIASTOLIC BLOOD PRESSURE: 60 MMHG | HEART RATE: 56 BPM | TEMPERATURE: 97.2 F

## 2023-01-11 ENCOUNTER — HOME CARE VISIT (OUTPATIENT)
Dept: HOME HEALTH SERVICES | Facility: HOME HEALTHCARE | Age: 88
End: 2023-01-11

## 2023-01-12 ENCOUNTER — HOME CARE VISIT (OUTPATIENT)
Dept: HOME HEALTH SERVICES | Facility: HOME HEALTHCARE | Age: 88
End: 2023-01-12

## 2023-01-12 VITALS — SYSTOLIC BLOOD PRESSURE: 147 MMHG | HEART RATE: 54 BPM | DIASTOLIC BLOOD PRESSURE: 68 MMHG

## 2023-01-13 ENCOUNTER — TELEPHONE (OUTPATIENT)
Dept: OTHER | Facility: OTHER | Age: 88
End: 2023-01-13

## 2023-01-13 ENCOUNTER — HOME CARE VISIT (OUTPATIENT)
Dept: HOME HEALTH SERVICES | Facility: HOME HEALTHCARE | Age: 88
End: 2023-01-13

## 2023-01-13 VITALS
DIASTOLIC BLOOD PRESSURE: 78 MMHG | TEMPERATURE: 97.7 F | HEART RATE: 67 BPM | RESPIRATION RATE: 18 BRPM | SYSTOLIC BLOOD PRESSURE: 156 MMHG | OXYGEN SATURATION: 95 %

## 2023-01-13 NOTE — TELEPHONE ENCOUNTER
STL/[facility] nurse at the home now with patient  She would like to speak to a nurse regarding patient's supra pubic catheter site  The area is redden with some maceration of the site, small amount of yellowish drainage  She would like to know what she can put on it?

## 2023-01-13 NOTE — TELEPHONE ENCOUNTER
Returned call to [facility], patient s/p removal of artifical sphincter b/l on 12/13/23 with SPT placement in IR  Reports a small amount of drainage on dressing  Small area of reddened graduated tissue around site  Scrotal incison looks good with mininal draining  no fevers , chills or pain  BP was up a little elevated due to issue with hernia / gi upset gas with belching wi   Spt is draining well, urine a little darker than normal small amount of sediment " looks better today than it has been"   He need to drink a little more  Follow up appt is scheduled for 1st SPT change on 1/25/23  Advised VNA to have patient cleans area with mild soap and water keep area clean and dry , can apply thin layer of triple antibiotic ointment to area  Also advised patient to make sure clothing is not rubbing on spt site  VNA will send a my chart picture of spt site  Will send message to provider for any additional recommendations

## 2023-01-14 VITALS — DIASTOLIC BLOOD PRESSURE: 70 MMHG | SYSTOLIC BLOOD PRESSURE: 139 MMHG | HEART RATE: 57 BPM

## 2023-01-15 ENCOUNTER — HOME CARE VISIT (OUTPATIENT)
Dept: HOME HEALTH SERVICES | Facility: HOME HEALTHCARE | Age: 88
End: 2023-01-15

## 2023-01-15 NOTE — CASE COMMUNICATION
Patients son Harika Aguirre called into [facility] regarding SP catheter  States it looks like patient may be having decreased output of urine from SP catheter today but patient not drinking very much  Patients urine WNL and patient declines urge to urinate  Son reports that about an hour ago he emptied about 300ml of urine from bag  I encouraged patient to drink fluids and move around as tolerated to facilitate urine going into bag  Instructed s on to call back into [facility] if he notices after drinking fluids that urine not going into bag  He verbalized understanding

## 2023-01-16 ENCOUNTER — HOME CARE VISIT (OUTPATIENT)
Dept: HOME HEALTH SERVICES | Facility: HOME HEALTHCARE | Age: 88
End: 2023-01-16

## 2023-01-17 ENCOUNTER — HOME CARE VISIT (OUTPATIENT)
Dept: HOME HEALTH SERVICES | Facility: HOME HEALTHCARE | Age: 88
End: 2023-01-17

## 2023-01-17 VITALS — HEART RATE: 62 BPM | OXYGEN SATURATION: 97 % | SYSTOLIC BLOOD PRESSURE: 142 MMHG | DIASTOLIC BLOOD PRESSURE: 88 MMHG

## 2023-01-18 ENCOUNTER — HOME CARE VISIT (OUTPATIENT)
Dept: HOME HEALTH SERVICES | Facility: HOME HEALTHCARE | Age: 88
End: 2023-01-18

## 2023-01-18 VITALS
SYSTOLIC BLOOD PRESSURE: 158 MMHG | OXYGEN SATURATION: 96 % | RESPIRATION RATE: 20 BRPM | DIASTOLIC BLOOD PRESSURE: 78 MMHG | HEART RATE: 60 BPM | TEMPERATURE: 98.6 F

## 2023-01-19 ENCOUNTER — HOME CARE VISIT (OUTPATIENT)
Dept: HOME HEALTH SERVICES | Facility: HOME HEALTHCARE | Age: 88
End: 2023-01-19

## 2023-01-20 VITALS — HEART RATE: 59 BPM | SYSTOLIC BLOOD PRESSURE: 158 MMHG | DIASTOLIC BLOOD PRESSURE: 76 MMHG

## 2023-01-20 NOTE — CASE COMMUNICATION
OT discharged patient 1/18/23 due to goals achieved to highest level of safe function  DME yet to be delivered to increase safety and independence with toilet transfer  Son acknowledges instruction for assembly and setup including appropriate height of BSC to be placed over toilet once delivered  He verbalized understanding  Son has not yet decided to purchase tub transfer bench but acknowledges understanding of benefits and that he  knows how to order if decided to purchase  He will assist patient with showering routine once he resumes  Call placed to PCP [address] [place] [address] office to advise of OT discharge  Spoke with [place] at [phone]

## 2023-01-24 ENCOUNTER — HOME CARE VISIT (OUTPATIENT)
Dept: HOME HEALTH SERVICES | Facility: HOME HEALTHCARE | Age: 88
End: 2023-01-24

## 2023-01-25 ENCOUNTER — APPOINTMENT (INPATIENT)
Dept: CT IMAGING | Facility: HOSPITAL | Age: 88
End: 2023-01-25

## 2023-01-25 ENCOUNTER — HOSPITAL ENCOUNTER (INPATIENT)
Facility: HOSPITAL | Age: 88
LOS: 3 days | Discharge: HOME HEALTH | End: 2023-01-28
Attending: EMERGENCY MEDICINE | Admitting: INTERNAL MEDICINE

## 2023-01-25 ENCOUNTER — HOME CARE VISIT (OUTPATIENT)
Dept: HOME HEALTH SERVICES | Facility: HOME HEALTHCARE | Age: 88
End: 2023-01-25

## 2023-01-25 ENCOUNTER — TELEPHONE (OUTPATIENT)
Dept: OTHER | Facility: HOSPITAL | Age: 88
End: 2023-01-25

## 2023-01-25 DIAGNOSIS — T83.010A SUPRAPUBIC CATHETER DYSFUNCTION, INITIAL ENCOUNTER (HCC): ICD-10-CM

## 2023-01-25 DIAGNOSIS — I10 HYPERTENSION: ICD-10-CM

## 2023-01-25 DIAGNOSIS — N39.0 UTI (URINARY TRACT INFECTION): Primary | ICD-10-CM

## 2023-01-25 PROBLEM — R41.82 AMS (ALTERED MENTAL STATUS): Status: ACTIVE | Noted: 2023-01-25

## 2023-01-25 PROBLEM — R53.83 LETHARGY: Status: ACTIVE | Noted: 2023-01-25

## 2023-01-25 PROBLEM — T83.9XXA PROBLEM WITH URINARY CATHETER (HCC): Status: ACTIVE | Noted: 2023-01-25

## 2023-01-25 LAB
2HR DELTA HS TROPONIN: -2 NG/L
ALBUMIN SERPL BCP-MCNC: 3.4 G/DL (ref 3.5–5)
ALP SERPL-CCNC: 73 U/L (ref 34–104)
ALT SERPL W P-5'-P-CCNC: 21 U/L (ref 7–52)
AMMONIA PLAS-SCNC: 18 UMOL/L (ref 18–72)
ANION GAP SERPL CALCULATED.3IONS-SCNC: 7 MMOL/L (ref 4–13)
APTT PPP: 44 SECONDS (ref 23–37)
AST SERPL W P-5'-P-CCNC: 30 U/L (ref 13–39)
ATRIAL RATE: 111 BPM
BACTERIA UR QL AUTO: ABNORMAL /HPF
BACTERIA UR QL AUTO: ABNORMAL /HPF
BASOPHILS # BLD AUTO: 0.1 THOUSANDS/ÂΜL (ref 0–0.1)
BASOPHILS NFR BLD AUTO: 1 % (ref 0–1)
BILIRUB SERPL-MCNC: 0.91 MG/DL (ref 0.2–1)
BILIRUB UR QL STRIP: NEGATIVE
BILIRUB UR QL STRIP: NEGATIVE
BUN SERPL-MCNC: 16 MG/DL (ref 5–25)
CALCIUM ALBUM COR SERPL-MCNC: 9.2 MG/DL (ref 8.3–10.1)
CALCIUM SERPL-MCNC: 8.7 MG/DL (ref 8.4–10.2)
CARDIAC TROPONIN I PNL SERPL HS: 12 NG/L
CARDIAC TROPONIN I PNL SERPL HS: 14 NG/L
CHLORIDE SERPL-SCNC: 98 MMOL/L (ref 96–108)
CLARITY UR: ABNORMAL
CLARITY UR: CLEAR
CO2 SERPL-SCNC: 31 MMOL/L (ref 21–32)
COLOR UR: ABNORMAL
COLOR UR: COLORLESS
CREAT SERPL-MCNC: 1.12 MG/DL (ref 0.6–1.3)
EOSINOPHIL # BLD AUTO: 0.05 THOUSAND/ÂΜL (ref 0–0.61)
EOSINOPHIL NFR BLD AUTO: 1 % (ref 0–6)
ERYTHROCYTE [DISTWIDTH] IN BLOOD BY AUTOMATED COUNT: 13.5 % (ref 11.6–15.1)
FOLATE SERPL-MCNC: 17 NG/ML (ref 3.1–17.5)
GFR SERPL CREATININE-BSD FRML MDRD: 55 ML/MIN/1.73SQ M
GLUCOSE SERPL-MCNC: 167 MG/DL (ref 65–140)
GLUCOSE SERPL-MCNC: 172 MG/DL (ref 65–140)
GLUCOSE SERPL-MCNC: 190 MG/DL (ref 65–140)
GLUCOSE SERPL-MCNC: 193 MG/DL (ref 65–140)
GLUCOSE SERPL-MCNC: 222 MG/DL (ref 65–140)
GLUCOSE UR STRIP-MCNC: NEGATIVE MG/DL
GLUCOSE UR STRIP-MCNC: NEGATIVE MG/DL
HCT VFR BLD AUTO: 37.1 % (ref 36.5–49.3)
HGB BLD-MCNC: 11.7 G/DL (ref 12–17)
HGB UR QL STRIP.AUTO: ABNORMAL
HGB UR QL STRIP.AUTO: ABNORMAL
IMM GRANULOCYTES # BLD AUTO: 0.04 THOUSAND/UL (ref 0–0.2)
IMM GRANULOCYTES NFR BLD AUTO: 0 % (ref 0–2)
INR PPP: 1.27 (ref 0.84–1.19)
KETONES UR STRIP-MCNC: NEGATIVE MG/DL
KETONES UR STRIP-MCNC: NEGATIVE MG/DL
LACTATE SERPL-SCNC: 1 MMOL/L (ref 0.5–2)
LEUKOCYTE ESTERASE UR QL STRIP: ABNORMAL
LEUKOCYTE ESTERASE UR QL STRIP: ABNORMAL
LYMPHOCYTES # BLD AUTO: 1.5 THOUSANDS/ÂΜL (ref 0.6–4.47)
LYMPHOCYTES NFR BLD AUTO: 15 % (ref 14–44)
MCH RBC QN AUTO: 27.7 PG (ref 26.8–34.3)
MCHC RBC AUTO-ENTMCNC: 31.5 G/DL (ref 31.4–37.4)
MCV RBC AUTO: 88 FL (ref 82–98)
MONOCYTES # BLD AUTO: 0.83 THOUSAND/ÂΜL (ref 0.17–1.22)
MONOCYTES NFR BLD AUTO: 8 % (ref 4–12)
MUCOUS THREADS UR QL AUTO: ABNORMAL
NEUTROPHILS # BLD AUTO: 7.54 THOUSANDS/ÂΜL (ref 1.85–7.62)
NEUTS SEG NFR BLD AUTO: 75 % (ref 43–75)
NITRITE UR QL STRIP: NEGATIVE
NITRITE UR QL STRIP: NEGATIVE
NON-SQ EPI CELLS URNS QL MICRO: ABNORMAL /HPF
NON-SQ EPI CELLS URNS QL MICRO: ABNORMAL /HPF
NRBC BLD AUTO-RTO: 0 /100 WBCS
PH UR STRIP.AUTO: 7 [PH]
PH UR STRIP.AUTO: 7.5 [PH]
PLATELET # BLD AUTO: 227 THOUSANDS/UL (ref 149–390)
PMV BLD AUTO: 9.6 FL (ref 8.9–12.7)
POTASSIUM SERPL-SCNC: 3.4 MMOL/L (ref 3.5–5.3)
PROCALCITONIN SERPL-MCNC: 0.08 NG/ML
PROT SERPL-MCNC: 6.5 G/DL (ref 6.4–8.4)
PROT UR STRIP-MCNC: ABNORMAL MG/DL
PROT UR STRIP-MCNC: ABNORMAL MG/DL
PROTHROMBIN TIME: 16.1 SECONDS (ref 11.6–14.5)
QRS AXIS: 96 DEGREES
QRSD INTERVAL: 96 MS
QT INTERVAL: 416 MS
QTC INTERVAL: 408 MS
RBC # BLD AUTO: 4.22 MILLION/UL (ref 3.88–5.62)
RBC #/AREA URNS AUTO: ABNORMAL /HPF
RBC #/AREA URNS AUTO: ABNORMAL /HPF
SODIUM SERPL-SCNC: 136 MMOL/L (ref 135–147)
SP GR UR STRIP.AUTO: 1 (ref 1–1.03)
SP GR UR STRIP.AUTO: 1.01 (ref 1–1.03)
T WAVE AXIS: -17 DEGREES
UROBILINOGEN UR STRIP-ACNC: <2 MG/DL
UROBILINOGEN UR STRIP-ACNC: <2 MG/DL
VENTRICULAR RATE: 58 BPM
VIT B12 SERPL-MCNC: 607 PG/ML (ref 100–900)
WBC # BLD AUTO: 10.06 THOUSAND/UL (ref 4.31–10.16)
WBC #/AREA URNS AUTO: ABNORMAL /HPF
WBC #/AREA URNS AUTO: ABNORMAL /HPF
WBC CLUMPS # UR AUTO: PRESENT /UL

## 2023-01-25 PROCEDURE — 0T2BX0Z CHANGE DRAINAGE DEVICE IN BLADDER, EXTERNAL APPROACH: ICD-10-PCS | Performed by: INTERNAL MEDICINE

## 2023-01-25 RX ORDER — ISOSORBIDE MONONITRATE 60 MG/1
60 TABLET, EXTENDED RELEASE ORAL DAILY
Status: DISCONTINUED | OUTPATIENT
Start: 2023-01-25 | End: 2023-01-28 | Stop reason: HOSPADM

## 2023-01-25 RX ORDER — ACETAMINOPHEN 325 MG/1
650 TABLET ORAL EVERY 6 HOURS PRN
Status: DISCONTINUED | OUTPATIENT
Start: 2023-01-25 | End: 2023-01-25 | Stop reason: SDUPTHER

## 2023-01-25 RX ORDER — PYRIDOSTIGMINE BROMIDE 60 MG/1
60 TABLET ORAL 3 TIMES DAILY
Status: DISCONTINUED | OUTPATIENT
Start: 2023-01-25 | End: 2023-01-28 | Stop reason: HOSPADM

## 2023-01-25 RX ORDER — PANTOPRAZOLE SODIUM 20 MG/1
20 TABLET, DELAYED RELEASE ORAL
Status: DISCONTINUED | OUTPATIENT
Start: 2023-01-25 | End: 2023-01-25

## 2023-01-25 RX ORDER — AMLODIPINE BESYLATE 5 MG/1
5 TABLET ORAL DAILY
Status: DISCONTINUED | OUTPATIENT
Start: 2023-01-25 | End: 2023-01-26

## 2023-01-25 RX ORDER — ACETAMINOPHEN 325 MG/1
650 TABLET ORAL EVERY 6 HOURS PRN
Status: DISCONTINUED | OUTPATIENT
Start: 2023-01-25 | End: 2023-01-28 | Stop reason: HOSPADM

## 2023-01-25 RX ORDER — PRAVASTATIN SODIUM 20 MG
20 TABLET ORAL DAILY
Status: DISCONTINUED | OUTPATIENT
Start: 2023-01-25 | End: 2023-01-28 | Stop reason: HOSPADM

## 2023-01-25 RX ORDER — FAMOTIDINE 20 MG/1
20 TABLET, FILM COATED ORAL 2 TIMES DAILY
Status: DISCONTINUED | OUTPATIENT
Start: 2023-01-25 | End: 2023-01-25

## 2023-01-25 RX ORDER — FLUOXETINE HYDROCHLORIDE 20 MG/1
40 CAPSULE ORAL DAILY
Status: DISCONTINUED | OUTPATIENT
Start: 2023-01-25 | End: 2023-01-28 | Stop reason: HOSPADM

## 2023-01-25 RX ORDER — LEVOTHYROXINE SODIUM 88 UG/1
88 TABLET ORAL
Status: DISCONTINUED | OUTPATIENT
Start: 2023-01-25 | End: 2023-01-28 | Stop reason: HOSPADM

## 2023-01-25 RX ORDER — INSULIN LISPRO 100 [IU]/ML
1-5 INJECTION, SOLUTION INTRAVENOUS; SUBCUTANEOUS
Status: DISCONTINUED | OUTPATIENT
Start: 2023-01-25 | End: 2023-01-28 | Stop reason: HOSPADM

## 2023-01-25 RX ORDER — HYDRALAZINE HYDROCHLORIDE 20 MG/ML
10 INJECTION INTRAMUSCULAR; INTRAVENOUS EVERY 6 HOURS PRN
Status: DISCONTINUED | OUTPATIENT
Start: 2023-01-25 | End: 2023-01-28 | Stop reason: HOSPADM

## 2023-01-25 RX ORDER — HYDRALAZINE HYDROCHLORIDE 20 MG/ML
5 INJECTION INTRAMUSCULAR; INTRAVENOUS ONCE
Status: COMPLETED | OUTPATIENT
Start: 2023-01-25 | End: 2023-01-25

## 2023-01-25 RX ORDER — FAMOTIDINE 20 MG/1
20 TABLET, FILM COATED ORAL DAILY
Status: DISCONTINUED | OUTPATIENT
Start: 2023-01-25 | End: 2023-01-25

## 2023-01-25 RX ORDER — POTASSIUM CHLORIDE 20 MEQ/1
20 TABLET, EXTENDED RELEASE ORAL ONCE
Status: COMPLETED | OUTPATIENT
Start: 2023-01-25 | End: 2023-01-25

## 2023-01-25 RX ORDER — PANTOPRAZOLE SODIUM 20 MG/1
20 TABLET, DELAYED RELEASE ORAL
Status: DISCONTINUED | OUTPATIENT
Start: 2023-01-25 | End: 2023-01-28 | Stop reason: HOSPADM

## 2023-01-25 RX ORDER — PYRIDOSTIGMINE BROMIDE 60 MG/1
60 TABLET ORAL 4 TIMES DAILY
Status: DISCONTINUED | OUTPATIENT
Start: 2023-01-25 | End: 2023-01-25

## 2023-01-25 RX ORDER — FAMOTIDINE 20 MG/1
20 TABLET, FILM COATED ORAL DAILY
Status: DISCONTINUED | OUTPATIENT
Start: 2023-01-26 | End: 2023-01-28 | Stop reason: HOSPADM

## 2023-01-25 RX ORDER — AMIODARONE HYDROCHLORIDE 200 MG/1
200 TABLET ORAL DAILY
Status: DISCONTINUED | OUTPATIENT
Start: 2023-01-25 | End: 2023-01-28 | Stop reason: HOSPADM

## 2023-01-25 RX ORDER — SUCRALFATE 1 G/1
1 TABLET ORAL EVERY 6 HOURS SCHEDULED
Status: DISCONTINUED | OUTPATIENT
Start: 2023-01-25 | End: 2023-01-28 | Stop reason: HOSPADM

## 2023-01-25 RX ORDER — AMLODIPINE BESYLATE 5 MG/1
5 TABLET ORAL DAILY
Status: DISCONTINUED | OUTPATIENT
Start: 2023-01-25 | End: 2023-01-25

## 2023-01-25 RX ADMIN — INSULIN LISPRO 1 UNITS: 100 INJECTION, SOLUTION INTRAVENOUS; SUBCUTANEOUS at 22:31

## 2023-01-25 RX ADMIN — INSULIN LISPRO 1 UNITS: 100 INJECTION, SOLUTION INTRAVENOUS; SUBCUTANEOUS at 18:55

## 2023-01-25 RX ADMIN — SODIUM CHLORIDE 500 ML: 0.9 INJECTION, SOLUTION INTRAVENOUS at 01:39

## 2023-01-25 RX ADMIN — HYDRALAZINE HYDROCHLORIDE 10 MG: 20 INJECTION, SOLUTION INTRAMUSCULAR; INTRAVENOUS at 17:06

## 2023-01-25 RX ADMIN — INSULIN LISPRO 1 UNITS: 100 INJECTION, SOLUTION INTRAVENOUS; SUBCUTANEOUS at 08:10

## 2023-01-25 RX ADMIN — HYDRALAZINE HYDROCHLORIDE 10 MG: 20 INJECTION, SOLUTION INTRAMUSCULAR; INTRAVENOUS at 08:55

## 2023-01-25 RX ADMIN — POTASSIUM CHLORIDE 20 MEQ: 1500 TABLET, EXTENDED RELEASE ORAL at 08:10

## 2023-01-25 RX ADMIN — HYDRALAZINE HYDROCHLORIDE 5 MG: 20 INJECTION INTRAMUSCULAR; INTRAVENOUS at 04:23

## 2023-01-25 RX ADMIN — SUCRALFATE 1 G: 1 TABLET ORAL at 18:17

## 2023-01-25 RX ADMIN — CEFTRIAXONE SODIUM 1000 MG: 10 INJECTION, POWDER, FOR SOLUTION INTRAVENOUS at 03:58

## 2023-01-25 RX ADMIN — INSULIN LISPRO 1 UNITS: 100 INJECTION, SOLUTION INTRAVENOUS; SUBCUTANEOUS at 11:50

## 2023-01-25 RX ADMIN — PANTOPRAZOLE SODIUM 20 MG: 20 TABLET, DELAYED RELEASE ORAL at 20:15

## 2023-01-25 RX ADMIN — PYRIDOSTIGMINE BROMIDE 60 MG: 60 TABLET ORAL at 16:46

## 2023-01-25 RX ADMIN — AMLODIPINE BESYLATE 5 MG: 5 TABLET ORAL at 14:21

## 2023-01-25 RX ADMIN — APIXABAN 2.5 MG: 2.5 TABLET, FILM COATED ORAL at 18:17

## 2023-01-25 RX ADMIN — PYRIDOSTIGMINE BROMIDE 60 MG: 60 TABLET ORAL at 20:15

## 2023-01-25 NOTE — PROGRESS NOTES
Pharmacy-Driven Renal Dosing Protocol    Per [employer] approved Pharmacy-Driven Renal Dosing Protocol, famotidine 20 mg PO BID has been reduced to daily for a CrCl < 50 mL/min      Loida Cooley, PharmD  Pharmacist

## 2023-01-25 NOTE — ED PROVIDER NOTES
History  Chief Complaint   Patient presents with   • Urinary Catheter Problem     Pt had suprapubic catheter placed x1 month ago  Pt son states pt had some leakage around catheter today  Pt has appt with urology tomorrow  • Lethargy     Pt son reports pt seems more lethargic than normal  Pt son concerned for infection     Patient is a 49-year-old male with PMH of HTN, CAD, myasthenia gravis, and A  fib on Eliquis brought in by his son for 3 days of fatigue, generalized weakness, and 1 day of suprapubic catheter tube dysfunction  Patient son, primary caregiver, notes that he has been more fatigued and weak at home for unknown reason  He notes today at approximately dinnertime (1 hour PTA) the patient was noted to have soaked cloathes and depends with urine  The Hoyt catheter was not draining per son  His son notes that he cleaned him up and brings him in for further evaluation  His son expresses concern for infection given there is some redness around the suprapubic catheter site and the patient is complaining of pain at that site  His son denies fevers at home  Patient currently endorses mild pain to his catheter site, however denies all other complaints  History provided by:  Patient and caregiver  History limited by: [facility]     used: No    Urinary Catheter Problem  Location:  Suprapubic catheter  Severity:  Mild  Onset quality:  Unable to specify  Duration:  1 day  Timing:  Constant  Progression:  Unchanged  Context:  No urine draining from catheter  Relieved by:  Nothing  Worsened by:  Palpation, manipulation of tube  Associated symptoms: abdominal pain (Around catheter site) and fatigue    Associated symptoms: no chest pain, no congestion, no cough, no diarrhea, no fever, no headaches, no loss of consciousness, no nausea, no rash, no shortness of breath, no sore throat and no vomiting    Fatigue:     Severity:  Moderate    Duration:  3 days    Timing:  Constant    Progression: Worsening      Prior to Admission Medications   Prescriptions Last Dose Informant Patient Reported? Taking? FLUoxetine (PROzac) 40 MG capsule   Yes No   Sig: Take 40 mg by mouth daily  Vitamin D, Cholecalciferol, 1000 UNITS CAPS   Yes No   Sig: Take 2,000 Units by mouth daily in the early morning  acetaminophen (TYLENOL) 325 mg tablet   No No   Sig: Take 2 tablets (650 mg total) by mouth every 6 (six) hours as needed for mild pain   acetaminophen-codeine (TYLENOL #3) 300-30 mg per tablet   Yes No   Sig: Take 1 tablet by mouth every 6 (six) hours as needed for moderate pain  Indications: Mild to Moderate Pain   aluminum-magnesium hydroxide 200-200 MG/5ML suspension   Yes No   Sig: Take 15 mL by mouth every 6 (six) hours as needed for heartburn  amiodarone 200 mg tablet   Yes No   Sig: Take 200 mg by mouth daily  apixaban (ELIQUIS) 2 5 mg   Yes No   Sig: Take 1 tablet by mouth every 12 (twelve) hours   dicyclomine (BENTYL) 20 mg tablet   No No   Sig: Take 1 tablet (20 mg total) by mouth 2 (two) times a day as needed (cramping)   famotidine (PEPCID) 20 mg tablet   Yes No   Sig: Take 1 tablet by mouth 2 (two) times a day   furosemide (LASIX) 20 mg tablet   Yes No   Sig: Take 20 mg by mouth see administration instructions Every Monday, Wednesday, and Friday   glucose monitoring kit (FREESTYLE) monitoring kit   Yes No   Sig: Use 1 each as needed   isosorbide mononitrate (IMDUR) 60 mg 24 hr tablet   Yes No   Sig: Take 60 mg by mouth daily   levothyroxine 88 mcg tablet   Yes No   Sig: Take 88 mcg by mouth   pantoprazole (PROTONIX) 40 mg tablet   Yes No   Sig: Take 20 mg by mouth 2 (two) times a day  pravastatin (PRAVACHOL) 20 mg tablet   Yes No   Sig: Take 20 mg by mouth daily  pyridostigmine (MESTINON) 60 mg tablet   Yes No   Sig: Take 60 mg by mouth 4 (four) times a day     saxagliptin (ONGLYZA) 2 5 MG tablet   Yes No   Si tablet daily      Facility-Administered Medications: None       Past Medical History:   Diagnosis Date   • Anxiety    • Arthritis    • Coronary artery disease    • Hiatal hernia    • Hypertension    • Irregular heart beat    • Myasthenia gravis [facility])        Past Surgical History:   Procedure Laterality Date   • CYSTECTOMY, RADICAL WITH ILEOCONDUIT N/A 12/13/2022    Procedure: REMOVAL ARTIFICIAL URINARY SPINCTER BILATERAL;  Surgeon: Abiola Conway MD;  [facility];  Service: Urology   • CYSTOSCOPY N/A 12/13/2022    Procedure: Mikaela Roth;  Surgeon: Abiola Conway MD;  [facility];  Service: Urology   • EYE SURGERY  2014    cataracts   • HERNIA REPAIR  1971    right inguinal hernia repair   • IR SUPRAPUBIC CATHETER PLACEMENT  12/13/2022   • [place] LAPAROSCOPY COLECTOMY PARTIAL W/ANASTOMOSIS N/A 6/3/2016    Procedure: LAPAROSCOPIC SIGMOID RESECTION ;  Surgeon: Citlali Jacobson MD;  [facility];  Service: Colorectal   • [place] LAPS COLECTOMY PRTL W/COLOPXTSTMY LW ANAST N/A 6/3/2016    Procedure: COLECTOMY LAPAROSCOPIC ASSISTED;  Surgeon: Citlali Jacobson MD;  [facility];  Service: Colorectal   • [place] SIGMOIDOSCOPY FLX DX W/COLLJ SPEC BR/WA IF PFRMD N/A 6/3/2016    Procedure: Dorinda Thurston;  Surgeon: Citlali Jacobson MD;  [facility];  Service: Colorectal   • PROSTATECTOMY     • REPLACEMENT TOTAL KNEE Right    • URINARY SPHINCTER IMPLANT  2001    s/p prostatectomy- pt had urinary leakage issues       Family History   Problem Relation Age of Onset   • Diabetes Brother      I have reviewed and agree with the history as documented  E-Cigarette/Vaping     E-Cigarette/Vaping Substances     Social History     Tobacco Use   • Smoking status: Former   • Tobacco comments:     quit smoking when 23 y/o   Substance Use Topics   • Alcohol use: No   • Drug use: No       Review of Systems   Constitutional: Positive for activity change (Decreased) and fatigue  Negative for chills and fever     HENT: Negative for congestion, sore throat and trouble swallowing  Eyes: Negative for pain and visual disturbance  Respiratory: Negative for cough and shortness of breath  Cardiovascular: Negative for chest pain  Gastrointestinal: Positive for abdominal pain (Around catheter site)  Negative for diarrhea, nausea and vomiting  Genitourinary: Positive for decreased urine volume (From catheter site)  Negative for flank pain  Musculoskeletal: Positive for gait problem (Weakness, decreased ability to ambulate per son)  Negative for back pain  Skin: Positive for color change (Redness around catheter site)  Negative for rash  Neurological: Positive for weakness  Negative for dizziness, loss of consciousness, syncope, light-headedness and headaches  All other systems reviewed and are negative  Physical Exam  Physical Exam  Vitals and nursing note reviewed  Constitutional:       General: He is in acute distress (Evidencing moderate distress)  Appearance: Normal appearance  He is well-developed and normal weight  He is ill-appearing  He is not toxic-appearing or diaphoretic  HENT:      Head: Normocephalic and atraumatic  Jaw: There is normal jaw occlusion  Nose: Nose normal       Mouth/Throat:      Lips: Pink  No lesions  Mouth: Mucous membranes are dry  Pharynx: Oropharynx is clear  Eyes:      Extraocular Movements: Extraocular movements intact  Conjunctiva/sclera: Conjunctivae normal    Neck:      Trachea: Trachea and phonation normal  No abnormal tracheal secretions  Cardiovascular:      Rate and Rhythm: Normal rate and regular rhythm  Pulses: Normal pulses  Radial pulses are 2+ on the right side and 2+ on the left side  Heart sounds: Normal heart sounds, S1 normal and S2 normal  No murmur heard  Pulmonary:      Effort: Pulmonary effort is normal  No tachypnea or respiratory distress  Breath sounds: Normal breath sounds and air entry   No stridor, decreased air movement or transmitted upper airway sounds  No decreased breath sounds  Abdominal:      General: There is distension (bladder distended)  Palpations: Abdomen is soft  Tenderness: There is no abdominal tenderness  There is no guarding or rebound  Musculoskeletal:         General: Normal range of motion  Cervical back: Normal range of motion and neck supple  Right lower leg: No edema  Left lower leg: No edema  Comments: 4/5 strength to b/l UE, 3/5 strength to b/l LE, sensation intact   Skin:     General: Skin is warm and dry  Capillary Refill: Capillary refill takes 2 to 3 seconds  Findings: No rash or wound  Neurological:      General: No focal deficit present  Mental Status: He is alert and oriented to person, place, and time  Mental status is at baseline  GCS: GCS eye subscore is 4  GCS verbal subscore is 5  GCS motor subscore is 6           Vital Signs  ED Triage Vitals   Temperature Pulse Respirations Blood Pressure SpO2   01/24/23 2110 01/24/23 2110 01/24/23 2110 01/24/23 2110 01/24/23 2110   98 °F (36 7 °C) 67 18 (!) 191/88 97 %      Temp Source Heart Rate Source Patient Position - Orthostatic VS BP Location FiO2 (%)   01/25/23 0124 01/24/23 2110 01/24/23 2110 01/24/23 2110 --   Oral Monitor Sitting Left arm       Pain Score       --                  Vitals:    01/24/23 2110 01/25/23 0124 01/25/23 0230 01/25/23 0300   BP: (!) 191/88 (!) 233/105 (!) 217/99 (!) 198/88   Pulse: 67 62 63 56   Patient Position - Orthostatic VS: Sitting Sitting Lying Lying         Visual Acuity      ED Medications  Medications   sodium chloride 0 9 % bolus 500 mL (0 mL Intravenous Stopped 1/25/23 0239)   ceftriaxone (ROCEPHIN) 1 g/50 mL in dextrose IVPB (1,000 mg Intravenous New Bag 1/25/23 4164)   hydrALAZINE (APRESOLINE) injection 5 mg (5 mg Intravenous Given 1/25/23 3658)       Diagnostic Studies  Results Reviewed     Procedure Component Value Units Date/Time    HS Troponin I 2hr [[id number]] Collected: 01/25/23 0358    Lab Status: In process Specimen: Blood from Arm, Right Updated: 01/25/23 0405    HS Troponin I 4hr [[id number]]     Lab Status: No result Specimen: Blood     Urine Microscopic [[id number]]  (Abnormal) Collected: 01/25/23 0248    Lab Status: Final result Specimen: Urine, Suprapubic catheter Updated: 01/25/23 0301     RBC, UA Innumerable /hpf      WBC, UA 20-30 /hpf      Epithelial Cells None Seen /hpf      Bacteria, UA Occasional /hpf     Urine culture [[id number]] Collected: 01/25/23 0248    Lab Status:  In process Specimen: Urine, Suprapubic catheter Updated: 01/25/23 0301    UA w Reflex to Microscopic w Reflex to Culture [[id number]]  (Abnormal) Collected: 01/25/23 0248    Lab Status: Final result Specimen: Urine, Suprapubic catheter Updated: 01/25/23 0258     Color, UA Colorless     Clarity, UA Clear     Specific [place], UA 1 005     pH, UA 7 5     Leukocytes, UA Moderate     Nitrite, UA Negative     Protein, UA Trace mg/dl      Glucose, UA Negative mg/dl      Ketones, UA Negative mg/dl      Urobilinogen, UA <2 0 mg/dl      Bilirubin, UA Negative     Occult Blood, UA Moderate    Protime-INR [[id number]]  (Abnormal) Collected: 01/25/23 0125    Lab Status: Final result Specimen: Blood from Arm, Right Updated: 01/25/23 0216     Protime 16 1 seconds      INR 1 27    APTT [[id number]]  (Abnormal) Collected: 01/25/23 0125    Lab Status: Final result Specimen: Blood from Arm, Right Updated: 01/25/23 0216     PTT 44 seconds     Procalcitonin [[id number]]  (Normal) Collected: 01/25/23 0125    Lab Status: Final result Specimen: Blood from Arm, Right Updated: 01/25/23 0210     Procalcitonin 0 08 ng/ml     Urine Microscopic [[id number]]  (Abnormal) Collected: 01/25/23 0140    Lab Status: Final result Specimen: Urine, Suprapubic catheter Updated: 01/25/23 0210     RBC, UA Innumerable /hpf      WBC, UA Innumerable /hpf      Epithelial Cells None Seen /hpf      Bacteria, UA Occasional /hpf      MUCUS THREADS Occasional     WBC Clumps Present    Urine culture [[id number]] Collected: 01/25/23 0140    Lab Status: In process Specimen: Urine, Suprapubic catheter Updated: 01/25/23 0210    HS Troponin 0hr (reflex protocol) [[id number]]  (Normal) Collected: 01/25/23 0125    Lab Status: Final result Specimen: Blood from Arm, Right Updated: 01/25/23 0209     hs TnI 0hr 14 ng/L     Comprehensive metabolic panel [[id number]]  (Abnormal) Collected: 01/25/23 0125    Lab Status: Final result Specimen: Blood from Arm, Right Updated: 01/25/23 0158     Sodium 136 mmol/L      Potassium 3 4 mmol/L      Chloride 98 mmol/L      CO2 31 mmol/L      ANION GAP 7 mmol/L      BUN 16 mg/dL      Creatinine 1 12 mg/dL      Glucose 167 mg/dL      Calcium 8 7 mg/dL      Corrected Calcium 9 2 mg/dL      AST 30 U/L      ALT 21 U/L      Alkaline Phosphatase 73 U/L      Total Protein 6 5 g/dL      Albumin 3 4 g/dL      Total Bilirubin 0 91 mg/dL      eGFR 55 ml/min/1 73sq m     Narrative:      [place] guidelines for Chronic Kidney Disease (CKD):   •  Stage 1 with normal or high GFR (GFR > 90 mL/min/1 73 square meters)  •  Stage 2 Mild CKD (GFR = 60-89 mL/min/1 73 square meters)  •  Stage 3A Moderate CKD (GFR = 45-59 mL/min/1 73 square meters)  •  Stage 3B Moderate CKD (GFR = 30-44 mL/min/1 73 square meters)  •  Stage 4 Severe CKD (GFR = 15-29 mL/min/1 73 square meters)  •  Stage 5 End Stage CKD (GFR <15 mL/min/1 73 square meters)  Note: GFR calculation is accurate only with a steady state creatinine    Lactic acid [[id number]]  (Normal) Collected: 01/25/23 0125    Lab Status: Final result Specimen: Blood from Arm, Right Updated: 01/25/23 0157     LACTIC ACID 1 0 mmol/L     Narrative:      Result may be elevated if tourniquet was used during collection      UA w Reflex to Microscopic w Reflex to Culture [[id number]]  (Abnormal) Collected: 01/25/23 0140    Lab Status: Final result Specimen: Urine, Suprapubic catheter Updated: 01/25/23 0156     Color, UA Light Orange     Clarity, UA Extra Turbid     Specific [place], UA 1 012     pH, UA 7 0     Leukocytes, UA Large     Nitrite, UA Negative     Protein,  (2+) mg/dl      Glucose, UA Negative mg/dl      Ketones, UA Negative mg/dl      Urobilinogen, UA <2 0 mg/dl      Bilirubin, UA Negative     Occult Blood, UA Large    CBC and differential [[id number]]  (Abnormal) Collected: 01/25/23 0125    Lab Status: Final result Specimen: Blood from Arm, Right Updated: 01/25/23 0140     WBC 10 06 Thousand/uL      RBC 4 22 Million/uL      Hemoglobin 11 7 g/dL      Hematocrit 37 1 %      MCV 88 fL      MCH 27 7 pg      MCHC 31 5 g/dL      RDW 13 5 %      MPV 9 6 fL      Platelets 912 Thousands/uL      nRBC 0 /100 WBCs      Neutrophils Relative 75 %      Immat GRANS % 0 %      Lymphocytes Relative 15 %      Monocytes Relative 8 %      Eosinophils Relative 1 %      Basophils Relative 1 %      Neutrophils Absolute 7 54 Thousands/µL      Immature Grans Absolute 0 04 Thousand/uL      Lymphocytes Absolute 1 50 Thousands/µL      Monocytes Absolute 0 83 Thousand/µL      Eosinophils Absolute 0 05 Thousand/µL      Basophils Absolute 0 10 Thousands/µL     Blood culture #1 [[id number]] Collected: 01/25/23 0125    Lab Status: In process Specimen: Blood from Arm, Left Updated: 01/25/23 0133    Blood culture #2 [[id number]] Collected: 01/25/23 0125    Lab Status: In process Specimen: Blood from Arm, Right Updated: 01/25/23 0133                 No orders to display              Procedures  ECG 12 Lead Documentation Only    Date/Time: 1/25/2023 1:35 AM  Performed by: Leilani Ward [facility]  Authorized by:  CHARLES Ward     Indications / Diagnosis:  Sepsis w/u  ECG reviewed by me, the ED Provider: yes    Patient location:  ED  Previous ECG:     Previous ECG:  Compared to current    Comparison ECG info:  12/11/22    Similarity:  Changes noted (Atrial fibrillation has replaced sinus bradycardia with first-degree AV block, no new acute ischemic changes when compared)    Comparison to cardiac monitor: Yes    Interpretation:     Interpretation: non-specific    Rate:     ECG rate:  58    ECG rate assessment: bradycardic    Rhythm:     Rhythm: atrial fibrillation    Comments:      Atrial fibrillation, rightward axis, normal intervals, no acute ischemic changes read by me             ED Course  ED Course as of 01/25/23 0429   Wed Jan 25, 2023   0104 KEYON GISELE PALACIOSSING messaged regarding pt case to determine if ED team here is able to exchange suprapubic catheter site here   [address] made with urology on call KEYON Thania for exchange of suprapubic catheter  No imaging required prior to procedure  0151 CBC and differential(!)  No leukocytosis to suggest acute infection, no gross anemia   0329 Comprehensive metabolic panel(!)  Mild hypokalemia, no other acute metabolic abnormality, kidney function at baseline, mildly elevated random glucose, no transaminitis   0334 Procalcitonin  Negative for screening of severe sepsis   0334 Protime-INR(!)  Within defined limits on Eliquis for chronic A  fib   0334 Urine Microscopic(!)  Will cover with antibiotics given this repeat specimen is still with elevated WBCs and pt with increased fatigue and generalized weakness  2399 Lactic acid  Negative for screening of severe sepsis   0353 SLIM messaged regarding case   0403 I discussed the case with KEYON Brad  Patient is with elevated blood pressure readings, with all other vital signs stable  Sepsis labs sent and without evidence of severe sepsis  I discussed the option of going home with the patient's son as he has follow-up with urology this morning at 6 AM   Patient's son expressing concern that with his generalized weakness and fatigue that these may be manifestations of infection  I discussed that my suspicion is highest for Hoyt catheter dysfunction such as kinking of tube or blockage of tube impairing adequate drainage    I believe this is why his bladder was distended on arrival and that his pain is improved after new Hoyt was placed and is actively draining urine  My suspicion for an acute infection is lower, however patient has advanced age and still has persistently elevated WBCs and urine concerning for infection with the systemic signs of weakness and fatigue  Plan made with patient's son and patient for IV ceftriaxone and observation stay while awaiting urine culture to grow and possible consult urology during the day  Patient and his son agreeable to that plan  Plan made for IV hydralazine for persistently elevated blood pressure even after adequate drainage of his distended bladder  Patient is with improved appearance after new Hoyt catheter placement  We will continue to monitor urine output and bring him in for further work-up of his generalized weakness and fatigue  SBIRT 22yo+    Flowsheet Row Most Recent Value   SBIRT (25 yo +)    In order to provide better care to our patients, we are screening all of our patients for alcohol and drug use  Would it be okay to ask you these screening questions? Yes Filed at: 01/25/2023 0141   Initial Alcohol Screen: US AUDIT-C     1  How often do you have a drink containing alcohol? 0 Filed at: 01/25/2023 0141   2  How many drinks containing alcohol do you have on a typical day you are drinking? 0 Filed at: 01/25/2023 0141   3a  Male UNDER 65: How often do you have five or more drinks on one occasion? 0 Filed at: 01/25/2023 0141   3b  FEMALE Any Age, or MALE 65+: How often do you have 4 or more drinks on one occassion? 0 Filed at: 01/25/2023 0141   Audit-C Score 0 Filed at: 01/25/2023 2107   [place]ALIA: How many times in the past year have you    Used an illegal drug or used a prescription medication for non-medical reasons?  Never Filed at: 01/25/2023 0141                    Medical Decision Making  DDx including but not limited to: metabolic abnormality, dehydration, anemia, ACS, MI, other infectious process including UTI    Suprapubic catheter dysfunction, initial encounter [facility]): acute illness or injury  UTI (urinary tract infection): acute illness or injury  Amount and/or Complexity of Data Reviewed  Labs: ordered  Decision-making details documented in ED Course  Risk  Prescription drug management  Decision regarding hospitalization  Disposition  Final diagnoses:   UTI (urinary tract infection)   Suprapubic catheter dysfunction, initial encounter [facility])     Time reflects when diagnosis was documented in both MDM as applicable and the Disposition within this note     Time User Action Codes Description Comment    1/25/2023  4:06 AM Feroz Stuart Add [N39 0] UTI (urinary tract infection)     1/25/2023  4:06 AM Feroz Stuart Add [T83 010A] Suprapubic catheter dysfunction, initial encounter [facility])       ED Disposition     ED Disposition   Admit    Condition   Stable    Date/Time   Wed Jan 25, 2023  4:06 AM    Comment   Case was discussed with KEYON Horta and the patient's admission status was agreed to be Admission Status: observation status to the service of Dr Zia Burns   Follow-up Information    None         Patient's Medications   Discharge Prescriptions    No medications on file       No discharge procedures on file      PDMP Review       Value Time User    PDMP Reviewed  Yes 10/19/2022  4:32 PM Marjan Lloyd PA-C          ED Provider  Electronically Signed by           Si Snellen, CRNP  01/25/23 9223

## 2023-01-25 NOTE — ASSESSMENT & PLAN NOTE
· With hypertensive urgency on presentation, BP up to 233/105  · Received 5 mg of IV hydralazine this morning and BP has since improved to 177/82  · Not on any BP medications as an outpatient  · Continue to monitor BP

## 2023-01-25 NOTE — ASSESSMENT & PLAN NOTE
· Presented with reported lethargy and generalized weakness  · Patient alert and oriented on admission and denies weakness  · Received 1 dose of IV ceftriaxone in the ED for possible UTI  Continue IV antibiotics pending culture  · Lab work otherwise unremarkable  · Obtain TSH level  · PT/OT eval    · Fall precautions

## 2023-01-25 NOTE — ASSESSMENT & PLAN NOTE
· Does not appear volume overloaded  · Echo 12/2022: EF 55%, moderate AS  · Per records, takes Lasix on an as needed basis  · Monitor volume status

## 2023-01-25 NOTE — H&P
[facility]  H&P- Emmanuel Larry 6/28/1927, 80 y o  male MRN: [id number]  Unit/Bed#: ED-37 Encounter: [id number]  Primary Care Provider: Flaco Mckee MD   Date and time admitted to hospital: 1/25/2023 12:16 AM    * Lethargy  Assessment & Plan  · Presented with reported lethargy and generalized weakness  · Patient alert and oriented on admission and denies weakness  · Received 1 dose of IV ceftriaxone in the ED for possible UTI  Continue IV antibiotics pending culture  · Lab work otherwise unremarkable  · Obtain TSH level  · PT/OT eval    · Fall precautions  Hypertension  Assessment & Plan  · With hypertensive urgency on presentation, BP up to 233/105  · Received 5 mg of IV hydralazine this morning and BP has since improved to 177/82  · Not on any BP medications as an outpatient  · Continue to monitor BP      UTI (urinary tract infection)  Assessment & Plan  · Possible UTI vs bacteruria  · Urine specimen obtained after suprapubic catheter exchange: 20-30 WBC, innumerable RBC and occasional bacteria  · Does not meet SIRS/ sepsis criteria  Procal negative  · Received 1 dose of IV ceftriaxone in the ED  · Continue IV ceftriaxone pending results of urine culture  Problem with urinary catheter [facility])  Assessment & Plan  · Patient underwent suprapubic catheter placement in December 2022  · Presented with leaking from around his suprapubic catheter site  Patient was scheduled to see urology later today for catheter exchange  · Case discussed with urology by ED provider and catheter was exchanged in the ED  · Suprapubic tube is now draining  · Continue to monitor urine output  · Outpatient follow-up with urology       CKD (chronic kidney disease)  Assessment & Plan  Lab Results   Component Value Date    EGFR 55 01/25/2023    EGFR 43 12/28/2022    EGFR 45 12/23/2022    CREATININE 1 12 01/25/2023    CREATININE 1 37 (H) 12/28/2022    CREATININE 1 33 12/23/2022     · Cr at baseline  · Continue to monitor BMP  · Monitor urine output  Type 2 diabetes mellitus, without long-term current use of insulin (HCC)  Assessment & Plan  Lab Results   Component Value Date    HGBA1C 7 2 (H) 12/12/2022       No results for input(s): POCGLU in the last 72 hours  Blood Sugar Average: Last 72 hrs:     • Hold PO medications which include saxagliptin while inpatient  • Monitor accu-checks AC and HS  • SSI coverage  • Hypoglycemia protocol  Diastolic dysfunction  Assessment & Plan  · Does not appear volume overloaded  · Echo 12/2022: EF 55%, moderate AS  · Per records, takes Lasix on an as needed basis  · Monitor volume status  CAD (coronary artery disease)  Assessment & Plan  · Reported CAD  · Continue Imdur and statin  · Not on aspirin as he is on [facility] with Eliquis and not a BB given h/o bradycardia  Myasthenia gravis ([address] )  Assessment & Plan  · Continue pyridostigmine  Chronic a-fib (HCC)  Assessment & Plan  · Continue amiodarone  · AC with Eliquis  VTE Pharmacologic Prophylaxis: VTE Score: 4 Moderate Risk (Score 3-4) - Pharmacological DVT Prophylaxis Ordered: apixaban (Eliquis)  Code Status: Full Code  Discussion with family: Patient declined call to   Anticipated Length of Stay: Patient will be admitted on an observation basis with an anticipated length of stay of less than 2 midnights secondary to possible UTI  Total Time for Visit, including Counseling / Coordination of Care: 70 minutes Greater than 50% of this total time spent on direct patient counseling and coordination of care  Chief Complaint: Lethargy and leakage around suprapubic catheter site    History of Present Illness: Regina Muro is a 80 y o  male with a PMH of CAD, myasthenia gravis, chronic A-fib on Eliquis, HTN and type 2 DM who presents with lethargy and leakage around suprapubic catheter site   Patient resides with his son and per ED records, the patient's son noted that he was more fatigued and weak at home the past 3 days  Per ED records, patient's son also reported that last evening the patients clothes became soaked with urine and there was noted leakage around his suprapubic catheter site  Patient's son also reportedly noted some erythema around his suprapubic catheter site  Patient offers no complaints at this time  He notes that he has been more fatigued recently but denies weakness, fevers/ chills, pain  Review of Systems:  Review of Systems   Constitutional: Positive for fatigue  Negative for appetite change, chills and fever  Respiratory: Negative for shortness of breath  Cardiovascular: Negative for chest pain  Gastrointestinal: Negative for abdominal pain, diarrhea and nausea  Genitourinary: Negative for dysuria  Neurological: Negative for dizziness  All other systems reviewed and are negative        Past Medical and Surgical History:   Past Medical History:   Diagnosis Date   • Anxiety    • Arthritis    • Coronary artery disease    • Hiatal hernia    • Hypertension    • Irregular heart beat    • Myasthenia gravis ([facility] [address] )        Past Surgical History:   Procedure Laterality Date   • CYSTECTOMY, RADICAL WITH ILEOCONDUIT N/A 12/13/2022    Procedure: REMOVAL ARTIFICIAL URINARY SPINCTER BILATERAL;  Surgeon: Leanna Og MD;  [facility];  Service: Urology   • CYSTOSCOPY N/A 12/13/2022    Procedure: Garth Galan;  Surgeon: Leanna Og MD;  [facility];  Service: Urology   • EYE SURGERY  2014    cataracts   • HERNIA REPAIR  1971    right inguinal hernia repair   • IR SUPRAPUBIC CATHETER PLACEMENT  12/13/2022   • WY LAPAROSCOPY COLECTOMY PARTIAL W/ANASTOMOSIS N/A 6/3/2016    Procedure: LAPAROSCOPIC SIGMOID RESECTION ;  Surgeon: Janelle Menjivar MD;  Location[facility];  Service: Colorectal   • WY LAPS COLECTOMY PRTL W/COLOPXTSTMY LW ANAST N/A 6/3/2016    Procedure: COLECTOMY LAPAROSCOPIC ASSISTED;  Surgeon: Jeff Cortes MD;  [facility];  Service: Colorectal   • IA SIGMOIDOSCOPY FLX DX W/COLLJ SPEC BR/WA IF PFRMD N/A 6/3/2016    Procedure: SIGMOIDOSCOPY FLEXIBLE;  Surgeon: Jeff Cortes MD;  [facility];  Service: Colorectal   • PROSTATECTOMY     • REPLACEMENT TOTAL KNEE Right    • URINARY SPHINCTER IMPLANT  2001    s/p prostatectomy- pt had urinary leakage issues       Meds/Allergies:  Prior to Admission medications    Medication Sig Start Date End Date Taking? Authorizing Provider   acetaminophen (TYLENOL) 325 mg tablet Take 2 tablets (650 mg total) by mouth every 6 (six) hours as needed for mild pain 12/17/22   CHARLES Batista   acetaminophen-codeine (TYLENOL #3) 300-30 mg per tablet Take 1 tablet by mouth every 6 (six) hours as needed for moderate pain  Indications: Mild to Moderate Pain    Historical Provider, MD   aluminum-magnesium hydroxide 200-200 MG/5ML suspension Take 15 mL by mouth every 6 (six) hours as needed for heartburn  Historical Provider, MD   amiodarone 200 mg tablet Take 200 mg by mouth daily  Historical Provider, MD   apixaban (ELIQUIS) 2 5 mg Take 1 tablet by mouth every 12 (twelve) hours 11/21/22   Historical Provider, MD   dicyclomine (BENTYL) 20 mg tablet Take 1 tablet (20 mg total) by mouth 2 (two) times a day as needed (cramping) 12/17/22   CHARLES Batista   famotidine (PEPCID) 20 mg tablet Take 1 tablet by mouth 2 (two) times a day 11/16/22   Historical Provider, MD   FLUoxetine (PROzac) 40 MG capsule Take 40 mg by mouth daily      Historical Provider, MD   furosemide (LASIX) 20 mg tablet Take 20 mg by mouth see administration instructions Every Monday, Wednesday, and Friday    Historical Provider, MD   glucose monitoring kit (FREESTYLE) monitoring kit Use 1 each as needed    Historical Provider, MD   isosorbide mononitrate (IMDUR) 60 mg 24 hr tablet Take 60 mg by mouth daily    Historical Provider, MD   levothyroxine 88 mcg tablet Take 88 mcg by mouth 11/2/22 1/31/23  Historical Provider, MD   pantoprazole (PROTONIX) 40 mg tablet Take 20 mg by mouth 2 (two) times a day  Historical Provider, MD   pravastatin (PRAVACHOL) 20 mg tablet Take 20 mg by mouth daily  Historical Provider, MD   pyridostigmine (MESTINON) 60 mg tablet Take 60 mg by mouth 4 (four) times a day  Historical Provider, MD   saxagliptin (ONGLYZA) 2 5 MG tablet 1 tablet daily 12/8/22   Historical Provider, MD   Vitamin D, Cholecalciferol, 1000 UNITS CAPS Take 2,000 Units by mouth daily in the early morning  Historical Provider, MD     I have reviewed home medications with a medical source (PCP, Pharmacy, other)  Allergies: Allergies   Allergen Reactions   • Levofloxacin Other (See Comments) and Rash   • Sulfa Antibiotics Rash       Social History:  Marital Status:    Occupation:   Patient Pre-hospital Living Situation: Home, With other family member: son  Patient Pre-hospital Level of Mobility: walks with walker  Patient Pre-hospital Diet Restrictions:   Substance Use History:   Social History     Substance and Sexual Activity   Alcohol Use No     Social History     Tobacco Use   Smoking Status Former   Smokeless Tobacco Not on file   Tobacco Comments    quit smoking when 25 y/o     Social History     Substance and Sexual Activity   Drug Use No       Family History:  Family History   Problem Relation Age of Onset   • Diabetes Brother        Physical Exam:     Vitals:   Blood Pressure: (!) 177/82 (01/25/23 0445)  Pulse: 62 (01/25/23 0445)  Temperature: 98 2 °F (36 8 °C) (01/25/23 0124)  Temp Source: Oral (01/25/23 0124)  Respirations: 20 (01/25/23 0445)  Weight - Scale: 64 kg (141 lb 1 5 oz) (01/25/23 0124)  SpO2: 97 % (01/25/23 0445)    Physical Exam  Vitals and nursing note reviewed  Constitutional:       General: He is not in acute distress  Appearance: He is not diaphoretic  HENT:      Head: Normocephalic and atraumatic     Eyes: Conjunctiva/sclera: Conjunctivae normal    Cardiovascular:      Rate and Rhythm: Normal rate and regular rhythm  Pulmonary:      Effort: Pulmonary effort is normal  No respiratory distress  Breath sounds: Normal breath sounds  Abdominal:      General: Bowel sounds are normal       Palpations: Abdomen is soft  Tenderness: There is no abdominal tenderness  Genitourinary:     Comments: Suprapubic catheter in place with dry dressing surrounding catheter site  Catheter draining light yellow urine  Musculoskeletal:      Right lower leg: No edema  Left lower leg: No edema  Skin:     General: Skin is warm and dry  Coloration: Skin is not pale  Neurological:      Mental Status: He is alert and oriented to person, place, and time  Psychiatric:         Mood and Affect: Mood normal           Additional Data:     Lab Results:  Results from last 7 days   Lab Units 01/25/23  0125   WBC Thousand/uL 10 06   HEMOGLOBIN g/dL 11 7*   HEMATOCRIT % 37 1   PLATELETS Thousands/uL 227   NEUTROS PCT % 75   LYMPHS PCT % 15   MONOS PCT % 8   EOS PCT % 1     Results from last 7 days   Lab Units 01/25/23  0125   SODIUM mmol/L 136   POTASSIUM mmol/L 3 4*   CHLORIDE mmol/L 98   CO2 mmol/L 31   BUN mg/dL 16   CREATININE mg/dL 1 12   ANION GAP mmol/L 7   CALCIUM mg/dL 8 7   ALBUMIN g/dL 3 4*   TOTAL BILIRUBIN mg/dL 0 91   ALK PHOS U/L 73   ALT U/L 21   AST U/L 30   GLUCOSE RANDOM mg/dL 167*     Results from last 7 days   Lab Units 01/25/23  0125   INR  1 27*             Results from last 7 days   Lab Units 01/25/23  0125   LACTIC ACID mmol/L 1 0   PROCALCITONIN ng/ml 0 08       Lines/Drains:  Invasive Devices     Peripheral Intravenous Line  Duration           Peripheral IV 01/25/23 Right Antecubital <1 day          Drain  Duration           Suprapubic Catheter 18 Fr  42 days                    Imaging: No pertinent imaging reviewed    No orders to display       EKG and Other Studies Reviewed on Admission:   · EKG: Atrial fibrillation  HR 58     ** Please Note: This note has been constructed using a voice recognition system   **

## 2023-01-25 NOTE — ASSESSMENT & PLAN NOTE
· Reported CAD  · Continue Imdur and statin  · Not on aspirin as he is on [facility] with Eliquis and not a BB given h/o bradycardia

## 2023-01-25 NOTE — ASSESSMENT & PLAN NOTE
Lab Results   Component Value Date    HGBA1C 7 2 (H) 12/12/2022       Recent Labs     01/25/23  0748 01/25/23  1135   POCGLU 193* 172*       Blood Sugar Average: Last 72 hrs:  (P) 182 5   • Hold PO medications which include saxagliptin while inpatient  • Monitor accu-checks AC and HS  • SSI coverage  • Hypoglycemia protocol

## 2023-01-25 NOTE — ASSESSMENT & PLAN NOTE
Lab Results   Component Value Date    HGBA1C 7 2 (H) 12/12/2022       No results for input(s): POCGLU in the last 72 hours  Blood Sugar Average: Last 72 hrs:     • Hold PO medications which include saxagliptin while inpatient  • Monitor accu-checks AC and HS  • SSI coverage  • Hypoglycemia protocol

## 2023-01-25 NOTE — ASSESSMENT & PLAN NOTE
· With hypertensive urgency on presentation, BP up to 233/105     · bp running consistently high   · Add prn hydralazine which did produce a good response previously   · Amlodipine 5 mg also added, however refusing PO   · Consider clonidine patch if BP still elevated

## 2023-01-25 NOTE — ASSESSMENT & PLAN NOTE
· Possible UTI vs bacteruria  · Urine specimen obtained after suprapubic catheter exchange: 20-30 WBC, innumerable RBC and occasional bacteria  · Does not meet SIRS/ sepsis criteria  Procal negative  · Received 1 dose of IV ceftriaxone in the ED  · Continue IV ceftriaxone pending results of urine culture

## 2023-01-25 NOTE — ASSESSMENT & PLAN NOTE
· Patient underwent suprapubic catheter placement in December 2022  · Presented with leaking from around his suprapubic catheter site  Patient was scheduled to see urology later today for catheter exchange  · Case discussed with urology by ED provider and catheter was exchanged in the ED  · Suprapubic tube is now draining  · Continue to monitor urine output  · Outpatient follow-up with urology

## 2023-01-25 NOTE — TELEPHONE ENCOUNTER
TT luis a landa and she said she will work on changing his SPT today inpatient   Will monitor for D/C and figure out future SPT exchanges

## 2023-01-25 NOTE — ED NOTES
Patient refusing to take morning medication  Patient believes the medication will harm him and is not his medication, "I cannot take medication that is not mine " RN reassured patient that the medication being administered to patient is correct medication, but patient still refusing  Patient has increased confusion, provider notified  Son was called, son spoke with patient to ensure correct medication was being given, but patient still refusing  Son aware of situation and states he will come to hospital shortly  Will attempt morning medication pass again when son is here       [address] [place] [address]  01/25/23 0486

## 2023-01-25 NOTE — ASSESSMENT & PLAN NOTE
Lab Results   Component Value Date    EGFR 55 01/25/2023    EGFR 43 12/28/2022    EGFR 45 12/23/2022    CREATININE 1 12 01/25/2023    CREATININE 1 37 (H) 12/28/2022    CREATININE 1 33 12/23/2022     · Cr at baseline  · Continue to monitor BMP  · Monitor urine output

## 2023-01-25 NOTE — PROGRESS NOTES
[facility]  Progress Note - Wilma Patel 6/28/1927, 80 y o  male MRN: [id number]  Unit/Bed#: ZJFCK-29 Encounter: [id number]  Primary Care Provider: Terra Cardona MD   Date and time admitted to hospital: 1/25/2023 12:16 AM    * AMS (altered mental status)  Assessment & Plan  · Presented with reported lethargy and generalized weakness gradually progressing to AMS through hospital stay  Per family this happens when he is in the hospital     · Received 1 dose of IV ceftriaxone in the ED for possible UTI  Continue IV antibiotics pending culture  · Lab work otherwise unremarkable  · TSH normal   · Hypertensive encephalopathy? · Will check CT head   · Ammonia level   · Consider delirium as well     Problem with urinary catheter [facility])  Assessment & Plan  · Patient underwent suprapubic catheter placement in December 2022  · Presented with leaking from around his suprapubic catheter site  Patient was scheduled to see urology later today for catheter exchange  · Case discussed with urology by ED provider and catheter was exchanged in the ED  · Suprapubic tube is now draining  · Continue to monitor urine output  · Outpatient follow-up with urology  UTI (urinary tract infection)  Assessment & Plan  · Possible UTI vs bacteruria  · Urine specimen obtained after suprapubic catheter exchange: 20-30 WBC, innumerable RBC and occasional bacteria  · Does not meet SIRS/ sepsis criteria  Procal negative  · Received 1 dose of IV ceftriaxone in the ED  · Continue IV ceftriaxone pending results of urine culture  Chronic a-fib (HCC)  Assessment & Plan  · Continue amiodarone  · AC with Eliquis  Diastolic dysfunction  Assessment & Plan  · Does not appear volume overloaded  · Echo 12/2022: EF 55%, moderate AS  · Per records, takes Lasix on an as needed basis  · Monitor volume status  CAD (coronary artery disease)  Assessment & Plan  · Reported CAD    · Continue Imdur and statin  · Not on aspirin as he is on [facility] with Eliquis and not a BB given h/o bradycardia  CKD (chronic kidney disease)  Assessment & Plan  Lab Results   Component Value Date    EGFR 55 01/25/2023    EGFR 43 12/28/2022    EGFR 45 12/23/2022    CREATININE 1 12 01/25/2023    CREATININE 1 37 (H) 12/28/2022    CREATININE 1 33 12/23/2022     · Cr at baseline  · Continue to monitor BMP  · Monitor urine output  Hypertension  Assessment & Plan  · With hypertensive urgency on presentation, BP up to 233/105  · bp running consistently high   · Add prn hydralazine which did produce a good response previously   · Amlodipine 5 mg also added, however refusing PO   · Consider clonidine patch if BP still elevated     Type 2 diabetes mellitus, without long-term current use of insulin [facility])  Assessment & Plan  Lab Results   Component Value Date    HGBA1C 7 2 (H) 12/12/2022       Recent Labs     01/25/23  0748 01/25/23  1135   POCGLU 193* 172*       Blood Sugar Average: Last 72 hrs:  (P) 182 5   • Hold PO medications which include saxagliptin while inpatient  • Monitor accu-checks AC and HS  • SSI coverage  • Hypoglycemia protocol  Myasthenia gravis ([facility] [address] )  Assessment & Plan  · Continue pyridostigmine 60 TID         VTE Pharmacologic Prophylaxis: VTE Score: 4 Moderate Risk (Score 3-4) - Pharmacological DVT Prophylaxis Ordered: apixaban (Eliquis)  Patient Centered Rounds: I performed bedside rounds with nursing staff today  Discussions with Specialists or Other Care Team Provider: primary RN     Education and Discussions with Family / Patient: Updated  (son) via phone  Time Spent for Care: 30 minutes  More than 50% of total time spent on counseling and coordination of care as described above      Current Length of Stay: 0 day(s)  Current Patient Status: Inpatient   Certification Statement: The patient will continue to require additional inpatient hospital stay due to pending AMS work up   Discharge Plan: Anticipate discharge in 24-48 hrs to rehab facility  Code Status: Level 3 - DNAR and DNI    Subjective:   Patient very altered during my exam he keeps calling me his son Layla Carlson  Objective:     Vitals:   Temp (24hrs), Av 1 °F (36 7 °C), Min:98 °F (36 7 °C), Max:98 2 °F (36 8 °C)    Temp:  [98 °F (36 7 °C)-98 2 °F (36 8 °C)] 98 2 °F (36 8 °C)  HR:  [56-79] 79  Resp:  [16-20] 16  BP: (160-233)/() 182/82  SpO2:  [95 %-97 %] 97 %  Body mass index is 21 45 kg/m²  Input and Output Summary (last 24 hours): Intake/Output Summary (Last 24 hours) at 2023 1411  Last data filed at 2023 0428  Gross per 24 hour   Intake 550 ml   Output --   Net 550 ml       Physical Exam:   Physical Exam  Vitals and nursing note reviewed  Constitutional:       General: He is not in acute distress  HENT:      Head: Normocephalic and atraumatic  Comments: Hard of hearing   Cardiovascular:      Rate and Rhythm: Normal rate and regular rhythm  Pulmonary:      Effort: Pulmonary effort is normal       Breath sounds: Normal breath sounds  Abdominal:      General: Abdomen is flat  Palpations: Abdomen is soft  Genitourinary:     Comments: Suprapubic in place  Dressing around catheter  Draining charlie urine  Skin:     General: Skin is warm and dry  Neurological:      General: No focal deficit present  Mental Status: He is alert  He is disoriented  Comments: Difficult with following commands             Additional Data:     Labs:  Results from last 7 days   Lab Units 23  0125   WBC Thousand/uL 10 06   HEMOGLOBIN g/dL 11 7*   HEMATOCRIT % 37 1   PLATELETS Thousands/uL 227   NEUTROS PCT % 75   LYMPHS PCT % 15   MONOS PCT % 8   EOS PCT % 1     Results from last 7 days   Lab Units 23  0125   SODIUM mmol/L 136   POTASSIUM mmol/L 3 4*   CHLORIDE mmol/L 98   CO2 mmol/L 31   BUN mg/dL 16   CREATININE mg/dL 1 12   ANION GAP mmol/L 7   CALCIUM mg/dL 8 7   ALBUMIN g/dL 3 4* TOTAL BILIRUBIN mg/dL 0 91   ALK PHOS U/L 73   ALT U/L 21   AST U/L 30   GLUCOSE RANDOM mg/dL 167*     Results from last 7 days   Lab Units 01/25/23  0125   INR  1 27*     Results from last 7 days   Lab Units 01/25/23  1135 01/25/23  0748   POC GLUCOSE mg/dl 172* 193*         Results from last 7 days   Lab Units 01/25/23  0125   LACTIC ACID mmol/L 1 0   PROCALCITONIN ng/ml 0 08       Lines/Drains:  Invasive Devices     Peripheral Intravenous Line  Duration           Peripheral IV 01/25/23 Right Antecubital <1 day          Drain  Duration           Suprapubic Catheter 18 Fr  43 days                      Imaging: No pertinent imaging reviewed  Recent Cultures (last 7 days):   Results from last 7 days   Lab Units 01/25/23  0125   BLOOD CULTURE  Received in Microbiology Lab  Culture in Progress  Received in Microbiology Lab  Culture in Progress         Last 24 Hours Medication List:   Current Facility-Administered Medications   Medication Dose Route Frequency Provider Last Rate   • acetaminophen  650 mg Oral Q6H PRN Harini Gilliam PA-C     • amiodarone  200 mg Oral Daily Harini Gilliam PA-C     • amLODIPine  5 mg Oral Daily Paresh Sandra PA-C     • apixaban  2 5 mg Oral Q12H Harini Gilliam PA-C     • [START ON 1/26/2023] cefTRIAXone  1,000 mg Intravenous Q24H Harini Gilliam PA-C     • famotidine  20 mg Oral Daily Gerson KURT Gilliam     • FLUoxetine  40 mg Oral Daily [employer] KURT Gilliam     • hydrALAZINE  10 mg Intravenous Q6H PRN Praesh Sandra PA-C     • insulin lispro  1-5 Units Subcutaneous TID AC Frieda Sung PA-C     • insulin lispro  1-5 Units Subcutaneous HS Harini Gilliam PA-C     • isosorbide mononitrate  60 mg Oral Daily Gerson KURT Gilliam     • levothyroxine  88 mcg Oral Early Morning [employer] KURT Gilliam     • pantoprazole  20 mg Oral Early Morning Asif\[facility]\""l UKRT Gilliam     • pravastatin  20 mg Oral Daily Frieda Sung PA-C • pyridostigmine  60 mg Oral TID Giulia Garcia PA-C          Today, Patient Was Seen By: Giulia Garcia PA-C    **Please Note: This note may have been constructed using a voice recognition system  **

## 2023-01-25 NOTE — ASSESSMENT & PLAN NOTE
· Presented with reported lethargy and generalized weakness gradually progressing to AMS through hospital stay  Per family this happens when he is in the hospital     · Received 1 dose of IV ceftriaxone in the ED for possible UTI  Continue IV antibiotics pending culture  · Lab work otherwise unremarkable  · TSH normal   · Hypertensive encephalopathy?   · Will check CT head   · Ammonia level   · Consider delirium as well

## 2023-01-25 NOTE — ASSESSMENT & PLAN NOTE
· Reported CAD  · Continue Imdur and statin  · Not on aspirin as he is on [facility] with Eliquis and not a BB given h/o bradycardia

## 2023-01-25 NOTE — TELEPHONE ENCOUNTER
Patient seen in ER last night with complaints of leaking suprapubic tube  I was planning to change suprapubic tube  Please follow-up with patient to make sure this was completed and see how patient is doing    Should follow-up as scheduled

## 2023-01-26 ENCOUNTER — APPOINTMENT (INPATIENT)
Dept: VASCULAR ULTRASOUND | Facility: HOSPITAL | Age: 88
End: 2023-01-26

## 2023-01-26 PROBLEM — I16.0 HYPERTENSIVE URGENCY: Status: ACTIVE | Noted: 2023-01-25

## 2023-01-26 LAB
ALBUMIN SERPL BCP-MCNC: 3.2 G/DL (ref 3.5–5)
ALP SERPL-CCNC: 72 U/L (ref 34–104)
ALT SERPL W P-5'-P-CCNC: 22 U/L (ref 7–52)
ANION GAP SERPL CALCULATED.3IONS-SCNC: 12 MMOL/L (ref 4–13)
AST SERPL W P-5'-P-CCNC: 31 U/L (ref 13–39)
BASOPHILS # BLD AUTO: 0.08 THOUSANDS/ÂΜL (ref 0–0.1)
BASOPHILS NFR BLD AUTO: 1 % (ref 0–1)
BILIRUB SERPL-MCNC: 0.73 MG/DL (ref 0.2–1)
BUN SERPL-MCNC: 13 MG/DL (ref 5–25)
CALCIUM ALBUM COR SERPL-MCNC: 8.9 MG/DL (ref 8.3–10.1)
CALCIUM SERPL-MCNC: 8.3 MG/DL (ref 8.4–10.2)
CHLORIDE SERPL-SCNC: 101 MMOL/L (ref 96–108)
CO2 SERPL-SCNC: 21 MMOL/L (ref 21–32)
CREAT SERPL-MCNC: 1.04 MG/DL (ref 0.6–1.3)
EOSINOPHIL # BLD AUTO: 0.01 THOUSAND/ÂΜL (ref 0–0.61)
EOSINOPHIL NFR BLD AUTO: 0 % (ref 0–6)
ERYTHROCYTE [DISTWIDTH] IN BLOOD BY AUTOMATED COUNT: 13.8 % (ref 11.6–15.1)
GFR SERPL CREATININE-BSD FRML MDRD: 60 ML/MIN/1.73SQ M
GLUCOSE SERPL-MCNC: 163 MG/DL (ref 65–140)
GLUCOSE SERPL-MCNC: 166 MG/DL (ref 65–140)
GLUCOSE SERPL-MCNC: 188 MG/DL (ref 65–140)
GLUCOSE SERPL-MCNC: 208 MG/DL (ref 65–140)
GLUCOSE SERPL-MCNC: 211 MG/DL (ref 65–140)
HCT VFR BLD AUTO: 39.1 % (ref 36.5–49.3)
HGB BLD-MCNC: 12.8 G/DL (ref 12–17)
IMM GRANULOCYTES # BLD AUTO: 0.08 THOUSAND/UL (ref 0–0.2)
IMM GRANULOCYTES NFR BLD AUTO: 1 % (ref 0–2)
LYMPHOCYTES # BLD AUTO: 1.11 THOUSANDS/ÂΜL (ref 0.6–4.47)
LYMPHOCYTES NFR BLD AUTO: 9 % (ref 14–44)
MAGNESIUM SERPL-MCNC: 1.8 MG/DL (ref 1.9–2.7)
MCH RBC QN AUTO: 28.3 PG (ref 26.8–34.3)
MCHC RBC AUTO-ENTMCNC: 32.7 G/DL (ref 31.4–37.4)
MCV RBC AUTO: 86 FL (ref 82–98)
MONOCYTES # BLD AUTO: 1.02 THOUSAND/ÂΜL (ref 0.17–1.22)
MONOCYTES NFR BLD AUTO: 9 % (ref 4–12)
NEUTROPHILS # BLD AUTO: 9.63 THOUSANDS/ÂΜL (ref 1.85–7.62)
NEUTS SEG NFR BLD AUTO: 80 % (ref 43–75)
NRBC BLD AUTO-RTO: 0 /100 WBCS
PLATELET # BLD AUTO: 275 THOUSANDS/UL (ref 149–390)
PMV BLD AUTO: 9.7 FL (ref 8.9–12.7)
POTASSIUM SERPL-SCNC: 3 MMOL/L (ref 3.5–5.3)
PROT SERPL-MCNC: 6.3 G/DL (ref 6.4–8.4)
RBC # BLD AUTO: 4.53 MILLION/UL (ref 3.88–5.62)
SODIUM SERPL-SCNC: 134 MMOL/L (ref 135–147)
TSH SERPL DL<=0.05 MIU/L-ACNC: 2.96 UIU/ML (ref 0.45–4.5)
WBC # BLD AUTO: 11.93 THOUSAND/UL (ref 4.31–10.16)

## 2023-01-26 RX ORDER — POTASSIUM CHLORIDE 14.9 MG/ML
20 INJECTION INTRAVENOUS ONCE
Status: COMPLETED | OUTPATIENT
Start: 2023-01-26 | End: 2023-01-26

## 2023-01-26 RX ORDER — AMLODIPINE BESYLATE 10 MG/1
10 TABLET ORAL DAILY
Status: DISCONTINUED | OUTPATIENT
Start: 2023-01-27 | End: 2023-01-28 | Stop reason: HOSPADM

## 2023-01-26 RX ORDER — POTASSIUM CHLORIDE 20 MEQ/1
40 TABLET, EXTENDED RELEASE ORAL ONCE
Status: COMPLETED | OUTPATIENT
Start: 2023-01-26 | End: 2023-01-26

## 2023-01-26 RX ORDER — MAGNESIUM SULFATE HEPTAHYDRATE 40 MG/ML
2 INJECTION, SOLUTION INTRAVENOUS ONCE
Status: COMPLETED | OUTPATIENT
Start: 2023-01-26 | End: 2023-01-26

## 2023-01-26 RX ORDER — AMLODIPINE BESYLATE 5 MG/1
5 TABLET ORAL ONCE
Status: COMPLETED | OUTPATIENT
Start: 2023-01-26 | End: 2023-01-26

## 2023-01-26 RX ADMIN — SUCRALFATE 1 G: 1 TABLET ORAL at 18:09

## 2023-01-26 RX ADMIN — PYRIDOSTIGMINE BROMIDE 60 MG: 60 TABLET ORAL at 21:08

## 2023-01-26 RX ADMIN — HYDRALAZINE HYDROCHLORIDE 10 MG: 20 INJECTION, SOLUTION INTRAMUSCULAR; INTRAVENOUS at 18:10

## 2023-01-26 RX ADMIN — FAMOTIDINE 20 MG: 20 TABLET ORAL at 08:08

## 2023-01-26 RX ADMIN — HYDRALAZINE HYDROCHLORIDE 10 MG: 20 INJECTION, SOLUTION INTRAMUSCULAR; INTRAVENOUS at 07:36

## 2023-01-26 RX ADMIN — PRAVASTATIN SODIUM 20 MG: 20 TABLET ORAL at 08:15

## 2023-01-26 RX ADMIN — SUCRALFATE 1 G: 1 TABLET ORAL at 05:14

## 2023-01-26 RX ADMIN — PANTOPRAZOLE SODIUM 20 MG: 20 TABLET, DELAYED RELEASE ORAL at 16:26

## 2023-01-26 RX ADMIN — FLUOXETINE 40 MG: 20 CAPSULE ORAL at 08:08

## 2023-01-26 RX ADMIN — APIXABAN 2.5 MG: 2.5 TABLET, FILM COATED ORAL at 06:16

## 2023-01-26 RX ADMIN — POTASSIUM CHLORIDE 20 MEQ: 14.9 INJECTION, SOLUTION INTRAVENOUS at 08:08

## 2023-01-26 RX ADMIN — AMLODIPINE BESYLATE 5 MG: 5 TABLET ORAL at 09:46

## 2023-01-26 RX ADMIN — PYRIDOSTIGMINE BROMIDE 60 MG: 60 TABLET ORAL at 08:18

## 2023-01-26 RX ADMIN — PYRIDOSTIGMINE BROMIDE 60 MG: 60 TABLET ORAL at 16:26

## 2023-01-26 RX ADMIN — INSULIN LISPRO 1 UNITS: 100 INJECTION, SOLUTION INTRAVENOUS; SUBCUTANEOUS at 08:09

## 2023-01-26 RX ADMIN — Medication 1000 MG: at 04:39

## 2023-01-26 RX ADMIN — LEVOTHYROXINE SODIUM 88 MCG: 88 TABLET ORAL at 06:16

## 2023-01-26 RX ADMIN — PANTOPRAZOLE SODIUM 20 MG: 20 TABLET, DELAYED RELEASE ORAL at 06:16

## 2023-01-26 RX ADMIN — POTASSIUM CHLORIDE 40 MEQ: 1500 TABLET, EXTENDED RELEASE ORAL at 08:08

## 2023-01-26 RX ADMIN — SUCRALFATE 1 G: 1 TABLET ORAL at 12:57

## 2023-01-26 RX ADMIN — ACETAMINOPHEN 650 MG: 325 TABLET ORAL at 19:04

## 2023-01-26 RX ADMIN — AMIODARONE HYDROCHLORIDE 200 MG: 200 TABLET ORAL at 08:16

## 2023-01-26 RX ADMIN — APIXABAN 2.5 MG: 2.5 TABLET, FILM COATED ORAL at 18:09

## 2023-01-26 RX ADMIN — MAGNESIUM SULFATE HEPTAHYDRATE 2 G: 40 INJECTION, SOLUTION INTRAVENOUS at 08:09

## 2023-01-26 RX ADMIN — AMLODIPINE BESYLATE 5 MG: 5 TABLET ORAL at 08:08

## 2023-01-26 RX ADMIN — SUCRALFATE 1 G: 1 TABLET ORAL at 23:42

## 2023-01-26 RX ADMIN — ISOSORBIDE MONONITRATE 60 MG: 60 TABLET, EXTENDED RELEASE ORAL at 08:15

## 2023-01-26 RX ADMIN — INSULIN LISPRO 2 UNITS: 100 INJECTION, SOLUTION INTRAVENOUS; SUBCUTANEOUS at 12:56

## 2023-01-26 NOTE — ASSESSMENT & PLAN NOTE
· Presented with reported lethargy and generalized weakness gradually progressing to AMS through hospital stay  Per family this happens when he is in the hospital     · Received 1 dose of IV ceftriaxone in the ED for possible UTI  Continue IV antibiotics pending culture  · Lab work otherwise unremarkable  · TSH normal   · Hypertensive encephalopathy?   · CTH chronic infarct and microangiopathic changes   · Ammonia level normal   · Consider delirium as well as he has had this in the past   · MS improved today

## 2023-01-26 NOTE — ASSESSMENT & PLAN NOTE
Lab Results   Component Value Date    HGBA1C 7 2 (H) 12/12/2022       Recent Labs     01/25/23  1135 01/25/23  1853 01/25/23  2138 01/26/23  0750   POCGLU 172* 190* 222* 166*       Blood Sugar Average: Last 72 hrs:  (P) 188 6   • Hold PO medications which include saxagliptin while inpatient  • Monitor accu-checks AC and HS  • SSI coverage  • Hypoglycemia protocol

## 2023-01-26 NOTE — ASSESSMENT & PLAN NOTE
· Reported CAD  · Continue Imdur and statin  · Not on aspirin as he is on [facility] with Eliquis and not a BB given h/o bradycardia

## 2023-01-26 NOTE — CASE MANAGEMENT
Case Management Assessment & Discharge Planning Note    Patient name Sadie Gong  Location 1 MS  MS  MRN [id number]  : 1927 Date 2023       Current Admission Date: 2023  Current Admission Diagnosis:AMS (altered mental status)   Patient Active Problem List    Diagnosis Date Noted   • AMS (altered mental status) 2023   • Problem with urinary catheter ([facility] [address] ) 2023   • UTI (urinary tract infection) 2023   • Hypertensive urgency 2023   • Ambulatory dysfunction 2022   • Moderate protein-calorie malnutrition ([address] ) 12/15/2022   • CKD (chronic kidney disease) 2022   • Type 2 diabetes mellitus, without long-term current use of insulin ([facility] [address] ) 2022   • Displacement of artificial urinary sphincter ([facility] [address] ) 2022   • Depression    • Diastolic dysfunction    • Asymptomatic Bacteriuria with hematuria  2022   • Chronic a-fib ([facility] [address] ) 2016   • Myasthenia gravis ([facility] [address] ) 2016   • CAD (coronary artery disease) 2016   • Hypomagnesemia 2016      LOS (days): 1  Geometric Mean LOS (GMLOS) (days): 3 20  Days to GMLOS:2 1     OBJECTIVE:    Risk of Unplanned Readmission Score: 19 42         Current admission status: Inpatient       Preferred Pharmacy:   [facility] [address] [address]  [address] [facility] [address]  Phone: [phone] Fax: [phone] [facility],[address], [address]  [address] [address]  Phone: [phone] Fax: [phone]    Primary Care Provider: David Orta MD    Primary Insurance: [facility]  Secondary Insurance:     ASSESSMENT:  [address] 26 Proxies    There are no active Health Care Proxies on file                        Patient Information  Admitted from[de-identified] Home  Mental Status: Alert  During Assessment patient was accompanied by: Not accompanied during assessment  Assessment information provided by[de-identified] Son  Primary Caregiver: Self  Support Systems: [address] of Residence: [address] [place] [address] do you live in?: [facility] entry access options  Select all that apply : Stairs  Number of steps to enter home : 2  Do the steps have railings?: No  Type of Current Residence: Ranch  In the last 12 months, was there a time when you were not able to pay the mortgage or rent on time?: No  In the last 12 months, was there a time when you did not have a steady place to sleep or slept in a shelter (including now)?: No  Homeless/housing insecurity resource given?: N/A  Living Arrangements: Lives w/ Son    Activities of Daily Living Prior to Admission  Functional Status: Independent  Completes ADLs independently?: No  Level of ADL dependence: Assistance (patient's son anne assists pt in and out of the tub, and assists with care of suprapubic tube   patient is able to dress self)  Ambulates independently?: Yes  Does patient use assisted devices?: Yes  Assisted Devices (DME) used: Walker, Bedside Commode  Does patient currently own DME?: Yes  What DME does the patient currently own?: Marge Rom, Wheelchair, Bedside Commode  Does patient have a history of Outpatient Therapy (PT/OT)?: No  Does the patient have a history of Short-Term Rehab?: Yes ([facility])  Does patient have a history of HHC?: Yes ([facility])  Does patient currently have [facility]?: Yes ([facility])    Current Home Health Care  Type of Current Home Care Services: Home PT, Home OT, Nurse visit  [address] [facility] [address] [facility] [address][de-identified] [facility] Provider[de-identified] PCP    Patient Information Continued  Within the past 12 months, you worried that your food would run out before you got the money to buy more : Never true  Within the past 12 months, the food you bought just didn't last and you didn't have money to get more : Never true  Food insecurity resource given?: N/A         Means of Transportation  Means of Transport to Appts[de-identified] Family transport  In the past 12 months, has lack of transportation kept you from medical appointments or from getting medications?: No  In the past 12 months, has lack of transportation kept you from meetings, work, or from getting things needed for daily living?: No  Was application for public transport provided?: N/A        DISCHARGE DETAILS:    Discharge planning discussed with[de-identified] patient's sonLselie of Choice: Yes  Comments - Freedom of Choice: CM s/w Mendoza Horn re: dcp  Pt currently lives with his son Mendoza Horn in ranch style home - one step to enter home or two from garage  Pt primarily ambulates with his RW but has a cane and WC if needed  Mendoza Horn assists pt with ADLs - pt is able to dress self  Son assists with pt's suprapubic tube care  Son relayed would refuse STR if it is rcmd for pt opting for patient to return home with [address] as both son and dtr are strong supports for pt  Son also inquired into aquiring a new PCP for pt - info link emailed to pt's son at [email or web address]  Mendoza Horn relayed he would provide pt transport home once medically stable for d/c  CM remains available for any further CM d/c needs identified    CM contacted family/caregiver?: Yes             Contacts  Patient Contacts: Mendoza Horn (son)  Relationship to Patient[de-identified] Family  Contact Method: Phone  Phone Number: [phone] (M)  Reason/Outcome: Continuity of Care, Emergency Contact, Discharge [address]         Is the patient interested in Onealreece Barillas at discharge?: Yes  Via Deon Matta requested[de-identified] Nursing, Physical Therapy, Occupational [address] Agency Name[de-identified] [address] [facility] Provider[de-identified] PCP  Home Health Services Needed[de-identified] Strengthening/Theraputic Exercises to Improve Function, Gait/ADL Training, Evaluate Functional Status and Safety  Homebound Criteria Met[de-identified] Requires the Assistance of Another Person for Safe Ambulation or to Leave the Home, Uses an Assist Device (i e  cane, walker, etc)  Supporting Clincal Findings[de-identified] Fatigues Easliy in [employer], Limited Endurance    DME Referral Provided  Referral made for DME?: No    Other Referral/Resources/Interventions Provided:  Interventions: [facility]  Referral Comments: Referral sent to [facility] via aidin for GARTH upon d/c      Would you like to participate in our [address] service program?  : No - Declined       Discharge Destination Plan[de-identified] Home with [address] [facility]

## 2023-01-26 NOTE — CASE MANAGEMENT
Case Management Progress Note    Patient name Jacqueline Whitt  Location 1 MS  MS 11- MRN [id number]  : 1927 Date 2023       LOS (days): 1  Geometric Mean LOS (GMLOS) (days): 3 20  Days to GMLOS:2 2        OBJECTIVE:        Current admission status: Inpatient  Preferred Pharmacy:   [place], [address]  [address] [facility]  Phone: [phone] Fax: [phone] [employer] [facility],[address], [address] [facility]  [address]  [address] [place] [address]  Phone: [phone] Fax: [phone]    Primary Care Provider: Dinora Moses MD    Primary Insurance: [facility]  Secondary Insurance:     PROGRESS NOTE:    CM attempted to contact pt's son, Mar Ricki, and Erik Alexi, via phone re: discussion of dcp - left VM requesting return call

## 2023-01-26 NOTE — PROGRESS NOTES
[facility]  Progress Note - Cipriano Berry 6/28/1927, 80 y o  male MRN: [id number]  Unit/Bed#: RZIYR-33 Encounter: [id number]  Primary Care Provider: Juliana Ferreira MD   Date and time admitted to hospital: 1/25/2023 12:16 AM    * AMS (altered mental status)  Assessment & Plan  · Presented with reported lethargy and generalized weakness gradually progressing to AMS through hospital stay  Per family this happens when he is in the hospital     · Received 1 dose of IV ceftriaxone in the ED for possible UTI  Continue IV antibiotics pending culture  · Lab work otherwise unremarkable  · TSH normal   · Hypertensive encephalopathy? · CTH chronic infarct and microangiopathic changes   · Ammonia level normal   · Consider delirium as well as he has had this in the past   · MS improved today     Problem with urinary catheter [facility])  Assessment & Plan  · Patient underwent suprapubic catheter placement in December 2022  · Presented with leaking from around his suprapubic catheter site  Patient was scheduled to see urology later today for catheter exchange  · Case discussed with urology by ED provider and catheter was exchanged in the ED  · Suprapubic tube is now draining  · Continue to monitor urine output  · Outpatient follow-up with urology  UTI (urinary tract infection)  Assessment & Plan  · Possible UTI vs bacteruria  · Urine specimen obtained after suprapubic catheter exchange: 20-30 WBC, innumerable RBC and occasional bacteria  · Does not meet SIRS/ sepsis criteria  Procal negative  · Received 1 dose of IV ceftriaxone in the ED  · Continue IV ceftriaxone pending results of urine culture  Chronic a-fib (HCC)  Assessment & Plan  · Continue amiodarone  · AC with Eliquis  Diastolic dysfunction  Assessment & Plan  · Does not appear volume overloaded  · Echo 12/2022: EF 55%, moderate AS  · Per records, takes Lasix on an as needed basis     · Monitor volume status  CAD (coronary artery disease)  Assessment & Plan  · Reported CAD  · Continue Imdur and statin  · Not on aspirin as he is on [facility] [place] [place] with Eliquis and not a BB given h/o bradycardia  CKD (chronic kidney disease)  Assessment & Plan  Lab Results   Component Value Date    EGFR 60 01/26/2023    EGFR 55 01/25/2023    EGFR 43 12/28/2022    CREATININE 1 04 01/26/2023    CREATININE 1 12 01/25/2023    CREATININE 1 37 (H) 12/28/2022     · Cr at baseline  · Continue to monitor BMP  · Monitor urine output  Hypertensive urgency  Assessment & Plan  · With hypertensive urgency on presentation, BP up to 233/105  · bp running consistently high   · Add prn hydralazine which did produce a good response previously   · Amlodipine 5 mg increased to 10 mg given persistently elevated bp   · Consider losartan 25 mg if bp still uncontrolled   · Check RA u/s     Type 2 diabetes mellitus, without long-term current use of insulin [facility])  Assessment & Plan  Lab Results   Component Value Date    HGBA1C 7 2 (H) 12/12/2022       Recent Labs     01/25/23  1135 01/25/23  1853 01/25/23  2138 01/26/23  0750   POCGLU 172* 190* 222* 166*       Blood Sugar Average: Last 72 hrs:  (P) 188 6   • Hold PO medications which include saxagliptin while inpatient  • Monitor accu-checks AC and HS  • SSI coverage  • Hypoglycemia protocol  Myasthenia gravis ([facility] [address] )  Assessment & Plan  · Continue pyridostigmine 60 TID         VTE Pharmacologic Prophylaxis: VTE Score: 4 Moderate Risk (Score 3-4) - Pharmacological DVT Prophylaxis Ordered: apixaban (Eliquis)  Patient Centered Rounds: I performed bedside rounds with nursing staff today  Discussions with Specialists or Other Care Team Provider: CM     Education and Discussions with Family / Patient: Updated  (son) via phone  Time Spent for Care: 30 minutes  More than 50% of total time spent on counseling and coordination of care as described above      Current Length of Stay: 1 day(s)  Current Patient Status: Inpatient   Certification Statement: The patient will continue to require additional inpatient hospital stay due to pendig improvement in blood pressures and mental status   Discharge Plan: Anticipate discharge in 24-48 hrs to pending clinical coarse     Code Status: Level 3 - DNAR and DNI    Subjective:   Patient is more alert today then yesterday  He was able to tell me the president, his , and was able to answer questions appropriately  He was unsure of the year the fact that he was in the hospital   He does not state that he is having any pain other than chronic abdominal pain  Denies headache, or visual changes  He is not short of breath, or having any chest pain at this time  Blood pressure still remain elevated  Objective:     Vitals:   No data recorded  HR:  [69-79] 74  Resp:  [16-17] 17  BP: (158-197)/(72-84) 166/72  SpO2:  [95 %-97 %] 95 %  Body mass index is 21 45 kg/m²  Input and Output Summary (last 24 hours): Intake/Output Summary (Last 24 hours) at 2023 1120  Last data filed at 2023 1009  Gross per 24 hour   Intake 200 ml   Output 850 ml   Net -650 ml       Physical Exam:   Physical Exam  Vitals and nursing note reviewed  Constitutional:       General: He is not in acute distress  Cardiovascular:      Rate and Rhythm: Normal rate and regular rhythm  Heart sounds: Normal heart sounds  Pulmonary:      Effort: Pulmonary effort is normal       Breath sounds: Normal breath sounds  Abdominal:      General: Abdomen is flat  Palpations: Abdomen is soft  Tenderness: There is abdominal tenderness (LUQ)  Musculoskeletal:      Right lower leg: No edema  Left lower leg: No edema  Skin:     General: Skin is warm and dry  Neurological:      General: No focal deficit present  Mental Status: He is alert  He is disoriented        Comments: aaox2          Additional Data:     Labs:  Results from last 7 days   Lab Units 01/26/23  0544   WBC Thousand/uL 11 93*   HEMOGLOBIN g/dL 12 8   HEMATOCRIT % 39 1   PLATELETS Thousands/uL 275   NEUTROS PCT % 80*   LYMPHS PCT % 9*   MONOS PCT % 9   EOS PCT % 0     Results from last 7 days   Lab Units 01/26/23  0544   SODIUM mmol/L 134*   POTASSIUM mmol/L 3 0*   CHLORIDE mmol/L 101   CO2 mmol/L 21   BUN mg/dL 13   CREATININE mg/dL 1 04   ANION GAP mmol/L 12   CALCIUM mg/dL 8 3*   ALBUMIN g/dL 3 2*   TOTAL BILIRUBIN mg/dL 0 73   ALK PHOS U/L 72   ALT U/L 22   AST U/L 31   GLUCOSE RANDOM mg/dL 163*     Results from last 7 days   Lab Units 01/25/23  0125   INR  1 27*     Results from last 7 days   Lab Units 01/26/23  0750 01/25/23  2138 01/25/23  1853 01/25/23  1135 01/25/23  0748   POC GLUCOSE mg/dl 166* 222* 190* 172* 193*         Results from last 7 days   Lab Units 01/25/23  0125   LACTIC ACID mmol/L 1 0   PROCALCITONIN ng/ml 0 08       Lines/Drains:  Invasive Devices     Peripheral Intravenous Line  Duration           Peripheral IV 01/25/23 Right Antecubital 1 day          Drain  Duration           Suprapubic Catheter 18 Fr  43 days                      Imaging: Reviewed radiology reports from this admission including: CT head    Recent Cultures (last 7 days):   Results from last 7 days   Lab Units 01/25/23  0125   BLOOD CULTURE  No Growth at 24 hrs  No Growth at 24 hrs         Last 24 Hours Medication List:   Current Facility-Administered Medications   Medication Dose Route Frequency Provider Last Rate   • acetaminophen  650 mg Oral Q6H PRN Gayla Barker PA-C     • amiodarone  200 mg Oral Daily Gayla Barker PA-C     • [START ON 1/27/2023] amLODIPine  10 mg Oral Daily Paresh Sandra PA-C     • apixaban  2 5 mg Oral Q12H Gayla Barker PA-C     • cefTRIAXone  1,000 mg Intravenous Q24H Gayla Barker PA-C Stopped (01/26/23 7315)   • famotidine  20 mg Oral Daily Brooke Pitt MD     • FLUoxetine  40 mg Oral Daily Gayla Barker PA-C     • hydrALAZINE  10 mg Intravenous Q6H PRN Paresh Sandra PA-C     • insulin lispro  1-5 Units Subcutaneous TID AC Frieda Feliciano PA-C     • insulin lispro  1-5 Units Subcutaneous HS Fernando Paul PA-C     • isosorbide mononitrate  60 mg Oral Daily Fernando Paul PA-C     • levothyroxine  88 mcg Oral Early Morning Fernando Paul PA-C     • pantoprazole  20 mg Oral BID AC Jose Navarro MD     • pravastatin  20 mg Oral Daily Frieda Feliciano PA-C     • pyridostigmine  60 mg Oral TID Promise Crook PA-C     • sucralfate  1 g Oral Q6H Marcy Echols MD          Today, Patient Was Seen By: Promise Crook PA-C    **Please Note: This note may have been constructed using a voice recognition system  **

## 2023-01-26 NOTE — UTILIZATION REVIEW
Initial Clinical Review    Admission: Date/Time/Statement:   Admission Orders (From admission, onward)     Ordered        01/25/23 1354  Inpatient Admission  Once            01/25/23 0408  Place in Observation  Once                   Orders Placed This Encounter   Procedures   • Inpatient Admission     Standing Status:   Standing     Number of Occurrences:   1     Order Specific Question:   Level of Care     Answer:   Med Surg [16]     Order Specific Question:   Estimated length of stay     Answer:   More than 2 Midnights     Order Specific Question:   Certification     Answer:   I certify that inpatient services are medically necessary for this patient for a duration of greater than two midnights  See H&P and MD Progress Notes for additional information about the patient's course of treatment  ED Arrival Information     Expected   -    Arrival   1/24/2023 20:49    Acuity   Urgent            Means of arrival   Wheelchair    Escorted by   Family Member    Service   Hospitalist    Admission type   Emergency            Arrival complaint   URINARY CATH PROBLEM / [facility]           Chief Complaint   Patient presents with   • Urinary Catheter Problem     Pt had suprapubic catheter placed x1 month ago  Pt son states pt had some leakage around catheter today  Pt has appt with urology tomorrow  • Lethargy     Pt son reports pt seems more lethargic than normal  Pt son concerned for infection       Initial Presentation: 80 y o  male with PMHx of CAD, myasthenia gravis, Afib on Eliquis, T2 DM presents to ED via wheelchair with lethargy and leakage around suprapubic cath site  Pt resides at home with his son and son noted pt's clothes became soaked with urine and noted leakage around his suprapubic cath site  In ED pt offers no complaints  BP elevated  Catheter in place, draining light yellow urine  Hgb 11 7, K 3 4, albumin 3 4, glucose 167   UA (+) lge blood and leuks  IV abx given in ED    Admit inpatient to M/S unit with AMS, UTI -- continue IV abx  Ucx, blood cx pending  Monitor BP, may start amlodipine if needed  Fingerstick glucose checks w/ ssi  Continue Elquis  SCDs  PTOT evals  Date:   Day 2: pt is more alert today  Able to tell examiner the president, his  and answers questions appropriately  Unsure of year or that he in the hospital  Denies pain  BP still remains elevated  Amlodipine 5 mg increased to 10 mg  May add losartan if BP remains elevated  Check RA  S  [facility] with chronic infarct and microangiopathic changes  Ammonia level wnl  Continue IV abx  Supportive care        ED Triage Vitals   Temperature Pulse Respirations Blood Pressure SpO2   23   98 °F (36 7 °C) 67 18 (!) 191/88 97 %      Temp Source Heart Rate Source Patient Position - Orthostatic VS BP Location FiO2 (%)   23 0124 23 --   Oral Monitor Sitting Left arm       Pain Score       23 1529       No Pain          Wt Readings from Last 1 Encounters:   23 64 kg (141 lb 1 5 oz)     Additional Vital Signs:   Date/Time Temp Pulse Resp BP MAP (mmHg) SpO2 O2 Device Patient Position - Orthostatic VS   23 1033 -- -- -- 166/72 -- -- -- --   23 0733 -- -- -- 182/76 Abnormal  -- -- -- --   23 0721 -- 74 -- 197/82 Abnormal  118 95 % -- --   23 0600 -- 69 17 158/76 103 -- -- Lying   23 1725 -- -- -- 164/72 -- -- -- --   23 1701 -- 76 16 180/81 Abnormal  -- 97 % None (Room air) Lying   23 1529 -- 79 16 178/84 Abnormal  121 97 % None (Room air) Sitting   23 1251 -- 79 16 182/82 Abnormal  118 97 % None (Room air) Lying   23 1000 -- -- 16 160/74 106 96 % None (Room air) Sitting   23 0930 -- 71 16 175/81 Abnormal  117 95 % None (Room air) Sitting   23 0845 -- 67 16 220/98 Abnormal  140 95 % None (Room air) Sitting   23 0645 -- 62 16 171/80 Abnormal  115 96 % None (Room air) Lying   01/25/23 0445 -- 62 20 177/82 Abnormal  118 97 % None (Room air) Lying   01/25/23 0300 -- 56 16 198/88 Abnormal  126 96 % None (Room air) Lying   01/25/23 0230 -- 63 20 217/99 Abnormal  142 96 % None (Room air) Lying   01/25/23 0124 98 2 °F (36 8 °C) 62 18 233/105 Abnormal  151 97 % None (Room air) Sitting   01/24/23 2110 98 °F (36 7 °C) 67 18 191/88 Abnormal  -- 97 % None (Room air) Sitting     Pertinent Labs/Diagnostic Test Results:   CT head wo contrast   Final Result by Zoie Colmenares MD (01/25 1439)      1  No acute intracranial abnormality  2   Chronic right basal ganglia lacunar infarct  Chronic microangiopathic ischemic changes              Results from last 7 days   Lab Units 01/26/23  0544 01/25/23  0125   WBC Thousand/uL 11 93* 10 06   HEMOGLOBIN g/dL 12 8 11 7*   HEMATOCRIT % 39 1 37 1   PLATELETS Thousands/uL 275 227   NEUTROS ABS Thousands/µL 9 63* 7 54     Results from last 7 days   Lab Units 01/26/23  0544 01/25/23  0125   SODIUM mmol/L 134* 136   POTASSIUM mmol/L 3 0* 3 4*   CHLORIDE mmol/L 101 98   CO2 mmol/L 21 31   ANION GAP mmol/L 12 7   BUN mg/dL 13 16   CREATININE mg/dL 1 04 1 12   EGFR ml/min/1 73sq m 60 55   CALCIUM mg/dL 8 3* 8 7   MAGNESIUM mg/dL 1 8*  --      Results from last 7 days   Lab Units 01/26/23  0544 01/25/23  1426 01/25/23  0125   AST U/L 31  --  30   ALT U/L 22  --  21   ALK PHOS U/L 72  --  73   TOTAL PROTEIN g/dL 6 3*  --  6 5   ALBUMIN g/dL 3 2*  --  3 4*   TOTAL BILIRUBIN mg/dL 0 73  --  0 91   AMMONIA umol/L  --  18  --      Results from last 7 days   Lab Units 01/26/23  0750 01/25/23  2138 01/25/23  1853 01/25/23  1135 01/25/23  0748   POC GLUCOSE mg/dl 166* 222* 190* 172* 193*     Results from last 7 days   Lab Units 01/26/23  0544 01/25/23  0125   GLUCOSE RANDOM mg/dL 163* 167*     Results from last 7 days   Lab Units 01/25/23  0358 01/25/23  0125   HS TNI 0HR ng/L  --  14   HS TNI 2HR ng/L 12  --    HSTNI D2 ng/L -2  --      Results from last 7 days Lab Units 01/25/23  0125   PROTIME seconds 16 1*   INR  1 27*   PTT seconds 44*     Results from last 7 days   Lab Units 01/26/23  0544   TSH 3RD GENERATON uIU/mL 2 957     Results from last 7 days   Lab Units 01/25/23  0125   PROCALCITONIN ng/ml 0 08     Results from last 7 days   Lab Units 01/25/23  0125   LACTIC ACID mmol/L 1 0     Results from last 7 days   Lab Units 01/25/23  0248 01/25/23  0140   CLARITY UA  Clear Extra Turbid   COLOR UA  Colorless Light Orange   SPEC GRAV UA  1 005 1 012   PH UA  7 5 7 0   GLUCOSE UA mg/dl Negative Negative   KETONES UA mg/dl Negative Negative   BLOOD UA  Moderate* Large*   PROTEIN UA mg/dl Trace* 200 (2+)*   NITRITE UA  Negative Negative   BILIRUBIN UA  Negative Negative   UROBILINOGEN UA (BE) mg/dl <2 0 <2 0   LEUKOCYTES UA  Moderate* Large*   WBC UA /hpf 20-30* Innumerable*   RBC UA /hpf Innumerable* Innumerable*   BACTERIA UA /hpf Occasional Occasional   EPITHELIAL CELLS WET PREP /hpf None Seen None Seen   MUCUS THREADS   --  Occasional*     Results from last 7 days   Lab Units 01/25/23  0125   BLOOD CULTURE  No Growth at 24 hrs  No Growth at 24 hrs         ED Treatment:   Medication Administration from 01/24/2023 2049 to 01/26/2023 1055       Date/Time Order Dose Route Action     01/25/2023 0139 EST sodium chloride 0 9 % bolus 500 mL 500 mL Intravenous New Bag     01/25/2023 0358 EST ceftriaxone (ROCEPHIN) 1 g/50 mL in dextrose IVPB 1,000 mg Intravenous New Bag     01/25/2023 0423 EST hydrALAZINE (APRESOLINE) injection 5 mg 5 mg Intravenous Given     01/26/2023 0816 EST amiodarone tablet 200 mg 200 mg Oral Given     01/26/2023 0616 EST apixaban (ELIQUIS) tablet 2 5 mg 2 5 mg Oral Given     01/25/2023 1817 EST apixaban (ELIQUIS) tablet 2 5 mg 2 5 mg Oral Given     01/26/2023 0808 EST FLUoxetine (PROzac) capsule 40 mg 40 mg Oral Given     01/26/2023 0815 EST isosorbide mononitrate (IMDUR) 24 hr tablet 60 mg 60 mg Oral Given     01/26/2023 0616 EST levothyroxine tablet 88 mcg 88 mcg Oral Given     01/26/2023 0815 EST pravastatin (PRAVACHOL) tablet 20 mg 20 mg Oral Given     01/26/2023 0809 EST insulin lispro (HumaLOG) 100 units/mL subcutaneous injection 1-5 Units 1 Units Subcutaneous Given     01/25/2023 1855 EST insulin lispro (HumaLOG) 100 units/mL subcutaneous injection 1-5 Units 1 Units Subcutaneous Given     01/25/2023 1150 EST insulin lispro (HumaLOG) 100 units/mL subcutaneous injection 1-5 Units 1 Units Subcutaneous Given     01/25/2023 0810 EST insulin lispro (HumaLOG) 100 units/mL subcutaneous injection 1-5 Units 1 Units Subcutaneous Given     01/25/2023 2231 EST insulin lispro (HumaLOG) 100 units/mL subcutaneous injection 1-5 Units 1 Units Subcutaneous Given     01/25/2023 0810 EST potassium chloride (K-DUR,KLOR-CON) CR tablet 20 mEq 20 mEq Oral Given     01/26/2023 0439 EST ceftriaxone (ROCEPHIN) 1 g/50 mL in dextrose IVPB 1,000 mg Intravenous New Bag     01/26/2023 0736 EST hydrALAZINE (APRESOLINE) injection 10 mg 10 mg Intravenous Given     01/25/2023 1706 EST hydrALAZINE (APRESOLINE) injection 10 mg 10 mg Intravenous Given     01/25/2023 0855 EST hydrALAZINE (APRESOLINE) injection 10 mg 10 mg Intravenous Given     01/26/2023 0808 EST amLODIPine (NORVASC) tablet 5 mg 5 mg Oral Given     01/25/2023 1421 EST amLODIPine (NORVASC) tablet 5 mg 5 mg Oral Given     01/26/2023 0818 EST pyridostigmine (MESTINON) tablet 60 mg 60 mg Oral Given     01/25/2023 2015 EST pyridostigmine (MESTINON) tablet 60 mg 60 mg Oral Given     01/25/2023 1646 EST pyridostigmine (MESTINON) tablet 60 mg 60 mg Oral Given     01/26/2023 0514 EST sucralfate (CARAFATE) tablet 1 g 1 g Oral Given     01/25/2023 1817 EST sucralfate (CARAFATE) tablet 1 g 1 g Oral Given     01/26/2023 0616 EST pantoprazole (PROTONIX) EC tablet 20 mg 20 mg Oral Given     01/25/2023 2015 EST pantoprazole (PROTONIX) EC tablet 20 mg 20 mg Oral Given     01/26/2023 0808 EST famotidine (PEPCID) tablet 20 mg 20 mg Oral Given 01/26/2023 0809 EST magnesium sulfate 2 g/50 mL IVPB (premix) 2 g 2 g Intravenous New Bag     01/26/2023 0068 EST potassium chloride (K-DUR,KLOR-CON) CR tablet 40 mEq 40 mEq Oral Given     01/26/2023 6292 EST potassium chloride 20 mEq IVPB (premix) 20 mEq Intravenous New Bag     01/26/2023 0946 EST amLODIPine (NORVASC) tablet 5 mg 5 mg Oral Given     Past Medical History:   Diagnosis Date   • Anxiety    • Arthritis    • Coronary artery disease    • Hiatal hernia    • Hypertension    • Irregular heart beat    • Myasthenia gravis ([facility] [address] )      Present on Admission:  • Chronic a-fib (HCC)  • Myasthenia gravis (HCC)  • Type 2 diabetes mellitus, without long-term current use of insulin ([facility])  • CKD (chronic kidney disease)  • CAD (coronary artery disease)  • Diastolic dysfunction      Admitting Diagnosis: Problem with urinary catheter ([facility] ) [D64  9XXA]  Lethargy [R53 83]  Age/Sex: 80 y o  male  Admission Orders:  Scheduled Medications:  amiodarone, 200 mg, Oral, Daily  [START ON 1/27/2023] amLODIPine, 10 mg, Oral, Daily  apixaban, 2 5 mg, Oral, Q12H  cefTRIAXone, 1,000 mg, Intravenous, Q24H  famotidine, 20 mg, Oral, Daily  FLUoxetine, 40 mg, Oral, Daily  insulin lispro, 1-5 Units, Subcutaneous, TID AC  insulin lispro, 1-5 Units, Subcutaneous, HS  isosorbide mononitrate, 60 mg, Oral, Daily  levothyroxine, 88 mcg, Oral, Early Morning  pantoprazole, 20 mg, Oral, BID AC  pravastatin, 20 mg, Oral, Daily  pyridostigmine, 60 mg, Oral, TID  sucralfate, 1 g, Oral, Q6H [address]    PRN Meds:  acetaminophen, 650 mg, Oral, Q6H PRN  hydrALAZINE, 10 mg, Intravenous, Q6H PRN 1/25 x2, 1/26 x1          Network Utilization Review Department  ATTENTION: Please call with any questions or concerns to [phone] and carefully listen to the prompts so that you are directed to the right person   All voicemails are confidential   Juan Thorpe all requests for admission clinical reviews, approved or denied determinations and any other requests to dedicated fax number below belonging to the campus where the patient is receiving treatment   List of dedicated fax numbers for the Facilities:  [address] [place] [address] DENIALS (Administrative/Medical Necessity) [phone]   [address]  (Maternity/NICU/Pediatrics) [phone]   [address] [phone]   Mary [address]ello [address] [phone]   [address] [phone]   [address] [facility] 134 [address] [phone]

## 2023-01-26 NOTE — ASSESSMENT & PLAN NOTE
· With hypertensive urgency on presentation, BP up to 233/105     · bp running consistently high   · Add prn hydralazine which did produce a good response previously   · Amlodipine 5 mg increased to 10 mg given persistently elevated bp   · Consider addition of clonidine if bp still persistently high after increased dose of amlodipine  · Check RA u/s

## 2023-01-26 NOTE — ASSESSMENT & PLAN NOTE
Lab Results   Component Value Date    EGFR 60 01/26/2023    EGFR 55 01/25/2023    EGFR 43 12/28/2022    CREATININE 1 04 01/26/2023    CREATININE 1 12 01/25/2023    CREATININE 1 37 (H) 12/28/2022     · Cr at baseline  · Continue to monitor BMP  · Monitor urine output

## 2023-01-27 ENCOUNTER — HOME CARE VISIT (OUTPATIENT)
Dept: HOME HEALTH SERVICES | Facility: HOME HEALTHCARE | Age: 88
End: 2023-01-27

## 2023-01-27 PROBLEM — G93.41 METABOLIC ENCEPHALOPATHY: Status: ACTIVE | Noted: 2023-01-25

## 2023-01-27 PROBLEM — R82.71 ASYMPTOMATIC BACTERIURIA: Status: ACTIVE | Noted: 2023-01-25

## 2023-01-27 LAB
ANION GAP SERPL CALCULATED.3IONS-SCNC: 7 MMOL/L (ref 4–13)
BACTERIA UR CULT: ABNORMAL
BUN SERPL-MCNC: 13 MG/DL (ref 5–25)
CALCIUM SERPL-MCNC: 8.3 MG/DL (ref 8.4–10.2)
CHLORIDE SERPL-SCNC: 105 MMOL/L (ref 96–108)
CO2 SERPL-SCNC: 25 MMOL/L (ref 21–32)
CREAT SERPL-MCNC: 1.1 MG/DL (ref 0.6–1.3)
ERYTHROCYTE [DISTWIDTH] IN BLOOD BY AUTOMATED COUNT: 13.9 % (ref 11.6–15.1)
GFR SERPL CREATININE-BSD FRML MDRD: 56 ML/MIN/1.73SQ M
GLUCOSE SERPL-MCNC: 164 MG/DL (ref 65–140)
GLUCOSE SERPL-MCNC: 177 MG/DL (ref 65–140)
GLUCOSE SERPL-MCNC: 185 MG/DL (ref 65–140)
GLUCOSE SERPL-MCNC: 190 MG/DL (ref 65–140)
GLUCOSE SERPL-MCNC: 196 MG/DL (ref 65–140)
HCT VFR BLD AUTO: 38.1 % (ref 36.5–49.3)
HGB BLD-MCNC: 12.4 G/DL (ref 12–17)
MAGNESIUM SERPL-MCNC: 1.9 MG/DL (ref 1.9–2.7)
MCH RBC QN AUTO: 27.8 PG (ref 26.8–34.3)
MCHC RBC AUTO-ENTMCNC: 32.5 G/DL (ref 31.4–37.4)
MCV RBC AUTO: 85 FL (ref 82–98)
PLATELET # BLD AUTO: 235 THOUSANDS/UL (ref 149–390)
PMV BLD AUTO: 9.5 FL (ref 8.9–12.7)
POTASSIUM SERPL-SCNC: 3.7 MMOL/L (ref 3.5–5.3)
RBC # BLD AUTO: 4.46 MILLION/UL (ref 3.88–5.62)
SODIUM SERPL-SCNC: 137 MMOL/L (ref 135–147)
WBC # BLD AUTO: 9.9 THOUSAND/UL (ref 4.31–10.16)

## 2023-01-27 RX ORDER — CEFDINIR 300 MG/1
300 CAPSULE ORAL EVERY 12 HOURS SCHEDULED
Status: DISCONTINUED | OUTPATIENT
Start: 2023-01-27 | End: 2023-01-27

## 2023-01-27 RX ORDER — ONDANSETRON 2 MG/ML
4 INJECTION INTRAMUSCULAR; INTRAVENOUS ONCE
Status: COMPLETED | OUTPATIENT
Start: 2023-01-27 | End: 2023-01-27

## 2023-01-27 RX ADMIN — Medication 1000 MG: at 03:00

## 2023-01-27 RX ADMIN — FAMOTIDINE 20 MG: 20 TABLET ORAL at 09:30

## 2023-01-27 RX ADMIN — PRAVASTATIN SODIUM 20 MG: 20 TABLET ORAL at 09:30

## 2023-01-27 RX ADMIN — SUCRALFATE 1 G: 1 TABLET ORAL at 18:04

## 2023-01-27 RX ADMIN — SUCRALFATE 1 G: 1 TABLET ORAL at 11:30

## 2023-01-27 RX ADMIN — AMLODIPINE BESYLATE 10 MG: 10 TABLET ORAL at 09:30

## 2023-01-27 RX ADMIN — PYRIDOSTIGMINE BROMIDE 60 MG: 60 TABLET ORAL at 18:04

## 2023-01-27 RX ADMIN — ACETAMINOPHEN 650 MG: 325 TABLET ORAL at 03:44

## 2023-01-27 RX ADMIN — PANTOPRAZOLE SODIUM 20 MG: 20 TABLET, DELAYED RELEASE ORAL at 18:04

## 2023-01-27 RX ADMIN — AMIODARONE HYDROCHLORIDE 200 MG: 200 TABLET ORAL at 09:31

## 2023-01-27 RX ADMIN — INSULIN LISPRO 1 UNITS: 100 INJECTION, SOLUTION INTRAVENOUS; SUBCUTANEOUS at 22:17

## 2023-01-27 RX ADMIN — FLUOXETINE 40 MG: 20 CAPSULE ORAL at 09:30

## 2023-01-27 RX ADMIN — SUCRALFATE 1 G: 1 TABLET ORAL at 23:09

## 2023-01-27 RX ADMIN — INSULIN LISPRO 1 UNITS: 100 INJECTION, SOLUTION INTRAVENOUS; SUBCUTANEOUS at 13:20

## 2023-01-27 RX ADMIN — SUCRALFATE 1 G: 1 TABLET ORAL at 05:05

## 2023-01-27 RX ADMIN — PYRIDOSTIGMINE BROMIDE 60 MG: 60 TABLET ORAL at 09:30

## 2023-01-27 RX ADMIN — ISOSORBIDE MONONITRATE 60 MG: 60 TABLET, EXTENDED RELEASE ORAL at 09:30

## 2023-01-27 RX ADMIN — PYRIDOSTIGMINE BROMIDE 60 MG: 60 TABLET ORAL at 23:08

## 2023-01-27 RX ADMIN — INSULIN LISPRO 1 UNITS: 100 INJECTION, SOLUTION INTRAVENOUS; SUBCUTANEOUS at 18:04

## 2023-01-27 RX ADMIN — PANTOPRAZOLE SODIUM 20 MG: 20 TABLET, DELAYED RELEASE ORAL at 09:31

## 2023-01-27 RX ADMIN — APIXABAN 2.5 MG: 2.5 TABLET, FILM COATED ORAL at 18:04

## 2023-01-27 RX ADMIN — ONDANSETRON 4 MG: 2 INJECTION INTRAMUSCULAR; INTRAVENOUS at 22:16

## 2023-01-27 RX ADMIN — LEVOTHYROXINE SODIUM 88 MCG: 88 TABLET ORAL at 05:17

## 2023-01-27 RX ADMIN — APIXABAN 2.5 MG: 2.5 TABLET, FILM COATED ORAL at 05:05

## 2023-01-27 NOTE — PROGRESS NOTES
-- Patient: Mihai Gonzalez  -- MRN: [id number]  -- Aidin Request ID: [id number]  -- Level of care reserved: [address] [place] [address]  -- Partner Reserved: [facility], [place], [address] [phone]  -- Clinical needs requested:  -- Geography searched: [phone]  -- Start of Service:  -- Request sent: 4:23pm EST on 1/26/2023 by Mi Rodriguez  -- Partner reserved: 10:59am EST on 1/27/2023 by Mi Rodriguez  -- Choice list shared: 8:33am EST on 1/27/2023 by Mi Rodriguez

## 2023-01-27 NOTE — NURSING NOTE
Patient arrived to unit via wheel chair,alert and verbally responsive oriented to person and place  No complaints of pain or discomfort, oriented to room and made comfortable

## 2023-01-27 NOTE — PLAN OF CARE
Problem: OCCUPATIONAL THERAPY ADULT  Goal: Performs self-care activities at highest level of function for planned discharge setting  See evaluation for individualized goals  Description: Treatment Interventions: ADL retraining, Functional transfer training, UE strengthening/ROM, Cognitive reorientation, Equipment evaluation/education, Patient/family training, Energy conservation, Endurance training, Activityengagement          See flowsheet documentation for full assessment, interventions and recommendations  Note: Limitation: Decreased ADL status, Decreased Safe judgement during ADL, Decreased UE strength, Decreased endurance, Decreased self-care trans, Decreased high-level ADLs  Prognosis: Fair  Assessment: Patient is a 80 y o  male seen for OT evaluation at [facility] following admission on 1/25/2023  s/p AMS (altered mental status)  Comorbidities and significant PMHx impacting functional performance include: chronic AFIB, myasthenia gravis, CAD, diastolic dysfunctions, DM2, CKD, UTI, HTN urgery, ambulatory dysfunction  Patient presents with active orders for OT eval and treat and Up with assistance   Performed at least 2 patient identifiers during session including name and wristband  At baseline, pt is mod (I) c RW, assistance for ADL/IADLs  Lives c son in 1 story home with 1 vs 2 SURESH Upon initial evaluation, patient requires supervision for UB ADLs, mod assistance for LB ADLs, and supervision for transfers and functional mobility short distance  with RW  Based on functional eval, pt presents with impaired  attention, impaired  safety awareness, impaired  problem solving skills, and impaired   memory   Occupational performance is affected by the following deficits: endurance ,  decreased muscular strength , decreased standing tolerance for self care tasks , decreased dynamic balance impacting functional reach, decreased trunk control , decreased activity tolerance , impaired judgement and problem solving  and impaired safety awareness  Based on these findings, functional performance deficits, and medical complexity pt has been identified as a high complexity evaluation  Personal factors impacting performance include: decreased steps to enter home, decreased ADL independence  and decreased IADL independence  Patient would benefit from OT services within the acute care setting to maximize level of functional independence in the following occupational areas bathing/showering, toileting, dressing , personal hygiene/grooming , bed mobility , functional mobility, transfer to all surfaces and fall prevention   From OT standpoint, recommendation at time of D/C would be return to previous environment with home health rehabilitation pending continued level of assistance/supervision available       OT Discharge Recommendation: Home with home health rehabilitation (pending family able to provide continued level of assistance/supervision )   [facility]

## 2023-01-27 NOTE — OCCUPATIONAL THERAPY NOTE
Occupational Therapy Evaluation      Laura Vega    1/27/2023    Patient Active Problem List   Diagnosis    Chronic a-fib ([facility] )    Myasthenia gravis ([facility] )    CAD (coronary artery disease)    Hypomagnesemia    Displacement of artificial urinary sphincter ([facility])    Depression    Diastolic dysfunction    Asymptomatic Bacteriuria with hematuria     Type 2 diabetes mellitus, without long-term current use of insulin ([facility])    CKD (chronic kidney disease)    Moderate protein-calorie malnutrition ([facility] )    Ambulatory dysfunction    AMS (altered mental status)    Problem with urinary catheter ([facility] )    UTI (urinary tract infection)    Hypertensive urgency       Past Medical History:   Diagnosis Date    Anxiety     Arthritis     Coronary artery disease     Hiatal hernia     Hypertension     Irregular heart beat     Myasthenia gravis ([facility] )        Past Surgical History:   Procedure Laterality Date    CYSTECTOMY, RADICAL WITH ILEOCONDUIT N/A 12/13/2022    Procedure: REMOVAL ARTIFICIAL URINARY SPINCTER BILATERAL;  Surgeon: Brandyn Payne MD;  [facility];  Service: Urology    CYSTOSCOPY N/A 12/13/2022    Procedure: Keren Thurman;  Surgeon: Brandyn Payne MD;  [facility];  Service: Urology    EYE SURGERY  2014    cataracts    HERNIA REPAIR  1971    right inguinal hernia repair    IR SUPRAPUBIC CATHETER PLACEMENT  12/13/2022    SD LAPAROSCOPY COLECTOMY PARTIAL W/ANASTOMOSIS N/A 6/3/2016    Procedure: LAPAROSCOPIC SIGMOID RESECTION ;  Surgeon: Kong Fitzpatrick MD;  [facility];  Service: Colorectal    SD LAPS COLECTOMY PRTL W/COLOPXTSTMY LW ANAST N/A 6/3/2016    Procedure: COLECTOMY LAPAROSCOPIC ASSISTED;  Surgeon: Kong Fitzpatrick MD;  [facility];  Service: Colorectal    SD SIGMOIDOSCOPY FLX DX W/COLLJ SPEC BR/WA IF PFRMD N/A 6/3/2016    Procedure: Jessica Blend;  Surgeon: Kong Fitzpatrick MD;  [facility];  Service: Colorectal    PROSTATECTOMY      REPLACEMENT TOTAL KNEE Right     URINARY SPHINCTER IMPLANT  2001    s/p prostatectomy- pt had urinary leakage issues        01/27/23 1251   OT Last Visit   OT Visit Date 01/27/23   Note Type   Note type Evaluation   Pain Assessment   Pain Assessment Tool 0-10   Pain Score No Pain   Restrictions/Precautions   Weight Bearing Precautions Per Order No   Other Precautions Chair Alarm;Cognitive;Multiple lines;Telemetry; Fall Risk  (suprapubic catheter)   Home Living   Type of [address] One level;Performs ADLs on one level; Able to live on main level with bedroom/bathroom;Stairs to enter with rails  (1 vs 2 SURESH)   Bathroom Shower/Tub Tub/shower unit   Bathroom Equipment Shower chair;Commode   [address] Walker;Cane  (RW used at baseline)   Prior Function   Level of [place] Independent with ADLs; Independent with functional mobility; Needs assistance with IADLS  (assistance with ADLs PRN)   Lives With Son   Receives Help From Family  (recently D/C from [address] [place] [address] [address])   IADLs Family/Friend/Other provides medication management; Family/Friend/Other provides meals; Family/Friend/Other provides transportation  (son manages all home maangement tasks, laundry and groceries)   Falls in the last 6 months 0  (denies)   Vocational Retired   Comments Pt is a questionable historian d/t confusion at time of IE  reports his son is home throughout the day for assistance  Assists with all ADLs including management of catheter, tub transfers, and occasional assistance on steps to enter home  Confirmed with CM notes  pt reports he ambulates household distances with RW mod (I) at baseline  Lifestyle   Autonomy At baseline, pt is mod (I) c RW, assistance for ADL/IADLs  Lives c son in 1 story home with 1 vs 2 SURESH   Reciprocal Relationships supportive son   Service to Others retired   General   Additional Pertinent History pt admitted d/t suspected infection with suprapubic catheter + AMS  Family/Caregiver Present No   ADL   Eating Assistance 5  Supervision/Setup   Grooming Assistance 4  Minimal Assistance   UB Bathing Assistance 4  Minimal Assistance   LB Bathing Assistance 3  Moderate Assistance   UB Dressing Assistance 4  Minimal Assistance   LB Dressing Assistance 3  Moderate Assistance   Toileting Assistance  3  Moderate Assistance   Toileting Deficit   (total A for management of suprapubic catheter)   Functional Assistance 4  Minimal Assistance   Additional Comments pt limited by confusion, generalized weakness, limited activity tolerance, decreased balance   Bed Mobility   Supine to Sit 5  Supervision   Additional items Assist x 1; Increased time required;Verbal cues;HOB elevated   Sit to Supine   (DNT; pt seated OOB in recliner at end of session)   Additional Comments reported initial lightheadedness at EOB  Sat with supervision at EOB, no LOB   Transfers   Sit to Stand 4  Minimal assistance   Additional items Assist x 1; Increased time required;Verbal cues  (VC for proper hand placements)   Stand to Sit 4  Minimal assistance   Additional items Assist x 1; Increased time required;Verbal cues  (cues for proxomity to sitting surface)   Additional Comments cues fore proper hand placements and body mechancis during transfers  RW used   Functional Mobility   Functional Mobility 5  Supervision   Additional Comments Functional mobility household distance, bed > recliner  no LOB or s/s of exertion   Additional items Rolling walker   Balance   Static Sitting Good   Dynamic Sitting Fair +   Static Standing Fair   Dynamic Standing Fair -   Activity Tolerance   Activity Tolerance Patient limited by fatigue   Medical Staff Made Aware Pt benefited from co-session of skilled OT and PT therapists in order to most appropriately address functional deficits d/t decreased activity tolerance  OT/PT objectives were addressed separately; please see PT note for specific goal areas targeted     Nurse Made Aware RN Racheal pre/post session  Present at end of session   RUE Assessment   RUE Assessment WFL  (~90 AROM, MMT grossly 3 to 3+/5 throughout)   LUE Assessment   LUE Assessment WFL  (Full AROM, MMT 3+/5 throughout)   Hand Function   Gross Motor Coordination Functional   Fine Motor Coordination Functional   Sensation   Light Touch Partial deficits in the RLE;Partial deficits in the LLE  (pt reports partial numbness in B feet at baseline)   Vision-Basic Assessment   Current Vision Wears glasses only for reading   Vision - Complex Assessment   Acuity   (without glasses donned)   Cognition   Overall Cognitive Status Impaired   Arousal/Participation Responsive; Cooperative  (increased alertness following activity engagement)   Attention Attends with cues to redirect   Orientation Level Oriented to person;Oriented to place; Disoriented to situation;Disoriented to time   Memory Decreased recall of precautions;Decreased recall of recent events;Decreased short term memory   Following Commands Follows one step commands with increased time or repetition  (d/t [place] + confusion)   Comments Agreeable to OT session  increased processing time requried, pt endorses feeling more confused than usual  Pt with intermittent difficulty recalling information re: PLOF  Significantly [place]   Assessment   Limitation Decreased ADL status; Decreased Safe judgement during ADL;Decreased UE strength;Decreased endurance;Decreased self-care trans;Decreased high-level ADLs   Prognosis Fair   Assessment Patient is a 80 y o  male seen for OT evaluation [facility] following admission on 1/25/2023  s/p AMS (altered mental status)  Comorbidities and significant PMHx impacting functional performance include: chronic AFIB, myasthenia gravis, CAD, diastolic dysfunctions, DM2, CKD, UTI, HTN urgery, ambulatory dysfunction  Patient presents with active orders for OT eval and treat and Up with assistance    Performed at least 2 patient identifiers during session including name and wristband  At baseline, pt is mod (I) c RW, assistance for ADL/IADLs  Lives c son in 1 story home with 1 vs 2 SURESH Upon initial evaluation, patient requires supervision for UB ADLs, mod assistance for LB ADLs, and supervision for transfers and functional mobility short distance  with RW  Based on functional eval, pt presents with impaired  attention, impaired  safety awareness, impaired  problem solving skills, and impaired   memory  Occupational performance is affected by the following deficits: endurance ,  decreased muscular strength , decreased standing tolerance for self care tasks , decreased dynamic balance impacting functional reach, decreased trunk control , decreased activity tolerance , impaired judgement and problem solving  and impaired safety awareness  Based on these findings, functional performance deficits, and medical complexity pt has been identified as a high complexity evaluation  Personal factors impacting performance include: decreased steps to enter home, decreased ADL independence  and decreased IADL independence  Patient would benefit from OT services within the acute care setting to maximize level of functional independence in the following occupational areas bathing/showering, toileting, dressing , personal hygiene/grooming , bed mobility , functional mobility, transfer to all surfaces and fall prevention   From OT standpoint, recommendation at time of D/C would be return to previous environment with home health rehabilitation pending continued level of assistance/supervision available  Goals   Patient Goals no rehab goals stated during session; to maximize active participation in self care tasks and activity engagement   Plan   Treatment Interventions ADL retraining;Functional transfer training;UE strengthening/ROM; Cognitive reorientation;Equipment evaluation/education;Patient/family training;Energy conservation; Endurance training; Activityengagement   Goal Expiration Date 02/06/23   OT Treatment Day 0   OT Frequency 3-5x/wk   Recommendation   OT Discharge Recommendation Home with home health rehabilitation  (pending family able to provide continued level of assistance/supervision )   Additional Comments  The patient's raw score on the AM-PAC Daily Activity inpatient short form is 16, standardized score is 35 96, less than 39 4  Patients at this level are likely to benefit from discharge to post-acute rehabilitation services  Please refer to the recommendation of the Occupational Therapist for safe discharge planning  AM-PAC Daily Activity Inpatient   Lower Body Dressing 2   Bathing 2   Toileting 2   Upper Body Dressing 3   Grooming 3   Eating 4   Daily Activity Raw Score 16   Daily Activity Standardized Score (Calc for Raw Score >=11) 35 96   AM-[facility] Applied Cognition Inpatient   Following a Speech/Presentation 2   Understanding Ordinary Conversation 3   Taking Medications 1   Remembering Where Things Are Placed or Put Away 2   Remembering List of 4-5 Errands 2   Taking Care of Complicated Tasks 1   Applied Cognition Raw Score 11   Applied Cognition Standardized Score 27 03   End of Consult   Education Provided Yes;Family or social support of family present for education by provider   Patient Position at End of Consult All needs within reach; Seated edge of bed;Bedside chair  (MIRIAN Bautista present at end of session)   Pt will complete UB ADLs Independent  as needed for increased ADL independence within 10 days  Pt will complete LB ADLs Min A  with use of LHAE as needed for increased ADL independence within 10 days  Pt will complete toileting Supervision  with use of DME for increased ADL independence within 10 days  Pt will demonstrate proper body mechanics to complete self-care transfers and functional mobility with Mod independent  and use of AD PRN for increased safety and functional independence within 10 days       Pt will demonstrate standing tolerance of 5 min with Mod independent  and use of AD PRN for increased activity tolerance during ADL/IADL tasks within 10 days  Pt will demonstrate proper body mechanics and fall prevention strategies during 100% of tx sessions for increased safety awareness during ADL/IADLs    Pt will demonstrate OOB sitting tolerance of 2-4 hr/day for increased activity tolerance and engagement in self care tasks within 10 days       Pritesh Taveras

## 2023-01-27 NOTE — ASSESSMENT & PLAN NOTE
Lab Results   Component Value Date    EGFR 56 01/27/2023    EGFR 60 01/26/2023    EGFR 55 01/25/2023    CREATININE 1 10 01/27/2023    CREATININE 1 04 01/26/2023    CREATININE 1 12 01/25/2023     · Monitor kidney function closely

## 2023-01-27 NOTE — PLAN OF CARE
Problem: Potential for Falls  Goal: Patient will remain free of falls  Description: INTERVENTIONS:  - Educate patient/family on patient safety including physical limitations  - Instruct patient to call for assistance with activity   - Consult OT/PT to assist with strengthening/mobility   - Keep Call bell within reach  - Keep bed low and locked with side rails adjusted as appropriate  - Keep care items and personal belongings within reach  - Initiate and maintain comfort rounds  - Make Fall Risk Sign visible to staff  - Offer Toileting every  Hours, in advance of need  - Initiate/Maintainalarm  - Obtain necessary fall risk management equipment:   - Apply yellow socks and bracelet for high fall risk patients  - Consider moving patient to room near nurses station  Outcome: Progressing     Problem: Prexisting or High Potential for Compromised Skin Integrity  Goal: Skin integrity is maintained or improved  Description: INTERVENTIONS:  - Identify patients at risk for skin breakdown  - Assess and monitor skin integrity  - Assess and monitor nutrition and hydration status  - Monitor labs   - Assess for incontinence   - Turn and reposition patient  - Assist with mobility/ambulation  - Relieve pressure over bony prominences  - Avoid friction and shearing  - Provide appropriate hygiene as needed including keeping skin clean and dry  - Evaluate need for skin moisturizer/barrier cream  - Collaborate with interdisciplinary team   - Patient/family teaching  - Consider wound care consult   Outcome: Progressing     Problem: MOBILITY - ADULT  Goal: Maintain or return to baseline ADL function  Description: INTERVENTIONS:  -  Assess patient's ability to carry out ADLs; assess patient's baseline for ADL function and identify physical deficits which impact ability to perform ADLs (bathing, care of mouth/teeth, toileting, grooming, dressing, etc )  - Assess/evaluate cause of self-care deficits   - Assess range of motion  - Assess patient's mobility; develop plan if impaired  - Assess patient's need for assistive devices and provide as appropriate  - Encourage maximum independence but intervene and supervise when necessary  - Involve family in performance of ADLs  - Assess for home care needs following discharge   - Consider OT consult to assist with ADL evaluation and planning for discharge  - Provide patient education as appropriate  Outcome: Progressing  Goal: Maintains/Returns to pre admission functional level  Description: INTERVENTIONS:  - Perform BMAT or MOVE assessment daily    - Set and communicate daily mobility goal to care team and patient/family/caregiver  - Collaborate with rehabilitation services on mobility goals if consulted  - Perform Range of Motion  times a day  - Reposition patient every  hours    - Dangle patient  times a day  - Stand patient  times a day  - Ambulate patient  times a day  - Out of bed to chair  times a day   - Out of bed for meals  times a day  - Out of bed for toileting  - Record patient progress and toleration of activity level   Outcome: Progressing     Problem: GENITOURINARY - ADULT  Goal: Maintains or returns to baseline urinary function  Description: INTERVENTIONS:  - Assess urinary function  - Encourage oral fluids to ensure adequate hydration if ordered  - Administer IV fluids as ordered to ensure adequate hydration  - Administer ordered medications as needed  - Offer frequent toileting  - Follow urinary retention protocol if ordered  Outcome: Not Progressing  Goal: Absence of urinary retention  Description: INTERVENTIONS:  - Assess patient’s ability to void and empty bladder  - Monitor I/O  - Bladder scan as needed  - Discuss with physician/AP medications to alleviate retention as needed  - Discuss catheterization for long term situations as appropriate  Outcome: Progressing  Goal: Urinary catheter remains patent  Description: INTERVENTIONS:  - Assess patency of urinary catheter  - If patient has a chronic cardenas, consider changing catheter if non-functioning  - Follow guidelines for intermittent irrigation of non-functioning urinary catheter  Outcome: Progressing     Problem: METABOLIC, FLUID AND ELECTROLYTES - ADULT  Goal: Electrolytes maintained within normal limits  Description: INTERVENTIONS:  - Monitor labs and assess patient for signs and symptoms of electrolyte imbalances  - Administer electrolyte replacement as ordered  - Monitor response to electrolyte replacements, including repeat lab results as appropriate  - Instruct patient on fluid and nutrition as appropriate  Outcome: Progressing  Goal: Fluid balance maintained  Description: INTERVENTIONS:  - Monitor labs   - Monitor I/O and WT  - Instruct patient on fluid and nutrition as appropriate  - Assess for signs & symptoms of volume excess or deficit  Outcome: Progressing  Goal: Glucose maintained within target range  Description: INTERVENTIONS:  - Monitor Blood Glucose as ordered  - Assess for signs and symptoms of hyperglycemia and hypoglycemia  - Administer ordered medications to maintain glucose within target range  - Assess nutritional intake and initiate nutrition service referral as needed  Outcome: Progressing     Problem: SKIN/TISSUE INTEGRITY - ADULT  Goal: Skin Integrity remains intact(Skin Breakdown Prevention)  Description: Assess:  -Perform Harinder assessment every   -Clean and moisturize skin every   -Inspect skin when repositioning, toileting, and assisting with ADLS  -Assess under medical devices such as  every   -Assess extremities for adequate circulation and sensation     Bed Management:  -Have minimal linens on bed & keep smooth, unwrinkled  -Change linens as needed when moist or perspiring  -Avoid sitting or lying in one position for more than  hours while in bed  -Keep HOB at degrees     Toileting:  -Offer bedside commode  -Assess for incontinence every   -Use incontinent care products after each incontinent episode such as Activity:  -Mobilize patient  times a day  -Encourage activity and walks on unit  -Encourage or provide ROM exercises   -Turn and reposition patient every  Hours  -Use appropriate equipment to lift or move patient in bed  -Instruct/ Assist with weight shifting every  when out of bed in chair  -Consider limitation of chair time  hour intervals    Skin Care:  -Avoid use of baby powder, tape, friction and shearing, hot water or constrictive clothing  -Relieve pressure over bony prominences using   -Do not massage red bony areas    Next Steps:  -Teach patient strategies to minimize risks such as    -Consider consults to  interdisciplinary teams such as   Outcome: Progressing  Goal: Incision(s), wounds(s) or drain site(s) healing without S/S of infection  Description: INTERVENTIONS  - Assess and document dressing, incision, wound bed, drain sites and surrounding tissue  - Provide patient and family education  - Perform skin care/dressing changes every   Outcome: Progressing  Goal: Pressure injury heals and does not worsen  Description: Interventions:  - Implement low air loss mattress or specialty surface (Criteria met)  - Apply silicone foam dressing  - Instruct/assist with weight shifting every  minutes when in chair   - Limit chair time to  hour intervals  - Use special pressure reducing interventions such as  when in chair   - Apply fecal or urinary incontinence containment device   - Perform passive or active ROM every   - Turn and reposition patient & offload bony prominences every  hours   - Utilize friction reducing device or surface for transfers   - Consider consults to  interdisciplinary teams such as   - Use incontinent care products after each incontinent episode such as  - Consider nutrition services referral as needed  Outcome: Progressing     Problem: MUSCULOSKELETAL - ADULT  Goal: Maintain or return mobility to safest level of function  Description: INTERVENTIONS:  - Assess patient's ability to carry out ADLs; assess patient's baseline for ADL function and identify physical deficits which impact ability to perform ADLs (bathing, care of mouth/teeth, toileting, grooming, dressing, etc )  - Assess/evaluate cause of self-care deficits   - Assess range of motion  - Assess patient's mobility  - Assess patient's need for assistive devices and provide as appropriate  - Encourage maximum independence but intervene and supervise when necessary  - Involve family in performance of ADLs  - Assess for home care needs following discharge   - Consider OT consult to assist with ADL evaluation and planning for discharge  - Provide patient education as appropriate  Outcome: Progressing  Goal: Maintain proper alignment of affected body part  Description: INTERVENTIONS:  - Support, maintain and protect limb and body alignment  - Provide patient/ family with appropriate education  Outcome: Progressing

## 2023-01-27 NOTE — CONSULTS
Consultation - Infectious Disease   Charis Skinner 80 y o  male MRN: [id number]  Unit/Bed#: 1 MS 11-01 Encounter: [id number]      IMPRESSION & RECOMMENDATIONS:   1  Acute encephalopathy  Suspect was related to urinary retention in the setting of a malfunctioning suprapubic catheter  Suprapubic catheter has been exchanged, the catheter seems to be functioning reasonably well, and the patient's cognition has by report recovered to baseline  Doubt secondary to an invasive UTI as the patient's cognition recovered without any active treatment directed to what is growing in the urine  -Discontinue ceftriaxone  -No additional antibiotics  -Urology follow-up for management of suprapubic catheter  -Monitor cognition    2  Asymptomatic bacteriuria  The patient has no localizing symptoms except that referable to the possible retention with leakage around the catheter, and has no sepsis   -No directed antibiotics  -Monitor for evidence of an invasive UTI    3  Chronic kidney disease  Stage III  The renal function is relatively stable  -Recheck BMP  -Volume management    4  Diabetes mellitus  Type II  Without long-term use of insulin  With most recent hemoglobin A1c of 7 2    Have discussed the above management plan in detail with the primary service    Extensive review of the medical records in epic including review of the notes, radiographs, and laboratory results     Will see the patient again 1/30/2023 if not discharged  Please call for questions  HISTORY OF PRESENT ILLNESS:  Reason for Consult: Pseudomonas urinary tract infection  HPI: Charis Skinner is a 80y o  year old male with coronary artery disease, myasthenia gravis, diabetes mellitus, chronic atrial fibrillation, and hypertension and with a recent suprapubic catheter placed admitted [facility] with lethargy who we are asked to assist with management of a positive urine culture    The patient apparently recently had a suprapubic catheter placed  However in recent days prior to this admission he was noted to have increasing leakage around the site and some pain  There was some concern about some erythema around the catheter site  He also apparently had some abdominal pain  He was brought to the emergency department for further evaluation  In the emergency department he was found to be afebrile and without leukocytosis  The suprapubic catheter was exchanged and now is functioning well  He has not had any fever during his brief hospital stay  He denies any increased cough or shortness of breath, denies any nausea vomiting or diarrhea  By report his cognition has improved and he seems to be back to baseline  REVIEW OF SYSTEMS:  A complete review of systems is negative other than that noted in the HPI      PAST MEDICAL HISTORY:  Past Medical History:   Diagnosis Date   • Anxiety    • Arthritis    • Coronary artery disease    • Hiatal hernia    • Hypertension    • Irregular heart beat    • Myasthenia gravis [facility])      Past Surgical History:   Procedure Laterality Date   • CYSTECTOMY, RADICAL WITH ILEOCONDUIT N/A 12/13/2022    Procedure: REMOVAL ARTIFICIAL URINARY SPINCTER BILATERAL;  Surgeon: Warren Lozada MD;  [facility];  Service: Urology   • CYSTOSCOPY N/A 12/13/2022    Procedure: Vivi Lewis;  Surgeon: Warren Lozada MD;  [facility];  Service: Urology   • EYE SURGERY  2014    cataracts   • HERNIA REPAIR  1971    right inguinal hernia repair   • IR SUPRAPUBIC CATHETER PLACEMENT  12/13/2022   • SC LAPAROSCOPY COLECTOMY PARTIAL W/ANASTOMOSIS N/A 6/3/2016    Procedure: LAPAROSCOPIC SIGMOID RESECTION ;  Surgeon: Lilia Villanueva MD;  [facility];  Service: Colorectal   • SC LAPS COLECTOMY PRTL W/COLOPXTSTMY LW ANAST N/A 6/3/2016    Procedure: COLECTOMY LAPAROSCOPIC ASSISTED;  Surgeon: Lilia Villanueva MD;  [facility];  Service: Colorectal   • SC SIGMOIDOSCOPY FLX DX W/COLLJ SPEC BR/WA IF PFRMD N/A 6/3/2016    Procedure: SIGMOIDOSCOPY FLEXIBLE;  Surgeon: Jami Ruano MD;  [facility];  Service: Colorectal   • PROSTATECTOMY     • REPLACEMENT TOTAL KNEE Right    • URINARY SPHINCTER IMPLANT      s/p prostatectomy- pt had urinary leakage issues       FAMILY HISTORY:  Non-contributory    SOCIAL HISTORY:  Social History   Social History     Substance and Sexual Activity   Alcohol Use No     Social History     Substance and Sexual Activity   Drug Use No     Social History     Tobacco Use   Smoking Status Former   Smokeless Tobacco Not on file   Tobacco Comments    quit smoking when 25 y/o       ALLERGIES:  Allergies   Allergen Reactions   • Levofloxacin Other (See Comments) and Rash   • Sulfa Antibiotics Rash       MEDICATIONS:  All current active medications have been reviewed    Antibiotics: Ceftriaxone x3 days    PHYSICAL EXAM:  Temp:  [98 °F (36 7 °C)-98 1 °F (36 7 °C)] 98 1 °F (36 7 °C)  HR:  [71-82] 80  Resp:  [16-20] 20  BP: (137-184)/(67-82) 162/78  SpO2:  [93 %-97 %] 97 %  Temp (24hrs), Av 1 °F (36 7 °C), Min:98 °F (36 7 °C), Max:98 1 °F (36 7 °C)  Current: Temperature: 98 1 °F (36 7 °C)    Intake/Output Summary (Last 24 hours) at 2023 1401  Last data filed at 2023 0501  Gross per 24 hour   Intake 240 ml   Output 700 ml   Net -460 ml       General Appearance:  Elderly, debilitated, nontoxic, and in no distress   Head:  Normocephalic, without obvious abnormality, atraumatic   Eyes:  Conjunctiva pink and sclera anicteric, both eyes   Nose: Nares normal, mucosa normal, no drainage   Throat: Oropharynx moist without lesions   Neck: Supple, symmetrical, no adenopathy, no tenderness/mass/nodules   Back:   Symmetric, no curvature, ROM normal, no CVA tenderness   Lungs:   Clear to auscultation bilaterally, respirations unlabored   Chest Wall:  No tenderness or deformity   Heart:  RRR; no murmur, rub or gallop   Abdomen:   Soft, non-tender, non-distended, positive bowel sounds  Suprapubic catheter site without erythema or drainage  Good output  Extremities: No cyanosis, clubbing or edema   Skin: No rashes or lesions  No draining wounds noted  Lymph nodes: Cervical, supraclavicular nodes normal   Neurologic: Alert and oriented times 3, extremity strength 5/5 and symmetric       LABS, IMAGING, & OTHER STUDIES:  Lab Results:  I have personally reviewed pertinent labs  Results from last 7 days   Lab Units 01/27/23  0425 01/26/23  0544 01/25/23  0125   WBC Thousand/uL 9 90 11 93* 10 06   HEMOGLOBIN g/dL 12 4 12 8 11 7*   PLATELETS Thousands/uL 235 275 227     Results from last 7 days   Lab Units 01/27/23  0425 01/26/23  0544 01/25/23  0125   SODIUM mmol/L 137 134* 136   POTASSIUM mmol/L 3 7 3 0* 3 4*   CHLORIDE mmol/L 105 101 98   CO2 mmol/L 25 21 31   BUN mg/dL 13 13 16   CREATININE mg/dL 1 10 1 04 1 12   EGFR ml/min/1 73sq m 56 60 55   CALCIUM mg/dL 8 3* 8 3* 8 7   AST U/L  --  31 30   ALT U/L  --  22 21   ALK PHOS U/L  --  72 73     Results from last 7 days   Lab Units 01/25/23  0248 01/25/23  0140 01/25/23  0125   BLOOD CULTURE   --   --  No Growth at 48 hrs  No Growth at 48 hrs     URINE CULTURE  70,000-79,000 cfu/ml Pseudomonas aeruginosa*  <10,000 cfu/ml Diphtheroids >100,000 cfu/ml Pseudomonas aeruginosa*  60,000-69,000 cfu/ml Diphtheroids  --      Results from last 7 days   Lab Units 01/25/23  0125   PROCALCITONIN ng/ml 0 08                   Imaging Studies:     CT head-no acute intracranial abnormality    Images personally reviewed by me in PACS

## 2023-01-27 NOTE — ASSESSMENT & PLAN NOTE
· Likely asymptomatic bacteriuria  · Received empirical IV ceftriaxone  · Urine cultures noted to be positive for Pseudomonas  · Infectious disease inputs noted  · Monitor off of antibiotics

## 2023-01-27 NOTE — PROGRESS NOTES
[facility]  Progress Note - Claudia Mariee 6/28/1927, 80 y o  male MRN: [id number]  Unit/Bed#: 1 MS 11-01 Encounter: [id number]  Primary Care Provider: Chantel Orr MD   Date and time admitted to hospital: 1/25/2023 62:73 AM    * Metabolic encephalopathy  Assessment & Plan  Presented with altered mental status  Improving  Patient with suprapubic catheter, likely with urinary retention  S/p suprapubic catheter exchange with urology  Symptomatically improving  Optimize metabolic parameters  Monitor closely  Avoid neurotoxins    Hypertensive urgency  Assessment & Plan  · Present on admission  · Improving  · Monitor blood pressures  · Avoid hypotension    Asymptomatic bacteriuria  Assessment & Plan  · Likely asymptomatic bacteriuria  · Received empirical IV ceftriaxone  · Urine cultures noted to be positive for Pseudomonas  · Infectious disease inputs noted  · Monitor off of antibiotics      Ambulatory dysfunction  Assessment & Plan  Multifactorial  Safe ambulation  Fall precautions    CKD (chronic kidney disease)  Assessment & Plan  Lab Results   Component Value Date    EGFR 56 01/27/2023    EGFR 60 01/26/2023    EGFR 55 01/25/2023    CREATININE 1 10 01/27/2023    CREATININE 1 04 01/26/2023    CREATININE 1 12 01/25/2023     · Monitor kidney function closely    Type 2 diabetes mellitus, without long-term current use of insulin [facility])  Assessment & Plan  Lab Results   Component Value Date    HGBA1C 7 2 (H) 12/12/2022       Recent Labs     01/26/23  1238 01/26/23  1620 01/26/23  2215 01/27/23  0857   POCGLU 211* 188* 208* 164*       Blood Sugar Average: Last 72 hrs:  (P) 041 [id number]     Home regimen saxagliptin - on hold while inpatient  Monitor Accu-Cheks  Hypoglycemia protocol in place      Diastolic dysfunction  Assessment & Plan  2D echo EF 50%, grade 2 diastolic function  Monitor I's/O, daily weights    CAD (coronary artery disease)  Assessment & Plan  · Reported CAD   · Continue Imdur and statin  · Not on aspirin as he is on [facility] with Eliquis and not a BB given h/o bradycardia  Myasthenia gravis ([address] )  Assessment & Plan  · Continue pyridostigmine 60 TID     Chronic a-fib (HCC)  Assessment & Plan  · Continue amiodarone  · Eliquis for anticoagulation            VTE Pharmacologic Prophylaxis:  High Risk (Score >/= 5) - Pharmacological DVT Prophylaxis Ordered: apixaban (Eliquis)  Sequential Compression Devices Ordered  Patient Centered Rounds: I performed bedside rounds with nursing staff today  Discussions with Specialists or Other Care Team Provider: Infectious disease Dr Gavin Padilla, urology    Education and Discussions with Family / Patient: Discussed with patient, updated son Sunshine Mcdonald, called and left a message for daughter Alejandra Craig   Time Spent for Care: 30 minutes  More than 50% of total time spent on counseling and coordination of care as described above  Current Length of Stay: 2 day(s)  Current Patient Status: Inpatient   Certification Statement: The patient will continue to require additional inpatient hospital stay due to As outlined  Discharge Plan: Awaiting clinical symptomatic improvement, possible discharge 24 to 48 hours    Code Status: Level 3 - DNAR and DNI    Subjective:     Comfortably in bed  Reports feeling okay  Reports suprapubic pain  Discussed with RN at bedside  History chart labs medications reviewed    Objective:     Vitals:   Temp (24hrs), Av °F (36 7 °C), Min:97 9 °F (36 6 °C), Max:98 1 °F (36 7 °C)    Temp:  [97 9 °F (36 6 °C)-98 1 °F (36 7 °C)] 97 9 °F (36 6 °C)  HR:  [71-82] 77  Resp:  [16-20] 18  BP: (137-184)/(67-82) 158/74  SpO2:  [93 %-97 %] 97 %  Body mass index is 21 45 kg/m²  Input and Output Summary (last 24 hours):      Intake/Output Summary (Last 24 hours) at 2023 1640  Last data filed at 2023 1601  Gross per 24 hour   Intake 480 ml   Output 900 ml   Net -420 ml       Physical Exam:   Physical Exam Comfortably in bed  Features of protein calorie malnutrition noted  Neck supple  Lungs diminished breath sounds bilaterally  Heart sounds S1-S2 noted  Abdomen soft nontender  Suprapubic catheter noted  Awake obey simple commands  Moves extremities   no pedal edema  No rash      Additional Data:     Labs:  Results from last 7 days   Lab Units 01/27/23  0425 01/26/23  0544   WBC Thousand/uL 9 90 11 93*   HEMOGLOBIN g/dL 12 4 12 8   HEMATOCRIT % 38 1 39 1   PLATELETS Thousands/uL 235 275   NEUTROS PCT %  --  80*   LYMPHS PCT %  --  9*   MONOS PCT %  --  9   EOS PCT %  --  0     Results from last 7 days   Lab Units 01/27/23  0425 01/26/23  0544   SODIUM mmol/L 137 134*   POTASSIUM mmol/L 3 7 3 0*   CHLORIDE mmol/L 105 101   CO2 mmol/L 25 21   BUN mg/dL 13 13   CREATININE mg/dL 1 10 1 04   ANION GAP mmol/L 7 12   CALCIUM mg/dL 8 3* 8 3*   ALBUMIN g/dL  --  3 2*   TOTAL BILIRUBIN mg/dL  --  0 73   ALK PHOS U/L  --  72   ALT U/L  --  22   AST U/L  --  31   GLUCOSE RANDOM mg/dL 177* 163*     Results from last 7 days   Lab Units 01/25/23  0125   INR  1 27*     Results from last 7 days   Lab Units 01/27/23  1607 01/27/23  1120 01/27/23  0857 01/26/23  2215 01/26/23  1620 01/26/23  1238 01/26/23  0750 01/25/23  2138 01/25/23  1853 01/25/23  1135 01/25/23  0748   POC GLUCOSE mg/dl 196* 185* 164* 208* 188* 211* 166* 222* 190* 172* 193*         Results from last 7 days   Lab Units 01/25/23  0125   LACTIC ACID mmol/L 1 0   PROCALCITONIN ng/ml 0 08       Lines/Drains:  Invasive Devices     Peripheral Intravenous Line  Duration           Peripheral IV 01/25/23 Right Antecubital 2 days          Drain  Duration           Suprapubic Catheter 18 Fr  45 days                      Imaging: Reviewed radiology reports from this admission including: CT head and ultrasound(s)    Recent Cultures (last 7 days):   Results from last 7 days   Lab Units 01/25/23  0248 01/25/23  0140 01/25/23  0125   BLOOD CULTURE   --   --  No Growth at 50 hrs   No Growth at 48 hrs  URINE CULTURE  70,000-79,000 cfu/ml Pseudomonas aeruginosa*  <10,000 cfu/ml Diphtheroids >100,000 cfu/ml Pseudomonas aeruginosa*  60,000-69,000 cfu/ml Diphtheroids  --        Last 24 Hours Medication List:   Current Facility-Administered Medications   Medication Dose Route Frequency Provider Last Rate   • acetaminophen  650 mg Oral Q6H PRN Richard Cooper PA-C     • amiodarone  200 mg Oral Daily Richard Cooper PA-C     • amLODIPine  10 mg Oral Daily Paresh Sandra PA-C     • apixaban  2 5 mg Oral Q12H Александр Salinas PA-C     • famotidine  20 mg Oral Daily Daniel Garcia MD     • FLUoxetine  40 mg Oral Daily Richard Cooper PA-C     • hydrALAZINE  10 mg Intravenous Q6H PRN Paresh Sandra PA-C     • insulin lispro  1-5 Units Subcutaneous TID AC Frieda Velásquez PA-C     • insulin lispro  1-5 Units Subcutaneous HS Richard Cooper PA-C     • isosorbide mononitrate  60 mg Oral Daily Richrad Cooper PA-C     • levothyroxine  88 mcg Oral Early Morning Richard Cooper PA-C     • pantoprazole  20 mg Oral BID AC Daniel Garcia MD     • pravastatin  20 mg Oral Daily Frieda Velásquez PA-C     • pyridostigmine  60 mg Oral TID Paresh Sandra PA-C     • sucralfate  1 g Oral Q6H Gabriel Acosta MD          Today, Patient Was Seen By: Nathalie Cook MD    **Please Note: This note may have been constructed using a voice recognition system  **

## 2023-01-27 NOTE — ASSESSMENT & PLAN NOTE
· Reported CAD  · Continue Imdur and statin  · Not on aspirin as he is on [facility] with Eliquis and not a BB given h/o bradycardia

## 2023-01-27 NOTE — PLAN OF CARE
Problem: PHYSICAL THERAPY ADULT  Goal: Performs mobility at highest level of function for planned discharge setting  See evaluation for individualized goals  Description: Treatment/Interventions: Functional transfer training, LE strengthening/ROM, Therapeutic exercise, Endurance training, Cognitive reorientation, Patient/family training, Equipment eval/education, Gait training, Bed mobility, Spoke to nursing, OT  Equipment Recommended: Hansel Aaron       See flowsheet documentation for full assessment, interventions and recommendations  Note: Prognosis: Fair  Problem List: Decreased strength, Decreased range of motion, Decreased endurance, Impaired balance, Decreased mobility, Decreased cognition, Impaired hearing (gait deviations)  Assessment: Pt is a 80 y o  male seen for PT evaluation s/p admit to [facility] on 1/25/2023 w/ AMS (altered mental status)  Order placed for PT  Prior to admission: Pt lives with family in a one-story home with 1 versus 2 steps to enter  Patient primarily using a walker for ambulation  Also has other DME including single-point cane and wheelchair  Upon evaluation: Pt requires only min assist for transfers and supervision for short distance ambulation within the room  Pt's clinical presentation is currently unstable/unpredictable given the functional mobility deficits above, especially (but not limited to)limited cervical posture, gait deviations and decreased functional mobility tolerance, coupled with fall risks including impaired balance, decreased safety awareness and decreased cognition, and combined with medical complications of hypertension , abnormal WBCs, abnormal sodium values and abnormal potassium values  Recommend continued mobility training w/ rolling walker, therex, postural training as able, stair training    Barriers to Discharge: Decreased caregiver support, Inaccessible home environment     PT Discharge Recommendation: (S) Home with home health rehabilitation (Pending family ability to provide physical assistance to patient as needed upon discharge as well as continued HHPT)    See flowsheet documentation for full assessment

## 2023-01-27 NOTE — ASSESSMENT & PLAN NOTE
Presented with altered mental status  Improving  Patient with suprapubic catheter, likely with urinary retention  S/p suprapubic catheter exchange with urology  Symptomatically improving  Optimize metabolic parameters  Monitor closely  Avoid neurotoxins

## 2023-01-27 NOTE — ASSESSMENT & PLAN NOTE
Lab Results   Component Value Date    HGBA1C 7 2 (H) 12/12/2022       Recent Labs     01/26/23  1238 01/26/23  1620 01/26/23  2215 01/27/23  0857   POCGLU 211* 188* 208* 164*       Blood Sugar Average: Last 72 hrs:  (P) 342 [id number]     Home regimen saxagliptin - on hold while inpatient  Monitor Accu-Cheks  Hypoglycemia protocol in place

## 2023-01-27 NOTE — PHYSICAL THERAPY NOTE
PHYSICAL THERAPY EVALUATION  NAME:  Ginger Rush  DATE: 01/27/23    AGE:   89 y o  Mrn:   [id number]  Principal problem: Principal Problem:    AMS (altered mental status)  Active Problems:    Chronic a-fib (HCC)    Myasthenia gravis (HCC)    CAD (coronary artery disease)    Diastolic dysfunction    Type 2 diabetes mellitus, without long-term current use of insulin (HCC)    CKD (chronic kidney disease)    Problem with urinary catheter ([address] )    UTI (urinary tract infection)    Hypertensive urgency      Vitals:    01/27/23 0700 01/27/23 1100   BP: 170/82 162/78   BP Location: Right arm Right arm   Pulse: 71 80   Resp: 20    Temp:     TempSrc:     SpO2: 97%    Weight:         Length Of Stay: 2  Performed at least 2 patient identifiers during session: Name and Birthday  PHYSICAL THERAPY EVALUATION :    01/27/23 1311   PT Last Visit   PT Visit Date 01/27/23   Note Type   Note type Evaluation   Pain Assessment   Pain Assessment Tool 0-10   Home Living   Type of [address] One level;Performs ADLs on one level; Able to live on main level with bedroom/bathroom;Stairs to enter with rails  (one step to enter home or two from garage)   Home Equipment Walker;Cane   Additional Comments Per case management notes: patient's son anne assists pt in and out of the tub, and assists with care of suprapubic tub  Patient reportedly ambulates independently with a walker, also has bedside commode, single-point cane, wheelchair  Son relayed would refuse STR if it is rcmd for pt opting for patient to return home   Prior Function   Level of [place] Independent with ADLs; Independent with functional mobility; Needs assistance with IADLS  (assistance with ADLs PRN)   Lives With Son   Receives Help From Family  (recently D/C from [facility] 1/18)   IADLs Family/Friend/Other provides medication management; Family/Friend/Other provides meals; Family/Friend/Other provides transportation  (son manages all home maangement tasks, laundry and groceries)   Falls in the last 6 months 0  (denies)   Vocational Retired   General   Family/Caregiver Present No   Cognition   Overall Cognitive Status Impaired   Arousal/Participation Alert   Orientation Level Oriented to person   Following Commands Follows one step commands with increased time or repetition  (With MMT)   RUE Assessment   RUE Assessment WFL  (Close to 90 degrees AROM against gravity at shoulder flexion/abduction)   LUE Assessment   LUE Assessment WFL  (WFL AROM at shoulder)   Strength RLE   R Hip Flexion 4/5   R Knee Extension 4/5   R Ankle Dorsiflexion 4/5   Strength LLE   L Hip Flexion 4/5   L Knee Extension 4/5   L Ankle Dorsiflexion 4/5   Vision-Basic Assessment   Current Vision Wears glasses only for reading   Light Touch   RLE Light Touch Grossly intact   LLE Light Touch Grossly intact   Bed Mobility   Supine to Sit Unable to assess  (Patient was at edge of bed on entering room)   Transfers   Sit to Stand 4  Minimal assistance   Additional items Assist x 1; Increased time required;Verbal cues;Armrests  (Instruction for hand placement)   Stand to Sit 4  Minimal assistance   Additional items Assist x 1; Increased time required;Verbal cues;Armrests  (Instruction for hand placement)   Additional Comments Posture:  Forward head with limited ability to maintain neutral cervical extension position   Ambulation/Elevation   Gait pattern Excessively slow   Gait Assistance 5  Supervision   Additional items Assist x 1;Verbal cues   Assistive Device Rolling walker   Distance 8'   Stair Management Assistance Not tested   Balance   Static Sitting Good   Static Standing Fair -   Ambulatory Poor +   Endurance Deficit   Endurance Deficit Yes   Endurance Deficit Description Limited ambulation distance   Activity Tolerance   Activity Tolerance Patient limited by fatigue   Medical Staff Rosy coordination with Torito Crandall from [address] Problem List Decreased strength;Decreased range of motion;Decreased endurance; Impaired balance;Decreased mobility; Decreased cognition; Impaired hearing  (gait deviations)   Assessment Pt is a 80 y o  male seen for PT evaluation s/p admit to [facility] on [facility]/25/2023 w/ AMS (altered mental status)  Order placed for PT  Prior to admission: Pt lives with family in a one-story home with 1 versus 2 steps to enter  Patient primarily using a walker for ambulation  Also has other DME including single-point cane and wheelchair  Upon evaluation: Pt requires only min assist for transfers and supervision for short distance ambulation within the room  Pt's clinical presentation is currently unstable/unpredictable given the functional mobility deficits above, especially (but not limited to)limited cervical posture, gait deviations and decreased functional mobility tolerance, coupled with fall risks including impaired balance, decreased safety awareness and decreased cognition, and combined with medical complications of hypertension , abnormal WBCs, abnormal sodium values and abnormal potassium values  Recommend continued mobility training w/ rolling walker, therex, postural training as able, stair training  Barriers to Discharge Decreased caregiver support; Inaccessible home environment   Goals   Patient Goals none stated, but agreeable to get OOB   STG Expiration Date 02/06/23   Short Term Goal #1 Pt will: Perform bed mobility tasks with modified I to prepare for transfers and reposition in bed  Perform transfers with S to improve ease of transfers  Perform ambulation with S for up to 48' with S to increase Indep in home environment  Perform up to 2 stairs w/ railing and w/no more than min A to return to home with SURESH  PT Treatment Day 0   Plan   Treatment/Interventions Functional transfer training;LE strengthening/ROM; Therapeutic exercise; Endurance training;Cognitive reorientation;Patient/family training;Equipment eval/education;Gait training;Bed mobility;Spoke to nursing;OT   PT Frequency 3-5x/wk   Recommendation   PT Discharge Recommendation (S)  Home with home health rehabilitation  (Pending family ability to provide physical assistance to patient as needed upon discharge as well as continued HHPT)   Equipment Recommended [facility] Recommended Wheeled walker   Additional Comments If family unavailable to provide assistance, then recommend rehab   AM-PAC Basic Mobility Inpatient   Turning in Flat Bed Without Bedrails 2   Lying on Back to Sitting on Edge of Flat Bed Without Bedrails 2   Moving Bed to Chair 3   Standing Up From Chair Using Arms 3   Walk in Room 3   Climb 3-5 Stairs With Railing 1   Basic Mobility Inpatient Raw Score 14   Basic Mobility Standardized Score 35 55   Highest Level Of Mobility   -[facility] Goal 4: Move to chair/commode   -[facility] Achieved 5: Stand (1 or more minutes)   End of Consult   Patient Position at End of Consult All needs within reach; Bedside chair  (Racheal RN in process of obtaining chair alarm pad, but will monitor pt while in recliner  Green chair alarm box available )   Pt requires PT /OT co-eval due to required skilled interventions of at least 2 clinicians for care delivery, medical complexity, limited activity tolerance, and cognitive-behavioral impairments  PT and OT goals addressed separately  (Please find full objective findings from PT assessment regarding body systems outlined above)  During this admission, pt would benefit from continued skilled inpatient PT in the acute care setting in order to address deficits as defined above to maximize function and mobility  Co-morbidities affecting pt's physical performance at time of assessment include:CAD, myasthenia gravis, HTN, R TKR   Personal factors affecting pt at time of IE include: steps to enter environment, advanced age, past experience, behavioral pattern, inability to navigate community distances and limited insight into impairments  The patient's -[facility] Basic Mobility Inpatient Short Form Raw Score is 14  A Raw score of less than or equal to 16 suggests the patient may benefit from discharge to post-acute rehabilitation services, which DOES coincide with CURRENT above PT recommendations  However please refer to therapist recommendation for discharge planning given other factors that may influence destination  Adapted from [facility] “6-Clicks” Basic Mobility and Daily Activity Scores With Discharge Destination  Physical Therapy, 2021;101:1-9  DOI: [id number]    Portions of the record may have been created with voice recognition software  Occasional wrong word or "sound a like" substitutions may have occurred due to the inherent limitations of voice recognition software    Read the chart carefully and recognize, using context, where substitutions have occurred

## 2023-01-27 NOTE — CASE MANAGEMENT
Case Management Discharge Planning Note    Patient name Connie Walker  Location 1 MS  MS 11 MRN [id number]  : 1927 Date 2023       Current Admission Date: 2023  Current Admission Diagnosis:AMS (altered mental status)   Patient Active Problem List    Diagnosis Date Noted   • AMS (altered mental status) 2023   • Problem with urinary catheter ([facility] ) 2023   • UTI (urinary tract infection) 2023   • Hypertensive urgency 2023   • Ambulatory dysfunction 2022   • Moderate protein-calorie malnutrition ([facility] ) 12/15/2022   • CKD (chronic kidney disease) 2022   • Type 2 diabetes mellitus, without long-term current use of insulin ([facility] ) 2022   • 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   • Depression    • Diastolic dysfunction 8159   • Asymptomatic Bacteriuria with hematuria  2022   • Chronic a-fib ([facility] ) 2016   • Myasthenia gravis ([facility] ) 2016   • CAD (coronary artery disease) 2016   • Hypomagnesemia 2016      LOS (days): 2  Geometric Mean LOS (GMLOS) (days): 3 20  Days to GMLOS:1 2     OBJECTIVE:  Risk of Unplanned Readmission Score: 19 65         Current admission status: Inpatient   Preferred Pharmacy:   [facility] [address] [facility] [address]  [address] [address]  Phone: [phone] Fax: [phone] [facility],[address], [address]  [address] [address]  Phone: [phone] Fax: [phone]    Primary Care Provider: Manuel Mcginnis MD    Primary Insurance: [facility] [employer] REP  Secondary Insurance:     DISCHARGE DETAILS:                                                                               [facility] confirmed for [facility] upon d/c - reserved in aidin  IMM reviewed with patient's caregiver, patient's caregiver agrees with discharge determination    IMM Given (Date):: 23  IMM Given to[de-identified] Family  Family notified[de-identified] Layla Carlson (son)

## 2023-01-27 NOTE — PLAN OF CARE
Problem: Prexisting or High Potential for Compromised Skin Integrity  Goal: Skin integrity is maintained or improved  Description: INTERVENTIONS:  - Identify patients at risk for skin breakdown  - Assess and monitor skin integrity  - Assess and monitor nutrition and hydration status  - Monitor labs   - Assess for incontinence   - Turn and reposition patient  - Assist with mobility/ambulation  - Relieve pressure over bony prominences  - Avoid friction and shearing  - Provide appropriate hygiene as needed including keeping skin clean and dry  - Evaluate need for skin moisturizer/barrier cream  - Collaborate with interdisciplinary team   - Patient/family teaching  - Consider wound care consult   Outcome: Progressing     Problem: GENITOURINARY - ADULT  Goal: Absence of urinary retention  Description: INTERVENTIONS:  - Assess patient’s ability to void and empty bladder  - Monitor I/O  - Bladder scan as needed  - Discuss with physician/AP medications to alleviate retention as needed  - Discuss catheterization for long term situations as appropriate  Outcome: Progressing     Problem: GENITOURINARY - ADULT  Goal: Urinary catheter remains patent  Description: INTERVENTIONS:  - Assess patency of urinary catheter  - If patient has a chronic cardenas, consider changing catheter if non-functioning  - Follow guidelines for intermittent irrigation of non-functioning urinary catheter  Outcome: Progressing     Problem: GENITOURINARY - ADULT  Goal: Maintains or returns to baseline urinary function  Description: INTERVENTIONS:  - Assess urinary function  - Encourage oral fluids to ensure adequate hydration if ordered  - Administer IV fluids as ordered to ensure adequate hydration  - Administer ordered medications as needed  - Offer frequent toileting  - Follow urinary retention protocol if ordered  Outcome: Progressing     Problem: METABOLIC, FLUID AND ELECTROLYTES - ADULT  Goal: Glucose maintained within target range  Description: INTERVENTIONS:  - Monitor Blood Glucose as ordered  - Assess for signs and symptoms of hyperglycemia and hypoglycemia  - Administer ordered medications to maintain glucose within target range  - Assess nutritional intake and initiate nutrition service referral as needed  Outcome: Progressing     Problem: METABOLIC, FLUID AND ELECTROLYTES - ADULT  Goal: Fluid balance maintained  Description: INTERVENTIONS:  - Monitor labs   - Monitor I/O and WT  - Instruct patient on fluid and nutrition as appropriate  - Assess for signs & symptoms of volume excess or deficit  Outcome: Progressing     Problem: METABOLIC, FLUID AND ELECTROLYTES - ADULT  Goal: Electrolytes maintained within normal limits  Description: INTERVENTIONS:  - Monitor labs and assess patient for signs and symptoms of electrolyte imbalances  - Administer electrolyte replacement as ordered  - Monitor response to electrolyte replacements, including repeat lab results as appropriate  - Instruct patient on fluid and nutrition as appropriate  Outcome: Progressing     Problem: SKIN/TISSUE INTEGRITY - ADULT  Goal: Pressure injury heals and does not worsen  Description: Interventions:  - Implement low air loss mattress or specialty surface (Criteria met)  - Apply silicone foam dressing  - Instruct/assist with weight shifting every sixty minutes when in chair   - Limit chair time to one hour intervals  - Use special pressure reducing interventions when in chair   - Apply fecal or urinary incontinence containment device   - Perform passive or active ROM every hour  - Turn and reposition patient & offload bony prominences every two hours   - Utilize friction reducing device or surface for transfers   - Consider consults to  interdisciplinary teams    - Use incontinent care products after each incontinent episode  - Consider nutrition services referral as needed    Outcome: Progressing

## 2023-01-27 NOTE — PLAN OF CARE
Problem: Potential for Falls  Goal: Patient will remain free of falls  Description: INTERVENTIONS:  - Educate patient/family on patient safety including physical limitations  - Instruct patient to call for assistance with activity   - Consult OT/PT to assist with strengthening/mobility   - Keep Call bell within reach  - Keep bed low and locked with side rails adjusted as appropriate  - Keep care items and personal belongings within reach  - Initiate and maintain comfort rounds  - Make Fall Risk Sign visible to staff  - Offer Toileting every  Hours, in advance of need  - Initiate/Maintainalarm  - Obtain necessary fall risk management equipment:   - Apply yellow socks and bracelet for high fall risk patients  - Consider moving patient to room near nurses station  Outcome: Progressing     Problem: Prexisting or High Potential for Compromised Skin Integrity  Goal: Skin integrity is maintained or improved  Description: INTERVENTIONS:  - Identify patients at risk for skin breakdown  - Assess and monitor skin integrity  - Assess and monitor nutrition and hydration status  - Monitor labs   - Assess for incontinence   - Turn and reposition patient  - Assist with mobility/ambulation  - Relieve pressure over bony prominences  - Avoid friction and shearing  - Provide appropriate hygiene as needed including keeping skin clean and dry  - Evaluate need for skin moisturizer/barrier cream  - Collaborate with interdisciplinary team   - Patient/family teaching  - Consider wound care consult   Outcome: Progressing     Problem: MOBILITY - ADULT  Goal: Maintain or return to baseline ADL function  Description: INTERVENTIONS:  -  Assess patient's ability to carry out ADLs; assess patient's baseline for ADL function and identify physical deficits which impact ability to perform ADLs (bathing, care of mouth/teeth, toileting, grooming, dressing, etc )  - Assess/evaluate cause of self-care deficits   - Assess range of motion  - Assess patient's mobility; develop plan if impaired  - Assess patient's need for assistive devices and provide as appropriate  - Encourage maximum independence but intervene and supervise when necessary  - Involve family in performance of ADLs  - Assess for home care needs following discharge   - Consider OT consult to assist with ADL evaluation and planning for discharge  - Provide patient education as appropriate  Outcome: Progressing  Goal: Maintains/Returns to pre admission functional level  Description: INTERVENTIONS:  - Perform BMAT or MOVE assessment daily    - Set and communicate daily mobility goal to care team and patient/family/caregiver  - Collaborate with rehabilitation services on mobility goals if consulted  - Perform Range of Motion  times a day  - Reposition patient every  hours    - Dangle patient  times a day  - Stand patient  times a day  - Ambulate patient  times a day  - Out of bed to chair  times a day   - Out of bed for meals  times a day  - Out of bed for toileting  - Record patient progress and toleration of activity level   Outcome: Progressing     Problem: GENITOURINARY - ADULT  Goal: Maintains or returns to baseline urinary function  Description: INTERVENTIONS:  - Assess urinary function  - Encourage oral fluids to ensure adequate hydration if ordered  - Administer IV fluids as ordered to ensure adequate hydration  - Administer ordered medications as needed  - Offer frequent toileting  - Follow urinary retention protocol if ordered  Outcome: Progressing  Goal: Absence of urinary retention  Description: INTERVENTIONS:  - Assess patient’s ability to void and empty bladder  - Monitor I/O  - Bladder scan as needed  - Discuss with physician/AP medications to alleviate retention as needed  - Discuss catheterization for long term situations as appropriate  Outcome: Progressing  Goal: Urinary catheter remains patent  Description: INTERVENTIONS:  - Assess patency of urinary catheter  - If patient has a chronic cardenas, consider changing catheter if non-functioning  - Follow guidelines for intermittent irrigation of non-functioning urinary catheter  Outcome: Progressing     Problem: METABOLIC, FLUID AND ELECTROLYTES - ADULT  Goal: Electrolytes maintained within normal limits  Description: INTERVENTIONS:  - Monitor labs and assess patient for signs and symptoms of electrolyte imbalances  - Administer electrolyte replacement as ordered  - Monitor response to electrolyte replacements, including repeat lab results as appropriate  - Instruct patient on fluid and nutrition as appropriate  Outcome: Progressing  Goal: Fluid balance maintained  Description: INTERVENTIONS:  - Monitor labs   - Monitor I/O and WT  - Instruct patient on fluid and nutrition as appropriate  - Assess for signs & symptoms of volume excess or deficit  Outcome: Progressing  Goal: Glucose maintained within target range  Description: INTERVENTIONS:  - Monitor Blood Glucose as ordered  - Assess for signs and symptoms of hyperglycemia and hypoglycemia  - Administer ordered medications to maintain glucose within target range  - Assess nutritional intake and initiate nutrition service referral as needed  Outcome: Progressing     Problem: SKIN/TISSUE INTEGRITY - ADULT  Goal: Skin Integrity remains intact(Skin Breakdown Prevention)  Description: Assess:  -Perform Harinder assessment every   -Clean and moisturize skin every   -Inspect skin when repositioning, toileting, and assisting with ADLS  -Assess under medical devices such as  every   -Assess extremities for adequate circulation and sensation     Bed Management:  -Have minimal linens on bed & keep smooth, unwrinkled  -Change linens as needed when moist or perspiring  -Avoid sitting or lying in one position for more than  hours while in bed  -Keep HOB at degrees     Toileting:  -Offer bedside commode  -Assess for incontinence every   -Use incontinent care products after each incontinent episode such as Activity:  -Mobilize patient  times a day  -Encourage activity and walks on unit  -Encourage or provide ROM exercises   -Turn and reposition patient every  Hours  -Use appropriate equipment to lift or move patient in bed  -Instruct/ Assist with weight shifting every  when out of bed in chair  -Consider limitation of chair time  hour intervals    Skin Care:  -Avoid use of baby powder, tape, friction and shearing, hot water or constrictive clothing  -Relieve pressure over bony prominences using   -Do not massage red bony areas    Next Steps:  -Teach patient strategies to minimize risks such as    -Consider consults to  interdisciplinary teams such as   Outcome: Progressing  Goal: Incision(s), wounds(s) or drain site(s) healing without S/S of infection  Description: INTERVENTIONS  - Assess and document dressing, incision, wound bed, drain sites and surrounding tissue  - Provide patient and family education  - Perform skin care/dressing changes every   Outcome: Progressing  Goal: Pressure injury heals and does not worsen  Description: Interventions:  - Implement low air loss mattress or specialty surface (Criteria met)  - Apply silicone foam dressing  - Instruct/assist with weight shifting every  minutes when in chair   - Limit chair time to  hour intervals  - Use special pressure reducing interventions such as  when in chair   - Apply fecal or urinary incontinence containment device   - Perform passive or active ROM every   - Turn and reposition patient & offload bony prominences every  hours   - Utilize friction reducing device or surface for transfers   - Consider consults to  interdisciplinary teams such as   - Use incontinent care products after each incontinent episode such as  - Consider nutrition services referral as needed  Outcome: Progressing     Problem: MUSCULOSKELETAL - ADULT  Goal: Maintain or return mobility to safest level of function  Description: INTERVENTIONS:  - Assess patient's ability to carry out ADLs; assess patient's baseline for ADL function and identify physical deficits which impact ability to perform ADLs (bathing, care of mouth/teeth, toileting, grooming, dressing, etc )  - Assess/evaluate cause of self-care deficits   - Assess range of motion  - Assess patient's mobility  - Assess patient's need for assistive devices and provide as appropriate  - Encourage maximum independence but intervene and supervise when necessary  - Involve family in performance of ADLs  - Assess for home care needs following discharge   - Consider OT consult to assist with ADL evaluation and planning for discharge  - Provide patient education as appropriate  Outcome: Progressing  Goal: Maintain proper alignment of affected body part  Description: INTERVENTIONS:  - Support, maintain and protect limb and body alignment  - Provide patient/ family with appropriate education  Outcome: Progressing

## 2023-01-28 VITALS
RESPIRATION RATE: 18 BRPM | BODY MASS INDEX: 21.45 KG/M2 | OXYGEN SATURATION: 97 % | HEART RATE: 70 BPM | TEMPERATURE: 98.1 F | WEIGHT: 141.09 LBS | DIASTOLIC BLOOD PRESSURE: 78 MMHG | SYSTOLIC BLOOD PRESSURE: 162 MMHG

## 2023-01-28 LAB
GLUCOSE SERPL-MCNC: 165 MG/DL (ref 65–140)
GLUCOSE SERPL-MCNC: 196 MG/DL (ref 65–140)

## 2023-01-28 RX ORDER — AMLODIPINE BESYLATE 10 MG/1
10 TABLET ORAL DAILY
Qty: 30 TABLET | Refills: 0 | Status: SHIPPED | OUTPATIENT
Start: 2023-01-29 | End: 2023-02-28

## 2023-01-28 RX ADMIN — PYRIDOSTIGMINE BROMIDE 60 MG: 60 TABLET ORAL at 09:13

## 2023-01-28 RX ADMIN — INSULIN LISPRO 1 UNITS: 100 INJECTION, SOLUTION INTRAVENOUS; SUBCUTANEOUS at 09:00

## 2023-01-28 RX ADMIN — INSULIN LISPRO 1 UNITS: 100 INJECTION, SOLUTION INTRAVENOUS; SUBCUTANEOUS at 12:12

## 2023-01-28 RX ADMIN — SUCRALFATE 1 G: 1 TABLET ORAL at 12:14

## 2023-01-28 RX ADMIN — AMLODIPINE BESYLATE 10 MG: 10 TABLET ORAL at 09:14

## 2023-01-28 RX ADMIN — ISOSORBIDE MONONITRATE 60 MG: 60 TABLET, EXTENDED RELEASE ORAL at 09:14

## 2023-01-28 RX ADMIN — FLUOXETINE 40 MG: 20 CAPSULE ORAL at 09:13

## 2023-01-28 RX ADMIN — AMIODARONE HYDROCHLORIDE 200 MG: 200 TABLET ORAL at 09:13

## 2023-01-28 RX ADMIN — SUCRALFATE 1 G: 1 TABLET ORAL at 05:27

## 2023-01-28 RX ADMIN — LEVOTHYROXINE SODIUM 88 MCG: 88 TABLET ORAL at 05:27

## 2023-01-28 RX ADMIN — APIXABAN 2.5 MG: 2.5 TABLET, FILM COATED ORAL at 06:17

## 2023-01-28 RX ADMIN — PANTOPRAZOLE SODIUM 20 MG: 20 TABLET, DELAYED RELEASE ORAL at 07:48

## 2023-01-28 RX ADMIN — FAMOTIDINE 20 MG: 20 TABLET ORAL at 09:13

## 2023-01-28 RX ADMIN — PRAVASTATIN SODIUM 20 MG: 20 TABLET ORAL at 09:14

## 2023-01-28 NOTE — ASSESSMENT & PLAN NOTE
Presented with altered mental status  Improved  Patient with suprapubic catheter, likely with urinary retention  S/p suprapubic catheter exchange with urology  Optimize metabolic parameters  Monitor closely  Avoid neurotoxins

## 2023-01-28 NOTE — DISCHARGE SUMMARY
[facility]  Discharge- Silvio Pont 6/28/1927, 80 y o  male MRN: [id number]  Unit/Bed#: W -01 Encounter: [id number]  Primary Care Provider: Jai Reid MD   Date and time admitted to hospital: 1/25/2023 84:34 AM    * Metabolic encephalopathy  Assessment & Plan  Presented with altered mental status  Improved  Patient with suprapubic catheter, likely with urinary retention  S/p suprapubic catheter exchange with urology  Optimize metabolic parameters  Monitor closely  Avoid neurotoxins    Hypertensive urgency  Assessment & Plan  · Present on admission  · Improving  · Monitor blood pressures  · Avoid hypotension    Asymptomatic bacteriuria  Assessment & Plan  · Likely asymptomatic bacteriuria  · Received empirical IV ceftriaxone  · Urine cultures noted to be positive for Pseudomonas  · Infectious disease inputs noted  · Monitor off of antibiotics      Ambulatory dysfunction  Assessment & Plan  Multifactorial  Safe ambulation  Fall precautions    CKD (chronic kidney disease)  Assessment & Plan  Lab Results   Component Value Date    EGFR 56 01/27/2023    EGFR 60 01/26/2023    EGFR 55 01/25/2023    CREATININE 1 10 01/27/2023    CREATININE 1 04 01/26/2023    CREATININE 1 12 01/25/2023     · Monitor kidney function closely    Type 2 diabetes mellitus, without long-term current use of insulin [facility])  Assessment & Plan  Lab Results   Component Value Date    HGBA1C 7 2 (H) 12/12/2022       Recent Labs     01/27/23  1607 01/27/23  2115 01/28/23  0754 01/28/23  1131   POCGLU 196* 190* 165* 196*       Blood Sugar Average: Last 72 hrs:  (P) 189     Resume home regimen of saxagliptin at discharge    Diastolic dysfunction  Assessment & Plan  2D echo EF 63%, grade 2 diastolic function  Monitor I's/O, daily weights    CAD (coronary artery disease)  Assessment & Plan  · Reported CAD  · Continue Imdur and statin     · Not on aspirin as he is on [facility] with Eliquis and not a BB given h/o bradycardia  Myasthenia gravis ([facility] [address] )  Assessment & Plan  · Continue pyridostigmine 60 TID     Chronic a-fib (HCC)  Assessment & Plan  · Continue amiodarone  · Eliquis for anticoagulation            Discharge Summary - [facility]    Patient Information: Jasmin Dumont 80 y o  male MRN: [id number]  Unit/Bed#: W -01 Encounter: [id number]    Discharging Physician / Practitioner: Mariam Jordan MD  PCP: Naeem Florian MD  Admission Date: 1/25/2023  Discharge Date: 01/28/23    Disposition:     Home    Reason for Admission: Lethargy, leakage around suprapubic catheter site    Discharge Diagnoses:     Principal Problem:    Metabolic encephalopathy  Active Problems:    Chronic a-fib (HCC)    Myasthenia gravis ([facility])    CAD (coronary artery disease)    Diastolic dysfunction    Type 2 diabetes mellitus, without long-term current use of insulin ([facility])    CKD (chronic kidney disease)    Ambulatory dysfunction    Problem with urinary catheter ([facility] [address] )    Asymptomatic bacteriuria    Hypertensive urgency  Resolved Problems:    * No resolved hospital problems  *      Consultations During Hospital Stay:    Infectious disease      Procedures Performed:     · CT head no acute abnormality  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        Hospital Course: Jasmin Dumont is a 80 y o  male patient who originally presented to the hospital on 1/25/2023 due to lethargy, leakage around the suprapubic catheter  Patient was admitted, he was seen in consultation with infectious disease  He underwent suprapubic tube exchange with urology  Initially received IV antibiotics  Per infectious disease likely asymptomatic bacteriuria, he was monitored off of antibiotics  He is hemodynamically stable symptomatically improved and is deemed ready for discharge today  Outpatient follow-up with urology, primary care physician    Kindly review the chart for details  Condition at Discharge: fair     Discharge Day Visit / Exam:     Subjective:      Comfortably in bed  Reports feeling okay  Agreeable to discharge plan    Vitals: Blood Pressure: 162/78 (01/28/23 0829)  Pulse: 70 (01/27/23 2210)  Temperature: 98 1 °F (36 7 °C) (01/27/23 2210)  Temp Source: Oral (01/27/23 1544)  Respirations: 18 (01/27/23 1544)  Weight - Scale: 64 kg (141 lb 1 5 oz) (01/25/23 0124)  SpO2: 97 % (01/27/23 2210)  Exam:   Physical Exam    Comfortably in bed  Features of protein calorie malnutrition noted  Neck supple  Lungs diminished breath sounds bilateral  Heart sounds S1-S2 noted  Abdomen soft nontender  Awake obey simple commands  No edema  No rash    Discharge instructions/Information to patient and family:   See after visit summary for information provided to patient and family  Discharge plan discussed with infectious disease  Discharge plan discussed with urology  Discharge plan discussed with the patient, updated son John Borrero and left a message for daughter Heydi Serrano follow-up with urology, primary care physician    Provisions for Follow-Up Care:  See after visit summary for information related to follow-up care and any pertinent home health orders  Planned Readmission: no     Discharge Statement:  I spent 45 minutes discharging the patient  This time was spent on the day of discharge  I had direct contact with the patient on the day of discharge  Greater than 50% of the total time was spent examining patient, answering all patient questions, arranging and discussing plan of care with patient as well as directly providing post-discharge instructions  Additional time then spent on discharge activities  Discharge Medications:  See after visit summary for reconciled discharge medications provided to patient and family        ** Please Note: This note has been constructed using a voice recognition system **

## 2023-01-28 NOTE — ASSESSMENT & PLAN NOTE
· Reported CAD  · Continue Imdur and statin  · Not on aspirin as he is on [facility] with Eliquis and not a BB given h/o bradycardia

## 2023-01-28 NOTE — CONSULTS
Consult Note - Wound   Charis Silence 80 y o  male MRN: [id number]  Unit/Bed#: W -01 Encounter: [id number]      Assessment:   Virtual wound care nurse consult for buttocks/sacrum  Image reviewed  On left buttock is raised, hypertrophic skin  Skin is intact  Wound/Skin Care Plan:    1  Apply sacral bordered foam dressing to sacrum  Change every 3 days and as needed  2  Pressure redistribution cushion to chair  3  Offload heels from mattress surface  4  Moisturize skin daily  Wound care nurse will sign off  Please re-consult if needed

## 2023-01-30 ENCOUNTER — NURSE TRIAGE (OUTPATIENT)
Dept: OTHER | Facility: OTHER | Age: 88
End: 2023-01-30

## 2023-01-30 ENCOUNTER — HOME CARE VISIT (OUTPATIENT)
Dept: HOME HEALTH SERVICES | Facility: HOME HEALTHCARE | Age: 88
End: 2023-01-30

## 2023-01-30 LAB
BACTERIA BLD CULT: NORMAL
BACTERIA BLD CULT: NORMAL

## 2023-01-30 NOTE — UTILIZATION REVIEW
NOTIFICATION OF ADMISSION DISCHARGE   This is a Notification of Discharge from [address]  Please be advised that this patient has been discharge from our facility  Below you will find the admission and discharge date and time including the patient’s disposition  UTILIZATION REVIEW CONTACT:  Pato Zuniga  Utilization   Network Utilization Review Department  Phone: [phone] x carefully listen to the prompts  All voicemails are confidential   Email: [email or web address]     ADMISSION INFORMATION  PRESENTATION DATE: 1/25/2023 12:16 AM  OBERVATION ADMISSION DATE:   INPATIENT ADMISSION DATE: 1/25/23  1:54 PM   DISCHARGE DATE: 1/28/2023  4:01 PM   DISPOSITION:Home/Self Care    IMPORTANT INFORMATION:  Send all requests for admission clinical reviews, approved or denied determinations and any other requests to dedicated fax number below belonging to the campus where the patient is receiving treatment   List of dedicated fax numbers:  [address] [place] [address] DENIALS (Administrative/Medical Necessity) [phone]   [address] (Maternity/NICU/Pediatrics) [phone]   [facility] [phone]   [place] [address] [phone]   [address] [phone]   220 [facility] [phone]   [address] [facility] [phone]   [address] [place], [place] [address] [address] [phone]   [facility]  [phone]   405 [facility] [phone]   [address] [facility] [address] [facility] [phone]

## 2023-01-30 NOTE — TELEPHONE ENCOUNTER
Patient son state that the visiting nurse came to the patients home   The urine is flowing through now and is draining without any issues  Patient has [facility] was there today and will be back again on Thursday  Son thinks they may have changed not sure  Patient was scheduled for 1st spt change on 1/25/23 but was in the hospital   Advised patient son that we would contact them for next fu appt  Will  Need to have RN follow up with [facility] in the morning to see if the patient did have spt changed or if they flushed   Spoke with [facility] they did change to SPT yesterday   SPT was changed in Ed on 1/25/23 by provider  They do need orders to change and maintain   #18F /10 cc balloon/ [place] tip Non - Latex silicone       Fax number #1- [phone]

## 2023-01-30 NOTE — TELEPHONE ENCOUNTER
Regarding: catheter issues  ----- Message from [facility] sent at 1/30/2023 10:03 AM EST -----  "My dad's urine is not going through his catheter and it looks like blood is in his urine "

## 2023-01-30 NOTE — TELEPHONE ENCOUNTER
Gin's son, Vianey Bowles called in to report leaking around catheter, small output in catheter bag with little blood in urine  Patient had same symptoms on 1/24 and was taken to [facility] ED; patient was admitted and discharged on 1/28  Hoyt was changed while hospitalized  Same symptoms started again  Patient needs to be seen in office  Please follow up with Claudell Idler  Reason for Disposition  • No urine in bag for > 4 hours and catheter is not kinked    Answer Assessment - Initial Assessment Questions  1  SYMPTOMS: "What symptoms are you concerned about?"      Hospitalized last week; leaking around the catheter; very little urine in bag; little blood in urine  2  ONSET:  "When did the symptoms start?"      1/24/22 PM and taken to [facility]; catheter changed while hospitalized 1/24-1/28/23  3  FEVER: "Is there a fever?" If Yes, ask: "What is the temperature, how was it measured, and when did it start?"      Denies  4  ABDOMINAL PAIN: "Is there any abdominal pain?" (e g , Scale 1-10; or mild, moderate, severe)      Denies  5  URINE COLOR: "What color is the urine?"  "Is there blood present in the urine?" (e g , clear, yellow, cloudy, tea-colored, blood streaks, bright red)      Darker yellow with red  6  ONSET: "When was the catheter inserted?"     Last week in hospital  7   OTHER SYMPTOMS: "Do you have any other symptoms?" (e g , back pain, bad urine odor)       Denies back pain secondary to spinal stenosis    Protocols used: URINARY CATHETER SYMPTOMS AND QUESTIONS-ADULT-OH

## 2023-01-30 NOTE — TELEPHONE ENCOUNTER
Gin's son, Peterson Conklin called in to report leaking around catheter, small output in catheter bag with little blood in urine  Patient had same symptoms on 1/24 and was taken to [facility] ED; patient was admitted and discharged on 1/28  Hoyt was changed while hospitalized  Same symptoms started again  Patient needs to be seen in office  Please follow up with Esteban Negrete      Reason for Disposition  • No urine in bag for > 4 hours and catheter is not kinked    Answer Assessment - Initial Assessment Questions  1  SYMPTOMS: "What symptoms are you concerned about?"      Hospitalized last week; leaking around the catheter; very little urine in bag; little blood in urine  2  ONSET:  "When did the symptoms start?"      1/24/22 PM and taken to [facility]; catheter changed while hospitalized 1/24-1/28/23  3  FEVER: "Is there a fever?" If Yes, ask: "What is the temperature, how was it measured, and when did it start?"      Denies  4  ABDOMINAL PAIN: "Is there any abdominal pain?" (e g , Scale 1-10; or mild, moderate, severe)      Denies  5  URINE COLOR: "What color is the urine?"  "Is there blood present in the urine?" (e g , clear, yellow, cloudy, tea-colored, blood streaks, bright red)      Darker yellow with red  6  ONSET: "When was the catheter inserted?"     Last week in hospital  7   OTHER SYMPTOMS: "Do you have any other symptoms?" (e g , back pain, bad urine odor)       Denies back pain secondary to spinal stenosis    Protocols used: URINARY CATHETER SYMPTOMS AND QUESTIONS-ADULT-[place]            Note         MIRIAN Davalos Chris 7 hours ago (10:06 AM)     Andreas Meneses RN 7 hours ago (10:04 AM)     Regarding: catheter issues  ----- Message from [facility] sent at 1/30/2023 10:03 AM EST -----  "My dad's urine is not going through his catheter and it looks like blood is in his urine "

## 2023-01-31 VITALS
DIASTOLIC BLOOD PRESSURE: 62 MMHG | TEMPERATURE: 97.9 F | OXYGEN SATURATION: 97 % | HEART RATE: 59 BPM | SYSTOLIC BLOOD PRESSURE: 136 MMHG | RESPIRATION RATE: 16 BRPM

## 2023-01-31 NOTE — TELEPHONE ENCOUNTER
· Routine Change , every 4 weeks, [facility],#66R non- latex silicone coated  10 mL balloon  · Routine catheter care  · May irrigate with 60 mL Normal strength saline for clog, sediment, hematuria  · May change catheter PRN for clog or dislodged catheter  · Call the office with any urological concerns  Next routine catheter change due approximately 2/27/23    [facility] contacted about orders

## 2023-02-01 ENCOUNTER — HOME CARE VISIT (OUTPATIENT)
Dept: HOME HEALTH SERVICES | Facility: HOME HEALTHCARE | Age: 88
End: 2023-02-01

## 2023-02-02 ENCOUNTER — HOME CARE VISIT (OUTPATIENT)
Dept: HOME HEALTH SERVICES | Facility: HOME HEALTHCARE | Age: 88
End: 2023-02-02

## 2023-02-02 VITALS
OXYGEN SATURATION: 95 % | DIASTOLIC BLOOD PRESSURE: 68 MMHG | TEMPERATURE: 97.8 F | HEART RATE: 65 BPM | SYSTOLIC BLOOD PRESSURE: 148 MMHG | RESPIRATION RATE: 18 BRPM

## 2023-02-02 NOTE — CASE COMMUNICATION
Faxed Dr Cazares Masters regarding the delay in initiating OT within 7 days  Anticipated date to begin is 2/10/23 or sooner  OT to contact the patient to scheduel the visit

## 2023-02-03 ENCOUNTER — HOME CARE VISIT (OUTPATIENT)
Dept: HOME HEALTH SERVICES | Facility: HOME HEALTHCARE | Age: 88
End: 2023-02-03

## 2023-02-03 VITALS — DIASTOLIC BLOOD PRESSURE: 73 MMHG | SYSTOLIC BLOOD PRESSURE: 132 MMHG | HEART RATE: 68 BPM

## 2023-02-05 ENCOUNTER — HOME CARE VISIT (OUTPATIENT)
Dept: HOME HEALTH SERVICES | Facility: HOME HEALTHCARE | Age: 88
End: 2023-02-05

## 2023-02-05 NOTE — CASE COMMUNICATION
Received tc from Gilmar Salcido, patients son, states he got a message from the nurse coming tomorrow and wanted to call in to notify that the time she wanted to come will work

## 2023-02-06 ENCOUNTER — HOME CARE VISIT (OUTPATIENT)
Dept: HOME HEALTH SERVICES | Facility: HOME HEALTHCARE | Age: 88
End: 2023-02-06

## 2023-02-06 VITALS
TEMPERATURE: 97.3 F | OXYGEN SATURATION: 97 % | HEART RATE: 60 BPM | DIASTOLIC BLOOD PRESSURE: 74 MMHG | SYSTOLIC BLOOD PRESSURE: 132 MMHG

## 2023-02-07 ENCOUNTER — HOME CARE VISIT (OUTPATIENT)
Dept: HOME HEALTH SERVICES | Facility: HOME HEALTHCARE | Age: 88
End: 2023-02-07

## 2023-02-07 VITALS — DIASTOLIC BLOOD PRESSURE: 80 MMHG | SYSTOLIC BLOOD PRESSURE: 132 MMHG | HEART RATE: 99 BPM | OXYGEN SATURATION: 95 %

## 2023-02-07 NOTE — HOME HEALTH
Pt reports having a rough morning  He has been having loose bowels and feeling very tired as a result

## 2023-02-09 ENCOUNTER — HOME CARE VISIT (OUTPATIENT)
Dept: HOME HEALTH SERVICES | Facility: HOME HEALTHCARE | Age: 88
End: 2023-02-09

## 2023-02-09 VITALS
SYSTOLIC BLOOD PRESSURE: 140 MMHG | TEMPERATURE: 97.7 F | DIASTOLIC BLOOD PRESSURE: 70 MMHG | HEART RATE: 64 BPM | RESPIRATION RATE: 18 BRPM | OXYGEN SATURATION: 97 %

## 2023-02-09 VITALS — SYSTOLIC BLOOD PRESSURE: 132 MMHG | DIASTOLIC BLOOD PRESSURE: 78 MMHG | OXYGEN SATURATION: 97 % | HEART RATE: 63 BPM

## 2023-02-09 NOTE — HOME HEALTH
Pt's son present during start of treatment  Reports pt buttock wounds being slightly worse since Monday  Sx of diarrhea have subsided  Pt currently very sedentery  Pt offers complaints of chronic LBP and abdominal pain from hernia  PTA discussed the importance of getting up from couch every 1 to 2 hours and weight shifting in order to relieve pressure on buttock wounds

## 2023-02-13 ENCOUNTER — HOME CARE VISIT (OUTPATIENT)
Dept: HOME HEALTH SERVICES | Facility: HOME HEALTHCARE | Age: 88
End: 2023-02-13

## 2023-02-13 VITALS
SYSTOLIC BLOOD PRESSURE: 150 MMHG | HEART RATE: 64 BPM | TEMPERATURE: 97.1 F | DIASTOLIC BLOOD PRESSURE: 68 MMHG | OXYGEN SATURATION: 97 %

## 2023-02-14 ENCOUNTER — HOME CARE VISIT (OUTPATIENT)
Dept: HOME HEALTH SERVICES | Facility: HOME HEALTHCARE | Age: 88
End: 2023-02-14

## 2023-02-15 ENCOUNTER — OFFICE VISIT (OUTPATIENT)
Dept: INTERNAL MEDICINE CLINIC | Facility: OTHER | Age: 88
End: 2023-02-15

## 2023-02-15 VITALS
HEART RATE: 71 BPM | DIASTOLIC BLOOD PRESSURE: 58 MMHG | SYSTOLIC BLOOD PRESSURE: 104 MMHG | OXYGEN SATURATION: 97 % | TEMPERATURE: 98.3 F | BODY MASS INDEX: 20.34 KG/M2 | RESPIRATION RATE: 18 BRPM | WEIGHT: 134.2 LBS | HEIGHT: 68 IN

## 2023-02-15 VITALS — SYSTOLIC BLOOD PRESSURE: 122 MMHG | DIASTOLIC BLOOD PRESSURE: 63 MMHG | OXYGEN SATURATION: 96 % | HEART RATE: 70 BPM

## 2023-02-15 DIAGNOSIS — M46.1 SACROILIITIS (HCC): ICD-10-CM

## 2023-02-15 DIAGNOSIS — L89.151 PRESSURE ULCER OF SACRAL REGION, STAGE 1: ICD-10-CM

## 2023-02-15 DIAGNOSIS — K44.9 DIAPHRAGMATIC HERNIA WITHOUT OBSTRUCTION AND WITHOUT GANGRENE: ICD-10-CM

## 2023-02-15 DIAGNOSIS — G70.00 MYASTHENIA GRAVIS (HCC): Chronic | ICD-10-CM

## 2023-02-15 DIAGNOSIS — I50.32 CHRONIC DIASTOLIC CONGESTIVE HEART FAILURE (HCC): ICD-10-CM

## 2023-02-15 DIAGNOSIS — L89.303 PRESSURE INJURY OF BUTTOCK, STAGE 3, UNSPECIFIED LATERALITY (HCC): ICD-10-CM

## 2023-02-15 DIAGNOSIS — I25.10 CORONARY ARTERY DISEASE INVOLVING NATIVE CORONARY ARTERY OF NATIVE HEART WITHOUT ANGINA PECTORIS: Chronic | ICD-10-CM

## 2023-02-15 DIAGNOSIS — C61 CARCINOMA OF PROSTATE (HCC): ICD-10-CM

## 2023-02-15 DIAGNOSIS — I25.118 ATHEROSCLEROSIS OF NATIVE CORONARY ARTERY OF NATIVE HEART WITH STABLE ANGINA PECTORIS (HCC): ICD-10-CM

## 2023-02-15 DIAGNOSIS — F11.20 CONTINUOUS OPIOID DEPENDENCE (HCC): ICD-10-CM

## 2023-02-15 DIAGNOSIS — E11.65 TYPE 2 DIABETES MELLITUS WITH HYPERGLYCEMIA, WITHOUT LONG-TERM CURRENT USE OF INSULIN (HCC): Primary | ICD-10-CM

## 2023-02-15 DIAGNOSIS — I35.0 NONRHEUMATIC AORTIC VALVE STENOSIS: ICD-10-CM

## 2023-02-15 DIAGNOSIS — K29.30 CHRONIC SUPERFICIAL GASTRITIS WITHOUT BLEEDING: ICD-10-CM

## 2023-02-15 DIAGNOSIS — K21.9 GASTROESOPHAGEAL REFLUX DISEASE WITHOUT ESOPHAGITIS: ICD-10-CM

## 2023-02-15 DIAGNOSIS — F32.4 MAJOR DEPRESSIVE DISORDER WITH SINGLE EPISODE, IN PARTIAL REMISSION (HCC): ICD-10-CM

## 2023-02-15 DIAGNOSIS — E78.2 MIXED HYPERLIPIDEMIA: ICD-10-CM

## 2023-02-15 DIAGNOSIS — R11.0 NAUSEA: ICD-10-CM

## 2023-02-15 DIAGNOSIS — Z93.59 SUPRAPUBIC CATHETER (HCC): ICD-10-CM

## 2023-02-15 DIAGNOSIS — R60.0 BILATERAL LOWER EXTREMITY EDEMA: ICD-10-CM

## 2023-02-15 DIAGNOSIS — I51.89 DIASTOLIC DYSFUNCTION: ICD-10-CM

## 2023-02-15 DIAGNOSIS — E44.0 MODERATE PROTEIN-CALORIE MALNUTRITION (HCC): ICD-10-CM

## 2023-02-15 DIAGNOSIS — I48.20 CHRONIC A-FIB (HCC): Chronic | ICD-10-CM

## 2023-02-15 DIAGNOSIS — N18.30 STAGE 3 CHRONIC KIDNEY DISEASE, UNSPECIFIED WHETHER STAGE 3A OR 3B CKD (HCC): ICD-10-CM

## 2023-02-15 PROBLEM — G93.41 METABOLIC ENCEPHALOPATHY: Status: RESOLVED | Noted: 2023-01-25 | Resolved: 2023-02-15

## 2023-02-15 PROBLEM — M40.204 KYPHOSIS OF THORACIC REGION: Status: ACTIVE | Noted: 2021-03-11

## 2023-02-15 PROBLEM — R82.71 ASYMPTOMATIC BACTERIURIA: Status: RESOLVED | Noted: 2023-01-25 | Resolved: 2023-02-15

## 2023-02-15 PROBLEM — I16.0 HYPERTENSIVE URGENCY: Status: RESOLVED | Noted: 2023-01-25 | Resolved: 2023-02-15

## 2023-02-15 PROBLEM — E03.2 DRUG-INDUCED HYPOTHYROIDISM: Status: ACTIVE | Noted: 2019-04-07

## 2023-02-15 PROBLEM — T83.9XXA PROBLEM WITH URINARY CATHETER (HCC): Status: RESOLVED | Noted: 2023-01-25 | Resolved: 2023-02-15

## 2023-02-15 PROBLEM — E03.2 DRUG-INDUCED HYPOTHYROIDISM: Status: RESOLVED | Noted: 2019-04-07 | Resolved: 2023-02-15

## 2023-02-15 PROBLEM — E55.9 VITAMIN D DEFICIENCY: Status: ACTIVE | Noted: 2021-03-11

## 2023-02-15 PROBLEM — N30.01 ACUTE CYSTITIS WITH HEMATURIA: Status: RESOLVED | Noted: 2022-12-11 | Resolved: 2023-02-15

## 2023-02-15 RX ORDER — SUCRALFATE ORAL 1 G/10ML
1 SUSPENSION ORAL
COMMUNITY
Start: 2023-02-02 | End: 2023-03-04

## 2023-02-15 RX ORDER — FLUOXETINE HYDROCHLORIDE 40 MG/1
40 CAPSULE ORAL DAILY
Qty: 90 CAPSULE | Refills: 1 | Status: SHIPPED | OUTPATIENT
Start: 2023-02-15

## 2023-02-15 RX ORDER — PRAVASTATIN SODIUM 20 MG
20 TABLET ORAL DAILY
Qty: 90 TABLET | Refills: 1 | Status: SHIPPED | OUTPATIENT
Start: 2023-02-15

## 2023-02-15 RX ORDER — ONDANSETRON 4 MG/1
4 TABLET, FILM COATED ORAL EVERY 8 HOURS PRN
Qty: 20 TABLET | Refills: 0 | Status: SHIPPED | OUTPATIENT
Start: 2023-02-15

## 2023-02-15 RX ORDER — ISOSORBIDE MONONITRATE 60 MG/1
60 TABLET, EXTENDED RELEASE ORAL DAILY
Qty: 90 TABLET | Refills: 1 | Status: SHIPPED | OUTPATIENT
Start: 2023-02-15

## 2023-02-15 RX ORDER — LANCETS 23 GAUGE
EACH MISCELLANEOUS DAILY
Qty: 100 EACH | Refills: 5 | Status: SHIPPED | OUTPATIENT
Start: 2023-02-15

## 2023-02-15 NOTE — ASSESSMENT & PLAN NOTE
Stable, controlled  Continue Pepcid 20 mg twice a day, pantoprazole 20 mg twice a day, and Carafate as needed

## 2023-02-15 NOTE — ASSESSMENT & PLAN NOTE
Lab Results   Component Value Date    EGFR 56 01/27/2023    EGFR 60 01/26/2023    EGFR 55 01/25/2023    CREATININE 1 10 01/27/2023    CREATININE 1 04 01/26/2023    CREATININE 1 12 01/25/2023   Continue to trend kidney function  Avoid nephrotoxic agents

## 2023-02-15 NOTE — ASSESSMENT & PLAN NOTE
Wt Readings from Last 3 Encounters:   02/15/23 60 9 kg (134 lb 3 2 oz)   01/25/23 64 kg (141 lb 1 5 oz)   01/03/23 63 5 kg (140 lb)     Euvolemic on examination today  Continue Imdur 60 mg daily and Lasix 20 mg 5 days a week  Will refer to cardiology

## 2023-02-15 NOTE — PROGRESS NOTES
Assessment/Plan:    Suprapubic catheter ([address] )  Continue to monitor urinary output and follow-up with urology  Pressure ulcer of sacral region, stage 1  Continue wound care and dressing changes with home health nurse  Chronic gastritis  Stable, controlled  Continue Pepcid 20 mg twice a day, pantoprazole 20 mg twice a day, and Carafate as needed  Type 2 diabetes mellitus, without long-term current use of insulin (Formerly [facility])  Controlled  Continue Onglyza  Lab Results   Component Value Date    HGBA1C 7 2 (H) 12/12/2022       Chronic diastolic congestive heart failure (Formerly [facility])  Wt Readings from Last 3 Encounters:   02/15/23 60 9 kg (134 lb 3 2 oz)   01/25/23 64 kg (141 lb 1 5 oz)   01/03/23 63 5 kg (140 lb)     Euvolemic on examination today  Continue Imdur 60 mg daily and Lasix 20 mg 5 days a week  Will refer to cardiology  Chronic a-fib (Formerly [facility])  Continue Eliquis anticoagulation and amiodarone 20 mg daily  Will refer to cardiology  Myasthenia gravis ([facility])  Continue pyridostigmine 60 mg 3 times a day  Will refer to neurology  CKD (chronic kidney disease)  Lab Results   Component Value Date    EGFR 56 01/27/2023    EGFR 60 01/26/2023    EGFR 55 01/25/2023    CREATININE 1 10 01/27/2023    CREATININE 1 04 01/26/2023    CREATININE 1 12 01/25/2023   Continue to trend kidney function  Avoid nephrotoxic agents  Depression  Stable, controlled  Continue Prozac 40 mg daily  Diagnoses and all orders for this visit:    Type 2 diabetes mellitus with hyperglycemia, without long-term current use of insulin (Former[facility])  -     CBC and differential; Future  -     Comprehensive metabolic panel; Future  -     Hemoglobin A1C; Future  -     Lipid panel; Future  -     Microalbumin / creatinine urine ratio; Future  -     Lancets 28G MISC; Use in the morning  -     glucose blood test strip; Use 1 each in the morning Use as instructed    Chronic a-fib (Former[facility])  -     Ambulatory Referral to Cardiology;  Future    Coronary artery disease involving native coronary artery of native heart without angina pectoris  -     Ambulatory Referral to Cardiology; Future    Myasthenia gravis [facility])  -     Ambulatory Referral to Neurology; Future    Stage 3 chronic kidney disease, unspecified whether stage 3a or 3b CKD (HCC)    Suprapubic catheter (HCC)    Gastroesophageal reflux disease without esophagitis    Chronic superficial gastritis without bleeding    Diaphragmatic hernia without obstruction and without gangrene    Nonrheumatic aortic valve stenosis    Pressure ulcer of sacral region, stage 1    Major depressive disorder with single episode, in partial remission (HCC)  -     FLUoxetine (PROzac) 40 MG capsule; Take 1 capsule (40 mg total) by mouth daily    Diastolic dysfunction  -     Ambulatory Referral to Cardiology; Future  -     isosorbide mononitrate (IMDUR) 60 mg 24 hr tablet; Take 1 tablet (60 mg total) by mouth daily    Mixed hyperlipidemia  -     pravastatin (PRAVACHOL) 20 mg tablet; Take 1 tablet (20 mg total) by mouth daily    Chronic diastolic congestive heart failure (HCC)    Continuous opioid dependence (HCC)    Pressure injury of buttock, stage 3, unspecified laterality (HCC)    Moderate protein-calorie malnutrition (HCC)    Sacroiliitis (HCC)    Carcinoma of prostate (HCC)    Atherosclerosis of native coronary artery of native heart with stable angina pectoris (HCC)    Nausea  -     ondansetron (ZOFRAN) 4 mg tablet; Take 1 tablet (4 mg total) by mouth every 8 (eight) hours as needed for nausea or vomiting    Bilateral lower extremity edema    Other orders  -     sucralfate (CARAFATE) 1 g/10 mL suspension; Take 1 g by mouth                Subjective:      Patient ID: Ginger Rush is a 80 y o  male      Chief Complaint   Patient presents with   • Establish Care   •      AWV due,  foot exam and eye exam ( exempt due to age )   • wounds     2 wounds on buttocks that visiting nurse monitors       80year-old male seen today to establish care  He has a past medical history of hypertension, diastolic heart failure, atrial fibrillation, coronary artery disease, myasthenia gravis, sacroiliitis, lumbar foraminal stenosis, anxiety, depression, hyperlipidemia, history of prostate cancer status post prostatectomy, GERD, chronic gastritis, hiatal hernia, diabetes mellitus, CKD stage III, suprapubic catheter, and a stage I pressure ulcer  He recently moved from [place]  He was hospitalized a few times over the past 6 months  He follows up with his urologist regularly  A suprapubic catheter is in place and fully functioning  He has been receiving home health care with a visiting nurse who has been providing care for his stage I pressure ulcer of bilateral buttocks  Diabetes  He presents for his initial diabetic visit  He has type 2 diabetes mellitus  His disease course has been stable  There are no hypoglycemic associated symptoms  Pertinent negatives for hypoglycemia include no dizziness or headaches  There are no diabetic associated symptoms  Pertinent negatives for diabetes include no chest pain, no fatigue and no weakness  There are no hypoglycemic complications  Symptoms are stable  Diabetic complications include heart disease and nephropathy  Current diabetic treatment includes diet and oral agent (monotherapy)  He is compliant with treatment all of the time  An ACE inhibitor/angiotensin II receptor blocker is not being taken  Hyperlipidemia  This is a chronic problem  The current episode started more than 1 year ago  The problem is controlled  Recent lipid tests were reviewed and are normal  Pertinent negatives include no chest pain or shortness of breath  Current antihyperlipidemic treatment includes statins  The current treatment provides moderate improvement of lipids  There are no compliance problems      Heartburn  He reports no abdominal pain, no chest pain, no choking, no coughing, no nausea, no sore throat or no wheezing  This is a chronic problem  The current episode started more than 1 year ago  The problem occurs occasionally  The problem has been unchanged  The symptoms are aggravated by certain foods  Pertinent negatives include no fatigue  He has tried a PPI and a histamine-2 antagonist for the symptoms  The treatment provided moderate relief  The following portions of the patient's history were reviewed and updated as appropriate: allergies, current medications, past family history, past medical history, past social history, past surgical history and problem list     Review of Systems   Constitutional: Negative for activity change, appetite change, chills, diaphoresis, fatigue and fever  HENT: Negative for congestion, postnasal drip, rhinorrhea, sinus pressure, sinus pain, sneezing and sore throat  Eyes: Negative for visual disturbance  Respiratory: Negative for apnea, cough, choking, chest tightness, shortness of breath and wheezing  Cardiovascular: Negative for chest pain, palpitations and leg swelling  Gastrointestinal: Negative for abdominal distention, abdominal pain, anal bleeding, blood in stool, constipation, diarrhea, nausea and vomiting  Endocrine: Negative for cold intolerance and heat intolerance  Genitourinary: Negative for difficulty urinating, dysuria and hematuria  Musculoskeletal: Negative  Skin: Negative  Neurological: Negative for dizziness, weakness, light-headedness, numbness and headaches  Hematological: Negative for adenopathy  Psychiatric/Behavioral: Negative for agitation, sleep disturbance and suicidal ideas  All other systems reviewed and are negative          Past Medical History:   Diagnosis Date   • Anxiety    • Arthritis    • Coronary artery disease    • 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    Last Assessment & Plan:  Formatting of this note might be different from the original  - may be related to amidoarone use -Taken off levothyroxinein 02/2020, then developed overt hypothyroidism -Labs from July 2020 show TSH of 61 and free T4 of 0 71 -Patient currently on levothyroxine 88 mcg -Labs from 12/11/20 TSH 1 4 and Free T4 1 4 -Continue with same dose -We will repeat TFTs today   • Hiatal hernia    • Hypertension    • Irregular heart beat    • Myasthenia gravis (HCC)          Current Outpatient Medications:   •  acetaminophen (TYLENOL) 325 mg tablet, Take 2 tablets (650 mg total) by mouth every 6 (six) hours as needed for mild pain, Disp: , Rfl: 0  •  acetaminophen-codeine (TYLENOL #3) 300-30 mg per tablet, Take 1 tablet by mouth every 6 (six) hours as needed for moderate pain  Indications: Mild to Moderate Pain, Disp: , Rfl:   •  amiodarone 200 mg tablet, Take 200 mg by mouth daily  , Disp: , Rfl:   •  apixaban (ELIQUIS) 2 5 mg, Take 1 tablet by mouth every 12 (twelve) hours, Disp: , Rfl:   •  dicyclomine (BENTYL) 20 mg tablet, Take 1 tablet (20 mg total) by mouth 2 (two) times a day as needed (cramping), Disp: , Rfl: 0  •  famotidine (PEPCID) 20 mg tablet, Take 1 tablet by mouth 2 (two) times a day, Disp: , Rfl:   •  FLUoxetine (PROzac) 40 MG capsule, Take 1 capsule (40 mg total) by mouth daily, Disp: 90 capsule, Rfl: 1  •  furosemide (LASIX) 20 mg tablet, Take 20 mg by mouth see administration instructions Monday through Friday, Disp: , Rfl:   •  glucose blood test strip, Use 1 each in the morning Use as instructed, Disp: 100 strip, Rfl: 5  •  isosorbide mononitrate (IMDUR) 60 mg 24 hr tablet, Take 1 tablet (60 mg total) by mouth daily, Disp: 90 tablet, Rfl: 1  •  Lancets 28G MISC, Use in the morning, Disp: 100 each, Rfl: 5  •  levothyroxine 88 mcg tablet, Take 88 mcg by mouth, Disp: , Rfl:   •  ondansetron (ZOFRAN) 4 mg tablet, Take 1 tablet (4 mg total) by mouth every 8 (eight) hours as needed for nausea or vomiting, Disp: 20 tablet, Rfl: 0  •  pantoprazole (PROTONIX) 40 mg tablet, Take 20 mg by mouth 2 (two) times a day   , Disp: , Rfl:   •  pravastatin (PRAVACHOL) 20 mg tablet, Take 1 tablet (20 mg total) by mouth daily, Disp: 90 tablet, Rfl: 1  •  pyridostigmine (MESTINON) 60 mg tablet, Take 60 mg by mouth 3 (three) times a day, Disp: , Rfl:   •  saxagliptin (ONGLYZA) 2 5 MG tablet, 1 tablet daily, Disp: , Rfl:   •  sucralfate (CARAFATE) 1 g/10 mL suspension, Take 1 g by mouth, Disp: , Rfl:   •  Vitamin D, Cholecalciferol, 1000 UNITS CAPS, Take 2,000 Units by mouth daily in the early morning   , Disp: , Rfl:   •  glucose monitoring kit (FREESTYLE) monitoring kit, Use 1 each as needed, Disp: , Rfl:     Allergies   Allergen Reactions   • Levofloxacin Other (See Comments) and Rash   • Sulfa Antibiotics Rash       Social History   Past Surgical History:   Procedure Laterality Date   • CYSTECTOMY, RADICAL WITH ILEOCONDUIT N/A 12/13/2022    Procedure: REMOVAL ARTIFICIAL URINARY SPINCTER BILATERAL;  Surgeon: Disha Mittal MD;  [facility];  Service: Urology   • CYSTOSCOPY N/A 12/13/2022    Procedure: Felice Rojas;  Surgeon: Disha Mittal MD;  [facility];  Service: Urology   • EYE SURGERY  2014    cataracts   • HERNIA REPAIR  1971    right inguinal hernia repair   • IR SUPRAPUBIC CATHETER PLACEMENT  12/13/2022   • ME LAPAROSCOPY COLECTOMY PARTIAL W/ANASTOMOSIS N/A 6/3/2016    Procedure: LAPAROSCOPIC SIGMOID RESECTION ;  Surgeon: Kanchan Balderas MD;  [facility];  Service: Colorectal   • ME LAPS COLECTOMY PRTL W/COLOPXTSTMY LW ANAST N/A 6/3/2016    Procedure: COLECTOMY LAPAROSCOPIC ASSISTED;  Surgeon: Kanchan Balderas MD;  [facility];  Service: Colorectal   • ME SIGMOIDOSCOPY FLX DX W/COLLJ SPEC BR/WA IF PFRMD N/A 6/3/2016    Procedure: Eulalio Gallagher;  Surgeon: Kanchan Balderas MD;  [facility];  Service: Colorectal   • PROSTATECTOMY     • REPLACEMENT TOTAL KNEE Right    • URINARY SPHINCTER IMPLANT  2001    s/p prostatectomy- pt had urinary leakage issues     Family History   Problem Relation Age of Onset   • Heart disease Mother    • Heart disease Sister    • Diabetes Brother        Objective:  /58 (BP Location: Left arm, Patient Position: Sitting, Cuff Size: Standard)   Pulse 71   Temp 98 3 °F (36 8 °C) (Temporal)   Resp 18   Ht 5' 8" (1 727 m)   Wt 60 9 kg (134 lb 3 2 oz)   SpO2 97%   BMI 20 41 kg/m²     Recent Results (from the past 1344 hour(s))   COVID/FLU/RSV    Collection Time: 12/22/22  7:16 AM    Specimen: Nose; Nares   Result Value Ref Range    SARS-CoV-2 Negative Negative    INFLUENZA A PCR Negative Negative    INFLUENZA B PCR Negative Negative    RSV PCR Negative Negative   CBC and differential    Collection Time: 12/23/22  6:45 AM   Result Value Ref Range    WBC 9 09 4 31 - 10 16 Thousand/uL    RBC 4 30 3 88 - 5 62 Million/uL    Hemoglobin 12 3 12 0 - 17 0 g/dL    Hematocrit 38 8 36 5 - 49 3 %    MCV 90 82 - 98 fL    MCH 28 6 26 8 - 34 3 pg    MCHC 31 7 31 4 - 37 4 g/dL    RDW 14 1 11 6 - 15 1 %    MPV 9 9 8 9 - 12 7 fL    Platelets 397 956 - 244 Thousands/uL    nRBC 0 /100 WBCs    Neutrophils Relative 73 43 - 75 %    Immat GRANS % 0 0 - 2 %    Lymphocytes Relative 15 14 - 44 %    Monocytes Relative 9 4 - 12 %    Eosinophils Relative 2 0 - 6 %    Basophils Relative 1 0 - 1 %    Neutrophils Absolute 6 74 1 85 - 7 62 Thousands/µL    Immature Grans Absolute 0 02 0 00 - 0 20 Thousand/uL    Lymphocytes Absolute 1 34 0 60 - 4 47 Thousands/µL    Monocytes Absolute 0 77 0 17 - 1 22 Thousand/µL    Eosinophils Absolute 0 14 0 00 - 0 61 Thousand/µL    Basophils Absolute 0 08 0 00 - 0 10 Thousands/µL   Comprehensive metabolic panel    Collection Time: 12/23/22  6:45 AM   Result Value Ref Range    Sodium 134 (L) 135 - 147 mmol/L    Potassium 4 0 3 5 - 5 3 mmol/L    Chloride 107 96 - 108 mmol/L    CO2 28 21 - 32 mmol/L    ANION GAP -1 (L) 5 - 14 mmol/L    BUN 31 (H) 5 - 25 mg/dL    Creatinine 1 33 0 70 - 1 50 mg/dL    Glucose 155 (H) 70 - 99 mg/dL    Calcium 8 4 8 4 - 10 2 mg/dL    Corrected Calcium 9 2 8 3 - 10 1 mg/dL    AST 60 (H) 17 - 59 U/L    ALT 42 <50 U/L    Alkaline Phosphatase 76 43 - 122 U/L    Total Protein 6 1 (L) 6 4 - 8 4 g/dL    Albumin 3 0 (L) 3 5 - 5 0 g/dL    Total Bilirubin 0 56 0 20 - 1 00 mg/dL    eGFR 45 ml/min/1 73sq m   CBC and Platelet    Collection Time: 12/28/22 12:16 PM   Result Value Ref Range    WBC 6 72 4 31 - 10 16 Thousand/uL    RBC 4 12 3 88 - 5 62 Million/uL    Hemoglobin 11 9 (L) 12 0 - 17 0 g/dL    Hematocrit 37 7 36 5 - 49 3 %    MCV 92 82 - 98 fL    MCH 28 9 26 8 - 34 3 pg    MCHC 31 6 31 4 - 37 4 g/dL    RDW 13 9 11 6 - 15 1 %    Platelets 987 795 - 480 Thousands/uL    MPV 10 3 8 9 - 12 7 fL   Comprehensive metabolic panel    Collection Time: 12/28/22 12:16 PM   Result Value Ref Range    Sodium 139 135 - 147 mmol/L    Potassium 4 4 3 5 - 5 3 mmol/L    Chloride 107 96 - 108 mmol/L    CO2 27 21 - 32 mmol/L    ANION GAP 5 4 - 13 mmol/L    BUN 29 (H) 5 - 25 mg/dL    Creatinine 1 37 (H) 0 60 - 1 30 mg/dL    Glucose 235 (H) 65 - 140 mg/dL    Calcium 9 1 8 3 - 10 1 mg/dL    Corrected Calcium 10 1 8 3 - 10 1 mg/dL    AST 63 (H) 5 - 45 U/L    ALT 68 12 - 78 U/L    Alkaline Phosphatase 77 46 - 116 U/L    Total Protein 5 8 (L) 6 4 - 8 4 g/dL    Albumin 2 7 (L) 3 5 - 5 0 g/dL    Total Bilirubin 0 45 0 20 - 1 00 mg/dL    eGFR 43 ml/min/1 73sq m   CBC and differential    Collection Time: 01/25/23  1:25 AM   Result Value Ref Range    WBC 10 06 4 31 - 10 16 Thousand/uL    RBC 4 22 3 88 - 5 62 Million/uL    Hemoglobin 11 7 (L) 12 0 - 17 0 g/dL    Hematocrit 37 1 36 5 - 49 3 %    MCV 88 82 - 98 fL    MCH 27 7 26 8 - 34 3 pg    MCHC 31 5 31 4 - 37 4 g/dL    RDW 13 5 11 6 - 15 1 %    MPV 9 6 8 9 - 12 7 fL    Platelets 451 599 - 898 Thousands/uL    nRBC 0 /100 WBCs    Neutrophils Relative 75 43 - 75 %    Immat GRANS % 0 0 - 2 %    Lymphocytes Relative 15 14 - 44 %    Monocytes Relative 8 4 - 12 %    Eosinophils Relative 1 0 - 6 %    Basophils Relative 1 0 - 1 %    Neutrophils Absolute 7 54 1 85 - 7 62 Thousands/µL    Immature Grans Absolute 0 04 0 00 - 0 20 Thousand/uL    Lymphocytes Absolute 1 50 0 60 - 4 47 Thousands/µL    Monocytes Absolute 0 83 0 17 - 1 22 Thousand/µL    Eosinophils Absolute 0 05 0 00 - 0 61 Thousand/µL    Basophils Absolute 0 10 0 00 - 0 10 Thousands/µL   Comprehensive metabolic panel    Collection Time: 01/25/23  1:25 AM   Result Value Ref Range    Sodium 136 135 - 147 mmol/L    Potassium 3 4 (L) 3 5 - 5 3 mmol/L    Chloride 98 96 - 108 mmol/L    CO2 31 21 - 32 mmol/L    ANION GAP 7 4 - 13 mmol/L    BUN 16 5 - 25 mg/dL    Creatinine 1 12 0 60 - 1 30 mg/dL    Glucose 167 (H) 65 - 140 mg/dL    Calcium 8 7 8 4 - 10 2 mg/dL    Corrected Calcium 9 2 8 3 - 10 1 mg/dL    AST 30 13 - 39 U/L    ALT 21 7 - 52 U/L    Alkaline Phosphatase 73 34 - 104 U/L    Total Protein 6 5 6 4 - 8 4 g/dL    Albumin 3 4 (L) 3 5 - 5 0 g/dL    Total Bilirubin 0 91 0 20 - 1 00 mg/dL    eGFR 55 ml/min/1 73sq m   Lactic acid    Collection Time: 01/25/23  1:25 AM   Result Value Ref Range    LACTIC ACID 1 0 0 5 - 2 0 mmol/L   Procalcitonin    Collection Time: 01/25/23  1:25 AM   Result Value Ref Range    Procalcitonin 0 08 <=0 25 ng/ml   Protime-INR    Collection Time: 01/25/23  1:25 AM   Result Value Ref Range    Protime 16 1 (H) 11 6 - 14 5 seconds    INR 1 27 (H) 0 84 - 1 19   APTT    Collection Time: 01/25/23  1:25 AM   Result Value Ref Range    PTT 44 (H) 23 - 37 seconds   Blood culture #1    Collection Time: 01/25/23  1:25 AM    Specimen: Arm, Left; Blood   Result Value Ref Range    Blood Culture No Growth After 5 Days  Blood culture #2    Collection Time: 01/25/23  1:25 AM    Specimen: Arm, Right; Blood   Result Value Ref Range    Blood Culture No Growth After 5 Days      HS Troponin 0hr (reflex protocol)    Collection Time: 01/25/23  1:25 AM   Result Value Ref Range    hs TnI 0hr 14 "Refer to ACS Flowchart"- see link ng/L   Vitamin B12    Collection Time: 01/25/23  1:25 AM   Result Value Ref Range    Vitamin B-12 607 100 - 900 pg/mL   Folate    Collection Time: 01/25/23  1:25 AM   Result Value Ref Range    Folate 17 0 3 1 - 17 5 ng/mL   ECG 12 lead    Collection Time: 01/25/23  1:33 AM   Result Value Ref Range    Ventricular Rate 58 BPM    Atrial Rate 111 BPM    CA Interval  ms    QRSD Interval 96 ms    QT Interval 416 ms    QTC Interval 408 ms    P Axis  degrees    QRS Axis 96 degrees    T Wave Axis -17 degrees   UA w Reflex to Microscopic w Reflex to Culture    Collection Time: 01/25/23  1:40 AM    Specimen: Urine, Suprapubic catheter   Result Value Ref Range    Color, UA Light Orange     Clarity, UA Extra Turbid     Specific [place], UA 1 012 1 003 - 1 030    pH, UA 7 0 4 5, 5 0, 5 5, 6 0, 6 5, 7 0, 7 5, 8 0    Leukocytes, UA Large (A) Negative    Nitrite, UA Negative Negative    Protein,  (2+) (A) Negative mg/dl    Glucose, UA Negative Negative mg/dl    Ketones, UA Negative Negative mg/dl    Urobilinogen, UA <2 0 <2 0 mg/dl mg/dl    Bilirubin, UA Negative Negative    Occult Blood, UA Large (A) Negative   Urine Microscopic    Collection Time: 01/25/23  1:40 AM   Result Value Ref Range    RBC, UA Innumerable (A) None Seen, 1-2 /hpf    WBC, UA Innumerable (A) None Seen, 1-2 /hpf    Epithelial Cells None Seen None Seen, Occasional /hpf    Bacteria, UA Occasional None Seen, Occasional /hpf    MUCUS THREADS Occasional (A) None Seen    WBC Clumps Present    Urine culture    Collection Time: 01/25/23  1:40 AM    Specimen: Urine, Suprapubic catheter   Result Value Ref Range    Urine Culture >100,000 cfu/ml Pseudomonas aeruginosa (A)     Urine Culture 60,000-69,000 cfu/ml Diphtheroids        Susceptibility    Pseudomonas aeruginosa - DANNI     ZID Performed Yes       Aztreonam ($$$)  <=4 Susceptible ug/ml     Cefepime ($) <=2 00 Susceptible ug/ml     Ceftazidime ($$) <=1 Susceptible ug/ml     Ciprofloxacin ($)  <=0 25 Susceptible ug/ml     Gentamicin ($$) <=2 Susceptible ug/ml     Imipenem <=1 Susceptible ug/ml     Levofloxacin ($) <=0 50 Susceptible ug/ml     Meropenem ($$) <=1 00 Susceptible ug/ml     Piperacillin + Tazobactam ($$$) <=8 Susceptible ug/ml     Tobramycin ($) <=2 Susceptible ug/ml   UA w Reflex to Microscopic w Reflex to Culture    Collection Time: 01/25/23  2:48 AM    Specimen: Urine, Suprapubic catheter   Result Value Ref Range    Color, UA Colorless     Clarity, UA Clear     Specific [place], UA 1 005 1 003 - 1 030    pH, UA 7 5 4 5, 5 0, 5 5, 6 0, 6 5, 7 0, 7 5, 8 0    Leukocytes, UA Moderate (A) Negative    Nitrite, UA Negative Negative    Protein, UA Trace (A) Negative mg/dl    Glucose, UA Negative Negative mg/dl    Ketones, UA Negative Negative mg/dl    Urobilinogen, UA <2 0 <2 0 mg/dl mg/dl    Bilirubin, UA Negative Negative    Occult Blood, UA Moderate (A) Negative   Urine Microscopic    Collection Time: 01/25/23  2:48 AM   Result Value Ref Range    RBC, UA Innumerable (A) None Seen, 1-2 /hpf    WBC, UA 20-30 (A) None Seen, 1-2 /hpf    Epithelial Cells None Seen None Seen, Occasional /hpf    Bacteria, UA Occasional None Seen, Occasional /hpf   Urine culture    Collection Time: 01/25/23  2:48 AM    Specimen: Urine, Suprapubic catheter   Result Value Ref Range    Urine Culture 70,000-79,000 cfu/ml Pseudomonas aeruginosa (A)     Urine Culture <10,000 cfu/ml Diphtheroids        Susceptibility    Pseudomonas aeruginosa - DANNI     ZID Performed Yes       Aztreonam ($$$)  <=4 Susceptible ug/ml     Cefepime ($) <=2 00 Susceptible ug/ml     Ceftazidime ($$) <=1 Susceptible ug/ml     Ciprofloxacin ($)  <=0 25 Susceptible ug/ml     Gentamicin ($$) <=2 Susceptible ug/ml     Imipenem <=1 Susceptible ug/ml     Levofloxacin ($) <=0 50 Susceptible ug/ml     Meropenem ($$) <=1 00 Susceptible ug/ml     Piperacillin + Tazobactam ($$$) <=8 Susceptible ug/ml     Tobramycin ($) <=2 Susceptible ug/ml   HS Troponin I 2hr    Collection Time: 01/25/23  3:58 AM   Result Value Ref Range    hs TnI 2hr 12 "Refer to ACS Flowchart"- see link ng/L    Delta 2hr hsTnI -2 <20 ng/L   Fingerstick Glucose (POCT)    Collection Time: 01/25/23  7:48 AM   Result Value Ref Range    POC Glucose 193 (H) 65 - 140 mg/dl   Fingerstick Glucose (POCT)    Collection Time: 01/25/23 11:35 AM   Result Value Ref Range    POC Glucose 172 (H) 65 - 140 mg/dl   Ammonia    Collection Time: 01/25/23  2:26 PM   Result Value Ref Range    Ammonia 18 18 - 72 umol/L   Fingerstick Glucose (POCT)    Collection Time: 01/25/23  6:53 PM   Result Value Ref Range    POC Glucose 190 (H) 65 - 140 mg/dl   Fingerstick Glucose (POCT)    Collection Time: 01/25/23  9:38 PM   Result Value Ref Range    POC Glucose 222 (H) 65 - 140 mg/dl   TSH, 3rd generation    Collection Time: 01/26/23  5:44 AM   Result Value Ref Range    TSH 3RD GENERATON 2 957 0 450 - 4 500 uIU/mL   Magnesium    Collection Time: 01/26/23  5:44 AM   Result Value Ref Range    Magnesium 1 8 (L) 1 9 - 2 7 mg/dL   CBC and differential    Collection Time: 01/26/23  5:44 AM   Result Value Ref Range    WBC 11 93 (H) 4 31 - 10 16 Thousand/uL    RBC 4 53 3 88 - 5 62 Million/uL    Hemoglobin 12 8 12 0 - 17 0 g/dL    Hematocrit 39 1 36 5 - 49 3 %    MCV 86 82 - 98 fL    MCH 28 3 26 8 - 34 3 pg    MCHC 32 7 31 4 - 37 4 g/dL    RDW 13 8 11 6 - 15 1 %    MPV 9 7 8 9 - 12 7 fL    Platelets 448 547 - 926 Thousands/uL    nRBC 0 /100 WBCs    Neutrophils Relative 80 (H) 43 - 75 %    Immat GRANS % 1 0 - 2 %    Lymphocytes Relative 9 (L) 14 - 44 %    Monocytes Relative 9 4 - 12 %    Eosinophils Relative 0 0 - 6 %    Basophils Relative 1 0 - 1 %    Neutrophils Absolute 9 63 (H) 1 85 - 7 62 Thousands/µL    Immature Grans Absolute 0 08 0 00 - 0 20 Thousand/uL    Lymphocytes Absolute 1 11 0 60 - 4 47 Thousands/µL    Monocytes Absolute 1 02 0 17 - 1 22 Thousand/µL    Eosinophils Absolute 0 01 0 00 - 0 61 Thousand/µL    Basophils Absolute 0 08 0 00 - 0 10 Thousands/µL   Comprehensive metabolic panel    Collection Time: 01/26/23  5:44 AM   Result Value Ref Range    Sodium 134 (L) 135 - 147 mmol/L    Potassium 3 0 (L) 3 5 - 5 3 mmol/L    Chloride 101 96 - 108 mmol/L    CO2 21 21 - 32 mmol/L    ANION GAP 12 4 - 13 mmol/L    BUN 13 5 - 25 mg/dL    Creatinine 1 04 0 60 - 1 30 mg/dL    Glucose 163 (H) 65 - 140 mg/dL    Calcium 8 3 (L) 8 4 - 10 2 mg/dL    Corrected Calcium 8 9 8 3 - 10 1 mg/dL    AST 31 13 - 39 U/L    ALT 22 7 - 52 U/L    Alkaline Phosphatase 72 34 - 104 U/L    Total Protein 6 3 (L) 6 4 - 8 4 g/dL    Albumin 3 2 (L) 3 5 - 5 0 g/dL    Total Bilirubin 0 73 0 20 - 1 00 mg/dL    eGFR 60 ml/min/1 73sq m   Fingerstick Glucose (POCT)    Collection Time: 01/26/23  7:50 AM   Result Value Ref Range    POC Glucose 166 (H) 65 - 140 mg/dl   Fingerstick Glucose (POCT)    Collection Time: 01/26/23 12:38 PM   Result Value Ref Range    POC Glucose 211 (H) 65 - 140 mg/dl   Fingerstick Glucose (POCT)    Collection Time: 01/26/23  4:20 PM   Result Value Ref Range    POC Glucose 188 (H) 65 - 140 mg/dl   Fingerstick Glucose (POCT)    Collection Time: 01/26/23 10:15 PM   Result Value Ref Range    POC Glucose 208 (H) 65 - 140 mg/dl   Magnesium    Collection Time: 01/27/23  4:25 AM   Result Value Ref Range    Magnesium 1 9 1 9 - 2 7 mg/dL   Basic metabolic panel    Collection Time: 01/27/23  4:25 AM   Result Value Ref Range    Sodium 137 135 - 147 mmol/L    Potassium 3 7 3 5 - 5 3 mmol/L    Chloride 105 96 - 108 mmol/L    CO2 25 21 - 32 mmol/L    ANION GAP 7 4 - 13 mmol/L    BUN 13 5 - 25 mg/dL    Creatinine 1 10 0 60 - 1 30 mg/dL    Glucose 177 (H) 65 - 140 mg/dL    Calcium 8 3 (L) 8 4 - 10 2 mg/dL    eGFR 56 ml/min/1 73sq m   CBC    Collection Time: 01/27/23  4:25 AM   Result Value Ref Range    WBC 9 90 4 31 - 10 16 Thousand/uL    RBC 4 46 3 88 - 5 62 Million/uL    Hemoglobin 12 4 12 0 - 17 0 g/dL    Hematocrit 38 1 36 5 - 49 3 %    MCV 85 82 - 98 fL    MCH 27 8 26 8 - 34 3 pg    MCHC 32 5 31 4 - 37 4 g/dL    RDW 13 9 11 6 - 15 1 % Platelets 147 970 - 355 Thousands/uL    MPV 9 5 8 9 - 12 7 fL   Fingerstick Glucose (POCT)    Collection Time: 01/27/23  8:57 AM   Result Value Ref Range    POC Glucose 164 (H) 65 - 140 mg/dl   Fingerstick Glucose (POCT)    Collection Time: 01/27/23 11:20 AM   Result Value Ref Range    POC Glucose 185 (H) 65 - 140 mg/dl   Fingerstick Glucose (POCT)    Collection Time: 01/27/23  4:07 PM   Result Value Ref Range    POC Glucose 196 (H) 65 - 140 mg/dl   Fingerstick Glucose (POCT)    Collection Time: 01/27/23  9:15 PM   Result Value Ref Range    POC Glucose 190 (H) 65 - 140 mg/dl   Fingerstick Glucose (POCT)    Collection Time: 01/28/23  7:54 AM   Result Value Ref Range    POC Glucose 165 (H) 65 - 140 mg/dl   Fingerstick Glucose (POCT)    Collection Time: 01/28/23 11:31 AM   Result Value Ref Range    POC Glucose 196 (H) 65 - 140 mg/dl            Physical Exam  Vitals and nursing note reviewed  Constitutional:       General: He is not in acute distress  Appearance: He is well-developed  He is not diaphoretic  HENT:      Head: Normocephalic and atraumatic  Eyes:      General: No scleral icterus  Right eye: No discharge  Left eye: No discharge  Conjunctiva/sclera: Conjunctivae normal       Pupils: Pupils are equal, round, and reactive to light  Neck:      Thyroid: No thyromegaly  Vascular: No JVD  Cardiovascular:      Rate and Rhythm: Normal rate and regular rhythm  Heart sounds: Normal heart sounds  No murmur heard  No friction rub  No gallop  Pulmonary:      Effort: Pulmonary effort is normal  No respiratory distress  Breath sounds: Normal breath sounds  No wheezing or rales  Chest:      Chest wall: No tenderness  Abdominal:      General: Bowel sounds are normal  There is no distension  Palpations: Abdomen is soft  There is no mass  Tenderness: There is no abdominal tenderness  There is no guarding or rebound     Musculoskeletal:         General: No tenderness or deformity  Normal range of motion  Cervical back: Normal range of motion and neck supple  Lymphadenopathy:      Cervical: No cervical adenopathy  Skin:     General: Skin is warm and dry  Coloration: Skin is not pale  Findings: No erythema or rash  Neurological:      Mental Status: He is alert and oriented to person, place, and time  Cranial Nerves: No cranial nerve deficit  Coordination: Coordination normal       Deep Tendon Reflexes: Reflexes are normal and symmetric  Psychiatric:         Behavior: Behavior normal          Thought Content:  Thought content normal          Judgment: Judgment normal

## 2023-02-16 ENCOUNTER — HOME CARE VISIT (OUTPATIENT)
Dept: HOME HEALTH SERVICES | Facility: HOME HEALTHCARE | Age: 88
End: 2023-02-16

## 2023-02-16 VITALS
HEART RATE: 64 BPM | TEMPERATURE: 97.3 F | SYSTOLIC BLOOD PRESSURE: 152 MMHG | OXYGEN SATURATION: 96 % | DIASTOLIC BLOOD PRESSURE: 70 MMHG | RESPIRATION RATE: 20 BRPM

## 2023-02-20 ENCOUNTER — HOME CARE VISIT (OUTPATIENT)
Dept: HOME HEALTH SERVICES | Facility: HOME HEALTHCARE | Age: 88
End: 2023-02-20

## 2023-02-20 VITALS
OXYGEN SATURATION: 95 % | RESPIRATION RATE: 18 BRPM | DIASTOLIC BLOOD PRESSURE: 68 MMHG | TEMPERATURE: 97.8 F | SYSTOLIC BLOOD PRESSURE: 130 MMHG | HEART RATE: 58 BPM

## 2023-02-23 ENCOUNTER — HOME CARE VISIT (OUTPATIENT)
Dept: HOME HEALTH SERVICES | Facility: HOME HEALTHCARE | Age: 88
End: 2023-02-23

## 2023-02-23 VITALS
DIASTOLIC BLOOD PRESSURE: 62 MMHG | TEMPERATURE: 97.7 F | SYSTOLIC BLOOD PRESSURE: 118 MMHG | HEART RATE: 66 BPM | RESPIRATION RATE: 20 BRPM | OXYGEN SATURATION: 96 %

## 2023-02-27 ENCOUNTER — HOME CARE VISIT (OUTPATIENT)
Dept: HOME HEALTH SERVICES | Facility: HOME HEALTHCARE | Age: 88
End: 2023-02-27

## 2023-02-27 ENCOUNTER — TELEPHONE (OUTPATIENT)
Dept: INTERNAL MEDICINE CLINIC | Facility: OTHER | Age: 88
End: 2023-02-27

## 2023-02-27 VITALS
DIASTOLIC BLOOD PRESSURE: 70 MMHG | TEMPERATURE: 96.1 F | HEART RATE: 60 BPM | OXYGEN SATURATION: 97 % | SYSTOLIC BLOOD PRESSURE: 168 MMHG | RESPIRATION RATE: 16 BRPM

## 2023-02-27 NOTE — TELEPHONE ENCOUNTER
Alexx the Home Nurse called to update you on a few things from his visit today  168/70 in left arm  174/70 in right arm  HR 60  +1 pitting edema in bilateral legs    He was there for a cath change today

## 2023-02-27 NOTE — CASE COMMUNICATION
TC to PCP Jarad Enrique [phone] left message with Panchito Hodgson to report 168/70 left 174/70 right arm HR 60  Patient reports normal dizziness SOB and fatigue no increase  Plus one pitting edema BLE pedal  Patient reports nausea, son reports intermittant due to hernia and over long periord of time a patient has had a decrease in appetite and oral intake  Educated on trying Fritzi Fly

## 2023-02-27 NOTE — CASE COMMUNICATION
Ship to Patient   Insurance [employer]     Wound 1 suprapubic catheter       [employer] Supplies   18fr 5cc cath [id number] 2   IF POSSIBLE SILICONE CATHETER     Syringe 60cc [id number]   Syringe 10cc [id number]   10cc cath tray [id number] 2   Drain bag 2000cc [id number] 2   Leg bag large [id number] 1   Saline 100ml [id number] 1

## 2023-02-27 NOTE — CASE COMMUNICATION
Son gave patient zofran and will check BP in an hour  Educated if BP continues to rise or patient becomes symptomatic to call [facility] or go to the ED

## 2023-02-28 NOTE — TELEPHONE ENCOUNTER
Please contact patient to schedule an appointment if Blood pressures remain elevated or if he having any symptoms such as dizziness, lightheadedness, infectious symptoms

## 2023-03-03 ENCOUNTER — HOME CARE VISIT (OUTPATIENT)
Dept: HOME HEALTH SERVICES | Facility: HOME HEALTHCARE | Age: 88
End: 2023-03-03

## 2023-03-03 DIAGNOSIS — K21.9 GASTROESOPHAGEAL REFLUX DISEASE WITHOUT ESOPHAGITIS: Primary | ICD-10-CM

## 2023-03-03 DIAGNOSIS — E11.65 TYPE 2 DIABETES MELLITUS WITH HYPERGLYCEMIA, WITHOUT LONG-TERM CURRENT USE OF INSULIN (HCC): ICD-10-CM

## 2023-03-03 RX ORDER — PANTOPRAZOLE SODIUM 40 MG/1
20 TABLET, DELAYED RELEASE ORAL 2 TIMES DAILY
Status: CANCELLED | OUTPATIENT
Start: 2023-03-03

## 2023-03-03 RX ORDER — PANTOPRAZOLE SODIUM 20 MG/1
20 TABLET, DELAYED RELEASE ORAL 2 TIMES DAILY
Qty: 180 TABLET | Refills: 1 | Status: SHIPPED | OUTPATIENT
Start: 2023-03-03

## 2023-03-03 NOTE — TELEPHONE ENCOUNTER
Rx refill    Patient also requesting a new script/order for lancets for their machine patient uses (Ac[employer] Viva Plus)  States the lancets he currently has do not fit the machine       NOV - 5/17/2023

## 2023-03-08 ENCOUNTER — HOME CARE VISIT (OUTPATIENT)
Dept: HOME HEALTH SERVICES | Facility: HOME HEALTHCARE | Age: 88
End: 2023-03-08

## 2023-03-08 VITALS
SYSTOLIC BLOOD PRESSURE: 126 MMHG | DIASTOLIC BLOOD PRESSURE: 64 MMHG | TEMPERATURE: 97.4 F | RESPIRATION RATE: 18 BRPM | OXYGEN SATURATION: 96 % | HEART RATE: 64 BPM

## 2023-03-13 ENCOUNTER — EPISODE CHANGES (OUTPATIENT)
Dept: CASE MANAGEMENT | Facility: OTHER | Age: 88
End: 2023-03-13

## 2023-03-14 ENCOUNTER — PATIENT OUTREACH (OUTPATIENT)
Dept: INTERNAL MEDICINE CLINIC | Facility: OTHER | Age: 88
End: 2023-03-14

## 2023-03-14 ENCOUNTER — HOME CARE VISIT (OUTPATIENT)
Dept: HOME HEALTH SERVICES | Facility: HOME HEALTHCARE | Age: 88
End: 2023-03-14

## 2023-03-14 NOTE — PROGRESS NOTES
Spoke with Pt's son who reports that he has Visiting Nurses coming to home at this time  Son discussed that he and pt moved to [place] from [place] last year  Ptt's daughter is an MD who keeps in close contact with pt and his son  Son declines outreach for care management  Son declines outreach at this time for [employer]

## 2023-03-15 ENCOUNTER — HOME CARE VISIT (OUTPATIENT)
Dept: HOME HEALTH SERVICES | Facility: HOME HEALTHCARE | Age: 88
End: 2023-03-15

## 2023-03-16 VITALS
SYSTOLIC BLOOD PRESSURE: 128 MMHG | DIASTOLIC BLOOD PRESSURE: 76 MMHG | RESPIRATION RATE: 18 BRPM | OXYGEN SATURATION: 97 % | HEART RATE: 78 BPM | TEMPERATURE: 99 F

## 2023-03-20 ENCOUNTER — HOME CARE VISIT (OUTPATIENT)
Dept: HOME HEALTH SERVICES | Facility: HOME HEALTHCARE | Age: 88
End: 2023-03-20

## 2023-03-22 ENCOUNTER — TELEPHONE (OUTPATIENT)
Dept: INTERNAL MEDICINE CLINIC | Facility: OTHER | Age: 88
End: 2023-03-22

## 2023-03-22 ENCOUNTER — HOME CARE VISIT (OUTPATIENT)
Dept: HOME HEALTH SERVICES | Facility: HOME HEALTHCARE | Age: 88
End: 2023-03-22

## 2023-03-22 VITALS
RESPIRATION RATE: 18 BRPM | OXYGEN SATURATION: 98 % | TEMPERATURE: 97.8 F | DIASTOLIC BLOOD PRESSURE: 64 MMHG | SYSTOLIC BLOOD PRESSURE: 130 MMHG | HEART RATE: 60 BPM

## 2023-03-22 NOTE — TELEPHONE ENCOUNTER
Needs different lancets  Seems like there is some confusion on the part of pt's son  [facility] will call pharmacy & get back to us if anything is needed

## 2023-03-23 ENCOUNTER — HOME CARE VISIT (OUTPATIENT)
Dept: HOME HEALTH SERVICES | Facility: HOME HEALTHCARE | Age: 88
End: 2023-03-23

## 2023-03-23 DIAGNOSIS — M48.061 SPINAL STENOSIS OF LUMBAR REGION, UNSPECIFIED WHETHER NEUROGENIC CLAUDICATION PRESENT: ICD-10-CM

## 2023-03-23 DIAGNOSIS — L89.151 PRESSURE ULCER OF SACRAL REGION, STAGE 1: ICD-10-CM

## 2023-03-23 DIAGNOSIS — M46.1 SACROILIITIS (HCC): Primary | ICD-10-CM

## 2023-03-23 RX ORDER — ACETAMINOPHEN AND CODEINE PHOSPHATE 300; 30 MG/1; MG/1
1 TABLET ORAL EVERY 6 HOURS PRN
Qty: 30 TABLET | Refills: 0 | Status: SHIPPED | OUTPATIENT
Start: 2023-03-23

## 2023-03-23 RX ORDER — ACETAMINOPHEN AND CODEINE PHOSPHATE 300; 30 MG/1; MG/1
1 TABLET ORAL EVERY 6 HOURS PRN
Qty: 30 TABLET | OUTPATIENT
Start: 2023-03-23

## 2023-03-24 ENCOUNTER — TELEPHONE (OUTPATIENT)
Dept: INTERNAL MEDICINE CLINIC | Facility: OTHER | Age: 88
End: 2023-03-24

## 2023-03-24 DIAGNOSIS — L89.153 PRESSURE ULCER OF SACRAL REGION, STAGE 3 (HCC): Primary | ICD-10-CM

## 2023-03-24 NOTE — TELEPHONE ENCOUNTER
Sandoval Cai from [facility] called to let us know that patient's wounds have gotten worse since her last visit  She states that patient now has a sacral wound that is already a stage 3 and this was not there at her last visit two weeks ago  When she spoke to the son last time they were not interested in going to wound care, but she is really recommending that patient be seen  She is also asking if we can do a repeat CMP and possibly start him on a protein supplement  Please advise  Sandoval Cai does not see patient again until Wednesday  I did tell her you are out of office today and she was okay with waiting until Monday on your return to address task

## 2023-03-27 ENCOUNTER — HOME CARE VISIT (OUTPATIENT)
Dept: HOME HEALTH SERVICES | Facility: HOME HEALTHCARE | Age: 88
End: 2023-03-27

## 2023-03-29 ENCOUNTER — HOME CARE VISIT (OUTPATIENT)
Dept: HOME HEALTH SERVICES | Facility: HOME HEALTHCARE | Age: 88
End: 2023-03-29

## 2023-03-29 ENCOUNTER — HOSPITAL ENCOUNTER (EMERGENCY)
Facility: HOSPITAL | Age: 88
Discharge: HOME | End: 2023-03-29
Attending: EMERGENCY MEDICINE

## 2023-03-29 VITALS
DIASTOLIC BLOOD PRESSURE: 71 MMHG | HEART RATE: 85 BPM | OXYGEN SATURATION: 98 % | TEMPERATURE: 99.4 F | RESPIRATION RATE: 20 BRPM | SYSTOLIC BLOOD PRESSURE: 142 MMHG

## 2023-03-29 VITALS
OXYGEN SATURATION: 99 % | RESPIRATION RATE: 20 BRPM | HEART RATE: 63 BPM | DIASTOLIC BLOOD PRESSURE: 82 MMHG | TEMPERATURE: 98.2 F | SYSTOLIC BLOOD PRESSURE: 150 MMHG

## 2023-03-29 DIAGNOSIS — G70.00 MYASTHENIA GRAVIS (HCC): ICD-10-CM

## 2023-03-29 DIAGNOSIS — E11.9 DIABETES (HCC): Primary | ICD-10-CM

## 2023-03-29 DIAGNOSIS — L89.151 PRESSURE ULCER OF SACRAL REGION, STAGE 1: ICD-10-CM

## 2023-03-29 DIAGNOSIS — R74.01 ELEVATED AST (SGOT): ICD-10-CM

## 2023-03-29 DIAGNOSIS — Z93.59 SUPRAPUBIC CATHETER (HCC): ICD-10-CM

## 2023-03-29 DIAGNOSIS — Z79.01 ANTICOAGULATED: ICD-10-CM

## 2023-03-29 LAB
ALBUMIN SERPL BCP-MCNC: 2.4 G/DL (ref 3.5–5)
ALP SERPL-CCNC: 85 U/L (ref 46–116)
ALT SERPL W P-5'-P-CCNC: 99 U/L (ref 12–78)
ANION GAP SERPL CALCULATED.3IONS-SCNC: 3 MMOL/L (ref 4–13)
AST SERPL W P-5'-P-CCNC: 90 U/L (ref 5–45)
BASOPHILS # BLD AUTO: 0.07 THOUSANDS/ÂΜL (ref 0–0.1)
BASOPHILS NFR BLD AUTO: 1 % (ref 0–1)
BILIRUB SERPL-MCNC: 1.02 MG/DL (ref 0.2–1)
BUN SERPL-MCNC: 16 MG/DL (ref 5–25)
CALCIUM ALBUM COR SERPL-MCNC: 10 MG/DL (ref 8.3–10.1)
CALCIUM SERPL-MCNC: 8.7 MG/DL (ref 8.3–10.1)
CHLORIDE SERPL-SCNC: 102 MMOL/L (ref 96–108)
CO2 SERPL-SCNC: 29 MMOL/L (ref 21–32)
CREAT SERPL-MCNC: 1.12 MG/DL (ref 0.6–1.3)
EOSINOPHIL # BLD AUTO: 0 THOUSAND/ÂΜL (ref 0–0.61)
EOSINOPHIL NFR BLD AUTO: 0 % (ref 0–6)
ERYTHROCYTE [DISTWIDTH] IN BLOOD BY AUTOMATED COUNT: 15.4 % (ref 11.6–15.1)
GFR SERPL CREATININE-BSD FRML MDRD: 55 ML/MIN/1.73SQ M
GLUCOSE SERPL-MCNC: 230 MG/DL (ref 65–140)
HCT VFR BLD AUTO: 39.6 % (ref 36.5–49.3)
HGB BLD-MCNC: 13.2 G/DL (ref 12–17)
IMM GRANULOCYTES # BLD AUTO: 0.04 THOUSAND/UL (ref 0–0.2)
IMM GRANULOCYTES NFR BLD AUTO: 0 % (ref 0–2)
LYMPHOCYTES # BLD AUTO: 0.42 THOUSANDS/ÂΜL (ref 0.6–4.47)
LYMPHOCYTES NFR BLD AUTO: 4 % (ref 14–44)
MCH RBC QN AUTO: 27.9 PG (ref 26.8–34.3)
MCHC RBC AUTO-ENTMCNC: 33.3 G/DL (ref 31.4–37.4)
MCV RBC AUTO: 84 FL (ref 82–98)
MONOCYTES # BLD AUTO: 0.78 THOUSAND/ÂΜL (ref 0.17–1.22)
MONOCYTES NFR BLD AUTO: 8 % (ref 4–12)
NEUTROPHILS # BLD AUTO: 9.06 THOUSANDS/ÂΜL (ref 1.85–7.62)
NEUTS SEG NFR BLD AUTO: 87 % (ref 43–75)
NRBC BLD AUTO-RTO: 0 /100 WBCS
PLATELET # BLD AUTO: 243 THOUSANDS/UL (ref 149–390)
PMV BLD AUTO: 10 FL (ref 8.9–12.7)
POTASSIUM SERPL-SCNC: 3.3 MMOL/L (ref 3.5–5.3)
PROT SERPL-MCNC: 6.2 G/DL (ref 6.4–8.4)
RBC # BLD AUTO: 4.73 MILLION/UL (ref 3.88–5.62)
SODIUM SERPL-SCNC: 134 MMOL/L (ref 135–147)
WBC # BLD AUTO: 10.37 THOUSAND/UL (ref 4.31–10.16)

## 2023-03-29 RX ORDER — POTASSIUM CHLORIDE 20 MEQ/1
20 TABLET, EXTENDED RELEASE ORAL ONCE
Status: COMPLETED | OUTPATIENT
Start: 2023-03-29 | End: 2023-03-29

## 2023-03-29 RX ADMIN — POTASSIUM CHLORIDE 20 MEQ: 1500 TABLET, EXTENDED RELEASE ORAL at 14:41

## 2023-03-29 NOTE — CONSULTS
Consultation - General Surgery   Candice Meraz 80 y o  male MRN: [id number]  Unit/Bed#: ED 23 Encounter: [id number]    Assessment/Plan     Assessment:  Candice Meraz is a 80 y o  male w/ sacral wound    Plan:  No acute surgical intervention  Patient to establish care with wound care clinic next Wednesday, [facility] providing interim wound care  Patient and son educated on frequent turning / offloading  Son states they are purchasing some Glucerna nutritional supplements which I encouraged    History of Present Illness     HPI:  Candice Meraz is a 80 y o  male who presents with sacral wound  Patient has visiting nursing who referred patient to ED for wound check given marginal increase in size  Denies fever / chills, pain or discharge from the area  Patient in process of establishing care with wound care clinic  Inpatient consult to Acute Care Surgery  Consult performed by: Yuni Irwin MD  Consult ordered by: Sonya Roque MD          Review of Systems   Constitutional: Negative for chills and fever  Poor appetite   HENT: Negative  Respiratory: Negative  Cardiovascular: Negative  Gastrointestinal: Negative  Endocrine: Negative  Genitourinary: Negative  Musculoskeletal: Negative  Skin: Positive for wound  Allergic/Immunologic: Negative  Neurological: Negative  Hematological: Negative  Psychiatric/Behavioral: Negative          Historical Information   Past Medical History:   Diagnosis Date   • Anxiety    • Arthritis    • Coronary artery disease    • 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    Last Assessment & Plan:  Formatting of this note might be different from the original  - may be related to amidoarone use -Taken off levothyroxinein 02/2020, then developed overt hypothyroidism -Labs from July 2020 show TSH of 61 and free T4 of 0 71 -Patient currently on levothyroxine 88 mcg -Labs from 12/11/20 TSH 1 4 and Free T4 1 4 -Continue with same dose -We will repeat TFTs today   • Hiatal hernia    • Hypertension    • Irregular heart beat    • Myasthenia gravis [facility])      Past Surgical History:   Procedure Laterality Date   • CYSTECTOMY, RADICAL WITH ILEOCONDUIT N/A 12/13/2022    Procedure: REMOVAL ARTIFICIAL URINARY SPINCTER BILATERAL;  Surgeon: Adams Hubbard MD;  [facility]: [facility];  Service: Urology   • CYSTOSCOPY N/A 12/13/2022    Procedure: Samantha Cruz;  Surgeon: Adams Hubbard MD;  [facility];  Service: Urology   • EYE SURGERY  2014    cataracts   • HERNIA REPAIR  1971    right inguinal hernia repair   • IR SUPRAPUBIC CATHETER PLACEMENT  12/13/2022   • TX LAPAROSCOPY COLECTOMY PARTIAL W/ANASTOMOSIS N/A 6/3/2016    Procedure: LAPAROSCOPIC SIGMOID RESECTION ;  Surgeon: Rodri Gallegos MD;  [facility];  Service: Colorectal   • TX LAPS COLECTOMY PRTL W/COLOPXTSTMY LW ANAST N/A 6/3/2016    Procedure: COLECTOMY LAPAROSCOPIC ASSISTED;  Surgeon: Rodri Gallegos MD;  [facility];  Service: Colorectal   • TX SIGMOIDOSCOPY FLX DX W/COLLJ SPEC BR/WA IF PFRMD N/A 6/3/2016    Procedure: Mitali Wolff;  Surgeon: Rodri Gallegos MD;  [facility];  Service: Colorectal   • PROSTATECTOMY     • REPLACEMENT TOTAL KNEE Right    • URINARY SPHINCTER IMPLANT  2001    s/p prostatectomy- pt had urinary leakage issues     Social History   Social History     Substance and Sexual Activity   Alcohol Use No     Social History     Substance and Sexual Activity   Drug Use No     E-Cigarette/Vaping   • E-Cigarette Use Never User      E-Cigarette/Vaping Substances     Social History     Tobacco Use   Smoking Status Former   • Packs/day: 0 50   • Years: 5 00   • Pack years: 2 50   • Types: Cigarettes   Smokeless Tobacco Not on file   Tobacco Comments    quit smoking when 25 y/o     Family History:   Family History   Problem Relation Age of Onset   • Heart disease Mother    • Heart disease Sister    • Diabetes Brother        Meds/Allergies   current meds:   No current facility-administered medications for this encounter  Allergies   Allergen Reactions   • Levofloxacin Other (See Comments) and Rash   • Sulfa Antibiotics Rash       Objective   First Vitals:   Blood Pressure: 142/71 (03/29/23 1300)  Pulse: 85 (03/29/23 1300)  Temperature: 99 4 °F (37 4 °C) (03/29/23 1300)  Respirations: 20 (03/29/23 1300)  SpO2: 98 % (03/29/23 1300)    Current Vitals:   Blood Pressure: 142/71 (03/29/23 1300)  Pulse: 85 (03/29/23 1300)  Temperature: 99 4 °F (37 4 °C) (03/29/23 1300)  Respirations: 20 (03/29/23 1300)  SpO2: 98 % (03/29/23 1300)    No intake or output data in the 24 hours ending 03/29/23 1414    Invasive Devices     Drain  Duration           Suprapubic Catheter 18 Fr  105 days                Physical Exam  GEN: elderly, cachectic, non-toxic  HEENT: mucus membranes dry  CV: warm/well perfused  Lung: normal effort  Ab: Soft, NT/ND  Extrem: Sacral wound approximately 2x2 w/ soft eschar, no underlying fluctuance or induration, no erythema or crepitus, no foul smell appreciated  Neuro: A+Ox3    Lab Results:   CBC:   Lab Results   Component Value Date    WBC 10 37 (H) 03/29/2023    HGB 13 2 03/29/2023    HCT 39 6 03/29/2023    MCV 84 03/29/2023     03/29/2023    MCH 27 9 03/29/2023    MCHC 33 3 03/29/2023    RDW 15 4 (H) 03/29/2023    MPV 10 0 03/29/2023    NRBC 0 03/29/2023   , CMP:   Lab Results   Component Value Date    SODIUM 134 (L) 03/29/2023    K 3 3 (L) 03/29/2023     03/29/2023    CO2 29 03/29/2023    BUN 16 03/29/2023    CREATININE 1 12 03/29/2023    CALCIUM 8 7 03/29/2023    AST 90 (H) 03/29/2023    ALT 99 (H) 03/29/2023    ALKPHOS 85 03/29/2023    EGFR 55 03/29/2023     Imaging: I have personally reviewed pertinent reports     and I have personally reviewed pertinent films in PACS  No orders to display

## 2023-03-29 NOTE — TELEPHONE ENCOUNTER
Zakiya Jacobson was in to see the pt today  Wound on his bottom is much worse, approx doubled in size, almost necrotic  She recommended that he got to the ER  His son will take him  She did not draw CBC & CMP

## 2023-03-29 NOTE — DISCHARGE INSTRUCTIONS
Daya Noel was seen here in the emergency department for concerns about a sacral wound  We inspected the wound, did some basic laboratory values did not show any signs of infection or bone infection  He also had our surgical colleagues come take a look, do not think anything needs to be done surgically at this time  You are to follow-up with wound care within the next week  Please also have your laboratory values drawn again to look at your liver functioning  Any new or concerning symptoms of infection or fever please return to the ER for reevaluation  Please continue trying to increase protein, whether through shakes, or protein drinks or Glucerna or Ensure  At this point that was likely delaying the healing process and contributing to worsening of the wound  If you have any questions please call back or follow-up with your primary care physician

## 2023-03-29 NOTE — ED ATTENDING ATTESTATION
3/29/2023  IToya MD, saw and evaluated the patient  I have discussed the patient with the resident/non-physician practitioner and agree with the resident's/non-physician practitioner's findings, Plan of Care, and MDM as documented in the resident's/non-physician practitioner's note, except where noted  All available labs and Radiology studies were reviewed  I was present for key portions of any procedure(s) performed by the resident/non-physician practitioner and I was immediately available to provide assistance  At this point I agree with the current assessment done in the Emergency Department  I have conducted an independent evaluation of this patient a history and physical is as follows:    ED Course         Critical Care Time  Procedures    81 yo male with myasthenia gravis, htn, dm, hiatal hernia, sacral pressure wound, seen by visiting nurse who felt wound in worsening  Pt also had suprapubic catheter changed today due to leakage  Pt with no current complaints  Vss, afebrile, lungs cta, rrr, abdomen soft nontender, sacral wound with drainage  Suprapubic catheter in place  Discuss with surgery to evaluate wound

## 2023-03-29 NOTE — ED PROVIDER NOTES
History  Chief Complaint   Patient presents with   • Wound Check     Pt son reports that visiting nurse was concerned about a wound at the base of his spine that is increasing in size  • Urinary Catheter Problem     Pt son mentioned concerns about new suprapubic that was replaced today that is leaking  77-year-old male past medical history of myasthenia gravis, A-fib on Eliquis, arthritis, hiatal [place] hernia, hypertension, diabetes presenting to the ER after visiting nursing noticed worsening wound on the patient's right buttocks area  patient has a chronic indwelling suprapubic catheter, that has been leaking and the patient had been saturated in some of his urine notes the son who is at bedside who lives with the patient and is primary caregiver  Patient denies any pain at this time, is hard of hearing but oriented to time and place  Patient denies any fevers, nausea, vomiting, son denies any changes in mental status but states the patient has decreased oral intake and they are attempting to get him some Glucerna to help with the wound healing process  Son also states that the patient has an appointment with wound care next week  Prior to Admission Medications   Prescriptions Last Dose Informant Patient Reported? Taking? FLUoxetine (PROzac) 40 MG capsule   No No   Sig: Take 1 capsule (40 mg total) by mouth daily   Lancets 28G MISC   No No   Sig: Use in the morning   Vitamin D, Cholecalciferol, 1000 UNITS CAPS   Yes No   Sig: Take 2,000 Units by mouth daily in the early morning  acetaminophen (TYLENOL) 325 mg tablet   No No   Sig: Take 2 tablets (650 mg total) by mouth every 6 (six) hours as needed for mild pain   acetaminophen-codeine (TYLENOL #3) 300-30 mg per tablet   No No   Sig: Take 1 tablet by mouth every 6 (six) hours as needed for moderate pain   amiodarone 200 mg tablet   Yes No   Sig: Take 200 mg by mouth daily     apixaban (ELIQUIS) 2 5 mg   Yes No   Sig: Take 1 tablet by mouth every 12 (twelve) hours   dicyclomine (BENTYL) 20 mg tablet   No No   Sig: Take 1 tablet (20 mg total) by mouth 2 (two) times a day as needed (cramping)   famotidine (PEPCID) 20 mg tablet   Yes No   Sig: Take 1 tablet by mouth 2 (two) times a day   furosemide (LASIX) 20 mg tablet   Yes No   Sig: Take 20 mg by mouth see administration instructions Monday through Friday   glucose blood test strip   No No   Sig: Use 1 each in the morning Use as instructed   glucose monitoring kit (FREESTYLE) monitoring kit   Yes No   Sig: Use 1 each as needed   isosorbide mononitrate (IMDUR) 60 mg 24 hr tablet   No No   Sig: Take 1 tablet (60 mg total) by mouth daily   levothyroxine 88 mcg tablet   Yes No   Sig: Take 88 mcg by mouth   ondansetron (ZOFRAN) 4 mg tablet   No No   Sig: Take 1 tablet (4 mg total) by mouth every 8 (eight) hours as needed for nausea or vomiting   pantoprazole (PROTONIX) 20 mg tablet   No No   Sig: Take 1 tablet (20 mg total) by mouth 2 (two) times a day   pantoprazole (PROTONIX) 40 mg tablet   Yes No   Sig: Take 20 mg by mouth 2 (two) times a day       pravastatin (PRAVACHOL) 20 mg tablet   No No   Sig: Take 1 tablet (20 mg total) by mouth daily   pyridostigmine (MESTINON) 60 mg tablet   Yes No   Sig: Take 60 mg by mouth 3 (three) times a day   saxagliptin (ONGLYZA) 2 5 MG tablet   Yes No   Si tablet daily      Facility-Administered Medications: None       Past Medical History:   Diagnosis Date   • Anxiety    • Arthritis    • Coronary artery disease    • Drug-induced hypothyroidism 2019    Last Assessment & Plan:  Formatting of this note might be different from the original  - may be related to amidoarone use -Taken off levothyroxinein 2020, then developed overt hypothyroidism -Labs from 2020 show TSH of 61 and free T4 of 0 71 -Patient currently on levothyroxine 88 mcg -Labs from 20 TSH 1 4 and Free T4 1 4 -Continue with same dose -We will repeat TFTs today   • Hiatal hernia    • Hypertension    • Irregular heart beat    • Myasthenia gravis [facility])        Past Surgical History:   Procedure Laterality Date   • CYSTECTOMY, RADICAL WITH ILEOCONDUIT N/A 12/13/2022    Procedure: REMOVAL ARTIFICIAL URINARY SPINCTER BILATERAL;  Surgeon: Thelma Gonsales MD;  [facility];  Service: Urology   • CYSTOSCOPY N/A 12/13/2022    Procedure: Bill Beat;  Surgeon: Thelma Gonsales MD;  [facility];  Service: Urology   • EYE SURGERY  2014    cataracts   • HERNIA REPAIR  1971    right inguinal hernia repair   • IR SUPRAPUBIC CATHETER PLACEMENT  12/13/2022   • LA LAPAROSCOPY COLECTOMY PARTIAL W/ANASTOMOSIS N/A 6/3/2016    Procedure: LAPAROSCOPIC SIGMOID RESECTION ;  Surgeon: Kait Foster MD;  [facility];  Service: Colorectal   • LA LAPS COLECTOMY PRTL W/COLOPXTSTMY LW ANAST N/A 6/3/2016    Procedure: COLECTOMY LAPAROSCOPIC ASSISTED;  Surgeon: Kait Foster MD;  [facility];  Service: Colorectal   • LA SIGMOIDOSCOPY FLX DX W/COLLJ SPEC BR/WA IF PFRMD N/A 6/3/2016    Procedure: Nannette Rodriguez;  Surgeon: Kait Foster MD;  [facility];  Service: Colorectal   • PROSTATECTOMY     • REPLACEMENT TOTAL KNEE Right    • URINARY SPHINCTER IMPLANT  2001    s/p prostatectomy- pt had urinary leakage issues       Family History   Problem Relation Age of Onset   • Heart disease Mother    • Heart disease Sister    • Diabetes Brother      I have reviewed and agree with the history as documented  E-Cigarette/Vaping   • E-Cigarette Use Never User      E-Cigarette/Vaping Substances     Social History     Tobacco Use   • Smoking status: Former     Packs/day: 0 50     Years: 5 00     Pack years: 2 50     Types: Cigarettes   • Tobacco comments:     quit smoking when 25 y/o   Vaping Use   • Vaping Use: Never used   Substance Use Topics   • Alcohol use: No   • Drug use: No        Review of Systems   Skin: Positive for wound         Physical Exam  ED Triage Vitals [03/29/23 1300]   Temperature Pulse Respirations Blood Pressure SpO2   99 4 °F (37 4 °C) 85 20 142/71 98 %      Temp src Heart Rate Source Patient Position - Orthostatic VS BP Location FiO2 (%)   -- -- -- -- --      Pain Score       --             Orthostatic Vital Signs  Vitals:    03/29/23 1300   BP: 142/71   Pulse: 85       Physical Exam  Constitutional:       Comments: Chronically ill-appearing   HENT:      Head: Normocephalic and atraumatic  Right Ear: External ear normal       Left Ear: External ear normal       Nose: Nose normal       Mouth/Throat:      Mouth: Mucous membranes are moist       Pharynx: Oropharynx is clear  Eyes:      Conjunctiva/sclera: Conjunctivae normal    Cardiovascular:      Rate and Rhythm: Normal rate  Rhythm irregular  Heart sounds: No murmur heard  Pulmonary:      Effort: Pulmonary effort is normal       Breath sounds: No wheezing  Abdominal:      General: Abdomen is flat  Palpations: Abdomen is soft  Comments: Suprapubic catheter dressing in place, no signs of erythema or infection or drainage from the urostomy   Musculoskeletal:      Cervical back: Neck supple  Right lower leg: No edema  Left lower leg: No edema  Skin:     General: Skin is warm and dry  Capillary Refill: Capillary refill takes less than 2 seconds  Comments: Right gluteal pressure wound, with beefy red erythema surrounding the wound  Neurological:      General: No focal deficit present        Mental Status: He is alert          Media Information  Document Information    Clinical Image - Mobile Device   Sacral pressure wound   03/29/2023 10:33   Attached To:   Appointment on 3/29/23 with Silver Mantilla RN     Source Information    Silver Mantilla RN  [address]         ED Medications  Medications   potassium chloride (K-DUR,KLOR-CON) CR tablet 20 mEq (20 mEq Oral Given 3/29/23 1441)       Diagnostic Studies  Results Reviewed     Procedure Component Value Units Date/Time    Comprehensive metabolic panel [[id number]]  (Abnormal) Collected: 03/29/23 1342    Lab Status: Final result Specimen: Blood from Arm, Right Updated: 03/29/23 1419     Sodium 134 mmol/L      Potassium 3 3 mmol/L      Chloride 102 mmol/L      CO2 29 mmol/L      ANION GAP 3 mmol/L      BUN 16 mg/dL      Creatinine 1 12 mg/dL      Glucose 230 mg/dL      Calcium 8 7 mg/dL      Corrected Calcium 10 0 mg/dL      AST 90 U/L      ALT 99 U/L      Alkaline Phosphatase 85 U/L      Total Protein 6 2 g/dL      Albumin 2 4 g/dL      Total Bilirubin 1 02 mg/dL      eGFR 55 ml/min/1 73sq m     Narrative:      [place] guidelines for Chronic Kidney Disease (CKD):   •  Stage 1 with normal or high GFR (GFR > 90 mL/min/1 73 square meters)  •  Stage 2 Mild CKD (GFR = 60-89 mL/min/1 73 square meters)  •  Stage 3A Moderate CKD (GFR = 45-59 mL/min/1 73 square meters)  •  Stage 3B Moderate CKD (GFR = 30-44 mL/min/1 73 square meters)  •  Stage 4 Severe CKD (GFR = 15-29 mL/min/1 73 square meters)  •  Stage 5 End Stage CKD (GFR <15 mL/min/1 73 square meters)  Note: GFR calculation is accurate only with a steady state creatinine    CBC and differential [[id number]]  (Abnormal) Collected: 03/29/23 1342    Lab Status: Final result Specimen: Blood from Arm, Right Updated: 03/29/23 1404     WBC 10 37 Thousand/uL      RBC 4 73 Million/uL      Hemoglobin 13 2 g/dL      Hematocrit 39 6 %      MCV 84 fL      MCH 27 9 pg      MCHC 33 3 g/dL      RDW 15 4 %      MPV 10 0 fL      Platelets 186 Thousands/uL      nRBC 0 /100 WBCs      Neutrophils Relative 87 %      Immat GRANS % 0 %      Lymphocytes Relative 4 %      Monocytes Relative 8 %      Eosinophils Relative 0 %      Basophils Relative 1 %      Neutrophils Absolute 9 06 Thousands/µL      Immature Grans Absolute 0 04 Thousand/uL      Lymphocytes Absolute 0 42 Thousands/µL      Monocytes Absolute 0 78 Thousand/µL      Eosinophils Absolute 0 00 Thousand/µL Basophils Absolute 0 07 Thousands/µL                  No orders to display         Procedures  Procedures      ED Course  ED Course as of 03/29/23 1553   Wed Mar 29, 2023   1420 Potassium(!): 3 3  Seems to be consistently hypokalemic    1502 Called and spoke to patient's daughter, who is a pediatrician in the system  Described that the patient needs to increase his protein, and follow-up with wound care  Also described the laboratory findings  All questions asked were answered                             SBIRT 20yo+    Flowsheet Row Most Recent Value   SBIRT (23 yo +)    In order to provide better care to our patients, we are screening all of our patients for alcohol and drug use  Would it be okay to ask you these screening questions? No Filed at: 03/29/2023 5004                Medical Decision Making  History and physical consistent with a pressure ulcer on the buttock/sacral area  Will obtain basic laboratory values to assess for infection and malnutrition  We will place a consult to general surgery to take a look at the wound and offer recommendations on treatment  We will reach out to our wound care nurse to also take a look at  No concerns for osteomyelitis at this time, as patient is afebrile, the wound does not appear to be any significant depth to reach his sacrum, however unsure so we will reach out to the expert our surgical colleagues  Surgery states that there is no surgical intervention needed at this time the patient can follow-up with wound care within the week  Also recommend that the patient increase his protein intake with either Glucerna or other protein supplement  Laboratory values notable for hypokalemia at 3 3, replaced with 1 potassium tablet  Also notable for elevated AST ALT and total bilirubin, unsure as to the cause at this time patient without abdominal pain or other symptoms    We will give the patient a follow-up CMP, and help the primary care physician to follow-up on this   Given good return precautions if the patient appears jaundice, or has any signs or symptoms of abdominal pain or fever  Laboratory values also notable for hypoalbuminemia likely secondary to decreased oral intake  Recommendations as above for increased protein intake  Spoke with patient, patient's son and, spoke to patient's daughter over the phone about our findings and plan  They stated understanding and willingness to follow-up  Amount and/or Complexity of Data Reviewed  Labs: ordered  Decision-making details documented in ED Course  Risk  Prescription drug management  Disposition  Final diagnoses:   Pressure ulcer of sacral region, stage 1   Diabetes (HCC)   Anticoagulated   Suprapubic catheter (HCC)   Myasthenia gravis (HCC)   Elevated AST (SGOT)     Time reflects when diagnosis was documented in both MDM as applicable and the Disposition within this note     Time User Action Codes Description Comment    3/29/2023  1:39 PM Hector CAMARA Add [L89 151] Pressure ulcer of sacral region, stage 1     3/29/2023  3:00 PM Hector Friend B Add [E11 9] Diabetes ([facility] [address] )     3/29/2023  3:01 PM Margo Carias, [address] [place] [address] [Z79 01] Anticoagulated     3/29/2023  3:01 PM Hector Friend B Add [Z93 59] Suprapubic catheter ([facility] [address] )     3/29/2023  3:01 PM Samantha Montoya Add [G70 00] Myasthenia gravis ([facility] [address] )     3/29/2023  3:06 PM Hector Friend B Add [R74 01] Elevated AST (SGOT)       ED Disposition     ED Disposition   Discharge    Condition   Stable    Date/Time   Wed Mar 29, 2023  2:26 PM    Comment   Bonifacio Gilliam discharge to home/self care                 Follow-up Information     Follow up With Specialties Details Why Contact Info Additional Information    Ginette Yo MD Internal Medicine Call   [address] [place] [id number] [phone] [address] [facility]ound [facility] Wound Care Schedule an appointment as soon as possible for a visit   Annmarie Duke [address], [address] [facility], [facility], [place], [address] [place] [address]          Discharge Medication List as of 3/29/2023  3:02 PM      CONTINUE these medications which have NOT CHANGED    Details   acetaminophen (TYLENOL) 325 mg tablet Take 2 tablets (650 mg total) by mouth every 6 (six) hours as needed for mild pain, Starting Sat 12/17/2022, No Print      acetaminophen-codeine (TYLENOL #3) 300-30 mg per tablet Take 1 tablet by mouth every 6 (six) hours as needed for moderate pain, Starting Thu 3/23/2023, Normal      amiodarone 200 mg tablet Take 200 mg by mouth daily  , Historical Med      apixaban (ELIQUIS) 2 5 mg Take 1 tablet by mouth every 12 (twelve) hours, Starting Mon 11/21/2022, Historical Med      dicyclomine (BENTYL) 20 mg tablet Take 1 tablet (20 mg total) by mouth 2 (two) times a day as needed (cramping), Starting Sat 12/17/2022, No Print      famotidine (PEPCID) 20 mg tablet Take 1 tablet by mouth 2 (two) times a day, Starting Wed 11/16/2022, Historical Med      FLUoxetine (PROzac) 40 MG capsule Take 1 capsule (40 mg total) by mouth daily, Starting Wed 2/15/2023, Normal      furosemide (LASIX) 20 mg tablet Take 20 mg by mouth see administration instructions Monday through Friday, Historical Med      glucose blood test strip Use 1 each in the morning Use as instructed, Starting Wed 2/15/2023, Normal      glucose monitoring kit (FREESTYLE) monitoring kit Use 1 each as needed, Historical Med      isosorbide mononitrate (IMDUR) 60 mg 24 hr tablet Take 1 tablet (60 mg total) by mouth daily, Starting Wed 2/15/2023, Normal      Lancets 28G MISC Use in the morning, Starting Wed 2/15/2023, Normal      levothyroxine 88 mcg tablet Take 88 mcg by mouth, Starting Wed 11/2/2022, Until Wed 2/15/2023 at 2359, Historical Med      ondansetron (ZOFRAN) 4 mg tablet Take 1 tablet (4 mg total) by mouth every 8 (eight) hours as needed for nausea or vomiting, Starting Wed 2/15/2023, Normal      !! pantoprazole (PROTONIX) 20 mg tablet Take 1 tablet (20 mg total) by mouth 2 (two) times a day, Starting Fri 3/3/2023, Normal      !! pantoprazole (PROTONIX) 40 mg tablet Take 20 mg by mouth 2 (two) times a day   , Historical Med      pravastatin (PRAVACHOL) 20 mg tablet Take 1 tablet (20 mg total) by mouth daily, Starting Wed 2/15/2023, Normal      pyridostigmine (MESTINON) 60 mg tablet Take 60 mg by mouth 3 (three) times a day, Historical Med      saxagliptin (ONGLYZA) 2 5 MG tablet 1 tablet daily, Starting Thu 12/8/2022, Historical Med      Vitamin D, Cholecalciferol, 1000 UNITS CAPS Take 2,000 Units by mouth daily in the early morning   , Historical Med       !! - Potential duplicate medications found  Please discuss with provider  Outpatient Discharge Orders   Comprehensive metabolic panel   Standing Status: Future Standing Exp  Date: 03/29/24       PDMP Review       Value Time User    PDMP Reviewed  Yes 10/19/2022  4:32 PM Oleg Jiang PA-C           ED Provider  Attending physically available and evaluated Daniel Ramirez  JOSEFA managed the patient along with the ED Attending      Electronically Signed by         Ulysses York DO  03/29/23 5162

## 2023-03-30 ENCOUNTER — VBI (OUTPATIENT)
Dept: INTERNAL MEDICINE CLINIC | Facility: OTHER | Age: 88
End: 2023-03-30

## 2023-03-30 NOTE — TELEPHONE ENCOUNTER
[facility]    ED Visit Information     Ed visit date: 3-29-23  Diagnosis Description: Pressure ulcer of sacral region, stage 1; Diabetes ([facility] [address] ); Anticoagulated; Suprapubic catheter ([facility] [address] ); Myasthenia gravis ([facility] [address] ); Elevated AST (SGOT)  In Network? Yes [facility]  Discharge status: Home  Discharged with meds ? No  Number of ED visits to date: 1  ED Severity:N/A     Outreach Information    Outreach successful: Yes 1  Date letter mailed:0  Date Finalized:3-30-23    Care Coordination    Follow up appointment with specialilty: yes Wound Care 4-5-23   Transportation issues ? No    Value [employer] type: 7 Day Outreach  Emergent necessity warranted by diagnosis: Yes  [facility] PCP: Yes  Transportation: Friend/Family Transport  Called PCP first?: No  Feels able to call PCP for urgent problems ?: Yes  Understands what emergencies can be handled by PCP ?: Yes  Ever any problems getting appointment with PCP for minor emergency/urgency problems?: No  Practice Contacted Patient ?: No  Pt had ED follow up with pcp/staff ?: No    Seen for follow-up out of network ?: No  Reason Patient went to ED instead of Urgent Care or PCP?: Perceived Severity of Illness, Specialist instructions  Urgent care Education?: No  03/30/2023 08:19 AM EDT by Cheryl Vivas 03/30/2023 08:19 AM EDT by Oneil Cabrera ([facility]) [phone] (Mobile    Personal communication with patients son regarding recent ED visit on 3-29-23  Patients son stated that patient is in a lot of pain   Patients son declines PCP follow-up       Patient sent to ED by visiting nurse

## 2023-04-03 ENCOUNTER — HOME CARE VISIT (OUTPATIENT)
Dept: HOME HEALTH SERVICES | Facility: HOME HEALTHCARE | Age: 88
End: 2023-04-03

## 2023-04-05 ENCOUNTER — HOME CARE VISIT (OUTPATIENT)
Dept: HOME HEALTH SERVICES | Facility: HOME HEALTHCARE | Age: 88
End: 2023-04-05

## 2023-04-05 ENCOUNTER — OFFICE VISIT (OUTPATIENT)
Dept: WOUND CARE | Facility: HOSPITAL | Age: 88
End: 2023-04-05

## 2023-04-05 VITALS
SYSTOLIC BLOOD PRESSURE: 140 MMHG | RESPIRATION RATE: 18 BRPM | DIASTOLIC BLOOD PRESSURE: 65 MMHG | HEART RATE: 72 BPM | TEMPERATURE: 98.4 F | BODY MASS INDEX: 21.22 KG/M2 | WEIGHT: 140 LBS | HEIGHT: 68 IN

## 2023-04-05 DIAGNOSIS — E11.65 TYPE 2 DIABETES MELLITUS WITH HYPERGLYCEMIA, WITHOUT LONG-TERM CURRENT USE OF INSULIN (HCC): ICD-10-CM

## 2023-04-05 DIAGNOSIS — L89.153 PRESSURE ULCER OF SACRAL REGION, STAGE 3 (HCC): Primary | ICD-10-CM

## 2023-04-05 DIAGNOSIS — L89.312 PRESSURE INJURY OF RIGHT BUTTOCK, STAGE 2 (HCC): ICD-10-CM

## 2023-04-05 RX ORDER — LIDOCAINE 40 MG/G
CREAM TOPICAL ONCE
Status: COMPLETED | OUTPATIENT
Start: 2023-04-05 | End: 2023-04-05

## 2023-04-05 RX ORDER — SODIUM HYPOCHLORITE 2.5 MG/ML
1 SOLUTION TOPICAL EVERY OTHER DAY
Qty: 473 ML | Refills: 0 | Status: SHIPPED | OUTPATIENT
Start: 2023-04-05

## 2023-04-05 RX ADMIN — LIDOCAINE: 40 CREAM TOPICAL at 10:17

## 2023-04-05 NOTE — PATIENT INSTRUCTIONS
Orders Placed This Encounter   Procedures    Wound cleansing and dressings     Right buttocks wounds:    Wash your hands with soap and water  Remove old dressing, discard into plastic bag and place in trash  Cleanse the wound with soap and water prior to applying a clean dressing  Do not use tissue or cotton balls  Do not scrub the wound  Pat dry using gauze  Shower no   Soak wound with Dakin's solution and gauze for 10-15 minutes  Apply skin prep to periwound  Apply silver alginate to the sacral wound  Cover with foam adhesive dressing  Change dressing every other day  This was done today at the wound center      The left buttocks wound is healed  VNA may remove the wound from the chart  Standing Status:   Future     Standing Expiration Date:   4/5/2024    Wound cleansing and dressings     Sacral wound:    Wash your hands with soap and water  Remove old dressing, discard into plastic bag and place in trash  Cleanse the wound with soap and water prior to applying a clean dressing  Do not use tissue or cotton balls  Do not scrub the wound  Pat dry using gauze  Shower no   Soak wound with Dakin's solution and gauze for 10-15 minutes  Apply skin prep to periwound  Apply silver alginate to the sacral wound  Cover with foam adhesive dressing  Change dressing every other day  This was done today at the wound center     Standing Status:   Future     Standing Expiration Date:   4/5/2024    Wound home care     [facility]     Please see patient every other day for wound dressing changes  Please obtain roho cushion to assist in pressure redistribution on sacrum and buttocks  Please obtain a group 1 pressure reduction mattress   Standing Status:   Future     Standing Expiration Date:   4/5/2024    Wound miscellaneous orders     Patient would benefit from Roho cushion to use in chair or while sitting on the couch   Important to STOP using donut device as it does not redistribute pressure appropriately  While watching television, please have patient stand up with his walker to support himself and stand during commercials  This will help relieve pressure from sacrum and buttocks  3-4 servings of protein a day, such as meats, yogurt, eggs, nuts, peanut butter  Try to maintain a low carbohydrate diet  Try to keep rice, potatoes, bread, dessert in moderation       Standing Status:   Future     Standing Expiration Date:   4/5/2024

## 2023-04-05 NOTE — PROGRESS NOTES
"Patient ID: Marilu Yee is a 80 y o  male Date of Birth 6/28/1927     Chief Complaint  Chief Complaint   Patient presents with   • New Patient Visit     buttocks       Allergies  Levofloxacin and Sulfa antibiotics    Assessment:    No problem-specific Assessment & Plan notes found for this encounter  Diagnoses and all orders for this visit:    Pressure ulcer of sacral region, stage 3 (HCC)  -     lidocaine (LMX) 4 % cream  -     Wound cleansing and dressings; Future  -     Wound cleansing and dressings; Future  -     Wound home care; Future  -     Wound miscellaneous orders; Future  -     sodium hypochlorite (DAKIN'S HALF-STRENGTH) external solution; Apply 1 application  topically every other day    Pressure injury of right buttock, stage 2 (HCC)  -     lidocaine (LMX) 4 % cream  -     Wound cleansing and dressings; Future  -     Wound cleansing and dressings; Future  -     Wound home care; Future  -     Wound miscellaneous orders; Future    Type 2 diabetes mellitus with hyperglycemia, without long-term current use of insulin ([facility] )    Other orders  -     Debridement              Debridement   Wound 03/22/23 Pressure Injury Sacrum Mid    [place] Protocol:  Consent: Verbal consent obtained  Consent given by: patient  Time out: Immediately prior to procedure a \"time out\" was called to verify the correct patient, procedure, equipment, support staff and site/side marked as required    Timeout called at: 4/5/2023 9:40 AM   Patient understanding: patient states understanding of the procedure being performed  Patient identity confirmed: verbally with patient      Performed by: physician  Debridement type: surgical  Level of debridement: subcutaneous tissue  Pain control: lidocaine 4%  Post-debridement measurements  Length (cm): 4 8  Width (cm): 2 3  Depth (cm): 2 5  Percent debrided: 100%  Surface Area (cm^2): 11 04  Area debrided (cm^2): 11 04  Volume (cm^3): 27 6  Tissue and other material debrided: " subcutaneous tissue  Devitalized tissue debrided: exudate, necrotic debris and slough  Instrument(s) utilized: curette, forceps and blade  Bleeding: small  Hemostasis obtained with: pressure  Response to treatment: procedure was tolerated well          Plan:  Initial evaluation  Sacral wound with significant odor and necrotic tissue, not yet probing to bone but close  Sacral wound debrided as above  Wound management with silver alginate, Dakin's soak for 15 minutes at each dressing change  See wound orders below  Extensively discussed the importance of pressure-relief with patient's daughter recommend a Roho cushion for use whenever seated as well as a specialty overlay for the bed  Also recommend patient stand periodically throughout the day while watching TV using the support of his walker  Discussed the importance of tight diabetic control, low carbohydrate diet meaning minimal intake of rice, potatoes, bread, pasta, sweets   A1C results reviewed with the patient today    Never leave wounds open to air  Dakin's sent to the pharmacy today  Follow-up in 2 weeks or call sooner with questions or concerns      Wound 02/02/23 Pressure Injury Buttocks Outer;Right (Active)   Wound Image Images linked 04/05/23 0937   Wound Description Yellow;Slough;Pink 04/05/23 0937   Randi-wound Assessment Fragile;Pink 04/05/23 0937   Wound Length (cm) 0 9 cm 04/05/23 0937   Wound Width (cm) 0 4 cm 04/05/23 0937   Wound Depth (cm) 0 4 cm 04/05/23 0937   Wound Surface Area (cm^2) 0 36 cm^2 04/05/23 0937   Wound Volume (cm^3) 0 144 cm^3 04/05/23 0937   Calculated Wound Volume (cm^3) 0 14 cm^3 04/05/23 0937   Change in Wound Size % 44 04/05/23 0937   Drainage Amount Moderate 04/05/23 0937   Drainage Description Serosanguineous 04/05/23 0937   Non-staged Wound Description Full thickness 04/05/23 1351   Dressing Status Other (Comment) (open to air) 04/05/23 0937       Wound 02/02/23 Pressure Injury Buttocks Left (Active)   Wound Image Images linked 04/05/23 0934   Wound Description Epithelialization 04/05/23 0934   Randi-wound Assessment Pink 04/05/23 0934   Wound Length (cm) 0 cm 04/05/23 0934   Wound Width (cm) 0 cm 04/05/23 0934   Wound Depth (cm) 0 cm 04/05/23 0934   Wound Surface Area (cm^2) 0 cm^2 04/05/23 0934   Wound Volume (cm^3) 0 cm^3 04/05/23 0934   Calculated Wound Volume (cm^3) 0 cm^3 04/05/23 0934   Change in Wound Size % 100 04/05/23 0934   Non-staged Wound Description Not applicable 33/37/94 9142   Dressing Status Intact 04/05/23 0934       Wound 03/23/23 Pressure Injury Buttocks Medial;Right (Active)   Wound Image Images linked 04/05/23 0935   Wound Description Pink;Slough; Yellow 04/05/23 0935   Randi-wound Assessment Pink;Fragile 04/05/23 0935   Wound Length (cm) 1 4 cm 04/05/23 0935   Wound Width (cm) 1 7 cm 04/05/23 0935   Wound Depth (cm) 0 5 cm 04/05/23 0935   Wound Surface Area (cm^2) 2 38 cm^2 04/05/23 0935   Wound Volume (cm^3) 1 19 cm^3 04/05/23 0935   Calculated Wound Volume (cm^3) 1 19 cm^3 04/05/23 0935   Change in Wound Size % -197 5 04/05/23 0935   Drainage Amount Moderate 04/05/23 0935   Drainage Description Serosanguineous 04/05/23 0935   Non-staged Wound Description Full thickness 04/05/23 0935       Wound 03/22/23 Pressure Injury Sacrum Mid (Active)   Wound Image Images linked 04/05/23 0938   Wound Description Slough; Yellow;Brown;Black; Pink 04/05/23 0938   Randi-wound Assessment Maceration;Pink 04/05/23 0938   Wound Length (cm) 4 8 cm 04/05/23 0938   Wound Width (cm) 2 3 cm (measurements taken with patient on side and not opened ) 04/05/23 0938   Wound Depth (cm) 2 3 cm 04/05/23 0938   Wound Surface Area (cm^2) 11 04 cm^2 04/05/23 0938   Wound Volume (cm^3) 25 392 cm^3 04/05/23 0938   Calculated Wound Volume (cm^3) 25 39 cm^3 04/05/23 0938   Change in Wound Size % -71346 67 04/05/23 0938   Tunneling 1 9 cm 04/05/23 0938   Tunneling in depth located at 9 o'clock 04/05/23 0938   Drainage Amount Moderate 04/05/23 8593   Drainage Description Foul smelling;Brown;Serosanguineous 04/05/23 0938   Non-staged Wound Description Full thickness 04/05/23 0938   Dressing Status Intact 04/05/23 0938       Wound 02/02/23 Pressure Injury Buttocks Outer;Right (Active)   Date First Assessed: 02/02/23   Pre-Existing Wound: Yes  Primary Wound Type: Pressure Injury  Location: Buttocks  Wound Location Orientation: Outer;Right       Wound 02/02/23 Pressure Injury Buttocks Left (Active)   Date First Assessed: 02/02/23   Pre-Existing Wound: Yes  Primary Wound Type: Pressure Injury  Location: Buttocks  Wound Location Orientation: Left       Wound 03/23/23 Pressure Injury Buttocks Medial;Right (Active)   Date First Assessed: 03/23/23   Pre-Existing Wound: Yes  Primary Wound Type: Pressure Injury  Location: Buttocks  Wound Location Orientation: Medial;Right       Wound 03/22/23 Pressure Injury Sacrum Mid (Active)   Date First Assessed: 03/22/23   Pre-Existing Wound: No  Primary Wound Type: Pressure Injury  Location: Sacrum  Wound Location Orientation: Mid  Wound Description (Comments): Mid sacrum wound       [REMOVED] Wound 12/12/22 Pressure Injury Sacrum Left (Removed)   Resolved Date: 01/29/23  Date First Assessed/Time First Assessed: 12/12/22 0001   Primary Wound Type: Pressure Injury  Location: Sacrum  Wound Location Orientation: Left  Dressing Status: Open to air       [REMOVED] Wound 12/13/22 Scrotum Right (Removed)   Resolved Date: 01/29/23  Date First Assessed/Time First Assessed: 12/13/22 1128   Location: Scrotum  Wound Location Orientation: Right  Wound Description (Comments): mesh underwear  Incision's 1st Dressing: SPONGE GAUZE 4 X 4 16 PLY STRL PLASTIC TRAY            [REMOVED] Wound 12/13/22 Perineum (Removed)   Resolved Date: 01/29/23  Date First Assessed/Time First Assessed: 12/13/22 1128   Location: Perineum  Wound Description (Comments): mesh underwear  Incision's 1st Dressing: SPONGE GAUZE 4 X 4 16 PLY STRL PLASTIC TRAY LF, DRESSING SURGIPAD 5 X 9 IN STR    [REMOVED] Wound 01/27/23 Buttocks (Removed)   Resolved Date: 01/29/23  Date First Assessed/Time First Assessed: 01/27/23 0896   Location: Buttocks       Subjective:        Patient presents for initial evaluation of multiple pressure ulcers on the sacral and buttock regions  Has been using alginate on the wounds  Unclear exactly how long the wounds have been present but patient was seen in the ED on 3/29/2023 due to visiting nurses noting worsening of the wounds  He was evaluated by general surgery while in the ED who noted no need for surgical intervention and recommended care here at the wound management center  Patient is present with his daughter today but he lives with his son who is his primary caretaker  Patient's daughter reports that patient spends all day sitting on the couch watching television, does not use a specialty cushion  He does sleep in bed at nighttime but does not have any type of a specialty overlay  Recently started supplements with Glucerna  Patient is a non-insulin-dependent diabetic, last A1c was 7 2  The following portions of the patient's history were reviewed and updated as appropriate:   He  has a past medical history of Anxiety, Arthritis, Coronary artery disease, 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), Hiatal hernia, Hypertension, Irregular heart beat, and Myasthenia gravis ([address] )    He   Patient Active Problem List    Diagnosis Date Noted   • Suprapubic catheter ([address] ) 02/15/2023   • Gastroesophageal reflux disease without esophagitis 02/15/2023   • Carcinoma of prostate ([address] ) 02/15/2023   • Diaphragmatic hernia 02/15/2023   • Pressure ulcer of sacral region, stage 1 02/15/2023   • Chronic diastolic congestive heart failure ([address] ) 02/15/2023   • Continuous opioid dependence ([address] ) 02/15/2023   • Ambulatory dysfunction 12/19/2022   • CKD (chronic kidney disease) 12/14/2022   • Type 2 diabetes mellitus, without long-term current use of insulin ([facility] ) 12/12/2022   • Displacement of artificial urinary sphincter ([place] [address] ) 12/11/2022   • Depression 68/86/0305   • Diastolic dysfunction 82/99/8027   • Kyphosis of thoracic region 03/11/2021   • Vitamin D deficiency 03/11/2021   • Nonrheumatic aortic valve stenosis 06/17/2020   • Chronic gastritis 12/30/2016   • Chronic a-fib ([place] [address] ) 06/04/2016   • Myasthenia gravis ([place] [address] ) 06/04/2016   • Hypomagnesemia 06/04/2016   • Atherosclerosis of native coronary artery of native heart with stable angina pectoris [facility])    • Bilateral lower extremity edema 04/04/2016   • Lumbar spinal stenosis 02/08/2016   • Sacroiliitis ([place] [address] ) 02/08/2016     He  reports that he has quit smoking  His smoking use included cigarettes  He has a 2 50 pack-year smoking history  He does not have any smokeless tobacco history on file  He reports that he does not drink alcohol and does not use drugs  Current Outpatient Medications   Medication Sig Dispense Refill   • sodium hypochlorite (DAKIN'S HALF-STRENGTH) external solution Apply 1 application  topically every other day 473 mL 0   • acetaminophen (TYLENOL) 325 mg tablet Take 2 tablets (650 mg total) by mouth every 6 (six) hours as needed for mild pain  0   • acetaminophen-codeine (TYLENOL #3) 300-30 mg per tablet Take 1 tablet by mouth every 6 (six) hours as needed for moderate pain 30 tablet 0   • amiodarone 200 mg tablet Take 200 mg by mouth daily       • apixaban (ELIQUIS) 2 5 mg Take 1 tablet by mouth every 12 (twelve) hours     • dicyclomine (BENTYL) 20 mg tablet Take 1 tablet (20 mg total) by mouth 2 (two) times a day as needed (cramping)  0   • famotidine (PEPCID) 20 mg tablet Take 1 tablet by mouth 2 (two) times a day     • FLUoxetine (PROzac) 40 MG capsule Take 1 capsule (40 mg total) by mouth daily 90 capsule 1   • furosemide (LASIX) 20 mg tablet Take 20 mg by mouth see administration instructions Monday through Friday     • glucose blood test strip Use 1 each in the morning Use as instructed 100 strip 5   • glucose monitoring kit (FREESTYLE) monitoring kit Use 1 each as needed     • isosorbide mononitrate (IMDUR) 60 mg 24 hr tablet Take 1 tablet (60 mg total) by mouth daily 90 tablet 1   • Lancets 28G MISC Use in the morning 100 each 5   • levothyroxine 88 mcg tablet Take 88 mcg by mouth     • ondansetron (ZOFRAN) 4 mg tablet Take 1 tablet (4 mg total) by mouth every 8 (eight) hours as needed for nausea or vomiting 20 tablet 0   • pantoprazole (PROTONIX) 20 mg tablet Take 1 tablet (20 mg total) by mouth 2 (two) times a day 180 tablet 1   • pantoprazole (PROTONIX) 40 mg tablet Take 20 mg by mouth 2 (two) times a day  • pravastatin (PRAVACHOL) 20 mg tablet Take 1 tablet (20 mg total) by mouth daily 90 tablet 1   • pyridostigmine (MESTINON) 60 mg tablet Take 60 mg by mouth 3 (three) times a day     • saxagliptin (ONGLYZA) 2 5 MG tablet 1 tablet daily     • Vitamin D, Cholecalciferol, 1000 UNITS CAPS Take 2,000 Units by mouth daily in the early morning  No current facility-administered medications for this visit  He is allergic to levofloxacin and sulfa antibiotics       Review of Systems   Constitutional: Negative for chills and fever  HENT: Positive for hearing loss  Negative for congestion and sneezing  Respiratory: Negative for cough  Musculoskeletal: Positive for gait problem  Skin: Positive for wound  Psychiatric/Behavioral: Negative for agitation           Objective:       Wound 02/02/23 Pressure Injury Buttocks Outer;Right (Active)   Wound Image Images linked 04/05/23 0937   Wound Description Yellow;Slough;Pink 04/05/23 0937   Randi-wound Assessment Fragile;Pink 04/05/23 0937   Wound Length (cm) 0 9 cm 04/05/23 0937   Wound Width (cm) 0 4 cm 04/05/23 0937   Wound Depth (cm) 0 4 cm 04/05/23 0937   Wound Surface Area (cm^2) 0 36 cm^2 04/05/23 0937   Wound Volume (cm^3) 0 144 cm^3 04/05/23 0937   Calculated Wound Volume (cm^3) 0 14 cm^3 04/05/23 0937   Change in Wound Size % 44 04/05/23 0937   Drainage Amount Moderate 04/05/23 0937   Drainage Description Serosanguineous 04/05/23 0937   Non-staged Wound Description Full thickness 04/05/23 0937   Dressing Status Other (Comment) (open to air) 04/05/23 0937       Wound 02/02/23 Pressure Injury Buttocks Left (Active)   Wound Image Images linked 04/05/23 0934   Wound Description Epithelialization 04/05/23 0934   Randi-wound Assessment Pink 04/05/23 0934   Wound Length (cm) 0 cm 04/05/23 0934   Wound Width (cm) 0 cm 04/05/23 0934   Wound Depth (cm) 0 cm 04/05/23 0934   Wound Surface Area (cm^2) 0 cm^2 04/05/23 0934   Wound Volume (cm^3) 0 cm^3 04/05/23 0934   Calculated Wound Volume (cm^3) 0 cm^3 04/05/23 0934   Change in Wound Size % 100 04/05/23 0934   Non-staged Wound Description Not applicable 31/00/18 8986   Dressing Status Intact 04/05/23 0934       Wound 03/23/23 Pressure Injury Buttocks Medial;Right (Active)   Wound Image Images linked 04/05/23 0935   Wound Description Pink;Slough; Yellow 04/05/23 0935   Randi-wound Assessment Pink;Fragile 04/05/23 0935   Wound Length (cm) 1 4 cm 04/05/23 0935   Wound Width (cm) 1 7 cm 04/05/23 0935   Wound Depth (cm) 0 5 cm 04/05/23 0935   Wound Surface Area (cm^2) 2 38 cm^2 04/05/23 0935   Wound Volume (cm^3) 1 19 cm^3 04/05/23 0935   Calculated Wound Volume (cm^3) 1 19 cm^3 04/05/23 0935   Change in Wound Size % -197 5 04/05/23 0935   Drainage Amount Moderate 04/05/23 0935   Drainage Description Serosanguineous 04/05/23 0935   Non-staged Wound Description Full thickness 04/05/23 0935       Wound 03/22/23 Pressure Injury Sacrum Mid (Active)   Wound Image Images linked 04/05/23 0938   Wound Description Slough; Yellow;Brown;Black; Pink 04/05/23 0938   Randi-wound Assessment Maceration;Pink 04/05/23 0938   Wound Length (cm) 4 8 cm 04/05/23 0938   Wound Width (cm) 2 3 cm (measurements taken with patient on side and not opened ) 04/05/23 0938   Wound Depth (cm) 2 3 cm 04/05/23 0938   Wound Surface Area (cm^2) "11 04 cm^2 04/05/23 0938   Wound Volume (cm^3) 25 392 cm^3 04/05/23 0938   Calculated Wound Volume (cm^3) 25 39 cm^3 04/05/23 0938   Change in Wound Size % -71758 67 04/05/23 0938   Tunneling 1 9 cm 04/05/23 0938   Tunneling in depth located at 9 o'clock 04/05/23 0938   Drainage Amount Moderate 04/05/23 0938   Drainage Description Foul smelling;Brown;Serosanguineous 04/05/23 0938   Non-staged Wound Description Full thickness 04/05/23 0938   Dressing Status Intact 04/05/23 0938       /65   Pulse 72   Temp 98 4 °F (36 9 °C)   Resp 18   Ht 5' 8\" (1 727 m)   Wt 63 5 kg (140 lb)   BMI 21 29 kg/m²     Physical Exam  Vitals reviewed  Constitutional:       General: He is not in acute distress  Appearance: Normal appearance  He is not ill-appearing, toxic-appearing or diaphoretic  HENT:      Head: Normocephalic and atraumatic  Right Ear: External ear normal       Left Ear: External ear normal    Eyes:      Conjunctiva/sclera: Conjunctivae normal    Pulmonary:      Effort: Pulmonary effort is normal  No respiratory distress  Musculoskeletal:      Cervical back: Neck supple  Skin:     Comments: See wound assessment   Neurological:      Mental Status: He is alert  Gait: Gait abnormal (Uses walker)     Psychiatric:         Mood and Affect: Mood normal          Behavior: Behavior normal            Wound 02/02/23 Pressure Injury Buttocks Outer;Right (Active)   Wound Image   04/05/23 0937   Wound Description Yellow;Slough;Pink 04/05/23 0937   Randi-wound Assessment Fragile;Pink 04/05/23 0937   Wound Length (cm) 0 9 cm 04/05/23 0937   Wound Width (cm) 0 4 cm 04/05/23 0937   Wound Depth (cm) 0 4 cm 04/05/23 0937   Wound Surface Area (cm^2) 0 36 cm^2 04/05/23 0937   Wound Volume (cm^3) 0 144 cm^3 04/05/23 0937   Calculated Wound Volume (cm^3) 0 14 cm^3 04/05/23 0937   Change in Wound Size % 44 04/05/23 0937   Drainage Amount Moderate 04/05/23 0937   Drainage Description Serosanguineous 04/05/23 0937 " Non-staged Wound Description Full thickness 04/05/23 0937   Dressing Status Other (Comment) 04/05/23 0937       Wound 02/02/23 Pressure Injury Buttocks Left (Active)   Wound Image   04/05/23 0934   Wound Description Epithelialization 04/05/23 0934   Randi-wound Assessment Pink 04/05/23 0934   Wound Length (cm) 0 cm 04/05/23 0934   Wound Width (cm) 0 cm 04/05/23 0934   Wound Depth (cm) 0 cm 04/05/23 0934   Wound Surface Area (cm^2) 0 cm^2 04/05/23 0934   Wound Volume (cm^3) 0 cm^3 04/05/23 0934   Calculated Wound Volume (cm^3) 0 cm^3 04/05/23 0934   Change in Wound Size % 100 04/05/23 0934   Non-staged Wound Description Not applicable 53/77/97 2931   Dressing Status Intact 04/05/23 0934       Wound 03/23/23 Pressure Injury Buttocks Medial;Right (Active)   Wound Image   04/05/23 0935   Wound Description Pink;Slough; Yellow 04/05/23 0935   Randi-wound Assessment Pink;Fragile 04/05/23 0935   Wound Length (cm) 1 4 cm 04/05/23 0935   Wound Width (cm) 1 7 cm 04/05/23 0935   Wound Depth (cm) 0 5 cm 04/05/23 0935   Wound Surface Area (cm^2) 2 38 cm^2 04/05/23 0935   Wound Volume (cm^3) 1 19 cm^3 04/05/23 0935   Calculated Wound Volume (cm^3) 1 19 cm^3 04/05/23 0935   Change in Wound Size % -197 5 04/05/23 0935   Drainage Amount Moderate 04/05/23 0935   Drainage Description Serosanguineous 04/05/23 0935   Non-staged Wound Description Full thickness 04/05/23 0935       Wound 03/22/23 Pressure Injury Sacrum Mid (Active)   Wound Image   04/05/23 2851   Wound Description Slough; Yellow;Brown;Black; Pink 04/05/23 0938   Randi-wound Assessment Maceration;Pink 04/05/23 0938   Wound Length (cm) 4 8 cm 04/05/23 0938   Wound Width (cm) 2 3 cm 04/05/23 0938   Wound Depth (cm) 2 3 cm 04/05/23 0938   Wound Surface Area (cm^2) 11 04 cm^2 04/05/23 0938   Wound Volume (cm^3) 25 392 cm^3 04/05/23 0938   Calculated Wound Volume (cm^3) 25 39 cm^3 04/05/23 0938   Change in Wound Size % -20708 67 04/05/23 0938   Tunneling 1 9 cm 04/05/23 0866 Tunneling in depth located at 9 o'clock 04/05/23 0938   Drainage Amount Moderate 04/05/23 0938   Drainage Description Foul smelling;Brown;Serosanguineous 04/05/23 9156   Non-staged Wound Description Full thickness 04/05/23 8186   Dressing Status Intact 04/05/23 0938                       Wound Instructions:  Orders Placed This Encounter   Procedures   • Wound cleansing and dressings     Right buttocks wounds:    Wash your hands with soap and water  Remove old dressing, discard into plastic bag and place in trash  Cleanse the wound with soap and water prior to applying a clean dressing  Do not use tissue or cotton balls  Do not scrub the wound  Pat dry using gauze  Shower no   Soak wound with Dakin's solution and gauze for 10-15 minutes  Apply skin prep to periwound  Apply silver alginate to the sacral wound  Cover with foam adhesive dressing  Change dressing every other day  This was done today at the wound center      The left buttocks wound is healed  VNA may remove the wound from the chart  Standing Status:   Future     Standing Expiration Date:   4/5/2024   • Wound cleansing and dressings     Sacral wound:    Wash your hands with soap and water  Remove old dressing, discard into plastic bag and place in trash  Cleanse the wound with soap and water prior to applying a clean dressing  Do not use tissue or cotton balls  Do not scrub the wound  Pat dry using gauze  Shower no   Soak wound with Dakin's solution and gauze for 10-15 minutes  Apply skin prep to periwound  Apply silver alginate to the sacral wound  Cover with foam adhesive dressing  Change dressing every other day  This was done today at the wound center     Standing Status:   Future     Standing Expiration Date:   4/5/2024   • Wound home care     [facility]'s [facility]     Please see patient every other day for wound dressing changes  Please obtain roho cushion to assist in pressure redistribution on sacrum and buttocks   Please obtain a group 1 pressure reduction mattress   Standing Status:   Future     Standing Expiration Date:   4/5/2024   • Wound miscellaneous orders     Patient would benefit from Roho cushion to use in chair or while sitting on the couch  Important to STOP using donut device as it does not redistribute pressure appropriately  While watching television, please have patient stand up with his walker to support himself and stand during commercials  This will help relieve pressure from sacrum and buttocks  3-4 servings of protein a day, such as meats, yogurt, eggs, nuts, peanut butter  Try to maintain a low carbohydrate diet  Try to keep rice, potatoes, bread, dessert in moderation  Standing Status:   Future     Standing Expiration Date:   4/5/2024   • Debridement     This order was created via procedure documentation        Diagnosis ICD-10-CM Associated Orders   1  Pressure ulcer of sacral region, stage 3 ([facility])  L89 153 lidocaine (LMX) 4 % cream     Wound cleansing and dressings     Wound cleansing and dressings     Wound home care     Wound miscellaneous orders     sodium hypochlorite (DAKIN'S HALF-STRENGTH) external solution      2  Pressure injury of right buttock, stage 2 ([facility])  L89 312 lidocaine (LMX) 4 % cream     Wound cleansing and dressings     Wound cleansing and dressings     Wound home care     Wound miscellaneous orders      3   Type 2 diabetes mellitus with hyperglycemia, without long-term current use of insulin ([facility])  E11 65

## 2023-04-07 ENCOUNTER — TELEPHONE (OUTPATIENT)
Dept: DIABETES SERVICES | Facility: CLINIC | Age: 88
End: 2023-04-07

## 2023-04-07 ENCOUNTER — TELEPHONE (OUTPATIENT)
Dept: OTHER | Facility: OTHER | Age: 88
End: 2023-04-07

## 2023-04-07 ENCOUNTER — HOME CARE VISIT (OUTPATIENT)
Dept: HOME HEALTH SERVICES | Facility: HOME HEALTHCARE | Age: 88
End: 2023-04-07

## 2023-04-07 ENCOUNTER — TELEPHONE (OUTPATIENT)
Dept: INTERNAL MEDICINE CLINIC | Facility: OTHER | Age: 88
End: 2023-04-07

## 2023-04-07 VITALS
TEMPERATURE: 96.4 F | RESPIRATION RATE: 18 BRPM | HEART RATE: 64 BPM | OXYGEN SATURATION: 96 % | DIASTOLIC BLOOD PRESSURE: 70 MMHG | SYSTOLIC BLOOD PRESSURE: 132 MMHG

## 2023-04-07 DIAGNOSIS — E11.65 TYPE 2 DIABETES MELLITUS WITH HYPERGLYCEMIA, WITHOUT LONG-TERM CURRENT USE OF INSULIN (HCC): Primary | ICD-10-CM

## 2023-04-07 NOTE — TELEPHONE ENCOUNTER
Marilynn Rivera from [place] [address] Visiting Nurses called stating that patient informed nurse that he had an unwitnessed fall on Wednesday 4/5, possible head injury and is on eliquis   Wanted to see if PCP wanted patient to be evaluated at ED

## 2023-04-07 NOTE — TELEPHONE ENCOUNTER
Left  for VNA saying there are orders to change or flush catheter as needed   Advised her to TT me or call office back

## 2023-04-07 NOTE — TELEPHONE ENCOUNTER
Per Calin Mills, visiting RN from [address] [place] [address], patient's son [facility] would like to schedule dm ed for patient  Son is main number on account    I called and lm for son that I will have to request a referral to dm ed from pcp before scheduling

## 2023-04-07 NOTE — TELEPHONE ENCOUNTER
[facility]NA with patient now, requesting a call back in regards to patient's suprapubic catheter leaking  She will be with the patient for the next 15 minutes and would like a call back while she is still with the patient

## 2023-04-13 NOTE — TELEPHONE ENCOUNTER
Gertrudis Daniel the VNA called to speak with [facility]   Call transferred to [facility]

## 2023-04-14 ENCOUNTER — APPOINTMENT (EMERGENCY)
Dept: RADIOLOGY | Facility: HOSPITAL | Age: 88
End: 2023-04-14

## 2023-04-14 ENCOUNTER — HOSPITAL ENCOUNTER (INPATIENT)
Facility: HOSPITAL | Age: 88
LOS: 6 days | Discharge: HOME HEALTH | End: 2023-04-20
Attending: SURGERY | Admitting: SURGERY

## 2023-04-14 DIAGNOSIS — N32.89 BLADDER RUPTURE: ICD-10-CM

## 2023-04-14 DIAGNOSIS — S22.31XA FRACTURE OF ONE RIB, RIGHT SIDE, INITIAL ENCOUNTER FOR CLOSED FRACTURE: ICD-10-CM
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
## 2023-04-14 DIAGNOSIS — L89.154 DECUBITUS ULCER OF SACRAL REGION, STAGE 4 (HCC): ICD-10-CM

## 2023-04-14 DIAGNOSIS — A41.9 SEPSIS (HCC): Primary | ICD-10-CM
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
## 2023-04-14 DIAGNOSIS — I50.32 CHRONIC DIASTOLIC (CONGESTIVE) HEART FAILURE (HCC): ICD-10-CM

## 2023-04-14 LAB
2HR DELTA HS TROPONIN: 2 NG/L
ABO GROUP BLD: NORMAL
ANION GAP SERPL CALCULATED.3IONS-SCNC: 4 MMOL/L (ref 4–13)
BACTERIA UR QL AUTO: ABNORMAL /HPF
BASE EXCESS BLDA CALC-SCNC: 6 MMOL/L (ref -2–3)
BASOPHILS # BLD AUTO: 0.03 THOUSANDS/ΜL (ref 0–0.1)
BASOPHILS NFR BLD AUTO: 0 % (ref 0–1)
BILIRUB UR QL STRIP: NEGATIVE
BLD GP AB SCN SERPL QL: NEGATIVE
BUN SERPL-MCNC: 18 MG/DL (ref 5–25)
CA-I BLD-SCNC: 1.12 MMOL/L (ref 1.12–1.32)
CALCIUM SERPL-MCNC: 8.8 MG/DL (ref 8.3–10.1)
CARDIAC TROPONIN I PNL SERPL HS: 13 NG/L
CARDIAC TROPONIN I PNL SERPL HS: 15 NG/L
CHLORIDE SERPL-SCNC: 100 MMOL/L (ref 96–108)
CLARITY UR: CLEAR
CO2 SERPL-SCNC: 29 MMOL/L (ref 21–32)
COLOR UR: COLORLESS
CREAT SERPL-MCNC: 0.99 MG/DL (ref 0.6–1.3)
EOSINOPHIL # BLD AUTO: 0 THOUSAND/ΜL (ref 0–0.61)
EOSINOPHIL NFR BLD AUTO: 0 % (ref 0–6)
ERYTHROCYTE [DISTWIDTH] IN BLOOD BY AUTOMATED COUNT: 16.4 % (ref 11.6–15.1)
GFR SERPL CREATININE-BSD FRML MDRD: 64 ML/MIN/1.73SQ M
GLUCOSE SERPL-MCNC: 185 MG/DL (ref 65–140)
GLUCOSE SERPL-MCNC: 187 MG/DL (ref 65–140)
GLUCOSE UR STRIP-MCNC: NEGATIVE MG/DL
HCO3 BLDA-SCNC: 29 MMOL/L (ref 24–30)
HCT VFR BLD AUTO: 37.8 % (ref 36.5–49.3)
HCT VFR BLD CALC: 36 % (ref 36.5–49.3)
HGB BLD-MCNC: 12.3 G/DL (ref 12–17)
HGB BLDA-MCNC: 12.2 G/DL (ref 12–17)
HGB UR QL STRIP.AUTO: ABNORMAL
IMM GRANULOCYTES # BLD AUTO: 0.06 THOUSAND/UL (ref 0–0.2)
IMM GRANULOCYTES NFR BLD AUTO: 1 % (ref 0–2)
INR PPP: 1.36 (ref 0.84–1.19)
KETONES UR STRIP-MCNC: NEGATIVE MG/DL
LACTATE SERPL-SCNC: 1.5 MMOL/L (ref 0.5–2)
LEUKOCYTE ESTERASE UR QL STRIP: ABNORMAL
LYMPHOCYTES # BLD AUTO: 0.64 THOUSANDS/ΜL (ref 0.6–4.47)
LYMPHOCYTES NFR BLD AUTO: 5 % (ref 14–44)
MCH RBC QN AUTO: 26.6 PG (ref 26.8–34.3)
MCHC RBC AUTO-ENTMCNC: 32.5 G/DL (ref 31.4–37.4)
MCV RBC AUTO: 82 FL (ref 82–98)
MONOCYTES # BLD AUTO: 1.08 THOUSAND/ΜL (ref 0.17–1.22)
MONOCYTES NFR BLD AUTO: 8 % (ref 4–12)
NEUTROPHILS # BLD AUTO: 11.12 THOUSANDS/ΜL (ref 1.85–7.62)
NEUTS SEG NFR BLD AUTO: 86 % (ref 43–75)
NITRITE UR QL STRIP: NEGATIVE
NON-SQ EPI CELLS URNS QL MICRO: ABNORMAL /HPF
NRBC BLD AUTO-RTO: 0 /100 WBCS
PCO2 BLD: 30 MMOL/L (ref 21–32)
PCO2 BLD: 34.2 MM HG (ref 42–50)
PH BLD: 7.54 [PH] (ref 7.3–7.4)
PH UR STRIP.AUTO: 8 [PH]
PLATELET # BLD AUTO: 373 THOUSANDS/UL (ref 149–390)
PMV BLD AUTO: 9.1 FL (ref 8.9–12.7)
PO2 BLD: 45 MM HG (ref 35–45)
POTASSIUM BLD-SCNC: 3.5 MMOL/L (ref 3.5–5.3)
POTASSIUM SERPL-SCNC: 3.4 MMOL/L (ref 3.5–5.3)
PROCALCITONIN SERPL-MCNC: 0.33 NG/ML
PROT UR STRIP-MCNC: ABNORMAL MG/DL
PROTHROMBIN TIME: 17 SECONDS (ref 11.6–14.5)
RBC # BLD AUTO: 4.62 MILLION/UL (ref 3.88–5.62)
RBC #/AREA URNS AUTO: ABNORMAL /HPF
RH BLD: POSITIVE
SAO2 % BLD FROM PO2: 86 % (ref 60–85)
SODIUM BLD-SCNC: 134 MMOL/L (ref 136–145)
SODIUM SERPL-SCNC: 133 MMOL/L (ref 135–147)
SP GR UR STRIP.AUTO: 1.03 (ref 1–1.03)
SPECIMEN EXPIRATION DATE: NORMAL
SPECIMEN SOURCE: ABNORMAL
UROBILINOGEN UR STRIP-ACNC: <2 MG/DL
WBC # BLD AUTO: 12.93 THOUSAND/UL (ref 4.31–10.16)
WBC #/AREA URNS AUTO: ABNORMAL /HPF

## 2023-04-14 PROCEDURE — 0QD10ZZ EXTRACTION OF SACRUM, OPEN APPROACH: ICD-10-PCS | Performed by: SURGERY

## 2023-04-14 RX ORDER — SODIUM HYPOCHLORITE 2.5 MG/ML
1 SOLUTION TOPICAL DAILY
Status: DISCONTINUED | OUTPATIENT
Start: 2023-04-15 | End: 2023-04-20 | Stop reason: HOSPADM

## 2023-04-14 RX ORDER — ENOXAPARIN SODIUM 100 MG/ML
30 INJECTION SUBCUTANEOUS EVERY 12 HOURS
Status: DISCONTINUED | OUTPATIENT
Start: 2023-04-14 | End: 2023-04-15

## 2023-04-14 RX ORDER — METRONIDAZOLE 500 MG/100ML
500 INJECTION, SOLUTION INTRAVENOUS EVERY 8 HOURS
Status: DISCONTINUED | OUTPATIENT
Start: 2023-04-14 | End: 2023-04-15

## 2023-04-14 RX ADMIN — IOHEXOL 100 ML: 350 INJECTION, SOLUTION INTRAVENOUS at 18:39

## 2023-04-14 RX ADMIN — METRONIDAZOLE 500 MG: 500 INJECTION, SOLUTION INTRAVENOUS at 20:47

## 2023-04-14 RX ADMIN — SODIUM CHLORIDE 1000 ML: 0.9 INJECTION, SOLUTION INTRAVENOUS at 23:16

## 2023-04-14 RX ADMIN — VANCOMYCIN HYDROCHLORIDE 1500 MG: 1 INJECTION, POWDER, LYOPHILIZED, FOR SOLUTION INTRAVENOUS at 21:05

## 2023-04-14 RX ADMIN — CEFEPIME 2000 MG: 2 INJECTION, POWDER, FOR SOLUTION INTRAVENOUS at 19:26

## 2023-04-14 RX ADMIN — Medication 2000 UNITS: at 19:25

## 2023-04-14 NOTE — ED PROVIDER NOTES
Emergency Department Airway Evaluation and Management Form    History  Obtained from: EMS  Sulfa antibiotics  Chief Complaint   Patient presents with   • Fall     Unwitnessed form home on eliquis unknown LOC or headstrike     81 yo M s/p fall Unwitnessed on Thinners - level B       Fall      No past medical history on file  No past surgical history on file  No family history on file  I have reviewed and agree with the history as documented      Review of Systems    Physical Exam  BP (!) 172/81   Pulse 79   Temp 100 °F (37 8 °C) (Tympanic)   Resp 20   Wt 61 7 kg (136 lb 0 4 oz)   SpO2 98%     Physical Exam  HENT:      Mouth/Throat:      Comments: Airway open and patent         ED Medications  Medications   cefepime (MAXIPIME) 2,000 mg in dextrose 5 % 50 mL IVPB (2,000 mg Intravenous New Bag 4/14/23 1926)   vancomycin (VANCOCIN) 1500 mg in sodium chloride 0 9% 250 mL IVPB (has no administration in time range)   metroNIDAZOLE (FLAGYL) IVPB (premix) 500 mg 100 mL (has no administration in time range)   prothrombin complex concentrate (human) (Kcentra) 2,000 Units (2,000 Units Intravenous Given 4/14/23 1925)   iohexol (OMNIPAQUE) 350 MG/ML injection (SINGLE-DOSE) 100 mL (100 mL Intravenous Given 4/14/23 1839)       Intubation  Procedures    Notes  No Acute airway intervention indicated     Final Diagnosis  Final diagnoses:   None       ED Provider  Electronically Signed by     Karen Forde MD  04/14/23 0422

## 2023-04-14 NOTE — H&P
H&P - Trauma   Rupesh Hobbs 80 y o  male MRN: [id number]  Unit/Bed#: ED 16 Encounter: [id number]    Trauma Alert: Level B   Model of Arrival: Ambulance    Trauma Team: Attending David Hurst and Residents Tahmina Khan  Consultants: Urology, Medicine, Infectious disease,     Assessment/Plan   Active Problems / Assessment:   - Nondisplaced fracture of right 12th rib   - Right anterior bladder perforation rupture  - Stage 4 sacral decubitus ulcer with necrotic tissue   - Sepsis       Plan:   - Rib fracture   - Rib fx protocol    - Analgesia  - Bladder perforation   - Urology consult  - Stage 4 decubitus ulcer    - Debride necrotic tissue    - Gen surg consult for wound management   - Sepsis    - Abx    - IV fluids    - ID consult   - Medicine consult       History of Present Illness     Chief Complaint: Fall with head strike  Mechanism:Fall     HPI:    Rupesh Hobbs is a 80 y o  male who presents with Fall on eliquis and unknown head strike  Hx of HTN, HLD, Noninsulin dependent diabetes, 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 and 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, stage IV sacral decubitus ulcer that was debrided last week due to some necrotic tissue  Patient has had SP catheter since December 2022  Patient is AOx2 and unaware of time which is his baseline per patient's son  Patient endorses no complaints on exam      Review of Systems   Constitutional: Negative for chills and fever  HENT: Negative  Eyes: Negative  Respiratory: Negative for shortness of breath  Cardiovascular: Negative for chest pain and leg swelling  Gastrointestinal: Negative for abdominal distention and abdominal pain  Endocrine: Negative  Genitourinary: Positive for difficulty urinating  Skin: Positive for wound  Allergic/Immunologic: Negative  Neurological: Negative for dizziness, weakness and headaches  Hematological: Negative  Psychiatric/Behavioral: Negative      All other systems reviewed and are negative  12-point, complete review of systems was reviewed and negative except as stated above  Historical Information   Hx of HTN, HLD, Noninsulin dependent diabetes, 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 1=Yes   2  Since the illness or injury that brought you to the Emergency, have you needed more help than usual to take care of yourself? 0=No   3  Have you been hospitalized for one or more nights during the past 6 months (excluding a stay in the Emergency Department)? 1=Yes   4  In general, do you see well? 0=Yes   5  In general, do you have serious problems with your memory? 1=Yes   6  Do you take more than three different medications everyday? 1=Yes   TOTAL   4     Did you order a geriatric consult if the score was 2 or greater?: yes     Meds/Allergies   all current active meds have been reviewed  Allergies have not been reviewed;     Allergies   Allergen Reactions   • Sulfa Antibiotics Rash       Objective   Initial Vitals:   Temperature: 100 °F (37 8 °C) (04/14/23 1814)  Pulse: 82 (04/14/23 1814)  Respirations: 20 (04/14/23 1814)  Blood Pressure: (!) 175/86 (04/14/23 1814)    Primary Survey:   Airway:        Status: patent;                  Breathing:        Pre-hospital Interventions: none       Effort: normal       Right breath sounds: normal       Left breath sounds: normal  Circulation:        Rhythm: regular       Rate: regular   Right Pulses Left Pulses    R radial: 2+    R pedal: 2+     L radial: 2+    L pedal: 2+       Disability:        GCS: Eye: 4; Verbal: 5 Motor: 5 Total: 14       Right Pupil: 3 mm;  round;  reactive         Left Pupil:  2 mm;  round;  reactive      R Motor Strength L Motor Strength    R : 5/5  R dorsiflex: 5/5  R plantarflex: 5/5 L : 5/5  L dorsiflex: 5/5  L plantarflex: 5/5        Sensory:  No sensory deficit  Exposure:       Completed: Yes      Secondary Survey:  Physical Exam  Vitals and nursing note reviewed  Constitutional:       General: He is not in acute distress  HENT:      Head: Normocephalic and atraumatic  Right Ear: Tympanic membrane and external ear normal       Left Ear: Tympanic membrane and external ear normal       Nose: Nose normal       Mouth/Throat:      Mouth: Mucous membranes are moist    Eyes:      General:         Right eye: No discharge  Left eye: No discharge  Extraocular Movements: Extraocular movements intact  Pupils: Pupils are equal, round, and reactive to light  Cardiovascular:      Rate and Rhythm: Normal rate  Pulses: Normal pulses  Pulmonary:      Effort: Pulmonary effort is normal       Breath sounds: Normal breath sounds  Abdominal:      Palpations: Abdomen is soft  Comments: Suprapubic catheter in place without any signs of infection   Genitourinary:     Penis: Normal     Musculoskeletal:      Cervical back: Normal range of motion  Skin:     Capillary Refill: Capillary refill takes less than 2 seconds  Findings: Lesion (Sacral decubitus ulcer stage IV ) present  Neurological:      General: No focal deficit present  Mental Status: He is alert and oriented to person, place, and time  Mental status is at baseline  Cranial Nerves: No cranial nerve deficit  Motor: No weakness  Psychiatric:         Mood and Affect: Mood normal          Behavior: Behavior normal          Thought Content:  Thought content normal          Judgment: Judgment normal          Invasive Devices     Peripheral Intravenous Line  Duration           Peripheral IV 04/14/23 Distal;Left;Upper;Ventral (anterior) Arm <1 day    Peripheral IV 04/14/23 Left;Ventral (anterior) Forearm <1 day              Lab Results: Results: I have personally reviewed all pertinent laboratory/tests results    Imaging Results: I have personally reviewed pertinent reports  Chest Xray(s): negative for acute findings   FAST exam(s): negative for acute findings   CT Scan(s): positive for acute findings: Right anterior bladder perforation /rupture with extraperitoneal air , nondisplaced fx of right 12th rib at costovertebral junction   Additional Xray(s): N/A     Other Studies: None    Code Status: Level 3 - DNAR and DNI  Advance Directive and Living Will:      Power of :    POLST:    I have spent 48 minutes with Patient and family today in which greater than 50% of this time was spent in counseling/coordination of care regarding Diagnostic results, Patient and family education, Impressions, Counseling / Coordination of care, Documenting in the medical record, Reviewing / ordering tests, medicine, procedures   and Obtaining or reviewing history

## 2023-04-15 PROBLEM — E78.2 MIXED HYPERLIPIDEMIA: Status: ACTIVE | Noted: 2023-01-01

## 2023-04-15 PROBLEM — N18.9 CKD (CHRONIC KIDNEY DISEASE): Status: ACTIVE | Noted: 2023-01-01

## 2023-04-15 PROBLEM — L89.159 PRESSURE ULCER OF SACRAL REGION: Status: ACTIVE | Noted: 2023-01-01

## 2023-04-15 PROBLEM — G70.00 MYASTHENIA GRAVIS (HCC): Status: ACTIVE | Noted: 2023-04-15

## 2023-04-15 PROBLEM — I25.10 CAD (CORONARY ARTERY DISEASE): Status: ACTIVE | Noted: 2023-04-15

## 2023-04-15 PROBLEM — E03.9 HYPOTHYROIDISM: Status: ACTIVE | Noted: 2023-04-15
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
## 2023-04-15 PROBLEM — E11.9 DM (DIABETES MELLITUS) (HCC): Status: ACTIVE | Noted: 2023-04-15

## 2023-04-15 PROBLEM — S22.39XA RIB FRACTURE: Status: ACTIVE | Noted: 2023-04-15

## 2023-04-15 PROBLEM — K29.50 CHRONIC GASTRITIS: Status: ACTIVE | Noted: 2023-04-15

## 2023-04-15 PROBLEM — I50.32 CHRONIC DIASTOLIC (CONGESTIVE) HEART FAILURE (HCC): Status: ACTIVE | Noted: 2023-01-01
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
## 2023-04-15 PROBLEM — I48.91 ATRIAL FIBRILLATION (HCC): Status: ACTIVE | Noted: 2023-01-01

## 2023-04-15 PROBLEM — F41.9 ANXIETY: Status: ACTIVE | Noted: 2023-01-01

## 2023-04-15 PROBLEM — Z93.59 SUPRAPUBIC CATHETER (HCC): Status: ACTIVE | Noted: 2023-04-15

## 2023-04-15 LAB
4HR DELTA HS TROPONIN: 3 NG/L
ABO GROUP BLD: NORMAL
ALBUMIN SERPL BCP-MCNC: 1.8 G/DL (ref 3.5–5)
ALP SERPL-CCNC: 90 U/L (ref 46–116)
ALT SERPL W P-5'-P-CCNC: 53 U/L (ref 12–78)
ANION GAP SERPL CALCULATED.3IONS-SCNC: 3 MMOL/L (ref 4–13)
AST SERPL W P-5'-P-CCNC: 52 U/L (ref 5–45)
ATRIAL RATE: 69 BPM
BASOPHILS # BLD AUTO: 0.04 THOUSANDS/ΜL (ref 0–0.1)
BASOPHILS NFR BLD AUTO: 0 % (ref 0–1)
BILIRUB DIRECT SERPL-MCNC: 0.26 MG/DL (ref 0–0.2)
BILIRUB SERPL-MCNC: 0.55 MG/DL (ref 0.2–1)
BLD GP AB SCN SERPL QL: NEGATIVE
BUN SERPL-MCNC: 12 MG/DL (ref 5–25)
CALCIUM SERPL-MCNC: 7.7 MG/DL (ref 8.3–10.1)
CARDIAC TROPONIN I PNL SERPL HS: 16 NG/L
CHLORIDE SERPL-SCNC: 106 MMOL/L (ref 96–108)
CO2 SERPL-SCNC: 27 MMOL/L (ref 21–32)
CREAT SERPL-MCNC: 0.73 MG/DL (ref 0.6–1.3)
EOSINOPHIL # BLD AUTO: 0.01 THOUSAND/ΜL (ref 0–0.61)
EOSINOPHIL NFR BLD AUTO: 0 % (ref 0–6)
ERYTHROCYTE [DISTWIDTH] IN BLOOD BY AUTOMATED COUNT: 16.6 % (ref 11.6–15.1)
GFR SERPL CREATININE-BSD FRML MDRD: 78 ML/MIN/1.73SQ M
GLUCOSE SERPL-MCNC: 139 MG/DL (ref 65–140)
GLUCOSE SERPL-MCNC: 152 MG/DL (ref 65–140)
GLUCOSE SERPL-MCNC: 157 MG/DL (ref 65–140)
HCT VFR BLD AUTO: 35 % (ref 36.5–49.3)
HGB BLD-MCNC: 11.2 G/DL (ref 12–17)
IMM GRANULOCYTES # BLD AUTO: 0.05 THOUSAND/UL (ref 0–0.2)
IMM GRANULOCYTES NFR BLD AUTO: 1 % (ref 0–2)
LYMPHOCYTES # BLD AUTO: 0.83 THOUSANDS/ΜL (ref 0.6–4.47)
LYMPHOCYTES NFR BLD AUTO: 8 % (ref 14–44)
MCH RBC QN AUTO: 26.3 PG (ref 26.8–34.3)
MCHC RBC AUTO-ENTMCNC: 32 G/DL (ref 31.4–37.4)
MCV RBC AUTO: 82 FL (ref 82–98)
MONOCYTES # BLD AUTO: 0.91 THOUSAND/ΜL (ref 0.17–1.22)
MONOCYTES NFR BLD AUTO: 9 % (ref 4–12)
NEUTROPHILS # BLD AUTO: 8.65 THOUSANDS/ΜL (ref 1.85–7.62)
NEUTS SEG NFR BLD AUTO: 82 % (ref 43–75)
NRBC BLD AUTO-RTO: 0 /100 WBCS
P AXIS: 81 DEGREES
PLATELET # BLD AUTO: 303 THOUSANDS/UL (ref 149–390)
PLATELET # BLD AUTO: 315 THOUSANDS/UL (ref 149–390)
PMV BLD AUTO: 9.2 FL (ref 8.9–12.7)
PMV BLD AUTO: 9.4 FL (ref 8.9–12.7)
POTASSIUM SERPL-SCNC: 3.1 MMOL/L (ref 3.5–5.3)
PR INTERVAL: 336 MS
PROT SERPL-MCNC: 5.5 G/DL (ref 6.4–8.4)
QRS AXIS: 120 DEGREES
QRSD INTERVAL: 88 MS
QT INTERVAL: 446 MS
QTC INTERVAL: 477 MS
RBC # BLD AUTO: 4.26 MILLION/UL (ref 3.88–5.62)
RH BLD: POSITIVE
SODIUM SERPL-SCNC: 136 MMOL/L (ref 135–147)
SPECIMEN EXPIRATION DATE: NORMAL
T WAVE AXIS: 48 DEGREES
VENTRICULAR RATE: 69 BPM
WBC # BLD AUTO: 10.49 THOUSAND/UL (ref 4.31–10.16)

## 2023-04-15 RX ORDER — FLUOXETINE HYDROCHLORIDE 20 MG/1
40 CAPSULE ORAL DAILY
Status: DISCONTINUED | OUTPATIENT
Start: 2023-04-15 | End: 2023-04-20 | Stop reason: HOSPADM

## 2023-04-15 RX ORDER — DICYCLOMINE HCL 20 MG
20 TABLET ORAL 2 TIMES DAILY PRN
COMMUNITY
End: 2023-04-24 | Stop reason: ALTCHOICE

## 2023-04-15 RX ORDER — INSULIN LISPRO 100 [IU]/ML
1-5 INJECTION, SOLUTION INTRAVENOUS; SUBCUTANEOUS
Status: DISCONTINUED | OUTPATIENT
Start: 2023-04-15 | End: 2023-04-20 | Stop reason: HOSPADM

## 2023-04-15 RX ORDER — PANTOPRAZOLE SODIUM 40 MG/1
40 TABLET, DELAYED RELEASE ORAL DAILY
Status: DISCONTINUED | OUTPATIENT
Start: 2023-04-15 | End: 2023-04-20 | Stop reason: HOSPADM

## 2023-04-15 RX ORDER — POTASSIUM CHLORIDE 20 MEQ/1
40 TABLET, EXTENDED RELEASE ORAL
Status: COMPLETED | OUTPATIENT
Start: 2023-04-15 | End: 2023-04-15

## 2023-04-15 RX ORDER — MELATONIN
2000 DAILY
Status: DISCONTINUED | OUTPATIENT
Start: 2023-04-15 | End: 2023-04-20 | Stop reason: HOSPADM

## 2023-04-15 RX ORDER — MELATONIN
2000 DAILY
COMMUNITY
End: 2023-04-24 | Stop reason: ALTCHOICE

## 2023-04-15 RX ORDER — DICYCLOMINE HCL 20 MG
20 TABLET ORAL 2 TIMES DAILY PRN
Status: DISCONTINUED | OUTPATIENT
Start: 2023-04-15 | End: 2023-04-20 | Stop reason: HOSPADM

## 2023-04-15 RX ORDER — ISOSORBIDE MONONITRATE 60 MG/1
60 TABLET, EXTENDED RELEASE ORAL DAILY
Status: DISCONTINUED | OUTPATIENT
Start: 2023-04-15 | End: 2023-04-20 | Stop reason: HOSPADM

## 2023-04-15 RX ORDER — PRAVASTATIN SODIUM 20 MG
20 TABLET ORAL DAILY
Status: DISCONTINUED | OUTPATIENT
Start: 2023-04-15 | End: 2023-04-20 | Stop reason: HOSPADM

## 2023-04-15 RX ORDER — SUCRALFATE 1 G/1
1 TABLET ORAL 4 TIMES DAILY PRN
COMMUNITY
End: 2023-04-24 | Stop reason: ALTCHOICE

## 2023-04-15 RX ORDER — LEVOTHYROXINE SODIUM 88 UG/1
88 TABLET ORAL DAILY
Status: DISCONTINUED | OUTPATIENT
Start: 2023-04-15 | End: 2023-04-20 | Stop reason: HOSPADM

## 2023-04-15 RX ORDER — ACETAMINOPHEN 325 MG/1
650 TABLET ORAL EVERY 6 HOURS PRN
Status: DISCONTINUED | OUTPATIENT
Start: 2023-04-15 | End: 2023-04-20 | Stop reason: HOSPADM

## 2023-04-15 RX ORDER — FUROSEMIDE 20 MG/1
20 TABLET ORAL DAILY
Status: DISCONTINUED | OUTPATIENT
Start: 2023-04-17 | End: 2023-04-20 | Stop reason: HOSPADM

## 2023-04-15 RX ORDER — LEVOTHYROXINE SODIUM 88 UG/1
88 TABLET ORAL DAILY
COMMUNITY
End: 2023-04-24 | Stop reason: ALTCHOICE

## 2023-04-15 RX ORDER — PRAVASTATIN SODIUM 20 MG
20 TABLET ORAL DAILY
COMMUNITY
End: 2023-04-24 | Stop reason: ALTCHOICE

## 2023-04-15 RX ORDER — FAMOTIDINE 20 MG/1
20 TABLET, FILM COATED ORAL 2 TIMES DAILY
Status: DISCONTINUED | OUTPATIENT
Start: 2023-04-15 | End: 2023-04-20 | Stop reason: HOSPADM

## 2023-04-15 RX ORDER — ACETAMINOPHEN AND CODEINE PHOSPHATE 300; 30 MG/1; MG/1
1 TABLET ORAL EVERY 6 HOURS PRN
COMMUNITY
End: 2023-04-24 | Stop reason: ALTCHOICE

## 2023-04-15 RX ORDER — FAMOTIDINE 20 MG/1
20 TABLET, FILM COATED ORAL 2 TIMES DAILY
COMMUNITY
End: 2023-04-24 | Stop reason: ALTCHOICE

## 2023-04-15 RX ORDER — PANTOPRAZOLE SODIUM 40 MG/1
40 TABLET, DELAYED RELEASE ORAL DAILY
COMMUNITY

## 2023-04-15 RX ORDER — ISOSORBIDE MONONITRATE 60 MG/1
60 TABLET, EXTENDED RELEASE ORAL DAILY
COMMUNITY
End: 2023-04-24 | Stop reason: ALTCHOICE

## 2023-04-15 RX ORDER — FLUOXETINE HYDROCHLORIDE 40 MG/1
40 CAPSULE ORAL DAILY
COMMUNITY
End: 2023-04-24 | Stop reason: ALTCHOICE

## 2023-04-15 RX ORDER — SUCRALFATE 1 G/1
1 TABLET ORAL 4 TIMES DAILY PRN
Status: DISCONTINUED | OUTPATIENT
Start: 2023-04-15 | End: 2023-04-20 | Stop reason: HOSPADM

## 2023-04-15 RX ORDER — PYRIDOSTIGMINE BROMIDE 60 MG/1
60 TABLET ORAL 3 TIMES DAILY
COMMUNITY
End: 2023-04-24 | Stop reason: ALTCHOICE

## 2023-04-15 RX ORDER — PYRIDOSTIGMINE BROMIDE 60 MG/1
60 TABLET ORAL 3 TIMES DAILY
Status: DISCONTINUED | OUTPATIENT
Start: 2023-04-15 | End: 2023-04-20 | Stop reason: HOSPADM

## 2023-04-15 RX ORDER — SODIUM CHLORIDE 9 MG/ML
75 INJECTION, SOLUTION INTRAVENOUS CONTINUOUS
Status: DISCONTINUED | OUTPATIENT
Start: 2023-04-15 | End: 2023-04-15

## 2023-04-15 RX ORDER — FUROSEMIDE 20 MG/1
20 TABLET ORAL DAILY
COMMUNITY
Start: 2023-04-10 | End: 2023-04-24 | Stop reason: ALTCHOICE

## 2023-04-15 RX ORDER — AMIODARONE HYDROCHLORIDE 200 MG/1
200 TABLET ORAL DAILY
Status: DISCONTINUED | OUTPATIENT
Start: 2023-04-15 | End: 2023-04-20 | Stop reason: HOSPADM

## 2023-04-15 RX ORDER — AMIODARONE HYDROCHLORIDE 200 MG/1
200 TABLET ORAL DAILY
COMMUNITY
End: 2023-04-24 | Stop reason: ALTCHOICE

## 2023-04-15 RX ORDER — MIRABEGRON 25 MG/1
TABLET, FILM COATED, EXTENDED RELEASE ORAL
COMMUNITY
End: 2023-04-24 | Stop reason: ALTCHOICE

## 2023-04-15 RX ADMIN — CHOLECALCIFEROL TAB 25 MCG (1000 UNIT) 2000 UNITS: 25 TAB at 17:21

## 2023-04-15 RX ADMIN — AMIODARONE HYDROCHLORIDE 200 MG: 200 TABLET ORAL at 17:19

## 2023-04-15 RX ADMIN — ISOSORBIDE MONONITRATE 60 MG: 60 TABLET, EXTENDED RELEASE ORAL at 17:17

## 2023-04-15 RX ADMIN — POTASSIUM CHLORIDE 40 MEQ: 1500 TABLET, EXTENDED RELEASE ORAL at 07:50

## 2023-04-15 RX ADMIN — METRONIDAZOLE 500 MG: 500 INJECTION, SOLUTION INTRAVENOUS at 04:40

## 2023-04-15 RX ADMIN — PANTOPRAZOLE SODIUM 40 MG: 40 TABLET, DELAYED RELEASE ORAL at 17:21

## 2023-04-15 RX ADMIN — FAMOTIDINE 20 MG: 20 TABLET, FILM COATED ORAL at 21:47

## 2023-04-15 RX ADMIN — PRAVASTATIN SODIUM 20 MG: 20 TABLET ORAL at 17:17

## 2023-04-15 RX ADMIN — SODIUM CHLORIDE 75 ML/HR: 0.9 INJECTION, SOLUTION INTRAVENOUS at 04:40

## 2023-04-15 RX ADMIN — APIXABAN 2.5 MG: 2.5 TABLET, FILM COATED ORAL at 17:18

## 2023-04-15 RX ADMIN — PIPERACILLIN SODIUM AND TAZOBACTAM SODIUM 4.5 G: 36; 4.5 INJECTION, POWDER, LYOPHILIZED, FOR SOLUTION INTRAVENOUS at 21:44

## 2023-04-15 RX ADMIN — PIPERACILLIN SODIUM AND TAZOBACTAM SODIUM 4.5 G: 36; 4.5 INJECTION, POWDER, LYOPHILIZED, FOR SOLUTION INTRAVENOUS at 09:23

## 2023-04-15 RX ADMIN — PYRIDOSTIGMINE BROMIDE 60 MG: 60 TABLET ORAL at 21:44

## 2023-04-15 RX ADMIN — INSULIN LISPRO 1 UNITS: 100 INJECTION, SOLUTION INTRAVENOUS; SUBCUTANEOUS at 21:45

## 2023-04-15 RX ADMIN — PYRIDOSTIGMINE BROMIDE 60 MG: 60 TABLET ORAL at 17:18

## 2023-04-15 RX ADMIN — ENOXAPARIN SODIUM 30 MG: 30 INJECTION SUBCUTANEOUS at 01:56

## 2023-04-15 RX ADMIN — POTASSIUM CHLORIDE 40 MEQ: 1500 TABLET, EXTENDED RELEASE ORAL at 09:22

## 2023-04-15 RX ADMIN — FLUOXETINE 40 MG: 20 CAPSULE ORAL at 17:17

## 2023-04-15 RX ADMIN — PIPERACILLIN SODIUM AND TAZOBACTAM SODIUM 4.5 G: 36; 4.5 INJECTION, POWDER, LYOPHILIZED, FOR SOLUTION INTRAVENOUS at 15:38

## 2023-04-15 RX ADMIN — LEVOTHYROXINE SODIUM 88 MCG: 88 TABLET ORAL at 17:20

## 2023-04-15 NOTE — ASSESSMENT & PLAN NOTE
History of hypothyroidism managed with 88 mcg levothyroxine  Most recent TSH was 2 957    Plan  We will continue levothyroxine 88 mcg daily

## 2023-04-15 NOTE — ASSESSMENT & PLAN NOTE
Displaced rib fracture of the right 12th rib secondary to fall    Plan:  Pain management with nonopioid analgesics and will escalate as needed  Encourage deep breathing  Symptomatic care with ice, heat, lidocaine patch, etc

## 2023-04-15 NOTE — ASSESSMENT & PLAN NOTE
History of myasthenia gravis with no acute complaints    Plan:   We will continue home pyridostigmine

## 2023-04-15 NOTE — ASSESSMENT & PLAN NOTE
History chronic A-fib managed with amiodarone 200 mg daily  Anticoagulated with Eliquis 2 5 mg twice daily    Plan  Continue  amiodarone at this time  HASBLED score 3 (medication pre-dispose to bleeding, age, and HTN)  Will discuss with patient and family the risks and benefits of anticoagulation versus no anticoagulation given patient's history of recurrent falls  Plan to discontinue Eliquis given patient's high risk of falls with anticoagulation

## 2023-04-15 NOTE — ASSESSMENT & PLAN NOTE
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

## 2023-04-15 NOTE — PROGRESS NOTES
Pastoral Care Progress Note    2023  Patient: Lucrecia Villeda : 1927  Admission Date & Time: 2023 1810  MRN: [id number] [facility]: [id number]      Anjel Quintanilla responded to trauma alert  Spoke with patient's son and alerted MD he was awaiting info  Listened kindly to the struggle Dad's care has become  No further interaction with either  Passed info to incoming   23   Clinical Encounter Type   Visited With Patient; Family   Crisis Visit Trauma   Patient Spiritual Encounters   Spiritual Encounter Notes Anjel Quintanilla responded to trauma alert  spoke with patient's son and alerted Dr to speak with him  No further contact with them

## 2023-04-15 NOTE — QUICK NOTE
Received signout regarding 59-year-old male patient coming in for fall on Eliquis  CT abdomen pelvis reveals extraperitoneal air around bladder  Hoyt catheter in place  Patient is status post SP tube insertion 12/13/2022 by interventional radiology  He is also status post explantation of eroded artificial urinary sphincter, scrotal exploration and diagnostic cystoscopy at that time  Discussed case with Dr Sheri Licona  We will continue to monitor with Hoyt catheter in place  No plan for medical intervention at this time      CHARLES Gilliam

## 2023-04-15 NOTE — ASSESSMENT & PLAN NOTE
History of anxiety managed with Prozac 40 mg daily at home; will continue regimen while inpatient  Consider d/c to avoid polypharmacy and SIADH in elderly vs consider switching to escitalopram

## 2023-04-15 NOTE — CONSULTS
UROLOGY CONSULTATION NOTE     Patient Identifiers: Chiquita Marshall (MRN [id number])  Service Requesting Consultation: ER  Service Providing Consultation:  Urology, Catrachito Traore MD    Date of Service: 4/15/2023  Consults    Reason for Consultation: CT findings of bladder    History of Present Illness: Chiquita Marshall is a 80 y o  old with a history of prostate cancer status post prostatectomy with resulting artificial urinary sphincter status post explantation December 13 2022 (for skin erosion of the pump) subsequently managed with suprapubic catheter  The patient had a fall resulting in CT scan of the abdomen which showed evidence of extraperitoneal air tracking around the right aspect of the bladder with a suprapubic catheter in place and no obvious signs of intra-peritoneal process  Patient denies abdominal pain at this time  His catheter has been draining without issues  He also has a known sacral decubitus ulcer which has been evaluate general surgery and by ID  Past Medical, Past Surgical History:   No past medical history on file :    No past surgical history on file :    Medications, Allergies:     Current Facility-Administered Medications   Medication Dose Route Frequency   • enoxaparin (LOVENOX) subcutaneous injection 30 mg  30 mg Subcutaneous Q12H   • piperacillin-tazobactam (ZOSYN) 4 5 g in sodium chloride 0 9 % 100 mL IVPB  4 5 g Intravenous Q6H   • sodium chloride 0 9 % infusion  75 mL/hr Intravenous Continuous   • sodium hypochlorite (DAKIN'S HALF-STRENGTH) 0 25 percent topical solution 1 application  1 application  Irrigation Daily       Allergies: Allergies   Allergen Reactions   • Sulfa Antibiotics Rash   :    Social and Family History:   Social History:          Social History     Tobacco Use   Smoking Status Not on file   Smokeless Tobacco Not on file       Family History:  No family history on file :     Review of Systems:     General: Fever, chills, or night sweats: "negative  Cardiac: Negative for chest pain  Pulmonary: Negative for shortness of breath  Gastrointestinal: Abdominal pain negative  Nausea, vomiting, or diarrhea negative,  Genitourinary: See HPI above  Patient does not have hematuria  All other systems queried were negative  Physical Exam:   General: Patient is pleasant and in NAD  Awake and alert  /77 (BP Location: Right arm)   Pulse 62   Temp 100 °F (37 8 °C) (Tympanic)   Resp 18   Ht 5' 9\" (1 753 m)   Wt 61 7 kg (136 lb 0 4 oz)   SpO2 96%   BMI 20 09 kg/m² Temp (24hrs), Av °F (37 8 °C), Min:100 °F (37 8 °C), Max:100 °F (37 8 °C)  current; Temperature: 100 °F (37 8 °C)  I/O last 24 hours: In: 1500 [IV Piggyback:1500]  Out: 1000 [Urine:1000]  Cardiac: Peripheral edema: negative  Pulmonary: Non-labored breathing  Abdomen: Soft, non-tender, non-distended  No surgical scars  SPT in place draining clear urine  No masses, tenderness, hernias noted  Genitourinary: Negative CVA tenderness, negative suprapubic tenderness  (Male): Penis unremakrkable    ALMONTE (SPT): in place draining clear yellow urine     Labs:     Lab Results   Component Value Date    HGB 11 2 (L) 04/15/2023    HCT 35 0 (L) 04/15/2023    WBC 10 49 (H) 04/15/2023     04/15/2023   ]    Lab Results   Component Value Date    K 3 1 (L) 04/15/2023     04/15/2023    CO2 27 04/15/2023    BUN 12 04/15/2023    CREATININE 0 73 04/15/2023    CALCIUM 7 7 (L) 04/15/2023    GLUCOSE 185 (H) 2023   ]    Imaging:   I personally reviewed the images and report of the following studies, and reviewed them with the patient:    CT Abdomen/Pelvis: Patient has a suprapubic catheter in place and there is evidence of air around the right side of the bladder which appears to be in the extraperitoneal space only        ASSESSMENT:     80 y o  old male with likely right-sided bladder rupture (versus abnormal air tracking associated with suprapubic tube) that appears to be " extraperitoneal with a suprapubic catheter in place draining clear urine and with no evidence of pain and benign abdominal exam  PLAN:     -Conservative measures with continued catheter drainage  Given this appears to be an extraperitoneal process surgical intervention is not immediately required  -Can consider repeat imaging in approximately 5 to 20 days to reevaluate the bladder for hopeful resolution of extraperitoneal air        Thank you for allowing me to participate in this patients’ care  Please do not hesitate to call with any additional questions  Rudie Favre, MD    Total time of encounter was 45  minutes, of which 35 minutes was spent on counseling

## 2023-04-15 NOTE — CONSULTS
Consultation - Infectious Disease   Morris Box 80 y o  male MRN: [id number]  Unit/Bed#: ED 16 Encounter: [id number]      IMPRESSION & RECOMMENDATIONS:   1  Systemic inflammatory response syndrome  Present on admission with tachycardia and leukocytosis  Possibly secondary to sepsis from an infected sacral decubitus ulcer  Possibly another source of sepsis  Possibly a noninfectious etiology such as the bladder rupture  Fortunately the patient remains hemodynamically stable  -Antibiotics as below  -Follow-up blood cultures  -Follow-up urine cultures  -Follow-up operative cultures if the patient gets surgical debridement  -Recheck CBC with differential and BMP  -Supportive care    2  Infected sacral decubitus ulcer  With malodorous drainage and initially necrotic tissue status post bedside debridement 4/15/2023  Likely polymicrobial   -Begin Zosyn 4 5 g IV every 6 hours  -Close surgical follow-up  -Await decision about surgical debridement  -Local wound care for now    3  Bladder rupture  In a patient with previous prostate cancer with prostatectomy, scrotal exploration and explantation of eroded artificial urinary sphincter and suprapubic catheter placed  Now with a new Hoyt catheter in place   -Hoyt drainage  -Close urology follow-up    4  Myasthenia gravis  Does not appear to be on any treatment currently  5   Ambulatory dysfunction        Have discussed the above management plan in detail with the primary service    Extensive review of the medical records in epic including review of the notes, radiographs, and laboratory results     HISTORY OF PRESENT ILLNESS:  Reason for Consult: Infected sacral decubitus ulcer  HPI: Morris Box is a 80y o  year old male with myasthenia gravis, sacral decubitus ulcer, prostate cancer status post radical prostatectomy, scrotal exploration with explantation of eroded artificial urinary sphincter, suprapubic catheter placement admitted [facility] [address] [place] [address] after sustaining a fall who we are asked to assist with antibiotic management for a possible infected decubitus ulcer  The patient presented as a trauma alert after sustaining a fall  CT of the abdomen pelvis revealed a ruptured bladder and a Hoyt catheter has been placed  On exam he was found to be tachycardic with a leukocytosis and a sacral decubitus ulcer that was necrotic, malodorous, with some drainage  The patient had undergone debridement of the sacral ulcer 1 week prior  The patient currently denies any headache or stiff neck, denies any nausea vomiting or diarrhea, denies any dysuria or hematuria, denies any new rash or skin lesions  He does admit to some back and buttock pain  He denies having any fever chills or sweats prior to coming to the hospital     REVIEW OF SYSTEMS:  A complete review of systems is negative other than that noted in the HPI  PAST MEDICAL HISTORY:  No past medical history on file  No past surgical history on file  FAMILY HISTORY:  Non-contributory    SOCIAL HISTORY:  Social History   Social History     Substance and Sexual Activity   Alcohol Use Not on file     Social History     Substance and Sexual Activity   Drug Use Not on file     Social History     Tobacco Use   Smoking Status Not on file   Smokeless Tobacco Not on file       ALLERGIES:  Allergies   Allergen Reactions   • Sulfa Antibiotics Rash       MEDICATIONS:  All current active medications have been reviewed    Antibiotics: Vancomycin, cefepime, Flagyl x1 dose    PHYSICAL EXAM:  Temp:  [100 °F (37 8 °C)] 100 °F (37 8 °C)  HR:  [64-82] 64  Resp:  [15-20] 16  BP: (157-182)/(70-86) 172/76  SpO2:  [94 %-99 %] 97 %  Temp (24hrs), Av °F (37 8 °C), Min:100 °F (37 8 °C), Max:100 °F (37 8 °C)  Current: Temperature: 100 °F (37 8 °C)    Intake/Output Summary (Last 24 hours) at 4/15/2023 0044  Last data filed at 4/15/2023 0155  Gross per 24 hour   Intake 1400 ml   Output --   Net 1400 ml General Appearance:  Elderly, debilitated, nontoxic, and in no distress   Head:  Normocephalic, without obvious abnormality, atraumatic   Eyes:  Conjunctiva pink and sclera anicteric, both eyes   Nose: Nares normal, mucosa normal, no drainage   Throat: Oropharynx moist without lesions   Neck: Supple, symmetrical, no adenopathy, no tenderness/mass/nodules   Back:   Symmetric, no curvature, ROM normal, no CVA tenderness   Lungs:   Clear to auscultation bilaterally, respirations unlabored   Chest Wall:  No tenderness or deformity   Heart:  RRR; no murmur, rub or gallop   Abdomen:   Soft, non-tender, non-distended, positive bowel sounds    Extremities: No cyanosis, clubbing or edema   Skin: No rashes  Sacral decubitus ulcer status postdebridement with some grayish drainage that is malodorous from the ulcer  Surrounding erythema   Lymph nodes: Cervical, supraclavicular nodes normal   Neurologic: Alert and oriented times 3, extremity strength 5/5 and symmetric       LABS, IMAGING, & OTHER STUDIES:  Lab Results:  I have personally reviewed pertinent labs  Results from last 7 days   Lab Units 04/15/23  0438 04/15/23  0002 04/14/23 1835 04/14/23  1833   WBC Thousand/uL 10 49*  --  12 93*  --    HEMOGLOBIN g/dL 11 2*  --  12 3  --    I STAT HEMOGLOBIN g/dl  --   --   --  12 2   PLATELETS Thousands/uL 315 303 373  --      Results from last 7 days   Lab Units 04/15/23  0438 04/14/23  1837 04/14/23  1837 04/14/23  1833   SODIUM mmol/L 136  --  133*  --    POTASSIUM mmol/L 3 1*   < > 3 4*  --    CHLORIDE mmol/L 106   < > 100  --    CO2 mmol/L 27   < > 29  --    CO2, I-STAT mmol/L  --   --   --  30   BUN mg/dL 12   < > 18  --    CREATININE mg/dL 0 73   < > 0 99  --    EGFR ml/min/1 73sq m 78   < > 64  --    GLUCOSE, ISTAT mg/dl  --   --   --  185*   CALCIUM mg/dL 7 7*   < > 8 8  --     < > = values in this interval not displayed       Results from last 7 days   Lab Units 04/14/23  1825   BLOOD CULTURE  Received in Microbiology Lab  Culture in Progress  Received in Microbiology Lab  Culture in Progress  Results from last 7 days   Lab Units 04/14/23  1837   PROCALCITONIN ng/ml 0 33*                   Imaging Studies:     CT chest abdomen pelvis- no consolidation  Sacral decubitus ulcer  Bladder rupture      Images personally reviewed by me in PACS and interpreted by me

## 2023-04-15 NOTE — ASSESSMENT & PLAN NOTE
Reports presenting after an unwitnessed fall hitting the ground complicated by rib fracture  Patient's son reports previous similar unwitnessed fall 1 week prior  Patient at baseline is a poor historian and unable to describe events leading to the fall  Occurred while patient was anticoagulated with Eliquis; given Kcentra to reverse Eliquis  Hemoglobin stable with no signs of ecchymosis or signs of bleeding  Suspect cause of fall may be secondary to sepsis in elderly patient      Plan:  Goals of care discussion with family  PT OT consult, appreciate recs  We will discuss with family the risks of continuing anticoagulation with history of recurrent falls

## 2023-04-15 NOTE — ASSESSMENT & PLAN NOTE
History of chronic sacral pressure ulcer  Noted to have stage IV sacral decubitus ulcer with significant necrotic tissue at the wound base  Sacral wound was debrided roughly 1 week ago by wound care  Status post bedside ED debridement of sacral wound  Given 1 dose of cefepime in the ED    Plan:   We will start Zosyn 4 5 g every 6hr   plan for 7 days of antibiotics we will continue till 4/20/2023   wound care

## 2023-04-15 NOTE — ASSESSMENT & PLAN NOTE
History of non-insulin-dependent diabetes mellitus  Last A1c in December of 7 2  Controlled with with saxagliptin 2 5 mg daily    Plan:   carb controlled diet   start correctional insulin to determine basal bolus regimen, titrate as needed with goal 140-180 in hospital  Hold oral home agents

## 2023-04-15 NOTE — ASSESSMENT & PLAN NOTE
Lab Results   Component Value Date    EGFR 68 04/17/2023    EGFR 77 04/16/2023    EGFR 78 04/15/2023    CREATININE 0 94 04/17/2023    CREATININE 0 76 04/16/2023    CREATININE 0 73 04/15/2023     History of CKD currently at baseline kidney    Plan:   We will continue to trend creatinine and avoid nephrotoxic agents and maintain adequate perfusion  Home 20 mg Lasix diuretics regimen

## 2023-04-15 NOTE — ASSESSMENT & PLAN NOTE
Wt Readings from Last 3 Encounters:   04/14/23 61 7 kg (136 lb 0 4 oz)     Last echo in 12/12/2022 showed EF of 55% with grade 2 pseudonormal diastolic dysfunction  Patient takes Lasix 20 mg p o   Monday through Friday    Plan:  Lasix 20 mg p o  and titrate as needed to maintain goal net negative 1-2 liters  Daily I&O's  K>4, Mg >2 goal, replenish as needed  Consider fluid restriction  Consider cards consult to maximize GDMT   Limited BB use as naturally hovers around 60/bradycardia   Consider adding Ace-I/ARB, but will discuss risks of falls and orthostasis with family considering patients age  If acutely SOB, IV lasix and STAT CXR

## 2023-04-15 NOTE — QUICK NOTE
Cervical Collar Clearance: The patient had a CT scan of the cervical spine demonstrating no acute injury  On exam, the patient had no midline point tenderness or paresthesias/numbness/weakness in the extremities  The patient had full range of motion (was then able to flex, extend, and rotate head laterally) without pain  There were no distracting injuries and the patient was not intoxicated  The patient's cervical spine was cleared radiologically and clinically  Cervical collar removed at this time       Chris Victoria MD  4/14/2023 8:50 PM

## 2023-04-15 NOTE — CONSULTS
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    Plan:  · Sacral wound judiciously debrided at bedside in ED  · Will perform BID packing changes with Dakin's-soaked wet-to-dry dressings  · Recommend ID consult  · Rest of care per trauma    History of Present Illness     HPI:  Jatin Phillips is a 80 y o  male who presented as a level B trauma alert due to fall with head strike on Eliquis  Work-up demonstrated 1/12 rib fracture and 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 in the setting of chronic suprapubic catheter  He was noted to have a stage IV sacral decubitus ulcer with significant necrotic tissue present at the wound base  General surgery was consulted for further management  Inpatient consult to Acute Care Surgery  Consult performed by: Holley Denis MD  Consult ordered by: Yu Payne MD          Review of Systems   Unable to perform ROS: Dementia       Historical Information   No past medical history on file  No past surgical history on file  Social History   Social History     Substance and Sexual Activity   Alcohol Use Not on file     Social History     Substance and Sexual Activity   Drug Use Not on file     No existing history information found  No existing history information found  Social History     Tobacco Use   Smoking Status Not on file   Smokeless Tobacco Not on file     Family History: No family history on file      Meds/Allergies   all current active meds have been reviewed  Allergies   Allergen Reactions   • Sulfa Antibiotics Rash       Objective   First Vitals:   Blood Pressure: (!) 175/86 (04/14/23 1814)  Pulse: 82 (04/14/23 1814)  Temperature: 100 °F (37 8 °C) (04/14/23 1814)  Temp Source: Tympanic (04/14/23 1814)  Respirations: 20 (04/14/23 1814)  Weight - Scale: 61 7 kg (136 lb 0 4 oz) (04/14/23 1814)  SpO2: 97 % (04/14/23 1814)    Current Vitals:   Blood Pressure: (!) 182/84 (04/14/23 1945)  Pulse: 74 (04/14/23 1945)  Temperature: 100 °F (37 8 °C) (04/14/23 1814)  Temp Source: Tympanic (04/14/23 1814)  Respirations: 20 (04/14/23 1945)  Weight - Scale: 61 7 kg (136 lb 0 4 oz) (04/14/23 1814)  SpO2: 99 % (04/14/23 1945)    No intake or output data in the 24 hours ending 04/14/23 2044    Invasive Devices     Peripheral Intravenous Line  Duration           Peripheral IV 04/14/23 Distal;Left;Upper;Ventral (anterior) Arm <1 day    Peripheral IV 04/14/23 Left;Ventral (anterior) Forearm <1 day                Physical Exam   Gen:  NAD  HENT: MMM  CV:  RRR  Lungs: nl effort  Abd:  soft, NT/ND  Sacrum: stage IV pressure injury with necrotic tissue and fibrinous exudate at base, +malodor  Ext:  no CCE  Skin:  no rashes  Neuro: awake, alert    Lab Results:   I have personally reviewed pertinent lab results  , CBC:   Lab Results   Component Value Date    WBC 12 93 (H) 04/14/2023    HGB 12 3 04/14/2023    HCT 37 8 04/14/2023    MCV 82 04/14/2023     04/14/2023    MCH 26 6 (L) 04/14/2023    MCHC 32 5 04/14/2023    RDW 16 4 (H) 04/14/2023    MPV 9 1 04/14/2023    NRBC 0 04/14/2023   , CMP:   Lab Results   Component Value Date    SODIUM 133 (L) 04/14/2023    K 3 4 (L) 04/14/2023     04/14/2023    CO2 29 04/14/2023    CO2 30 04/14/2023    BUN 18 04/14/2023    CREATININE 0 99 04/14/2023    GLUCOSE 185 (H) 04/14/2023    CALCIUM 8 8 04/14/2023    EGFR 64 04/14/2023   , Coagulation:   Lab Results   Component Value Date    INR 1 36 (H) 04/14/2023     Imaging: I have personally reviewed pertinent reports  and I have personally reviewed pertinent films in PACS  EKG, Pathology, and Other Studies: I have personally reviewed pertinent reports

## 2023-04-15 NOTE — PROGRESS NOTES
INTERNAL MEDICINE RESIDENCY PROGRESS NOTE     Name: Santhosh Muller   Age & Sex: 80 y o  male   MRN: [id number]  Unit/Bed#: ED 12   Encounter: [id number]  Team: SOD Team C     PATIENT INFORMATION     Name: Santhosh Muller   Age & Sex: 80 y o  male   MRN: [id number]  Hospital Stay Days: 1    ASSESSMENT/PLAN     Principal Problem:    Fall at home  Active Problems:    Pressure ulcer of sacral region    CKD (chronic kidney disease)    DM (diabetes mellitus) (HCC)    Chronic gastritis    Myasthenia gravis (HCC)    Chronic diastolic (congestive) heart failure (HCC)    Atrial fibrillation (HCC)    Suprapubic catheter ([address] )    Mixed hyperlipidemia    Hypothyroidism    CAD (coronary artery disease)    Rib fracture    Anxiety      * Fall at home  [address] [place] [address] presenting after an unwitnessed fall hitting the ground complicated by rib fracture  Patient's son reports previous similar unwitnessed fall 1 week prior  Patient at baseline is a poor historian and unable to describe events leading to the fall  Occurred while patient was anticoagulated with Eliquis; given Kcentra to reverse Eliquis  Hemoglobin stable with no signs of ecchymosis or signs of bleeding  Suspect cause of fall may be secondary to sepsis in elderly patient      Plan: We will further evaluate causes of fall including EKG and orthostatics  Will hold off further work-up of recurrent fall until after the resolution of sepsis and goals of care discussion with family  PT OT consult  We will discuss with family the risks of continuing anticoagulation with history of recurrent falls    Pressure ulcer of sacral region  Assessment & Plan  History of chronic sacral pressure ulcer  Noted to have stage IV sacral decubitus ulcer with significant necrotic tissue at the wound base  Sacral wound was debrided roughly 1 week ago by wound care  Status post bedside ED debridement of sacral wound  Given 1 dose of cefepime in the ED    Plan:   We will start Zosyn 4 5 g every 6hr   Wound care    CKD (chronic kidney disease)  Assessment & Plan  Lab Results   Component Value Date    EGFR 78 04/15/2023    EGFR 64 04/14/2023    CREATININE 0 73 04/15/2023    CREATININE 0 99 04/14/2023     History of CKD currently at baseline kidney    Plan: We will continue to trend creatinine and avoid nephrotoxic agents and maintain adequate perfusion  Home 20 mg Lasix diuretics regimen    Anxiety  Assessment & Plan  History of anxiety managed with Prozac 40 mg daily at home; will continue regimen while inpatient    Rib fracture  Assessment & Plan  Displaced rib fracture of the right 12th rib secondary to fall    Plan:  Pain management with nonopioid analgesics and will escalate as needed      CAD (coronary artery disease)  Assessment & Plan  History of coronary disease; will continue home pravastatin and Imdur    Hypothyroidism  Assessment & Plan  History of hypothyroidism managed with 88 mcg levothyroxine  Most recent TSH was 2 957    Plan  We will continue levothyroxine 88 mcg daily    Mixed hyperlipidemia  Assessment & Plan  History of hyperlipidemia managed with pravastatin 20 mg at home which she will continue while inpatient    Suprapubic catheter [facility])  Assessment & Plan  History of prostate cancer status post radical prostatectomy  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  CT scan of the abdomen which showed evidence of extraperitoneal air tracking around the right aspect of the bladder with a suprapubic catheter in place and no obvious signs of intra-peritoneal process; evaluated by urology with no issues    Plan:   We will maintain suprapubic catheter and monitor for output and signs of infection    Atrial fibrillation (HCC)  Assessment & Plan  History chronic A-fib managed with amiodarone 200 mg daily  Anticoagulated with Eliquis 2 5 mg twice daily    Plan  Continue Eliquis and amiodarone at this time  Well discuss with patient and family the risks and benefits of anticoagulation versus no anticoagulation given patient's history of recurrent falls      Chronic diastolic (congestive) heart failure (HCC)  Assessment & Plan  Wt Readings from Last 3 Encounters:   04/14/23 61 7 kg (136 lb 0 4 oz)     Last echo in 12/12/2022 showed EF of 55% with grade 2 pseudonormal diastolic dysfunction  Patient takes Lasix 20 mg p o  Monday through Friday    Plan: We will continue Lasix 20 mg p o  and titrate as needed      Myasthenia gravis [facility])  Assessment & Plan  History of myasthenia gravis with no acute complaints    Plan: We will continue home pyridostigmine    Chronic gastritis  Assessment & Plan  History of chronic gastritis managed with 40 mg twice daily; will continue regimen while inpatient    DM (diabetes mellitus) ([facility] [address] )  Assessment & Plan  History of non-insulin-dependent diabetes mellitus  Last A1c in December of 7 2  Controlled with with saxagliptin 2 5 mg daily    Plan: We will start carb controlled diet and start correctional insulin to determine basal bolus regimen          SUBJECTIVE     The patient is a 80-year-old male with past medical history of A-fib on Eliquis, prostatectomy complicated by urinary drainage and 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 now managed with suprapubic catheter, stage IV sacral decubitus ulcer last debrided last week, DM, myasthenia gravis, HFpEF, CAD, hypothyroidism, and CKD presenting to the ED after unwitnessed fall while anticoagulated for A-fib  The patient fell on the ground while trying to get up and landed on his chest  patient has baseline dementia and is AOx2 at his baseline confirmed by patient's son at bedside  Patient's son also explained that he had another unwitnessed fall 1 week prior  Patient is unable to explain the events leading to the fall  He ambulates with a walker    Patient has baseline dementia however denied any fevers, chills, nausea, vomiting, shortness of breath, and dizziness  he endorsed some pain in his abdomen and his sacral area  Discussion with the patient family patient is level 3 DNI DNR  Upon arrival to the ED, patient was afebrile, pulse of 82, respiratory rate of 20, blood pressure of 175/86, and 97% on room air  Trauma work-up significant for nondisplaced fracture of the right 12th rib and right anterior bladder perforation  Urology was consulted who recommended conservative management with continued catheter drainage  Patient also received IV fluids and 1 dose of cefepime in the ED for sepsis  He underwent surgical debridement of this sacral ulcer  ID recommended infectious work-up including blood, urine, and operative cultures and starting Zosyn 4 5 g IV every 6  OBJECTIVE     Vitals:    04/15/23 1115 04/15/23 1200 04/15/23 1245 04/15/23 1430   BP: 166/77 (!) 172/81 166/87 (!) 177/73   BP Location: Right arm Right arm Right arm Right arm   Pulse: 62 64 64 64   Resp: 18 19 21 20   Temp:       TempSrc:       SpO2: 96% 98% 98% 96%   Weight:       Height:          Temperature:   Temp (24hrs), Av °F (37 8 °C), Min:100 °F (37 8 °C), Max:100 °F (37 8 °C)    Temperature: 100 °F (37 8 °C)  Intake & Output:  I/O        0701  /14 0700 /14 0701  04/15 0700 04/15 0701  04/16 0700    I V  (mL/kg)   822 5 (13 3)    IV Piggyback  1400 100    Total Intake(mL/kg)  1400 (22 7) 922 5 (15)    Urine (mL/kg/hr)   1000 (1 8)    Total Output   1000    Net  +1400 -77 5               Weights:   IBW (Ideal Body Weight): 70 7 kg    Body mass index is 20 09 kg/m²  Weight (last 2 days)     Date/Time Weight    23 18:14:18 61 7 (136 02)        Physical Exam  Constitutional:       General: He is not in acute distress  Appearance: Normal appearance  He is normal weight  He is not ill-appearing or toxic-appearing  HENT:      Head: Normocephalic and atraumatic  Cardiovascular:      Rate and Rhythm: Normal rate and regular rhythm  Pulses: Normal pulses  Heart sounds: No murmur heard  No gallop  Pulmonary:      Effort: Pulmonary effort is normal  No respiratory distress  Breath sounds: No wheezing or rales  Abdominal:      General: Abdomen is flat  There is no distension  Tenderness: There is no abdominal tenderness  There is no guarding  Comments: Right tenderness along 12th rib   Genitourinary:     Comments: Suprapubic catheter  Neurological:      Mental Status: He is alert  Comments: AO x2       LABORATORY DATA     Labs: I have personally reviewed pertinent reports  Results from last 7 days   Lab Units 04/15/23  0438 04/15/23  0002 04/14/23  1835 04/14/23 1833   WBC Thousand/uL 10 49*  --  12 93*  --    HEMOGLOBIN g/dL 11 2*  --  12 3  --    I STAT HEMOGLOBIN g/dl  --   --   --  12 2   HEMATOCRIT % 35 0*  --  37 8  --    HEMATOCRIT, ISTAT %  --   --   --  36*   PLATELETS Thousands/uL 315 303 373  --    NEUTROS PCT % 82*  --  86*  --    MONOS PCT % 9  --  8  --       Results from last 7 days   Lab Units 04/15/23  0438 04/14/23  1837 04/14/23  1833   POTASSIUM mmol/L 3 1* 3 4*  --    CHLORIDE mmol/L 106 100  --    CO2 mmol/L 27 29  --    CO2, I-STAT mmol/L  --   --  30   BUN mg/dL 12 18  --    CREATININE mg/dL 0 73 0 99  --    CALCIUM mg/dL 7 7* 8 8  --    GLUCOSE, ISTAT mg/dl  --   --  185*              Results from last 7 days   Lab Units 04/14/23 1837   INR  1 36*     Results from last 7 days   Lab Units 04/14/23  1837   LACTIC ACID mmol/L 1 5           IMAGING & DIAGNOSTIC TESTING     Radiology Results: I have personally reviewed pertinent reports  TRAUMA - CT head wo contrast    Result Date: 4/14/2023  Impression: No acute intracranial hemorrhage  Chronic microangiopathic changes  Left sphenoid sinusitis and right otomastoiditis    I personally discussed this study with Rajani Stevens on 4/14/2023 at 7:06 PM  Workstation performed: [id number]     TRAUMA - CT spine cervical wo contrast    Result Date: 4/14/2023  Impression: No acute fracture or dislocation  I personally discussed this study with Riaz Gonsalez on 4/14/2023 at 7:06 PM   Workstation performed: [id number]     TRAUMA - CT chest abdomen pelvis w contrast    Result Date: 4/14/2023  Impression: Right anterior bladder perforation /rupture with extraperitoneal air  Urology/surgical consult  Nondisplaced fracture of the medial aspect of the right 12th rib at the costovertebral junction  I personally discussed this study with Riaz Gonsalez on 4/14/2023 at 7:06 PM  Workstation performed: [id number]     XR Trauma multiple (SLB/SLRA trauma bay ONLY)    Result Date: 4/14/2023  Impression: No acute cardiopulmonary disease within limitations of supine imaging  Workstation performed: [id number]     Other Diagnostic Testing: I have personally reviewed pertinent reports      ACTIVE MEDICATIONS     Current Facility-Administered Medications   Medication Dose Route Frequency   • acetaminophen (TYLENOL) tablet 650 mg  650 mg Oral Q6H PRN   • amiodarone tablet 200 mg  200 mg Oral Daily   • apixaban (ELIQUIS) tablet 2 5 mg  2 5 mg Oral BID   • cholecalciferol (VITAMIN D3) tablet 2,000 Units  2,000 Units Oral Daily   • dicyclomine (BENTYL) tablet 20 mg  20 mg Oral BID PRN   • famotidine (PEPCID) tablet 20 mg  20 mg Oral BID   • FLUoxetine (PROzac) capsule 40 mg  40 mg Oral Daily   • [START ON 4/17/2023] furosemide (LASIX) tablet 20 mg  20 mg Oral Daily   • insulin lispro (HumaLOG) 100 units/mL subcutaneous injection 1-5 Units  1-5 Units Subcutaneous TID AC   • insulin lispro (HumaLOG) 100 units/mL subcutaneous injection 1-5 Units  1-5 Units Subcutaneous HS   • isosorbide mononitrate (IMDUR) 24 hr tablet 60 mg  60 mg Oral Daily   • levothyroxine tablet 88 mcg  88 mcg Oral Daily   • pantoprazole (PROTONIX) EC tablet 40 mg  40 mg Oral Daily   • piperacillin-tazobactam (ZOSYN) 4 5 g in sodium chloride 0 9 % 100 mL IVPB  4 5 g Intravenous Q6H   • pravastatin (PRAVACHOL) tablet 20 mg "20 mg Oral Daily   • pyridostigmine (MESTINON) tablet 60 mg  60 mg Oral TID   • sodium hypochlorite (DAKIN'S HALF-STRENGTH) 0 25 percent topical solution 1 application  1 application  Irrigation Daily   • sucralfate (CARAFATE) tablet 1 g  1 g Oral 4x Daily PRN       VTE Pharmacologic Prophylaxis: Reason for no pharmacologic prophylaxis Anticoagulated with Eliquis  VTE Mechanical Prophylaxis: sequential compression device    Portions of the record may have been created with voice recognition software  Occasional wrong word or \"sound a like\" substitutions may have occurred due to the inherent limitations of voice recognition software    Read the chart carefully and recognize, using context, where substitutions have occurred   ==  Sridhar Berumen, [address] [facility]  Internal Medicine Residency PGY-1     "

## 2023-04-15 NOTE — PROCEDURES
Procedures    Bedside debridement of stage IV sacral pressure injury    Method:  ___ Excisional  _X_ Non-excisional debridement  Instrument:  ___  Blade  _X_ Scalpel  ___ Scissors  ___Lavage  ___Other, please specify:  Depth:  ___ Subcutaneous  ___ Fascia  ___ Muscle  _X_ Bone    Size of the wound: 4 5 cm x 3 cm x 3 cm  Wound description: necrotic tissue and fibrinous slough at base, no purulence, malodorous    Indicate if the procedure extended to the wound margins? ___ Yes  _X_ No  Indicate if the procedure extended to bleeding tissue? ___ Yes  _X_ No    Wound was judiciously debrided given that patient is on eliquis  Family unsure if they would want more aggressive debridement in the OR  Will pack with Dakin's-moistened WtD dressing  Cover with ABD pad or Allevyn pad  Change BID and PRN        Holley Denis MD   PGY4, General Surgery

## 2023-04-15 NOTE — PROCEDURES
POC FAST US    Date/Time: 4/14/2023 6:55 PM  Performed by: Julissa Brown MD  Authorized by: Julissa Brown MD     Patient location:  Trauma  Procedure details:     Exam Type:  Diagnostic    Indications comment:  Trauma    Assess for:  Intra-abdominal fluid and pericardial effusion    Technique: FAST      Views obtained:  RUQ - Gong's Pouch, LUQ - Splenorenal space, Suprapubic - Pouch of En and Heart - Pericardial sac    Image quality: diagnostic      Image availability:  Images available in PACS and video obtained  FAST Findings:     RUQ (Hepatorenal) free fluid: absent      LUQ (Splenorenal) free fluid: absent      Suprapubic free fluid: absent      Cardiac wall motion: identified      Pericardial effusion: absent    Interpretation:     Impressions: negative

## 2023-04-15 NOTE — ED NOTES
Incentive spirometry at patient bedside  RN performed patient teaching on incentive spirometry using teach back method  Patient verbalized understanding        Bibiana Mathis RN  04/15/23 [id number]

## 2023-04-15 NOTE — ASSESSMENT & PLAN NOTE
History of hyperlipidemia managed with pravastatin 20 mg at home which she will continue while inpatient

## 2023-04-16 PROBLEM — N32.89 BLADDER RUPTURE: Status: ACTIVE | Noted: 2023-04-16

## 2023-04-16 LAB
ALBUMIN SERPL BCP-MCNC: 1.7 G/DL (ref 3.5–5)
ALP SERPL-CCNC: 80 U/L (ref 46–116)
ALT SERPL W P-5'-P-CCNC: 51 U/L (ref 12–78)
ANION GAP SERPL CALCULATED.3IONS-SCNC: 4 MMOL/L (ref 4–13)
AST SERPL W P-5'-P-CCNC: 54 U/L (ref 5–45)
BASOPHILS # BLD AUTO: 0.04 THOUSANDS/ΜL (ref 0–0.1)
BASOPHILS NFR BLD AUTO: 0 % (ref 0–1)
BILIRUB SERPL-MCNC: 0.61 MG/DL (ref 0.2–1)
BUN SERPL-MCNC: 8 MG/DL (ref 5–25)
CALCIUM ALBUM COR SERPL-MCNC: 10 MG/DL (ref 8.3–10.1)
CALCIUM SERPL-MCNC: 8.2 MG/DL (ref 8.3–10.1)
CHLORIDE SERPL-SCNC: 109 MMOL/L (ref 96–108)
CO2 SERPL-SCNC: 23 MMOL/L (ref 21–32)
CREAT SERPL-MCNC: 0.76 MG/DL (ref 0.6–1.3)
EOSINOPHIL # BLD AUTO: 0.01 THOUSAND/ΜL (ref 0–0.61)
EOSINOPHIL NFR BLD AUTO: 0 % (ref 0–6)
ERYTHROCYTE [DISTWIDTH] IN BLOOD BY AUTOMATED COUNT: 16.9 % (ref 11.6–15.1)
GFR SERPL CREATININE-BSD FRML MDRD: 77 ML/MIN/1.73SQ M
GLUCOSE SERPL-MCNC: 115 MG/DL (ref 65–140)
GLUCOSE SERPL-MCNC: 117 MG/DL (ref 65–140)
GLUCOSE SERPL-MCNC: 204 MG/DL (ref 65–140)
GLUCOSE SERPL-MCNC: 225 MG/DL (ref 65–140)
GLUCOSE SERPL-MCNC: 247 MG/DL (ref 65–140)
HCT VFR BLD AUTO: 36 % (ref 36.5–49.3)
HGB BLD-MCNC: 11.5 G/DL (ref 12–17)
IMM GRANULOCYTES # BLD AUTO: 0.05 THOUSAND/UL (ref 0–0.2)
IMM GRANULOCYTES NFR BLD AUTO: 1 % (ref 0–2)
LYMPHOCYTES # BLD AUTO: 0.91 THOUSANDS/ΜL (ref 0.6–4.47)
LYMPHOCYTES NFR BLD AUTO: 9 % (ref 14–44)
MCH RBC QN AUTO: 26.7 PG (ref 26.8–34.3)
MCHC RBC AUTO-ENTMCNC: 31.9 G/DL (ref 31.4–37.4)
MCV RBC AUTO: 84 FL (ref 82–98)
MONOCYTES # BLD AUTO: 0.85 THOUSAND/ΜL (ref 0.17–1.22)
MONOCYTES NFR BLD AUTO: 8 % (ref 4–12)
NEUTROPHILS # BLD AUTO: 8.37 THOUSANDS/ΜL (ref 1.85–7.62)
NEUTS SEG NFR BLD AUTO: 82 % (ref 43–75)
NRBC BLD AUTO-RTO: 0 /100 WBCS
PLATELET # BLD AUTO: 311 THOUSANDS/UL (ref 149–390)
PMV BLD AUTO: 8.8 FL (ref 8.9–12.7)
POTASSIUM SERPL-SCNC: 4 MMOL/L (ref 3.5–5.3)
PROT SERPL-MCNC: 5.5 G/DL (ref 6.4–8.4)
RBC # BLD AUTO: 4.31 MILLION/UL (ref 3.88–5.62)
SODIUM SERPL-SCNC: 136 MMOL/L (ref 135–147)
WBC # BLD AUTO: 10.23 THOUSAND/UL (ref 4.31–10.16)

## 2023-04-16 RX ORDER — ACETAMINOPHEN AND CODEINE PHOSPHATE 300; 30 MG/1; MG/1
1 TABLET ORAL EVERY 6 HOURS PRN
Status: DISCONTINUED | OUTPATIENT
Start: 2023-04-16 | End: 2023-04-16

## 2023-04-16 RX ORDER — OXYCODONE HYDROCHLORIDE AND ACETAMINOPHEN 5; 325 MG/1; MG/1
1 TABLET ORAL EVERY 4 HOURS PRN
Status: DISCONTINUED | OUTPATIENT
Start: 2023-04-16 | End: 2023-04-20 | Stop reason: HOSPADM

## 2023-04-16 RX ADMIN — PRAVASTATIN SODIUM 20 MG: 20 TABLET ORAL at 09:04

## 2023-04-16 RX ADMIN — PANTOPRAZOLE SODIUM 40 MG: 40 TABLET, DELAYED RELEASE ORAL at 09:04

## 2023-04-16 RX ADMIN — CHOLECALCIFEROL TAB 25 MCG (1000 UNIT) 2000 UNITS: 25 TAB at 09:04

## 2023-04-16 RX ADMIN — INSULIN LISPRO 2 UNITS: 100 INJECTION, SOLUTION INTRAVENOUS; SUBCUTANEOUS at 16:31

## 2023-04-16 RX ADMIN — ISOSORBIDE MONONITRATE 60 MG: 60 TABLET, EXTENDED RELEASE ORAL at 09:04

## 2023-04-16 RX ADMIN — PIPERACILLIN SODIUM AND TAZOBACTAM SODIUM 4.5 G: 36; 4.5 INJECTION, POWDER, LYOPHILIZED, FOR SOLUTION INTRAVENOUS at 14:19

## 2023-04-16 RX ADMIN — FLUOXETINE 40 MG: 20 CAPSULE ORAL at 09:04

## 2023-04-16 RX ADMIN — PYRIDOSTIGMINE BROMIDE 60 MG: 60 TABLET ORAL at 21:28

## 2023-04-16 RX ADMIN — APIXABAN 2.5 MG: 2.5 TABLET, FILM COATED ORAL at 09:04

## 2023-04-16 RX ADMIN — APIXABAN 2.5 MG: 2.5 TABLET, FILM COATED ORAL at 16:31

## 2023-04-16 RX ADMIN — LEVOTHYROXINE SODIUM 88 MCG: 88 TABLET ORAL at 05:47

## 2023-04-16 RX ADMIN — FAMOTIDINE 20 MG: 20 TABLET, FILM COATED ORAL at 09:04

## 2023-04-16 RX ADMIN — PIPERACILLIN SODIUM AND TAZOBACTAM SODIUM 4.5 G: 36; 4.5 INJECTION, POWDER, LYOPHILIZED, FOR SOLUTION INTRAVENOUS at 09:06

## 2023-04-16 RX ADMIN — PYRIDOSTIGMINE BROMIDE 60 MG: 60 TABLET ORAL at 16:31

## 2023-04-16 RX ADMIN — INSULIN LISPRO 1 UNITS: 100 INJECTION, SOLUTION INTRAVENOUS; SUBCUTANEOUS at 10:53

## 2023-04-16 RX ADMIN — FAMOTIDINE 20 MG: 20 TABLET, FILM COATED ORAL at 21:28

## 2023-04-16 RX ADMIN — PIPERACILLIN SODIUM AND TAZOBACTAM SODIUM 4.5 G: 36; 4.5 INJECTION, POWDER, LYOPHILIZED, FOR SOLUTION INTRAVENOUS at 02:45

## 2023-04-16 RX ADMIN — PIPERACILLIN SODIUM AND TAZOBACTAM SODIUM 4.5 G: 36; 4.5 INJECTION, POWDER, LYOPHILIZED, FOR SOLUTION INTRAVENOUS at 20:28

## 2023-04-16 RX ADMIN — SODIUM HYPOCHLORITE 1 APPLICATION.: 2.5 SOLUTION TOPICAL at 09:05

## 2023-04-16 RX ADMIN — ACETAMINOPHEN 650 MG: 325 TABLET ORAL at 15:28

## 2023-04-16 RX ADMIN — AMIODARONE HYDROCHLORIDE 200 MG: 200 TABLET ORAL at 09:04

## 2023-04-16 NOTE — PLAN OF CARE
Problem: OCCUPATIONAL THERAPY ADULT  Goal: Performs self-care activities at highest level of function for planned discharge setting  See evaluation for individualized goals  Description: Treatment Interventions: ADL retraining, Functional transfer training, UE strengthening/ROM, Endurance training, Cognitive reorientation, Patient/family training, Compensatory technique education, Continued evaluation, Energy conservation, Activityengagement          See flowsheet documentation for full assessment, interventions and recommendations  4/16/2023 1324 by Eddie Breen OT  Note: Limitation: Decreased ADL status, Decreased UE strength, Decreased Safe judgement during ADL, Decreased endurance, Decreased self-care trans, Decreased high-level ADLs  Prognosis: Good  Assessment: Pt is a 80 y o  male seen for OT evaluation s/p admit to B on 4/14/2023 w/ Fall at home  Pt now with R sided Rib fx's  Comorbidities affecting pt's functional performance at time of assessment include: DM, Presure ulcer of sacral region, chronic gastritis, MG, CKD, CHF, suprapubic catheter, mixed HLD, Hypothyroidism, CAD, anxiety, Bladder rupture, [place]  Personal factors affecting pt at time of IE include:steps to enter environment, difficulty performing ADLS, difficulty performing IADLS , limited insight into deficits, decreased initiation and engagement  and health management   Prior to admission, pt was Requiring assist with ADLS and IADLS  Upon evaluation: Pt presents supine and is agreeable to OTIE  Pt requires overall Min A 2* the following deficits impacting occupational performance: weakness, decreased strength, decreased balance, decreased tolerance, impaired problem solving, decreased safety awareness, increased pain and decreased coping skills  Pt resting in chair at end of session with all needs in reach, alarm on, all lines in place and SCD's on   Pt to benefit from continued skilled OT tx while in the hospital to address deficits as defined above and maximize level of functional independence w ADL's and functional mobility  Occupational Performance areas to address include: grooming, bathing/shower, toilet hygiene, dressing, health maintenance, functional mobility, community mobility, clothing management and social participation  The patient's raw score on the AM-PAC Daily Activity inpatient short form is 19  , standardized score is 40 22  , greater than 39 4  Patients at this level are likely to benefit from discharge to home  Please refer to the recommendation of the Occupational Therapist for safe discharge planning  OT Discharge Recommendation: Home with home health rehabilitation (and continued level of support)       4/16/2023 1323 by Tabby Bobo OT  Note: Limitation: Decreased ADL status, Decreased UE strength, Decreased Safe judgement during ADL, Decreased endurance, Decreased self-care trans, Decreased high-level ADLs  Prognosis: Good  Assessment: Pt is a 80 y o  male seen for OT evaluation s/p admit to B on 4/14/2023 w/ Fall at home  Pt now with R sided Rib fx's  Comorbidities affecting pt's functional performance at time of assessment include: DM, Presure ulcer of sacral region, chronic gastritis, MG, CKD, CHF, suprapubic catheter, mixed HLD, Hypothyroidism, CAD, anxiety, Bladder rupture, [place]  Personal factors affecting pt at time of IE include:steps to enter environment, difficulty performing ADLS, difficulty performing IADLS , limited insight into deficits, decreased initiation and engagement  and health management   Prior to admission, pt was Requiring assist with ADLS and IADLS  Upon evaluation: Pt presents supine and is agreeable to OTIE  Pt requires overall Min A 2* the following deficits impacting occupational performance: weakness, decreased strength, decreased balance, decreased tolerance, impaired problem solving, decreased safety awareness, increased pain and decreased coping skills   Pt resting in chair at end of session with all needs in reach, alarm on, all lines in place and SCD's on  Pt to benefit from continued skilled OT tx while in the hospital to address deficits as defined above and maximize level of functional independence w ADL's and functional mobility  Occupational Performance areas to address include: grooming, bathing/shower, toilet hygiene, dressing, health maintenance, functional mobility, community mobility, clothing management and social participation  The patient's raw score on the AM-PAC Daily Activity inpatient short form is 19  , standardized score is 40 22  , greater than 39 4  Patients at this level are likely to benefit from discharge to home  Please refer to the recommendation of the Occupational Therapist for safe discharge planning       OT Discharge Recommendation: Home with home health rehabilitation (and continued level of support)

## 2023-04-16 NOTE — ASSESSMENT & PLAN NOTE
Lab Results   Component Value Date    EGFR 78 04/15/2023    EGFR 64 04/14/2023    CREATININE 0 73 04/15/2023    CREATININE 0 99 04/14/2023     -Creatinine at baseline 0 73 from 0 99  -Trend daily BMP  -Continue home lasix  -Avoid hypotension and nephrotoxic agents

## 2023-04-16 NOTE — MALNUTRITION/BMI
This medical record reflects one or more clinical indicators suggestive of malnutrition     Malnutrition Findings:   Adult Malnutrition type: Chronic illness  Adult Degree of Malnutrition: Malnutrition of moderate degree  Malnutrition Characteristics: Fat loss, Muscle loss, Inadequate energy                  360 Statement: related to disease/condition evidenced by <75% energy intake versus estimated needs for > 1 month exhibiting mild buccal fat pads loss and mild clavicle region muscle loss  Treating with CCD3 diet and Glucerna BID as po supplement    BMI Findings: Body mass index is 20 09 kg/m²  See Nutrition note dated 04/16/2023   for additional details  Completed nutrition assessment is viewable in the nutrition documentation

## 2023-04-16 NOTE — OCCUPATIONAL THERAPY NOTE
"    Occupational Therapy Evaluation     Patient Name: Popeye Woodward  Today's Date: 4/16/2023  Problem List  Principal Problem:    Fall at home  Active Problems:    DM (diabetes mellitus) ([facility] [address] )    Pressure ulcer of sacral region    Chronic gastritis    Myasthenia gravis ([facility] [address] )    CKD (chronic kidney disease)    Chronic diastolic (congestive) heart failure (HCC)    Atrial fibrillation (HCC)    Suprapubic catheter (HCC)    Mixed hyperlipidemia    Hypothyroidism    CAD (coronary artery disease)    Rib fracture    Anxiety    Bladder rupture    Past Medical History  No past medical history on file  Past Surgical History  No past surgical history on file  04/16/23 1040   OT Last Visit   OT Visit Date 04/16/23   Note Type   Note type Evaluation   Pain Assessment   Pain Score No Pain   Restrictions/Precautions   Weight Bearing Precautions Per Order No   Other Precautions Cognitive; Chair Alarm; Fall Risk;Pain;Hard of hearing   Home Living   Type of [facility] One level;Performs ADLs on one level;Stairs to enter with rails   Bathroom Shower/Tub Tub/shower unit   Bathroom Toilet Standard   Bathroom Equipment Shower chair   [address] [place]   Additional Comments Pt lives in a one level home with 4 SURESH  Has RW which he uses for all mobility needs   Prior Function   Level of [place] Needs assistance with ADLs; Independent with functional mobility; Needs assistance with IADLS   Lives With Zoey Normann Help From Family;Home health   IADLs Family/Friend/Other provides transportation; Family/Friend/Other provides meals; Family/Friend/Other provides medication management   Falls in the last 6 months 1 to 4   Vocational Retired   Lifestyle   Autonomy Assist with ADLS and IADLS, Mod I with RW, does not drive   Reciprocal Relationships supoprtive son who he lives with and home health aides   Service to Others retired   Intrinsic Gratification Watching TV \"Afternoon " "programs\"   Subjective   Subjective \"I cant hear you\"   ADL   Where Assessed Edge of bed   Grooming Assistance 4  Minimal Assistance   UB Bathing Assistance 4  Minimal Assistance   LB Bathing Assistance 3  Moderate [address] 4  Minimal [address] 3  Moderate [address]  3  Moderate Assistance   Additional Comments Pt recieves assist with ADLS at baseline   Bed Mobility   Supine to Sit 4  Minimal assistance   Additional items Assist x 1; Increased time required   Additional Comments OOB at end of session   Transfers   Sit to Stand 4  Minimal assistance   Additional items Assist x 1; Increased time required;Verbal cues   Stand to Sit 4  Minimal assistance   Additional items Assist x 1; Increased time required;Verbal cues   Stand pivot 4  Minimal assistance   Additional items Assist x 1; Increased time required;Verbal cues   Additional Comments RW   Functional Mobility   Functional Mobility 4  Minimal assistance   Additional Comments house hold distances, increased TC and VC for safe RW use   Additional items Rolling walker   Balance   Static Sitting Fair   Dynamic Sitting Fair -   Static Standing Poor +   Dynamic Standing Poor +   Ambulatory Poor   Activity Tolerance   Activity Tolerance Patient limited by fatigue   Medical Staff Made Aware DPT, NSG aware   Nurse Made Aware yes   RUE Assessment   RUE Assessment WFL   LUE Assessment   LUE Assessment WFL   Psychosocial   Psychosocial (WDL) WDL   Cognition   Overall Cognitive Status Impaired   Arousal/Participation Responsive; Cooperative   Attention Attends with cues to redirect   Orientation Level Oriented to person;Disoriented to place; Disoriented to situation;Disoriented to time   Memory Decreased recall of precautions;Decreased recall of recent events;Decreased short term memory   Following Commands Follows one step commands with increased time or repetition   Comments Overall pleasant and " cooperative  Pt is very [place]  Able to state he is in the hospital but unable to identify the hospital or location  Assessment   Limitation Decreased ADL status; Decreased UE strength;Decreased Safe judgement during ADL;Decreased endurance;Decreased self-care trans;Decreased high-level ADLs   Prognosis Good   Assessment Pt is a 80 y o  male seen for OT evaluation s/p [facility] w/ Fall at home  Pt now with R sided Rib fx's  Comorbidities affecting pt's functional performance at time of assessment include: DM, Presure ulcer of sacral region, chronic gastritis, MG, CKD, CHF, suprapubic catheter, mixed HLD, Hypothyroidism, CAD, anxiety, Bladder rupture, [place]  Personal factors affecting pt at time of IE include:steps to enter environment, difficulty performing ADLS, difficulty performing IADLS , limited insight into deficits, decreased initiation and engagement  and health management   Prior to admission, pt was Requiring assist with ADLS and IADLS  Upon evaluation: Pt presents supine and is agreeable to OTIE  Pt requires overall Min A 2* the following deficits impacting occupational performance: weakness, decreased strength, decreased balance, decreased tolerance, impaired problem solving, decreased safety awareness, increased pain and decreased coping skills  Pt resting in chair at end of session with all needs in reach, alarm on, all lines in place and SCD's on  Pt to benefit from continued skilled OT tx while in the hospital to address deficits as defined above and maximize level of functional independence w ADL's and functional mobility  Occupational Performance areas to address include: grooming, bathing/shower, toilet hygiene, dressing, health maintenance, functional mobility, community mobility, clothing management and social participation  The patient's raw score on the AM-PAC Daily Activity inpatient short form is 19  , standardized score is 40 22  , greater than 39 4   Patients at this level are likely to benefit from discharge to home  Please refer to the recommendation of the Occupational Therapist for safe discharge planning  Goals   Patient Goals To get out of bed   Plan   Treatment Interventions ADL retraining;Functional transfer training;UE strengthening/ROM; Endurance training;Cognitive reorientation;Patient/family training; Compensatory technique education;Continued evaluation; Energy conservation; Activityengagement   Goal Expiration Date 04/30/23   OT Frequency 2-3x/wk   Recommendation   OT Discharge Recommendation Home with home health rehabilitation  (and continued level of support)   AM-PAC Daily Activity Inpatient   Lower Body Dressing 2   Bathing 2   Toileting 3   Upper Body Dressing 4   Grooming 4   Eating 4   Daily Activity Raw Score 19   Daily Activity Standardized Score (Calc for Raw Score >=11) 40 22   AM-PAC Applied Cognition Inpatient   Following a Speech/Presentation 2   Understanding Ordinary Conversation 3   Taking Medications 3   Remembering Where Things Are Placed or Put Away 2   Remembering List of 4-5 Errands 2   Taking Care of Complicated Tasks 1   Applied Cognition Raw Score 13   Applied Cognition Standardized Score 30 46     OT goals to be addressed in the next 14 days:    Pt will increase activity tolerance to G for 30 min txment sessions     Pt will complete UB/LB self care w/ S - UB and Min A- LB using adaptive device and DME as needed    Pt will complete toileting w/ mod I w/ G hygiene/thoroughness using DME as needed    Pt will improve functional transfers to Mod I on/off all surfaces using DME as needed w/ G balance/safety     Pt will improve functional mobility during ADL/IADL/leisure tasks to Mod I using DME as needed w/ G balance/safety     Pt will demonstrate G carryover of pt/caregiver education and training as appropriate  Pt will independently identify and utilize 2-3 coping strategies to increase positive affect and promote overall well-being

## 2023-04-16 NOTE — ASSESSMENT & PLAN NOTE
-Nondisplaced fracture of the medial aspect of the right 12th rib at the costovertebral junction  -Rib fracture protocol  -Multimodal analgesia  -IS/aggressive pulm toilet  -PT/OT

## 2023-04-16 NOTE — ASSESSMENT & PLAN NOTE
No results found for: HGBA1C    Recent Labs     04/15/23  1630 04/15/23  2105   POCGLU 139 152*       Blood Sugar Average: Last 72 hrs:  (P) 145 5     -SSI for glucose control  -Hold home saxagliptin

## 2023-04-16 NOTE — ASSESSMENT & PLAN NOTE
Wt Readings from Last 3 Encounters:   04/14/23 61 7 kg (136 lb 0 4 oz)       -Continue Imdur  -Continue home lasix  -Daily weights

## 2023-04-16 NOTE — PLAN OF CARE
Problem: PAIN - ADULT  Goal: Verbalizes/displays adequate comfort level or baseline comfort level  Description: Interventions:  - Encourage patient to monitor pain and request assistance  - Assess pain using appropriate pain scale  - Administer analgesics based on type and severity of pain and evaluate response  - Implement non-pharmacological measures as appropriate and evaluate response  - Consider cultural and social influences on pain and pain management  - Notify physician/advanced practitioner if interventions unsuccessful or patient reports new pain  Outcome: Progressing     Problem: INFECTION - ADULT  Goal: Absence or prevention of progression during hospitalization  Description: INTERVENTIONS:  - Assess and monitor for signs and symptoms of infection  - Monitor lab/diagnostic results  - Monitor all insertion sites, i e  indwelling lines, tubes, and drains  - Monitor endotracheal if appropriate and nasal secretions for changes in amount and color  - [place] appropriate cooling/warming therapies per order  - Administer medications as ordered  - Instruct and encourage patient and family to use good hand hygiene technique  - Identify and instruct in appropriate isolation precautions for identified infection/condition  Outcome: Progressing     Problem: DISCHARGE PLANNING  Goal: Discharge to home or other facility with appropriate resources  Description: INTERVENTIONS:  - Identify barriers to discharge w/patient and caregiver  - Arrange for needed discharge resources and transportation as appropriate  - Identify discharge learning needs (meds, wound care, etc )  - Arrange for interpretive services to assist at discharge as needed  - Refer to Case Management Department for coordinating discharge planning if the patient needs post-hospital services based on physician/advanced practitioner order or complex needs related to functional status, cognitive ability, or social support system  Outcome: Progressing Problem: Knowledge Deficit  Goal: Patient/family/caregiver demonstrates understanding of disease process, treatment plan, medications, and discharge instructions  Description: Complete learning assessment and assess knowledge base  Interventions:  - Provide teaching at level of understanding  - Provide teaching via preferred learning methods  Outcome: Progressing     Problem: Nutrition/Hydration-ADULT  Goal: Nutrient/Hydration intake appropriate for improving, restoring or maintaining nutritional needs  Description: Monitor and assess patient's nutrition/hydration status for malnutrition  Collaborate with interdisciplinary team and initiate plan and interventions as ordered  Monitor patient's weight and dietary intake as ordered or per policy  Utilize nutrition screening tool and intervene as necessary  Determine patient's food preferences and provide high-protein, high-caloric foods as appropriate       INTERVENTIONS:  - Monitor oral intake, urinary output, labs, and treatment plans  - Assess nutrition and hydration status and recommend course of action  - Evaluate amount of meals eaten  - Assist patient with eating if necessary   - Allow adequate time for meals  - Recommend/ encourage appropriate diets, oral nutritional supplements, and vitamin/mineral supplements  - Order, calculate, and assess calorie counts as needed  - Recommend, monitor, and adjust tube feedings and TPN/PPN based on assessed needs  - Assess need for intravenous fluids  - Provide specific nutrition/hydration education as appropriate  - Include patient/family/caregiver in decisions related to nutrition  Outcome: Progressing     Problem: METABOLIC, FLUID AND ELECTROLYTES - ADULT  Goal: Electrolytes maintained within normal limits  Description: INTERVENTIONS:  - Monitor labs and assess patient for signs and symptoms of electrolyte imbalances  - Administer electrolyte replacement as ordered  - Monitor response to electrolyte replacements, including repeat lab results as appropriate  - Instruct patient on fluid and nutrition as appropriate  Outcome: Progressing     Problem: SKIN/TISSUE INTEGRITY - ADULT  Goal: Incision(s), wounds(s) or drain site(s) healing without S/S of infection  Description: INTERVENTIONS  - Assess and document dressing, incision, wound bed, drain sites and surrounding tissue  - Provide patient and family education    Outcome: Progressing

## 2023-04-16 NOTE — PHYSICAL THERAPY NOTE
Physical Therapy Evaluation     Patient's Name: Rupesh Hobbs    Admitting Diagnosis  Bladder rupture [N32 89]  Decubitus ulcer of sacral region, stage 4 ([place] [address] ) [L89 154]  Sepsis ([place] [address] ) [A41 9]  Fracture of one rib, right side, initial encounter for closed fracture [S22 31XA]  Unspecified multiple injuries, initial encounter [T07  XXXA]    Problem List  Patient Active Problem List   Diagnosis    Fall at home    DM (diabetes mellitus) ([place] [address] )    Pressure ulcer of sacral region    Chronic gastritis    Myasthenia gravis ([place] [address] )    CKD (chronic kidney disease)    Chronic diastolic (congestive) heart failure (HCC)    Atrial fibrillation (HCC)    Suprapubic catheter (HCC)    Mixed hyperlipidemia    Hypothyroidism    CAD (coronary artery disease)    Rib fracture    Anxiety    Bladder rupture       Past Medical History  No past medical history on file  Past Surgical History  No past surgical history on file  04/16/23 1041   PT Last Visit   PT Visit Date 04/16/23   Note Type   Note type Evaluation   Pain Assessment   Pain Assessment Tool 0-10   Pain Score No Pain   Restrictions/Precautions   Weight Bearing Precautions Per Order No   Other Precautions Cognitive; Chair Alarm; Bed Alarm; Fall Risk;Pain;Hard of hearing   Home Living   Type of [address] [place] [address] One level;Performs ADLs on one level;Stairs to enter with rails   Bathroom Shower/Tub Tub/shower unit   Bathroom Toilet Standard   Bathroom Equipment Shower chair   [address] Walker   Additional Comments 4 SURESH, use RW PTA for all mobility with son's assistance   Prior Function   Level of [place] Needs assistance with ADLs; Independent with functional mobility; Needs assistance with IADLS   Lives With Carmelita Salgado Help From Family;Home health   IADLs Family/Friend/Other provides transportation; Family/Friend/Other provides meals; Family/Friend/Other provides medication management   Falls in the last 6 months 1 to 4 Vocational Retired   Cognition   Overall Cognitive Status Impaired   Attention Attends with cues to redirect   Orientation Level Oriented to person;Disoriented to place; Disoriented to situation;Disoriented to time   Memory Decreased recall of precautions;Decreased recall of recent events;Decreased short term memory   Following Commands Follows one step commands with increased time or repetition   RLE Assessment   RLE Assessment WFL   LLE Assessment   LLE Assessment WFL   Bed Mobility   Supine to Sit 4  Minimal assistance   Additional items Assist x 1; Increased time required;Verbal cues   Additional Comments Pt OOB in bedside recliner at end of session   Transfers   Sit to Stand 4  Minimal assistance   Additional items Assist x 1; Increased time required;Verbal cues   Stand to Sit 4  Minimal assistance   Additional items Assist x 1; Increased time required;Verbal cues   Stand pivot 4  Minimal assistance   Additional items Assist x 1; Increased time required;Verbal cues   Additional Comments w/ RW   Ambulation/Elevation   Gait pattern Wide CINDY; Forward Flexion; Step through pattern   Gait Assistance 4  Minimal assist   Additional items Assist x 1;Verbal cues   Assistive Device Rolling walker   Distance 80'   Ambulation/Elevation Additional Comments Pt required frequent cuing for proximety to RW   Balance   Static Sitting Fair   Dynamic Sitting Fair -   Static Standing Poor +   Dynamic Standing Poor +   Ambulatory Poor   Activity Tolerance   Activity Tolerance Patient limited by fatigue   Medical Staff Made Aware OT   Nurse Made Aware RN cleared and updated   Assessment   Prognosis Fair   Problem List Decreased strength;Decreased endurance; Impaired balance;Decreased safety awareness;Decreased cognition   Assessment Pt seen for PT evaluation  Pt with active PT eval/treat orders  Pt is a 80 y o  male who was [facility] on [facility]/14/[facility]23  Pt's current dx/ problem list include: fall at home   Comorbidities affecting pt's physical performance at time of assessment are as follows: DM, Presure ulcer of sacral region, chronic gastritis, MG, CKD, CHF, suprapubic catheter, mixed HLD, Hypothyroidism, CAD, anxiety, Bladder rupture, Andreafski  Personal factors affecting pt at time of initial examination include: steps to enter environment, inability to perform IADLs, inability to perform ADLs, inability to ambulate household distances  Due to acute medical issues, ongoing medical workup for primary dx;  fall risk, decreased activity tolerance compared to baseline, increased assistance needed from caregiver at current time, multiple lines, decline in overall functional mobility status; health management issues  At baseline, pt resides with is son in a 2 SH with 4 SURESH and required assistance with ADLs/ IADLs  Currently, upon initial examination, pt is requiring min Ax1 for supine to sit and min A for sit to stand and ambulation  PT to continue to follow and assess functional transfers and ambulation as appropriate and able  Currently, pt presents functioning below baseline and with overall mobility deficits 2* to: decreased LE strength/AROM; limited flexibility;  generalized weakness/ deconditioning; decreased endurance; decreased activity tolerance; impaired balance; gait deviations  Pt currently at increased risk for falls  At the end of evaluation, pt was left seated in bed side recliner with all needs in reach  Pt would benefit from continued skilled PT services while in hospital to address remaining limitations and maximize functional potential  The patient's AM-PAC Basic Mobility Inpatient Short Form Raw Score is 18  A Raw score of greater than 16 suggests the patient may benefit from discharge to home  Please also refer to the recommendation of the Physical Therapist for safe discharge planning  Goals   Patient Goals To get up   STG Expiration Date 04/30/23   Short Term Goal #1 STG 1   Pt will be able to perform bed mobility with mod I in order to improve overall functional mobility and assist in safe d/c  STG 2  Pt will be able to perform functional transfer with mod I in order to improve overall functional mobility and assist in safe d/c  STG 3  Pt will be able to ambulate at least 150 feet with least restrictive device with mod I A in order to improve overall functional mobility and assist in safe d/c  STG 4  Pt will improve sitting/standing static/dynamic balance 1 grade in order to improve functional mobility and assist in safe d/c  STG 5  Pt will improve LE strength by one grade in order to improve functional mobility and assist in safe d/c  STG 6  Pt will negotiate at least 1 step at mod I level A in order to improve overall funcitonal mobility and assist in safe d/c   Plan   Treatment/Interventions ADL retraining;Functional transfer training;LE strengthening/ROM; Elevations; Therapeutic exercise;Cognitive reorientation;Patient/family training;Equipment eval/education; Bed mobility;Gait training; Compensatory technique education;Continued evaluation;Spoke to nursing;OT;Spoke to case management   PT Frequency 2-3x/wk   Recommendation   PT Discharge Recommendation Home with home health rehabilitation   Equipment Recommended Walker  (pt owns)   [employer] in Flat Bed Without Bedrails 4   Lying on Back to Sitting on Edge of Flat Bed Without Bedrails 3   Moving Bed to Chair 3   Standing Up From Chair Using Arms 3   Walk in Room 3   Climb 3-5 Stairs With Railing 2   Basic Mobility Inpatient Raw Score 18   Basic Mobility Standardized Score 41 05   Highest Level Of Mobility   JH-HLM Goal 6: Walk 10 steps or more   JH-HLM Achieved 7: Walk 25 feet or more         Becka Cox PT

## 2023-04-16 NOTE — H&P
INTERNAL MEDICINE RESIDENCY PROGRESS NOTE     Name: Candice Meraz   Age & Sex: 80 y o  male   MRN: [id number]  Unit/Bed#: ED 12   Encounter: [id number]  Team: SOD Team C     PATIENT INFORMATION     Name: Candice Meraz   Age & Sex: 80 y o  male   MRN: [id number]  Hospital Stay Days: 1    ASSESSMENT/PLAN     Principal Problem:    Fall at home  Active Problems:    Pressure ulcer of sacral region    CKD (chronic kidney disease)    DM (diabetes mellitus) (HCC)    Chronic gastritis    Myasthenia gravis (HCC)    Chronic diastolic (congestive) heart failure (HCC)    Atrial fibrillation (HCC)    Suprapubic catheter ([address] )    Mixed hyperlipidemia    Hypothyroidism    CAD (coronary artery disease)    Rib fracture    Anxiety      * Fall at home  [address] [place] [address] presenting after an unwitnessed fall hitting the ground complicated by rib fracture  Patient's son reports previous similar unwitnessed fall 1 week prior  Patient at baseline is a poor historian and unable to describe events leading to the fall  Occurred while patient was anticoagulated with Eliquis; given Kcentra to reverse Eliquis  Hemoglobin stable with no signs of ecchymosis or signs of bleeding  Suspect cause of fall may be secondary to sepsis in elderly patient      Plan: We will further evaluate causes of fall including EKG and orthostatics  Will hold off further work-up of recurrent fall until after the resolution of sepsis and goals of care discussion with family  PT OT consult  We will discuss with family the risks of continuing anticoagulation with history of recurrent falls    Pressure ulcer of sacral region  Assessment & Plan  History of chronic sacral pressure ulcer  Noted to have stage IV sacral decubitus ulcer with significant necrotic tissue at the wound base  Sacral wound was debrided roughly 1 week ago by wound care  Status post bedside ED debridement of sacral wound  Given 1 dose of cefepime in the ED    Plan:   We will start Zosyn 4 5 g every 6hr   Wound care    CKD (chronic kidney disease)  Assessment & Plan  Lab Results   Component Value Date    EGFR 78 04/15/2023    EGFR 64 04/14/2023    CREATININE 0 73 04/15/2023    CREATININE 0 99 04/14/2023     History of CKD currently at baseline kidney    Plan: We will continue to trend creatinine and avoid nephrotoxic agents and maintain adequate perfusion  Home 20 mg Lasix diuretics regimen    Anxiety  Assessment & Plan  History of anxiety managed with Prozac 40 mg daily at home; will continue regimen while inpatient    Rib fracture  Assessment & Plan  Displaced rib fracture of the right 12th rib secondary to fall    Plan:  Pain management with nonopioid analgesics and will escalate as needed      CAD (coronary artery disease)  Assessment & Plan  History of coronary disease; will continue home pravastatin and Imdur    Hypothyroidism  Assessment & Plan  History of hypothyroidism managed with 88 mcg levothyroxine  Most recent TSH was 2 957    Plan  We will continue levothyroxine 88 mcg daily    Mixed hyperlipidemia  Assessment & Plan  History of hyperlipidemia managed with pravastatin 20 mg at home which she will continue while inpatient    Suprapubic catheter [facility])  Assessment & Plan  History of prostate cancer status post radical prostatectomy  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  CT scan of the abdomen which showed evidence of extraperitoneal air tracking around the right aspect of the bladder with a suprapubic catheter in place and no obvious signs of intra-peritoneal process; evaluated by urology with no issues    Plan:   We will maintain suprapubic catheter and monitor for output and signs of infection    Atrial fibrillation (HCC)  Assessment & Plan  History chronic A-fib managed with amiodarone 200 mg daily  Anticoagulated with Eliquis 2 5 mg twice daily    Plan  Continue Eliquis and amiodarone at this time  Well discuss with patient and family the risks and benefits of anticoagulation versus no anticoagulation given patient's history of recurrent falls      Chronic diastolic (congestive) heart failure (HCC)  Assessment & Plan  Wt Readings from Last 3 Encounters:   04/14/23 61 7 kg (136 lb 0 4 oz)     Last echo in 12/12/2022 showed EF of 55% with grade 2 pseudonormal diastolic dysfunction  Patient takes Lasix 20 mg p o  Monday through Friday    Plan: We will continue Lasix 20 mg p o  and titrate as needed      Myasthenia gravis [facility])  Assessment & Plan  History of myasthenia gravis with no acute complaints    Plan: We will continue home pyridostigmine    Chronic gastritis  Assessment & Plan  History of chronic gastritis managed with 40 mg twice daily; will continue regimen while inpatient    DM (diabetes mellitus) ([facility] [address] )  Assessment & Plan  History of non-insulin-dependent diabetes mellitus  Last A1c in December of 7 2  Controlled with with saxagliptin 2 5 mg daily    Plan: We will start carb controlled diet and start correctional insulin to determine basal bolus regimen          SUBJECTIVE     The patient is a 79-year-old male with past medical history of A-fib on Eliquis, prostatectomy complicated by urinary drainage and 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 now managed with suprapubic catheter, stage IV sacral decubitus ulcer last debrided last week, DM, myasthenia gravis, HFpEF, CAD, hypothyroidism, and CKD presenting to the ED after unwitnessed fall while anticoagulated for A-fib  The patient fell on the ground while trying to get up and landed on his chest  patient has baseline dementia and is AOx2 at his baseline confirmed by patient's son at bedside  Patient's son also explained that he had another unwitnessed fall 1 week prior  Patient is unable to explain the events leading to the fall  He ambulates with a walker    Patient has baseline dementia however denied any fevers, chills, nausea, vomiting, shortness of breath, and dizziness  he endorsed some pain in his abdomen and his sacral area  Discussion with the patient family patient is level 3 DNI DNR  Upon arrival to the ED, patient was afebrile, pulse of 82, respiratory rate of 20, blood pressure of 175/86, and 97% on room air  Trauma work-up significant for nondisplaced fracture of the right 12th rib and right anterior bladder perforation  Urology was consulted who recommended conservative management with continued catheter drainage  Patient also received IV fluids and 1 dose of cefepime in the ED for sepsis  He underwent surgical debridement of this sacral ulcer  ID recommended infectious work-up including blood, urine, and operative cultures and starting Zosyn 4 5 g IV every 6  OBJECTIVE     Vitals:    04/15/23 1115 04/15/23 1200 04/15/23 1245 04/15/23 1430   BP: 166/77 (!) 172/81 166/87 (!) 177/73   BP Location: Right arm Right arm Right arm Right arm   Pulse: 62 64 64 64   Resp: 18 19 21 20   Temp:       TempSrc:       SpO2: 96% 98% 98% 96%   Weight:       Height:          Temperature:   Temp (24hrs), Av °F (37 8 °C), Min:100 °F (37 8 °C), Max:100 °F (37 8 °C)    Temperature: 100 °F (37 8 °C)  Intake & Output:  I/O        0701  /14 0700 /14 0701  04/15 0700 04/15 0701  04/16 0700    I V  (mL/kg)   822 5 (13 3)    IV Piggyback  1400 100    Total Intake(mL/kg)  1400 (22 7) 922 5 (15)    Urine (mL/kg/hr)   1000 (1 8)    Total Output   1000    Net  +1400 -77 5               Weights:   IBW (Ideal Body Weight): 70 7 kg    Body mass index is 20 09 kg/m²  Weight (last 2 days)     Date/Time Weight    23 18:14:18 61 7 (136 02)        Physical Exam  Constitutional:       General: He is not in acute distress  Appearance: Normal appearance  He is normal weight  He is not ill-appearing or toxic-appearing  HENT:      Head: Normocephalic and atraumatic  Cardiovascular:      Rate and Rhythm: Normal rate and regular rhythm  Pulses: Normal pulses  Heart sounds: No murmur heard  No gallop  Pulmonary:      Effort: Pulmonary effort is normal  No respiratory distress  Breath sounds: No wheezing or rales  Abdominal:      General: Abdomen is flat  There is no distension  Tenderness: There is no abdominal tenderness  There is no guarding  Comments: Right tenderness along 12th rib   Genitourinary:     Comments: Suprapubic catheter  Neurological:      Mental Status: He is alert  Comments: AO x2       LABORATORY DATA     Labs: I have personally reviewed pertinent reports  Results from last 7 days   Lab Units 04/15/23  0438 04/15/23  0002 04/14/23  1835 04/14/23 1833   WBC Thousand/uL 10 49*  --  12 93*  --    HEMOGLOBIN g/dL 11 2*  --  12 3  --    I STAT HEMOGLOBIN g/dl  --   --   --  12 2   HEMATOCRIT % 35 0*  --  37 8  --    HEMATOCRIT, ISTAT %  --   --   --  36*   PLATELETS Thousands/uL 315 303 373  --    NEUTROS PCT % 82*  --  86*  --    MONOS PCT % 9  --  8  --       Results from last 7 days   Lab Units 04/15/23  0438 04/14/23  1837 04/14/23  1833   POTASSIUM mmol/L 3 1* 3 4*  --    CHLORIDE mmol/L 106 100  --    CO2 mmol/L 27 29  --    CO2, I-STAT mmol/L  --   --  30   BUN mg/dL 12 18  --    CREATININE mg/dL 0 73 0 99  --    CALCIUM mg/dL 7 7* 8 8  --    GLUCOSE, ISTAT mg/dl  --   --  185*              Results from last 7 days   Lab Units 04/14/23 1837   INR  1 36*     Results from last 7 days   Lab Units 04/14/23  1837   LACTIC ACID mmol/L 1 5           IMAGING & DIAGNOSTIC TESTING     Radiology Results: I have personally reviewed pertinent reports  TRAUMA - CT head wo contrast    Result Date: 4/14/2023  Impression: No acute intracranial hemorrhage  Chronic microangiopathic changes  Left sphenoid sinusitis and right otomastoiditis    I personally discussed this study with Rajani Stevens on 4/14/2023 at 7:06 PM  Workstation performed: [id number]     TRAUMA - CT spine cervical wo contrast    Result Date: 4/14/2023  Impression: No acute fracture or dislocation  I personally discussed this study with Jaki Olivier on 4/14/2023 at 7:06 PM   Workstation performed: [id number]     TRAUMA - CT chest abdomen pelvis w contrast    Result Date: 4/14/2023  Impression: Right anterior bladder perforation /rupture with extraperitoneal air  Urology/surgical consult  Nondisplaced fracture of the medial aspect of the right 12th rib at the costovertebral junction  I personally discussed this study with Jaki Come on 4/14/2023 at 7:06 PM  Workstation performed: [id number]     XR Trauma multiple (SLB/SLRA trauma bay ONLY)    Result Date: 4/14/2023  Impression: No acute cardiopulmonary disease within limitations of supine imaging  Workstation performed: [id number]     Other Diagnostic Testing: I have personally reviewed pertinent reports      ACTIVE MEDICATIONS     Current Facility-Administered Medications   Medication Dose Route Frequency   • acetaminophen (TYLENOL) tablet 650 mg  650 mg Oral Q6H PRN   • amiodarone tablet 200 mg  200 mg Oral Daily   • apixaban (ELIQUIS) tablet 2 5 mg  2 5 mg Oral BID   • cholecalciferol (VITAMIN D3) tablet 2,000 Units  2,000 Units Oral Daily   • dicyclomine (BENTYL) tablet 20 mg  20 mg Oral BID PRN   • famotidine (PEPCID) tablet 20 mg  20 mg Oral BID   • FLUoxetine (PROzac) capsule 40 mg  40 mg Oral Daily   • [START ON 4/17/2023] furosemide (LASIX) tablet 20 mg  20 mg Oral Daily   • insulin lispro (HumaLOG) 100 units/mL subcutaneous injection 1-5 Units  1-5 Units Subcutaneous TID AC   • insulin lispro (HumaLOG) 100 units/mL subcutaneous injection 1-5 Units  1-5 Units Subcutaneous HS   • isosorbide mononitrate (IMDUR) 24 hr tablet 60 mg  60 mg Oral Daily   • levothyroxine tablet 88 mcg  88 mcg Oral Daily   • pantoprazole (PROTONIX) EC tablet 40 mg  40 mg Oral Daily   • piperacillin-tazobactam (ZOSYN) 4 5 g in sodium chloride 0 9 % 100 mL IVPB  4 5 g Intravenous Q6H   • pravastatin (PRAVACHOL) tablet 20 mg "20 mg Oral Daily   • pyridostigmine (MESTINON) tablet 60 mg  60 mg Oral TID   • sodium hypochlorite (DAKIN'S HALF-STRENGTH) 0 25 percent topical solution 1 application  1 application  Irrigation Daily   • sucralfate (CARAFATE) tablet 1 g  1 g Oral 4x Daily PRN       VTE Pharmacologic Prophylaxis: Reason for no pharmacologic prophylaxis Anticoagulated with Eliquis  VTE Mechanical Prophylaxis: sequential compression device    Portions of the record may have been created with voice recognition software  Occasional wrong word or \"sound a like\" substitutions may have occurred due to the inherent limitations of voice recognition software    Read the chart carefully and recognize, using context, where substitutions have occurred   ==  Joan Emery, [address] [facility]  Internal Medicine Residency PGY-1  "

## 2023-04-16 NOTE — UTILIZATION REVIEW
Initial Clinical Review    Admission: Date/Time/Statement:   Admission Orders (From admission, onward)     Ordered        04/14/23 2121  Inpatient Admission  Once                      Orders Placed This Encounter   Procedures   • Inpatient Admission     Standing Status:   Standing     Number of Occurrences:   1     Order Specific Question:   Level of Care     Answer:   Level 2 Stepdown / HOT [14]     Order Specific Question:   Estimated length of stay     Answer:   More than 2 Midnights     Order Specific Question:   Certification     Answer:   I certify that inpatient services are medically necessary for this patient for a duration of greater than two midnights  See H&P and MD Progress Notes for additional information about the patient's course of treatment  ED Arrival Information     Expected   -    Arrival   4/14/2023 18:10    Acuity   Emergent            Means of arrival   Ambulance    Escorted by   [email or web address]    Service   [facility]    Admission type   Emergency            Arrival complaint   Fall           Chief Complaint   Patient presents with   • Fall     Unwitnessed form home on eliquis unknown LOC or headstrike       Initial Presentation:89 y o  male who presented by EMS to [address] [place] [address] ED  Inpatient admission for evaluation and treatment of fall  PMHx: A-fib on Eliquis, prostatectomy complicated by urinary drainage and 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 now managed with suprapubic catheter, stage IV sacral decubitus ulcer last debrided last week, DM, myasthenia gravis, HFpEF, CAD, hypothyroidism, and CKD  Presented w/ fall, unknown if head strike  On exam, Oriented x2 (baseline per family), sacral decubitus ulcer S4  CT showed R anterior bladder perf w/ extraperitoneal air, nondisplaced R 12th rib fracture  Plan: analgesics, IVF, IV ABX, wound care  Urology, General Surgery, ID consulted      General Surgery: Pt w/ sacral wound  Bedside debridement; Size of the wound: 4 5 cm x 3 cm x 3 cm  Wound description: necrotic tissue and fibrinous slough at base, no purulence, malodorous; Depth to bone  Pam Cables  IV ABX, BID packing changes w/ dakins moistened dressings  Date: 4/15   Day 2: On exam, SPC, R tenderness along 12th rib, decubitus ulcer packed  Plan: PT/OT evals, IV ABX, local wound care, continue PTA meds, analgesics, Trend labs, replete electrolytes as needed  SSI w/ accuchecks ACHS  ID: Pt w/ SIRS, tachycardic, leukocytosis  Follow blood and urine cultures  Trend labs, replete electrolytes as needed  IV Zosyn q6h  Local wound care, family unsure if benefit of surgical debridement outweighs risk s/t pt's age  Maintain cardenas      Urology: Pt w/ likely R-sided bladder rupture (versus abnormal air tracking associated w/ suprapubic tube) that appears to be extraperitoneal w/ suprapubic catheter in place draining clear urine; no evidence of pain       ED Triage Vitals   Temperature Pulse Respirations Blood Pressure SpO2   04/14/23 1814 04/14/23 1814 04/14/23 1814 04/14/23 1814 04/14/23 1814   100 °F (37 8 °C) 82 20 (!) 175/86 97 %      Temp Source Heart Rate Source Patient Position - Orthostatic VS BP Location FiO2 (%)   04/14/23 1814 04/14/23 1814 04/14/23 2015 04/14/23 2115 --   Tympanic Monitor Lying Right arm       Pain Score       04/15/23 0700       No Pain          Wt Readings from Last 1 Encounters:   04/14/23 61 7 kg (136 lb 0 4 oz)     Additional Vital Signs:   Date/Time Temp Pulse Resp BP MAP (mmHg) SpO2 O2 Device   04/15/23 23:23:52 97 7 °F (36 5 °C) 65 16 146/70 95 94 % --   04/15/23 19:25:27 98 1 °F (36 7 °C) 64 16 152/71 98 96 % None (Room air)   04/15/23 1729 -- 76 13 173/79 Abnormal  113 97 % None (Room air)   04/15/23 1430 -- 64 20 177/73 Abnormal  116 96 % None (Room air)   04/15/23 1245 -- 64 21 166/87 116 98 % None (Room air)   04/15/23 1200 -- 64 19 172/81 Abnormal  117 98 % None (Room air) 04/15/23 1115 -- 62 18 166/77 111 96 % None (Room air)   04/15/23 1100 -- 64 13 180/85 Abnormal  122 98 % None (Room air)   04/15/23 1000 -- 60 17 168/69 109 97 % None (Room air)   04/15/23 0900 -- 60 18 168/79 114 97 % None (Room air)   04/15/23 0800 -- 72 18 186/84 Abnormal  121 97 % None (Room air)   04/15/23 0700 -- 64 16 172/76 Abnormal  109 97 % None (Room air)   04/15/23 0600 -- 66 15 157/76 109 95 % None (Room air)   04/15/23 0500 -- 64 18 170/77 111 97 % None (Room air)   04/15/23 0400 -- 66 16 171/79 Abnormal  113 99 % None (Room air)   04/15/23 0115 -- 64 18 165/74 107 97 % None (Room air)   04/15/23 0000 -- 64 17 157/70 104 97 % None (Room air)   04/14/23 2200 -- 68 19 160/76 109 97 % None (Room air)   04/14/23 2115 -- 70 16 160/72 104 98 % None (Room air)   04/14/23 2045 -- 69 20 160/72 -- 97 % None (Room air)   04/14/23 2015 -- 72 20 159/72 -- 97 % None (Room air)   04/14/23 1945 -- 74 20 182/84 Abnormal  -- 99 % --   04/14/23 1915 -- 72 18 170/81 -- 96 % --   04/14/23 1900 -- 72 18 170/78 -- 94 % --   04/14/23 1845 -- 79 20 172/81 Abnormal  -- 98 % --   04/14/23 1835 -- 80 18 175/86 Abnormal  -- 98 % --   04/14/23 1830 -- 80 20 167/79 -- 97 % --   04/14/23 1817 -- 79 20 173/72 Abnormal  -- 95 % --     Pertinent Labs/Diagnostic Test Results:   TRAUMA - CT head wo contrast   Final Result by Jael Chaudhari MD (04/14 1908)      No acute intracranial hemorrhage  Chronic microangiopathic changes  Left sphenoid sinusitis and right otomastoiditis  I personally discussed this study with Rachael Cross on 4/14/2023 at 7:06 PM                   Workstation performed: [id number]         TRAUMA - CT spine cervical wo contrast   Final Result by Jael Chaudhari MD (04/14 1919)      No acute fracture or dislocation           I personally discussed this study with Rachael Cross on 4/14/2023 at 7:06 PM              Workstation performed: [id number]         TRAUMA - CT chest abdomen pelvis w contrast   Final Result by Nichole Cochran MD (04/14 1952)      Right anterior bladder perforation /rupture with extraperitoneal air  Urology/surgical consult  Nondisplaced fracture of the medial aspect of the right 12th rib at the costovertebral junction  I personally discussed this study with Jaki Olivier on 4/14/2023 at 7:06 PM          Workstation performed: [id number]         XR Trauma multiple (SLB/SLRA trauma bay ONLY)   Final Result by Nichole Cochran MD (04/14 2000)      No acute cardiopulmonary disease within limitations of supine imaging                 Workstation performed: [id number]         XR chest 1 view    (Results Pending)         Results from last 7 days   Lab Units 04/16/23  0534 04/15/23  0438 04/15/23  0002 04/14/23  1835 04/14/23  1833   WBC Thousand/uL 10 23* 10 49*  --  12 93*  --    HEMOGLOBIN g/dL 11 5* 11 2*  --  12 3  --    I STAT HEMOGLOBIN g/dl  --   --   --   --  12 2   HEMATOCRIT % 36 0* 35 0*  --  37 8  --    HEMATOCRIT, ISTAT %  --   --   --   --  36*   PLATELETS Thousands/uL 311 315 303 373  --    NEUTROS ABS Thousands/µL 8 37* 8 65*  --  11 12*  --          Results from last 7 days   Lab Units 04/16/23  0534 04/15/23  0438 04/14/23  1837 04/14/23  1833   SODIUM mmol/L 136 136 133*  --    POTASSIUM mmol/L 4 0 3 1* 3 4*  --    CHLORIDE mmol/L 109* 106 100  --    CO2 mmol/L 23 27 29  --    CO2, I-STAT mmol/L  --   --   --  30   ANION GAP mmol/L 4 3* 4  --    BUN mg/dL 8 12 18  --    CREATININE mg/dL 0 76 0 73 0 99  --    EGFR ml/min/1 73sq m 77 78 64  --    CALCIUM mg/dL 8 2* 7 7* 8 8  --    CALCIUM, IONIZED, ISTAT mmol/L  --   --   --  1 12     Results from last 7 days   Lab Units 04/16/23  0534 04/15/23  0438   AST U/L 54* 52*   ALT U/L 51 53   ALK PHOS U/L 80 90   TOTAL PROTEIN g/dL 5 5* 5 5*   ALBUMIN g/dL 1 7* 1 8*   TOTAL BILIRUBIN mg/dL 0 61 0 55   BILIRUBIN DIRECT mg/dL  --  0 26*     Results from last 7 days   Lab Units 04/16/23  0714 04/15/23  2105 04/15/23  1630   POC GLUCOSE mg/dl 115 152* 139     Results from last 7 days   Lab Units 04/16/23  0534 04/15/23  0438 04/14/23  1837   GLUCOSE RANDOM mg/dL 117 157* 187*     Results from last 7 days   Lab Units 04/14/23  1833   PH, AUDELIA I-STAT  7 537*   PCO2, AUDELIA ISTAT mm HG 34 2*   PO2, AUDELIA ISTAT mm HG 45 0   HCO3, AUDELIA ISTAT mmol/L 29 0   I STAT BASE EXC mmol/L 6*   I STAT O2 SAT % 86*         Results from last 7 days   Lab Units 04/15/23  0002 04/14/23  2115 04/14/23  1837   HS TNI 0HR ng/L  --   --  13   HS TNI 2HR ng/L  --  15  --    HSTNI D2 ng/L  --  2  --    HS TNI 4HR ng/L 16  --   --    HSTNI D4 ng/L 3  --   --          Results from last 7 days   Lab Units 04/14/23  1837   PROTIME seconds 17 0*   INR  1 36*         Results from last 7 days   Lab Units 04/14/23  1837   PROCALCITONIN ng/ml 0 33*     Results from last 7 days   Lab Units 04/14/23  1837   LACTIC ACID mmol/L 1 5           Results from last 7 days   Lab Units 04/14/23  1913   CLARITY UA  Clear   COLOR UA  Colorless   SPEC GRAV UA  1 034*   PH UA  8 0   GLUCOSE UA mg/dl Negative   KETONES UA mg/dl Negative   BLOOD UA  Trace*   PROTEIN UA mg/dl Trace*   NITRITE UA  Negative   BILIRUBIN UA  Negative   UROBILINOGEN UA (BE) mg/dl <2 0   LEUKOCYTES UA  Moderate*   WBC UA /hpf 20-30*   RBC UA /hpf 4-10*   BACTERIA UA /hpf Occasional   EPITHELIAL CELLS WET PREP /hpf None Seen     Results from last 7 days   Lab Units 04/14/23  1825   BLOOD CULTURE  No Growth at 24 hrs  No Growth at 24 hrs           ED Treatment:   Medication Administration from 04/14/2023 1801 to 04/15/2023 1911       Date/Time Order Dose Route Action     04/14/2023 1926 EDT cefepime (MAXIPIME) 2,000 mg in dextrose 5 % 50 mL IVPB 2,000 mg Intravenous New Bag     04/14/2023 2105 EDT vancomycin (VANCOCIN) 1500 mg in sodium chloride 0 9% 250 mL IVPB 1,500 mg Intravenous New Bag     04/15/2023 0440 EDT metroNIDAZOLE (FLAGYL) IVPB (premix) 500 mg 100 mL 500 mg Intravenous New Bag     04/14/2023 2047 EDT metroNIDAZOLE (FLAGYL) IVPB (premix) 500 mg 100 mL 500 mg Intravenous New Bag     04/14/2023 1925 EDT prothrombin complex concentrate (human) (Kcentra) 2,000 Units 2,000 Units Intravenous Given     04/14/2023 1839 EDT iohexol (OMNIPAQUE) 350 MG/ML injection (SINGLE-DOSE) 100 mL 100 mL Intravenous Given     04/14/2023 2316 EDT sodium chloride 0 9 % bolus 1,000 mL 1,000 mL Intravenous New Bag     04/15/2023 0156 EDT enoxaparin (LOVENOX) subcutaneous injection 30 mg 30 mg Subcutaneous Given     04/15/2023 0440 EDT sodium chloride 0 9 % infusion 75 mL/hr Intravenous New Bag     04/15/2023 0922 EDT potassium chloride (K-DUR,KLOR-CON) CR tablet 40 mEq 40 mEq Oral Given     04/15/2023 0750 EDT potassium chloride (K-DUR,KLOR-CON) CR tablet 40 mEq 40 mEq Oral Given     04/15/2023 1538 EDT piperacillin-tazobactam (ZOSYN) 4 5 g in sodium chloride 0 9 % 100 mL IVPB 4 5 g Intravenous New Bag     04/15/2023 0923 EDT piperacillin-tazobactam (ZOSYN) 4 5 g in sodium chloride 0 9 % 100 mL IVPB 4 5 g Intravenous New Bag     04/15/2023 1719 EDT amiodarone tablet 200 mg 200 mg Oral Given     04/15/2023 1718 EDT apixaban (ELIQUIS) tablet 2 5 mg 2 5 mg Oral Given     04/15/2023 1721 EDT cholecalciferol (VITAMIN D3) tablet 2,000 Units 2,000 Units Oral Given     04/15/2023 1717 EDT FLUoxetine (PROzac) capsule 40 mg 40 mg Oral Given     04/15/2023 1717 EDT isosorbide mononitrate (IMDUR) 24 hr tablet 60 mg 60 mg Oral Given     04/15/2023 1720 EDT levothyroxine tablet 88 mcg 88 mcg Oral Given     04/15/2023 1721 EDT pantoprazole (PROTONIX) EC tablet 40 mg 40 mg Oral Given     04/15/2023 1717 EDT pravastatin (PRAVACHOL) tablet 20 mg 20 mg Oral Given     04/15/2023 1718 EDT pyridostigmine (MESTINON) tablet 60 mg 60 mg Oral Given          Admitting Diagnosis: Bladder rupture [N32 89]  Decubitus ulcer of sacral region, stage 4 ([facility] ) [L89 154]  Sepsis ([facility] ) [A41 9]  Fracture of one rib, right side, initial encounter for closed fracture [S22 31XA]  Unspecified multiple injuries, initial encounter [T07  XXXA]  Age/Sex: 80 y o  male  Admission Orders:  Consistent Carbohydrate Diet  Accu-checks ACHS  Continuous Pulse Ox  SCDs  Scheduled Medications:  amiodarone, 200 mg, Oral, Daily  apixaban, 2 5 mg, Oral, BID  cholecalciferol, 2,000 Units, Oral, Daily  famotidine, 20 mg, Oral, BID  FLUoxetine, 40 mg, Oral, Daily  [START ON 4/17/2023] furosemide, 20 mg, Oral, Daily  insulin lispro, 1-5 Units, Subcutaneous, TID AC  insulin lispro, 1-5 Units, Subcutaneous, HS  isosorbide mononitrate, 60 mg, Oral, Daily  levothyroxine, 88 mcg, Oral, Daily  pantoprazole, 40 mg, Oral, Daily  piperacillin-tazobactam, 4 5 g, Intravenous, Q6H  pravastatin, 20 mg, Oral, Daily  pyridostigmine, 60 mg, Oral, TID  sodium hypochlorite, 1 application  , Irrigation, Daily    Continuous IV Infusions: none     PRN Meds:  acetaminophen, 650 mg, Oral, Q6H PRN  dicyclomine, 20 mg, Oral, BID PRN  sucralfate, 1 g, Oral, 4x Daily PRN        IP CONSULT TO INFECTIOUS DISEASES  IP CONSULT TO UROLOGY  IP CONSULT TO ACUTE CARE SURGERY  IP CONSULT TO CASE MANAGEMENT    Network Utilization Review Department  ATTENTION: Please call with any questions or concerns to [phone] and carefully listen to the prompts so that you are directed to the right person  All voicemails are confidential   Birda Manners all requests for admission clinical reviews, approved or denied determinations and any other requests to dedicated fax number below belonging to the campus where the patient is receiving treatment   List of dedicated fax numbers for the Facilities:  [address] DENIALS (Administrative/Medical Necessity) [phone]   [address] (Maternity/NICU/Pediatrics) [phone]   7 Soraida Lim [phone]   [facility] [phone]   [place] [phone]   [facility]8 [address] [facility] [phone] [address] 28 U [facility] 310 [facility] 134 815 [facility] [phone]

## 2023-04-16 NOTE — ASSESSMENT & PLAN NOTE
uro on board, appreciate recs   -Conservative measures with continued catheter drainage    Given this appears to be an extraperitoneal process surgical intervention is not immediately required   -Can consider repeat imaging in approximately 5 to 20 days to reevaluate the bladder for hopeful resolution of  extraperitoneal air

## 2023-04-16 NOTE — CASE MANAGEMENT
Case Management Assessment & Discharge Planning Note    Patient name Daniel Ramirez  Location [address] [place] [address] [id number]/PPHP [id number] MRN [id number]  : 1927 Date 2023       Current Admission Date: 2023  Current Admission Diagnosis:Fall at home   Patient Active Problem List    Diagnosis Date Noted   • Bladder rupture 2023   • Fall at home 04/15/2023   • DM (diabetes mellitus) ([facility] ) 04/15/2023   • Pressure ulcer of sacral region 04/15/2023   • Chronic gastritis 04/15/2023   • Myasthenia gravis ([facility] ) 04/15/2023   • CKD (chronic kidney disease) 04/15/2023   • Chronic diastolic (congestive) heart failure ([facility] ) 04/15/2023   • Atrial fibrillation ([facility] ) 04/15/2023   • Suprapubic catheter ([facility] ) 04/15/2023   • Mixed hyperlipidemia 04/15/2023   • Hypothyroidism 04/15/2023   • CAD (coronary artery disease) 04/15/2023   • Rib fracture 04/15/2023   • Anxiety 04/15/2023      LOS (days): 2  Geometric Mean LOS (GMLOS) (days):   Days to GMLOS:     OBJECTIVE:    Risk of Unplanned Readmission Score: 16 89         Current admission status: Inpatient       Preferred Pharmacy:   [address] [place] [address]  Phone: [phone] Fax: [phone]    Primary Care Provider: Alejandra Rubio MD    Primary Insurance: [facility]  Secondary Insurance:     ASSESSMENT:  James Iglesias Proxies    There are no active Health Care Proxies on file                   Readmission Root Cause  30 Day Readmission: No    Patient Information  Admitted from[de-identified] Home  Mental Status: Alert  During Assessment patient was accompanied by: Not accompanied during assessment  Assessment information provided by[de-identified] Patient, Son  Primary Caregiver: Family  Caregiver's Name[de-identified] doug Gunter Relationship to Patient[de-identified] Family Member  Caregiver's Telephone Number[de-identified] [phone]  Support Systems: 1000 [facility] of Residence: [address] [place] [address] [facility]   Select all that apply : Stairs  Number of steps to enter home : 2  Type of Current Residence: [place] Marcial  In the last 12 months, was there a time when you were not able to pay the mortgage or rent on time?: No  In the last 12 months, was there a time when you did not have a steady place to sleep or slept in a shelter (including now)?: No  Homeless/housing insecurity resource given?: N/A  Living Arrangements: Lives w/ Son  Is patient a ?: Yes ([employer])  Is patient active with [place] ([facility])?: No    Activities of Daily Living Prior to Admission  Functional Status: Assistance  Completes ADLs independently?: No  Level of ADL dependence: Assistance  Ambulates independently?: Yes  Does patient use assisted devices?: Yes  Assisted Devices (DME) used: Chiquita Egan  Does patient currently own DME?: Yes  What DME does the patient currently own?: Sebastian Olmedo  Does the patient have a history of Short-Term Rehab?: No  Does patient have a history of HHC?: Yes  Does patient currently have [address]?: Yes ([address] [place] [address] )    [facility]  Type of Current Home Care Services: Home health aide  Current Home Health Agency[de-identified] [address] [facility] Provider[de-identified] PCP    Patient Information Continued  Income Source: Pension/long-term  Does patient have prescription coverage?: Yes  Within the past 12 months, you worried that your food would run out before you got the money to buy more : Never true  Within the past 12 months, the food you bought just didn't last and you didn't have money to get more : Never true  Food insecurity resource given?: N/A  Does patient receive dialysis treatments?: No  Does patient have a history of substance abuse?: No  Does patient have a history of Mental Health Diagnosis?: No         Means of Transportation  Means of Transport to Appts[de-identified] Family transport  In the past 12 months, has lack of transportation kept you from medical appointments or from getting medications?: No  In the past 12 months, has lack of transportation kept you from meetings, work, or from getting things needed for daily living?: No  Was application for public transport provided?: N/A        DISCHARGE DETAILS:    Discharge planning discussed with[de-identified] patient at bedside, son Skip Trent via phone  Freedom of Choice: Yes     CM contacted family/caregiver?: Yes  Were Treatment Team discharge recommendations reviewed with patient/caregiver?: Yes  Did patient/caregiver verbalize understanding of patient care needs?: Yes  Were patient/caregiver advised of the risks associated with not following Treatment Team discharge recommendations?: Yes    Contacts  Patient Contacts: doug Gunter  Relationship to Patient[de-identified] Family  Contact Method: Phone  Phone Number: [phone]  Reason/Outcome: Emergency Contact, Continuity of Care, Discharge [address]         Is the patient interested in Jamesmendy Barillas at discharge?: Yes  Via Deon Matta requested[de-identified] [address] Name[de-identified] [facility] Provider[de-identified] PCP  Home Health Services Needed[de-identified] Wound/Ostomy Care  Homebound Criteria Met[de-identified] Requires the Assistance of Another Person for Safe Ambulation or to Leave the Home, Uses an Assist Device (i e  cane, walker, etc)  Supporting Clincal Findings[de-identified] Fatigues Easliy in [employer], Limited Endurance                   Treatment Team Recommendation: Home with [address]  Discharge Destination Plan[de-identified] Home with [place] at Discharge : Family                Patient/caregiver received discharge checklist   Content reviewed  Patient/caregiver encouraged to participate in discharge plan of care prior to discharge home    CM reviewed d/c planning process including the following: identifying help at home, patient preference for d/c planning needs, Discharge Lounge, [facility] Meds to Bed program, availability of treatment team to discuss questions or concerns patient and/or family may have regarding understanding medications and recognizing signs and symptoms once discharged  CM also encouraged patient to follow up with all recommended appointments after discharge  Patient advised of importance for patient and family to participate in managing patient’s medical well being  Additional Comments: Introduced self and role to patient at bedside and son Marija Mcfarland via phone  Patient lives with son in ranch-style home with 4 steps to enter, son assists with ADLs  Patient is current with home care, has a home care aide and nursing services  Return referral placed to [facility], assigned CM will continue to follow for additional d/c needs

## 2023-04-16 NOTE — ASSESSMENT & PLAN NOTE
-Right anterior bladder perforation /rupture with extraperitoneal air  -Urology consulted, appreciate recommendations  -Continue cardenas drainage  -Plan for repeat imaging in 5-20 days

## 2023-04-16 NOTE — PLAN OF CARE
Problem: PAIN - ADULT  Goal: Verbalizes/displays adequate comfort level or baseline comfort level  Description: Interventions:  - Encourage patient to monitor pain and request assistance  - Assess pain using appropriate pain scale  - Administer analgesics based on type and severity of pain and evaluate response  - Implement non-pharmacological measures as appropriate and evaluate response  - Consider cultural and social influences on pain and pain management  - Notify physician/advanced practitioner if interventions unsuccessful or patient reports new pain  Outcome: Progressing     Problem: INFECTION - ADULT  Goal: Absence or prevention of progression during hospitalization  Description: INTERVENTIONS:  - Assess and monitor for signs and symptoms of infection  - Monitor lab/diagnostic results  - Monitor all insertion sites, i e  indwelling lines, tubes, and drains  - Monitor endotracheal if appropriate and nasal secretions for changes in amount and color  - [place] appropriate cooling/warming therapies per order  - Administer medications as ordered  - Instruct and encourage patient and family to use good hand hygiene technique  - Identify and instruct in appropriate isolation precautions for identified infection/condition  Outcome: Progressing  Goal: Absence of fever/infection during neutropenic period  Description: INTERVENTIONS:  - Monitor WBC    Outcome: Progressing     Problem: SAFETY ADULT  Goal: Patient will remain free of falls  Description: INTERVENTIONS:  - Educate patient/family on patient safety including physical limitations  - Instruct patient to call for assistance with activity   - Consult OT/PT to assist with strengthening/mobility   - Keep Call bell within reach  - Keep bed low and locked with side rails adjusted as appropriate  - Keep care items and personal belongings within reach  - Initiate and maintain comfort rounds  - Make Fall Risk Sign visible to staff  - Offer Toileting every 2 Hours, in advance of need  - Initiate/Maintain bed alarm  - Obtain necessary fall risk management equipment:   - Apply yellow socks and bracelet for high fall risk patients  - Consider moving patient to room near nurses station  Outcome: Progressing  Goal: Maintain or return to baseline ADL function  Description: INTERVENTIONS:  -  Assess patient's ability to carry out ADLs; assess patient's baseline for ADL function and identify physical deficits which impact ability to perform ADLs (bathing, care of mouth/teeth, toileting, grooming, dressing, etc )  - Assess/evaluate cause of self-care deficits   - Assess range of motion  - Assess patient's mobility; develop plan if impaired  - Assess patient's need for assistive devices and provide as appropriate  - Encourage maximum independence but intervene and supervise when necessary  - Involve family in performance of ADLs  - Assess for home care needs following discharge   - Consider OT consult to assist with ADL evaluation and planning for discharge  - Provide patient education as appropriate  Outcome: Progressing  Goal: Maintains/Returns to pre admission functional level  Description: INTERVENTIONS:  - Perform BMAT or MOVE assessment daily    - Set and communicate daily mobility goal to care team and patient/family/caregiver  - Collaborate with rehabilitation services on mobility goals if consulted  - Perform Range of Motion 3 times a day  - Reposition patient every 2 hours    - Dangle patient 3 times a day  - Stand patient 3 times a day  - Ambulate patient 3 times a day  - Out of bed to chair 3 times a day   - Out of bed for meals 3 times a day  - Out of bed for toileting  - Record patient progress and toleration of activity level   Outcome: Progressing     Problem: DISCHARGE PLANNING  Goal: Discharge to home or other facility with appropriate resources  Description: INTERVENTIONS:  - Identify barriers to discharge w/patient and caregiver  - Arrange for needed discharge resources and transportation as appropriate  - Identify discharge learning needs (meds, wound care, etc )  - Arrange for interpretive services to assist at discharge as needed  - Refer to Case Management Department for coordinating discharge planning if the patient needs post-hospital services based on physician/advanced practitioner order or complex needs related to functional status, cognitive ability, or social support system  Outcome: Progressing     Problem: Knowledge Deficit  Goal: Patient/family/caregiver demonstrates understanding of disease process, treatment plan, medications, and discharge instructions  Description: Complete learning assessment and assess knowledge base    Interventions:  - Provide teaching at level of understanding  - Provide teaching via preferred learning methods  Outcome: Progressing

## 2023-04-16 NOTE — PROGRESS NOTES
Progress Note - Infectious Disease   Santhosh Muller 80 y o  male MRN: [id number]  Unit/Bed#: [facility] 818-01 Encounter: [id number]      Impression/Plan:  1  Systemic inflammatory response syndrome  Present on admission with tachycardia and leukocytosis  Possibly secondary to sepsis from an infected sacral decubitus ulcer  Possibly another source of sepsis  Possibly a noninfectious etiology such as the bladder rupture  Fortunately the patient remains hemodynamically stable  -Antibiotics as below  -Follow-up blood cultures  -Follow-up urine cultures  -Follow-up operative cultures if the patient gets surgical debridement  -Recheck CBC with differential and BMP  -Supportive care     2  Infected sacral decubitus ulcer  With malodorous drainage and initially necrotic tissue status post bedside debridement 4/15/2023  Likely polymicrobial   -Continue Zosyn  -Close surgical follow-up  -Await decision about surgical debridement versus palliative/comfort care  -Local wound care for now     3  Bladder rupture  In a patient with previous prostate cancer with prostatectomy, scrotal exploration and explantation of eroded artificial urinary sphincter and suprapubic catheter placed  Now with a new Hoyt catheter in place   -Hoyt drainage  -Close urology follow-up     4  Myasthenia gravis  Does not appear to be on any treatment currently      5  Ambulatory dysfunction  Discussed the above management plan with the primary service who agrees with the plan    Await discussions about level of care    Antibiotics:  Zosyn 2  Antibiotics 3    Subjective:  Patient has no fever, chills, sweats; no nausea, vomiting, diarrhea; no cough, shortness of breath; no pain  No new symptoms    He is anxious to get out of the hospital     Objective:  Vitals:  Temp:  [97 7 °F (36 5 °C)-98 4 °F (36 9 °C)] 98 4 °F (36 9 °C)  HR:  [64-76] 69  Resp:  [13-21] 16  BP: (146-177)/(70-90) 150/90  SpO2:  [94 %-98 %] 97 %  Temp (24hrs), Av 1 °F (36 7 °C), Min:97 7 °F (36 5 °C), Max:98 4 °F (36 9 °C)  Current: Temperature: 98 4 °F (36 9 °C)    Physical Exam:   General Appearance:  Alert, interactive, nontoxic, no acute distress  Throat: Oropharynx moist without lesions  Lungs:   Clear to auscultation bilaterally; no wheezes, rhonchi or rales; respirations unlabored   Heart:  RRR; no murmur, rub or gallop   Abdomen:   Soft, non-tender, non-distended, positive bowel sounds  Extremities: No clubbing, cyanosis or edema   Skin: No new rashes or lesions  No draining wounds noted  Labs, Imaging, & Other studies:   All pertinent labs and imaging studies were personally reviewed  Results from last 7 days   Lab Units 04/16/23  0534 04/15/23  0438 04/15/23  0002 04/14/23 1835   WBC Thousand/uL 10 23* 10 49*  --  12 93*   HEMOGLOBIN g/dL 11 5* 11 2*  --  12 3   PLATELETS Thousands/uL 311 315 303 373     Results from last 7 days   Lab Units 04/16/23  0534 04/15/23  0438 04/14/23 1837 04/14/23 1837 04/14/23  1833   SODIUM mmol/L 136 136  --  133*  --    POTASSIUM mmol/L 4 0 3 1*   < > 3 4*  --    CHLORIDE mmol/L 109* 106   < > 100  --    CO2 mmol/L 23 27   < > 29  --    CO2, I-STAT mmol/L  --   --   --   --  30   BUN mg/dL 8 12   < > 18  --    CREATININE mg/dL 0 76 0 73   < > 0 99  --    EGFR ml/min/1 73sq m 77 78   < > 64  --    GLUCOSE, ISTAT mg/dl  --   --   --   --  185*   CALCIUM mg/dL 8 2* 7 7*   < > 8 8  --    AST U/L 54* 52*  --   --   --    ALT U/L 51 53   < >  --   --    ALK PHOS U/L 80 90   < >  --   --     < > = values in this interval not displayed  Results from last 7 days   Lab Units 04/14/23 1825   BLOOD CULTURE  No Growth at 24 hrs  No Growth at 24 hrs       Results from last 7 days   Lab Units 04/14/23  1837   PROCALCITONIN ng/ml 0 33*

## 2023-04-16 NOTE — PROGRESS NOTES
INTERNAL MEDICINE RESIDENCY PROGRESS NOTE     Name: Candice Meraz   Age & Sex: 80 y o  male   MRN: [id number]  Unit/Bed#: [facility] 818-01   Encounter: [id number]  Team: SOD Team C     PATIENT INFORMATION     Name: Candice Meraz   Age & Sex: 80 y o  male   MRN: [id number]  Hospital Stay Days: 2    ASSESSMENT/PLAN     Principal Problem:    Fall at home  Active Problems:    DM (diabetes mellitus) ([address] )    Pressure ulcer of sacral region    Chronic gastritis    Myasthenia gravis (HCC)    CKD (chronic kidney disease)    Chronic diastolic (congestive) heart failure (HCC)    Atrial fibrillation (HCC)    Suprapubic catheter ([facility] [address] )    Mixed hyperlipidemia    Hypothyroidism    CAD (coronary artery disease)    Rib fracture    Anxiety    Bladder rupture      * Fall at home  [address] [place] [address] presenting after an unwitnessed fall hitting the ground complicated by rib fracture  Patient's son reports previous similar unwitnessed fall 1 week prior  Patient at baseline is a poor historian and unable to describe events leading to the fall  Occurred while patient was anticoagulated with Eliquis; given Kcentra to reverse Eliquis  Hemoglobin stable with no signs of ecchymosis or signs of bleeding  Suspect cause of fall may be secondary to sepsis in elderly patient      Plan:   We will further evaluate causes of fall including EKG and orthostatics  Will hold off further work-up of recurrent fall until after the resolution of sepsis and goals of care discussion with family  PT OT consult, appreciate recs  We will discuss with family the risks of continuing anticoagulation with history of recurrent falls    Anxiety  Assessment & Plan  History of anxiety managed with Prozac 40 mg daily at home; will continue regimen while inpatient  Consider d/c to avoid polypharmacy and SIADH in elderly vs consider switching to escitalopram     Rib fracture  Assessment & Plan  Displaced rib fracture of the right 12th rib secondary to fall    Plan:  Pain management with nonopioid analgesics and will escalate as needed  Encourage deep breathing  Symptomatic care with ice, heat, lidocaine patch, etc      CAD (coronary artery disease)  Assessment & Plan  History of coronary disease; will continue home pravastatin and Imdur    Hypothyroidism  Assessment & Plan  History of hypothyroidism managed with 88 mcg levothyroxine  Most recent TSH was 2 957    Plan  We will continue levothyroxine 88 mcg daily    Mixed hyperlipidemia  Assessment & Plan  History of hyperlipidemia managed with pravastatin 20 mg at home which she will continue while inpatient    Suprapubic catheter [facility])  Assessment & Plan  History of prostate cancer status post radical prostatectomy  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  CT scan of the abdomen which showed evidence of extraperitoneal air tracking around the right aspect of the bladder with a suprapubic catheter in place and no obvious signs of intra-peritoneal process; evaluated by urology with no issues    Plan: We will maintain suprapubic catheter and monitor for output and signs of infection    Atrial fibrillation (HCC)  Assessment & Plan  History chronic A-fib managed with amiodarone 200 mg daily  Anticoagulated with Eliquis 2 5 mg twice daily    Plan  Continue Eliquis and amiodarone at this time  HASBLED score 3 (medication pre-dispose to bleeding, age, and HTN)  Will discuss with patient and family the risks and benefits of anticoagulation versus no anticoagulation given patient's history of recurrent falls      Chronic diastolic (congestive) heart failure (HCC)  Assessment & Plan  Wt Readings from Last 3 Encounters:   04/14/23 61 7 kg (136 lb 0 4 oz)     Last echo in 12/12/2022 showed EF of 55% with grade 2 pseudonormal diastolic dysfunction  Patient takes Lasix 20 mg p o   Monday through Friday    Plan:  Lasix 20 mg p o  and titrate as needed to maintain goal net negative 1-2 liters  Daily I&O's  K>4, Mg >2 goal, replenish as needed  Consider fluid restriction  Consider cards consult to maximize GDMT   Limited BB use as naturally hovers around 60/bradycardia   Consider adding Ace-I/ARB, but will discuss risks of falls and orthostasis with family considering patients age  If acutely SOB, IV lasix and STAT CXR        CKD (chronic kidney disease)  Assessment & Plan  Lab Results   Component Value Date    EGFR 78 04/15/2023    EGFR 64 04/14/2023    CREATININE 0 73 04/15/2023    CREATININE 0 99 04/14/2023     History of CKD currently at baseline kidney    Plan: We will continue to trend creatinine and avoid nephrotoxic agents and maintain adequate perfusion  Home 20 mg Lasix diuretics regimen    Myasthenia gravis ([facility] )  Assessment & Plan  History of myasthenia gravis with no acute complaints    Plan: We will continue home pyridostigmine    Chronic gastritis  Assessment & Plan  History of chronic gastritis managed with 40 mg twice daily; will continue regimen while inpatient    Pressure ulcer of sacral region  Assessment & Plan  History of chronic sacral pressure ulcer  Noted to have stage IV sacral decubitus ulcer with significant necrotic tissue at the wound base  Sacral wound was debrided roughly 1 week ago by wound care  Status post bedside ED debridement of sacral wound  Given 1 dose of cefepime in the ED    Plan: We will start Zosyn 4 5 g every 6hr    ID on board, appreciate recs  Wound care    DM (diabetes mellitus) ([facility] )  Assessment & Plan  History of non-insulin-dependent diabetes mellitus  Last A1c in December of 7 2  Controlled with with saxagliptin 2 5 mg daily    Plan:   carb controlled diet   start correctional insulin to determine basal bolus regimen, titrate as needed with goal 140-180 in hospital  Hold oral home agents        Disposition: Pending urine and BCx to narrow abx, need PT/OT recs     SUBJECTIVE     Patient seen and examined  No acute events overnight   Patient on bedpan  No acute complaints  No concerns from nursing staff    OBJECTIVE     Vitals:    04/15/23 1729 04/15/23 1925 04/15/23 2323 23 0717   BP: (!) 173/79 152/71 146/70 150/90   BP Location: Right arm      Pulse: 76 64 65 69   Resp: 13 16 16    Temp:  98 1 °F (36 7 °C) 97 7 °F (36 5 °C) 98 4 °F (36 9 °C)   TempSrc:       SpO2: 97% 96% 94% 97%   Weight:       Height:          Temperature:   Temp (24hrs), Av 1 °F (36 7 °C), Min:97 7 °F (36 5 °C), Max:98 4 °F (36 9 °C)    Temperature: 98 4 °F (36 9 °C)  Intake & Output:  I/O        0701  04/15 0700 04/15 0701   0700  0701   0700    I V  (mL/kg)  822 5 (13 3)     IV Piggyback 1400 100     Total Intake(mL/kg) 1400 (22 7) 922 5 (15)     Urine (mL/kg/hr)  1500 (1)     Stool  0     Total Output  1500     Net +1400 -577 5            Unmeasured Stool Occurrence  2 x         Weights:   IBW (Ideal Body Weight): 70 7 kg    Body mass index is 20 09 kg/m²  Weight (last 2 days)     Date/Time Weight    23 18:14:18 61 7 (136 02)        Physical Exam  Constitutional:       General: He is not in acute distress  Comments: Frail   HENT:      Mouth/Throat:      Mouth: Mucous membranes are dry  Cardiovascular:      Rate and Rhythm: Normal rate  Heart sounds: No murmur heard  Pulmonary:      Effort: Pulmonary effort is normal       Breath sounds: No wheezing or rales  Abdominal:      Tenderness: There is no abdominal tenderness  There is no guarding  Genitourinary:     Comments: Suprapubic catheter in place  Musculoskeletal:      Right lower leg: No edema  Left lower leg: No edema  Skin:     General: Skin is warm  Neurological:      Comments: AOX2, baseline   Psychiatric:         Mood and Affect: Mood normal          Behavior: Behavior normal        LABORATORY DATA     Labs: I have personally reviewed pertinent reports    Results from last 7 days   Lab Units 23  0534 04/15/23  0438 04/15/23  0002 23  1835   WBC Thousand/uL 10 23* 10 49*  --  12 93*   HEMOGLOBIN g/dL 11 5* 11 2*  --  12 3   HEMATOCRIT % 36 0* 35 0*  --  37 8   PLATELETS Thousands/uL 311 315 303 373   NEUTROS PCT % 82* 82*  --  86*   MONOS PCT % 8 9  --  8      Results from last 7 days   Lab Units 04/16/23  0534 04/15/23  0438 04/14/23  1837 04/14/23  1833   POTASSIUM mmol/L 4 0 3 1* 3 4*  --    CHLORIDE mmol/L 109* 106 100  --    CO2 mmol/L 23 27 29  --    CO2, I-STAT mmol/L  --   --   --  30   BUN mg/dL 8 12 18  --    CREATININE mg/dL 0 76 0 73 0 99  --    CALCIUM mg/dL 8 2* 7 7* 8 8  --    ALK PHOS U/L 80 90  --   --    ALT U/L 51 53  --   --    AST U/L 54* 52*  --   --    GLUCOSE, ISTAT mg/dl  --   --   --  185*              Results from last 7 days   Lab Units 04/14/23  1837   INR  1 36*     Results from last 7 days   Lab Units 04/14/23  1837   LACTIC ACID mmol/L 1 5           IMAGING & DIAGNOSTIC TESTING     Radiology Results: I have personally reviewed pertinent reports  TRAUMA - CT head wo contrast    Result Date: 4/14/2023  Impression: No acute intracranial hemorrhage  Chronic microangiopathic changes  Left sphenoid sinusitis and right otomastoiditis  I personally discussed this study with Jerilyn Esparza on 4/14/2023 at 7:06 PM  Workstation performed: [id number]     TRAUMA - CT spine cervical wo contrast    Result Date: 4/14/2023  Impression: No acute fracture or dislocation  I personally discussed this study with Jerilyn Esparza on 4/14/2023 at 7:06 PM   Workstation performed: [id number]     TRAUMA - CT chest abdomen pelvis w contrast    Result Date: 4/14/2023  Impression: Right anterior bladder perforation /rupture with extraperitoneal air  Urology/surgical consult  Nondisplaced fracture of the medial aspect of the right 12th rib at the costovertebral junction   I personally discussed this study with Jerilyn Esparza on 4/14/2023 at 7:06 PM  Workstation performed: [id number]     XR Trauma multiple (SLB/SLRA trauma bay ONLY)    Result Date: "4/14/2023  Impression: No acute cardiopulmonary disease within limitations of supine imaging  Workstation performed: [id number]     Other Diagnostic Testing: I have personally reviewed pertinent reports  ACTIVE MEDICATIONS     Current Facility-Administered Medications   Medication Dose Route Frequency   • acetaminophen (TYLENOL) tablet 650 mg  650 mg Oral Q6H PRN   • amiodarone tablet 200 mg  200 mg Oral Daily   • apixaban (ELIQUIS) tablet 2 5 mg  2 5 mg Oral BID   • cholecalciferol (VITAMIN D3) tablet 2,000 Units  2,000 Units Oral Daily   • dicyclomine (BENTYL) tablet 20 mg  20 mg Oral BID PRN   • famotidine (PEPCID) tablet 20 mg  20 mg Oral BID   • FLUoxetine (PROzac) capsule 40 mg  40 mg Oral Daily   • [START ON 4/17/2023] furosemide (LASIX) tablet 20 mg  20 mg Oral Daily   • insulin lispro (HumaLOG) 100 units/mL subcutaneous injection 1-5 Units  1-5 Units Subcutaneous TID AC   • insulin lispro (HumaLOG) 100 units/mL subcutaneous injection 1-5 Units  1-5 Units Subcutaneous HS   • isosorbide mononitrate (IMDUR) 24 hr tablet 60 mg  60 mg Oral Daily   • levothyroxine tablet 88 mcg  88 mcg Oral Daily   • pantoprazole (PROTONIX) EC tablet 40 mg  40 mg Oral Daily   • piperacillin-tazobactam (ZOSYN) 4 5 g in sodium chloride 0 9 % 100 mL IVPB  4 5 g Intravenous Q6H   • pravastatin (PRAVACHOL) tablet 20 mg  20 mg Oral Daily   • pyridostigmine (MESTINON) tablet 60 mg  60 mg Oral TID   • sodium hypochlorite (DAKIN'S HALF-STRENGTH) 0 25 percent topical solution 1 application  1 application  Irrigation Daily   • sucralfate (CARAFATE) tablet 1 g  1 g Oral 4x Daily PRN       VTE Pharmacologic Prophylaxis: Reason for no pharmacologic prophylaxis anticoagulated on eliquis  VTE Mechanical Prophylaxis: sequential compression device    Portions of the record may have been created with voice recognition software    Occasional wrong word or \"sound a like\" substitutions may have occurred due to the inherent limitations of voice " recognition software    Read the chart carefully and recognize, using context, where substitutions have occurred   ==  Sandy Sotelo, [facility]  Internal Medicine Residency PGY-3

## 2023-04-16 NOTE — ASSESSMENT & PLAN NOTE
-Stage IV sacral pressure injury  -Surgery consulted  -S/p bedside debridement on 4/14  -Ongoing local wound care per Surgery  -ID consulted, appreciate recommendations, continue Zosyn

## 2023-04-16 NOTE — UTILIZATION REVIEW
NOTIFICATION OF INPATIENT ADMISSION   AUTHORIZATION REQUEST   SERVICING FACILITY:   [facility]  Address: [address] [place] [address], [address] [place] [address]  Tax ID: [id number]  NPI: [id number] ATTENDING PROVIDER:  Attending Name and NPI#: Deatra Spurling, Md [[id number]]  Address: [address] [place] [address]  Phone: [phone]   ADMISSION INFORMATION:  Place of Service: Inpatient [address] [facility]  [address]  Place of Service Code: 21  Inpatient Admission Date/Time: 4/14/23  9:21 PM  Discharge Date/Time: No discharge date for patient encounter  Admitting Diagnosis Code/Description:  Bladder rupture [N32 89]  Decubitus ulcer of sacral region, stage 4 ([address] ) [L89 154]  Sepsis ([address] ) [A41 9]  Fracture of one rib, right side, initial encounter for closed fracture [S22 31XA]  Unspecified multiple injuries, initial encounter [T07  XXXA]     UTILIZATION REVIEW CONTACT:  Larry Engel Utilization   Network Utilization Review Department  Phone: [phone]  Fax: [phone]  Email: [email or web address]  Contact for approvals/pending authorizations, clinical reviews, and discharge  PHYSICIAN ADVISORY SERVICES:  Medical Necessity Denial & Pbib-un-Fvuf Review  Phone: [phone]  Fax: [phone]  Email: [email or web address]

## 2023-04-16 NOTE — PROGRESS NOTES
[facility]  Progress Note  Name: Corrie Chavez  MRN: [id number]  Unit/Bed#: [facility] 855-16 I Date of Admission: 4/14/2023   Date of Service: 4/16/2023 I Hospital Day: 2    Assessment/Plan   Bladder rupture  Assessment & Plan  -Right anterior bladder perforation /rupture with extraperitoneal air  -Urology consulted, appreciate recommendations  -Continue cardenas drainage  -Plan for repeat imaging in 5-20 days    Rib fracture  Assessment & Plan  -Nondisplaced fracture of the medial aspect of the right 12th rib at the costovertebral junction  -Rib fracture protocol  -Multimodal analgesia  -IS/aggressive pulm toilet  -PT/OT    Hypothyroidism  Assessment & Plan  -Continue levothyroxine    Mixed hyperlipidemia  Assessment & Plan  -Continue statin    Atrial fibrillation (HCC)  Assessment & Plan  -Continue eliquis and amiodarone    Chronic diastolic (congestive) heart failure (HCC)  Assessment & Plan  Wt Readings from Last 3 Encounters:   04/14/23 61 7 kg (136 lb 0 4 oz)       -Continue Imdur  -Continue home lasix  -Daily weights    CKD (chronic kidney disease)  Assessment & Plan  Lab Results   Component Value Date    EGFR 78 04/15/2023    EGFR 64 04/14/2023    CREATININE 0 73 04/15/2023    CREATININE 0 99 04/14/2023     -Creatinine at baseline 0 73 from 0 99  -Trend daily BMP  -Continue home lasix  -Avoid hypotension and nephrotoxic agents    Chronic gastritis  Assessment & Plan  -Continue protonix, pepcid and carafate    Pressure ulcer of sacral region  Assessment & Plan  -Stage IV sacral pressure injury  -Surgery consulted  -S/p bedside debridement on 4/14  -Ongoing local wound care per Surgery  -ID consulted, appreciate recommendations, continue Zosyn    DM (diabetes mellitus) ([facility] [address] )  Assessment & Plan  No results found for: HGBA1C    Recent Labs     04/15/23  1630 04/15/23  2105   POCGLU 139 152*       Blood Sugar Average: Last 72 hrs:  (P) 145 5     -SSI for glucose control  -Hold home saxagliptin    * Fall at home  Assessment & Plan  -Fall precautions             TRAUMA TERTIARY SURVEY NOTE    VTE Prophylaxis: Eliquis    Disposition: PT/OT evaluation pending    Code status:  Level 3 - DNAR and DNI    Consultants: IP CONSULT TO INFECTIOUS DISEASES  IP CONSULT TO UROLOGY  IP CONSULT TO ACUTE CARE SURGERY  IP CONSULT TO CASE MANAGEMENT    Subjective     Mechanism of Injury:Fall     Chief Complaint: Bladder rupture, Sacral Ulcer    HPI/Last 24 hour events: Patient was admitted, General surgery was consulted for sacral wound and performed bedside debridement on 4/15, Urology was consulted and recommended continued cardenas drainage and repeat imaging in 5-20 days, ID was consulted and patient was started on Zosyn, patient was discussed with medical service and transferred to their service     Objective   Vitals:   Temp:  [97 7 °F (36 5 °C)-98 1 °F (36 7 °C)] 97 7 °F (36 5 °C)  HR:  [60-76] 65  Resp:  [13-21] 16  BP: (146-186)/(69-87) 146/70    I/O       04/14 0701  04/15 0700 04/15 0701  04/16 0700    I V  (mL/kg)  822 5 (13 3)    IV Piggyback 1400 100    Total Intake(mL/kg) 1400 (22 7) 922 5 (15)    Urine (mL/kg/hr)  1500 (1)    Stool  0    Total Output  1500    Net +1400 -577 5          Unmeasured Stool Occurrence  1 x           Physical Exam:   Gen:    NAD  CV:      warm, well-perfused  Lungs: No respiratory distress on room air  Abd:     soft, NT/ND, SPT in place with clear urine output  Ext:      no CCE  Neuro: A&Ox3       Invasive Devices     Peripheral Intravenous Line  Duration           Peripheral IV 04/14/23 Distal;Left;Upper;Ventral (anterior) Arm 1 day    Peripheral IV 04/14/23 Left;Ventral (anterior) Forearm 1 day          Drain  Duration           Suprapubic Catheter 2 days                   1  Before the illness or injury that brought you to the Emergency, did you need someone to help you on a regular basis? 1=Yes   2   Since the illness or injury that brought you to the Emergency, have you needed more help than usual to take care of yourself? 1=Yes   3  Have you been hospitalized for one or more nights during the past 6 months (excluding a stay in the Emergency Department)? 1=Yes   4  In general, do you see well? 0=Yes   5  In general, do you have serious problems with your memory? 0=No   6  Do you take more than three different medications everyday? 1=Yes   TOTAL   4     Did you order a geriatric consult if the score was 2 or greater?: Per primary team         Lab Results: Results: I have personally reviewed all pertinent laboratory/tests results    Imaging Results: I have personally reviewed pertinent reports      Chest Xray(s): negative for acute findings   FAST exam(s): negative for acute findings   CT Scan(s): positive for acute findings: anterior extraperitoneal bladder rupture   Additional Xray(s): N/A     Other Studies: None

## 2023-04-16 NOTE — PLAN OF CARE
Problem: PHYSICAL THERAPY ADULT  Goal: Performs mobility at highest level of function for planned discharge setting  See evaluation for individualized goals  Description: Treatment/Interventions: ADL retraining, Functional transfer training, LE strengthening/ROM, Elevations, Therapeutic exercise, Cognitive reorientation, Patient/family training, Equipment eval/education, Bed mobility, Gait training, Compensatory technique education, Continued evaluation, Spoke to nursing, OT, Spoke to case management  Equipment Recommended: Leeroy Puga (pt owns)       See flowsheet documentation for full assessment, interventions and recommendations  Note: Prognosis: Fair  Problem List: Decreased strength, Decreased endurance, Impaired balance, Decreased safety awareness, Decreased cognition  Assessment: Pt seen for PT evaluation  Pt with active PT eval/treat orders  Pt is a 80 y o  male who was [facility] on [facility]/14/[facility]23  Pt's current dx/ problem list include: fall at home  Comorbidities affecting pt's physical performance at time of assessment are as follows: DM, Presure ulcer of sacral region, chronic gastritis, MG, CKD, CHF, suprapubic catheter, mixed HLD, Hypothyroidism, CAD, anxiety, Bladder rupture, [place]  Personal factors affecting pt at time of initial examination include: steps to enter environment, inability to perform IADLs, inability to perform ADLs, inability to ambulate household distances  Due to acute medical issues, ongoing medical workup for primary dx;  fall risk, decreased activity tolerance compared to baseline, increased assistance needed from caregiver at current time, multiple lines, decline in overall functional mobility status; health management issues  At baseline, pt resides with is son in a 2 SH with 4 SURESH and required assistance with ADLs/ IADLs  Currently, upon initial examination, pt is requiring min Ax1 for supine to sit and min A for sit to stand and ambulation   PT to continue to follow and assess functional transfers and ambulation as appropriate and able  Currently, pt presents functioning below baseline and with overall mobility deficits 2* to: decreased LE strength/AROM; limited flexibility;  generalized weakness/ deconditioning; decreased endurance; decreased activity tolerance; impaired balance; gait deviations  Pt currently at increased risk for falls  At the end of evaluation, pt was left seated in bed side recliner with all needs in reach  Pt would benefit from continued skilled PT services while in hospital to address remaining limitations and maximize functional potential  The patient's AM-PAC Basic Mobility Inpatient Short Form Raw Score is 18  A Raw score of greater than 16 suggests the patient may benefit from discharge to home  Please also refer to the recommendation of the Physical Therapist for safe discharge planning  PT Discharge Recommendation: Home with home health rehabilitation    See flowsheet documentation for full assessment

## 2023-04-16 NOTE — QUICK NOTE
Spoke with daughter Art Brandt in regards to updates in patient care and dispo planning  Broached subject of ACP/GOC planning, which she is open to  Family meeting with Art Brandt and Brother anne tentative to occur during the week   All questions answered to satisfaction

## 2023-04-17 PROBLEM — E44.0 MODERATE PROTEIN-CALORIE MALNUTRITION (HCC): Status: ACTIVE | Noted: 2023-04-17

## 2023-04-17 LAB
ANION GAP SERPL CALCULATED.3IONS-SCNC: 2 MMOL/L (ref 4–13)
BASOPHILS # BLD AUTO: 0.05 THOUSANDS/ΜL (ref 0–0.1)
BASOPHILS NFR BLD AUTO: 1 % (ref 0–1)
BUN SERPL-MCNC: 8 MG/DL (ref 5–25)
CALCIUM SERPL-MCNC: 8.6 MG/DL (ref 8.3–10.1)
CHLORIDE SERPL-SCNC: 108 MMOL/L (ref 96–108)
CO2 SERPL-SCNC: 27 MMOL/L (ref 21–32)
CREAT SERPL-MCNC: 0.94 MG/DL (ref 0.6–1.3)
EOSINOPHIL # BLD AUTO: 0.03 THOUSAND/ΜL (ref 0–0.61)
EOSINOPHIL NFR BLD AUTO: 0 % (ref 0–6)
ERYTHROCYTE [DISTWIDTH] IN BLOOD BY AUTOMATED COUNT: 17 % (ref 11.6–15.1)
GFR SERPL CREATININE-BSD FRML MDRD: 68 ML/MIN/1.73SQ M
GLUCOSE SERPL-MCNC: 169 MG/DL (ref 65–140)
GLUCOSE SERPL-MCNC: 183 MG/DL (ref 65–140)
GLUCOSE SERPL-MCNC: 189 MG/DL (ref 65–140)
GLUCOSE SERPL-MCNC: 194 MG/DL (ref 65–140)
GLUCOSE SERPL-MCNC: 244 MG/DL (ref 65–140)
HCT VFR BLD AUTO: 39.3 % (ref 36.5–49.3)
HGB BLD-MCNC: 12.4 G/DL (ref 12–17)
IMM GRANULOCYTES # BLD AUTO: 0.03 THOUSAND/UL (ref 0–0.2)
IMM GRANULOCYTES NFR BLD AUTO: 0 % (ref 0–2)
LYMPHOCYTES # BLD AUTO: 0.99 THOUSANDS/ΜL (ref 0.6–4.47)
LYMPHOCYTES NFR BLD AUTO: 11 % (ref 14–44)
MCH RBC QN AUTO: 26.4 PG (ref 26.8–34.3)
MCHC RBC AUTO-ENTMCNC: 31.6 G/DL (ref 31.4–37.4)
MCV RBC AUTO: 84 FL (ref 82–98)
MONOCYTES # BLD AUTO: 0.77 THOUSAND/ΜL (ref 0.17–1.22)
MONOCYTES NFR BLD AUTO: 8 % (ref 4–12)
NEUTROPHILS # BLD AUTO: 7.38 THOUSANDS/ΜL (ref 1.85–7.62)
NEUTS SEG NFR BLD AUTO: 80 % (ref 43–75)
NRBC BLD AUTO-RTO: 0 /100 WBCS
PLATELET # BLD AUTO: 332 THOUSANDS/UL (ref 149–390)
PMV BLD AUTO: 8.9 FL (ref 8.9–12.7)
POTASSIUM SERPL-SCNC: 3.9 MMOL/L (ref 3.5–5.3)
RBC # BLD AUTO: 4.69 MILLION/UL (ref 3.88–5.62)
SODIUM SERPL-SCNC: 137 MMOL/L (ref 135–147)
WBC # BLD AUTO: 9.25 THOUSAND/UL (ref 4.31–10.16)

## 2023-04-17 RX ADMIN — PIPERACILLIN SODIUM AND TAZOBACTAM SODIUM 4.5 G: 36; 4.5 INJECTION, POWDER, LYOPHILIZED, FOR SOLUTION INTRAVENOUS at 03:09

## 2023-04-17 RX ADMIN — AMIODARONE HYDROCHLORIDE 200 MG: 200 TABLET ORAL at 08:26

## 2023-04-17 RX ADMIN — PYRIDOSTIGMINE BROMIDE 60 MG: 60 TABLET ORAL at 17:08

## 2023-04-17 RX ADMIN — FAMOTIDINE 20 MG: 20 TABLET, FILM COATED ORAL at 08:26

## 2023-04-17 RX ADMIN — CHOLECALCIFEROL TAB 25 MCG (1000 UNIT) 2000 UNITS: 25 TAB at 08:26

## 2023-04-17 RX ADMIN — PIPERACILLIN SODIUM AND TAZOBACTAM SODIUM 4.5 G: 36; 4.5 INJECTION, POWDER, LYOPHILIZED, FOR SOLUTION INTRAVENOUS at 15:45

## 2023-04-17 RX ADMIN — INSULIN LISPRO 1 UNITS: 100 INJECTION, SOLUTION INTRAVENOUS; SUBCUTANEOUS at 08:33

## 2023-04-17 RX ADMIN — APIXABAN 2.5 MG: 2.5 TABLET, FILM COATED ORAL at 17:08

## 2023-04-17 RX ADMIN — INSULIN LISPRO 1 UNITS: 100 INJECTION, SOLUTION INTRAVENOUS; SUBCUTANEOUS at 11:58

## 2023-04-17 RX ADMIN — PANTOPRAZOLE SODIUM 40 MG: 40 TABLET, DELAYED RELEASE ORAL at 06:06

## 2023-04-17 RX ADMIN — FLUOXETINE 40 MG: 20 CAPSULE ORAL at 08:26

## 2023-04-17 RX ADMIN — INSULIN LISPRO 1 UNITS: 100 INJECTION, SOLUTION INTRAVENOUS; SUBCUTANEOUS at 21:31

## 2023-04-17 RX ADMIN — APIXABAN 2.5 MG: 2.5 TABLET, FILM COATED ORAL at 08:26

## 2023-04-17 RX ADMIN — INSULIN LISPRO 2 UNITS: 100 INJECTION, SOLUTION INTRAVENOUS; SUBCUTANEOUS at 17:08

## 2023-04-17 RX ADMIN — PYRIDOSTIGMINE BROMIDE 60 MG: 60 TABLET ORAL at 08:27

## 2023-04-17 RX ADMIN — PRAVASTATIN SODIUM 20 MG: 20 TABLET ORAL at 08:26

## 2023-04-17 RX ADMIN — PIPERACILLIN SODIUM AND TAZOBACTAM SODIUM 4.5 G: 36; 4.5 INJECTION, POWDER, LYOPHILIZED, FOR SOLUTION INTRAVENOUS at 20:18

## 2023-04-17 RX ADMIN — FUROSEMIDE 20 MG: 20 TABLET ORAL at 08:26

## 2023-04-17 RX ADMIN — SODIUM HYPOCHLORITE 1 APPLICATION.: 2.5 SOLUTION TOPICAL at 08:27

## 2023-04-17 RX ADMIN — LEVOTHYROXINE SODIUM 88 MCG: 88 TABLET ORAL at 06:06

## 2023-04-17 RX ADMIN — FAMOTIDINE 20 MG: 20 TABLET, FILM COATED ORAL at 21:27

## 2023-04-17 RX ADMIN — ISOSORBIDE MONONITRATE 60 MG: 60 TABLET, EXTENDED RELEASE ORAL at 08:26

## 2023-04-17 RX ADMIN — PYRIDOSTIGMINE BROMIDE 60 MG: 60 TABLET ORAL at 21:27

## 2023-04-17 RX ADMIN — PIPERACILLIN SODIUM AND TAZOBACTAM SODIUM 4.5 G: 36; 4.5 INJECTION, POWDER, LYOPHILIZED, FOR SOLUTION INTRAVENOUS at 08:25

## 2023-04-17 NOTE — CONSULTS
Consultation - Trauma   Dante Larsen 80 y o  male MRN: [id number]  Unit/Bed#: [facility] 818-01 Encounter: [id number]    Trauma Alert: Other Trauma consult   Trauma Team: Attending Burton Mercado and Residents Anastasiya Melendez  Consultants:     None     Assessment/Plan   Bladder rupture  Assessment & Plan  -Right anterior bladder perforation /rupture with extraperitoneal air  -Urology consulted, appreciate recommendations  -Continue cardenas drainage  -Plan for repeat imaging in 5-20 days     Rib fracture  Assessment & Plan  -Nondisplaced fracture of the medial aspect of the right 12th rib at the costovertebral junction  - Non tender  Possibly chronic   - Patient with ongoing medical management requirement that is not related to trauma  Trauma will sign off  If any questions please don't hesitate to contact us       Hypothyroidism  Assessment & Plan  -Continue levothyroxine     Mixed hyperlipidemia  Assessment & Plan  -Continue statin     Atrial fibrillation (HCC)  Assessment & Plan  -Continue eliquis and amiodarone     Chronic diastolic (congestive) heart failure (HCC)  Assessment & Plan      Wt Readings from Last 3 Encounters:   04/14/23 61 7 kg (136 lb 0 4 oz)         -Continue Imdur  -Continue home lasix  -Daily weights     CKD (chronic kidney disease)  Assessment & Plan  -Trend daily BMP  -Continue home lasix  -Avoid hypotension and nephrotoxic agents     Chronic gastritis  Assessment & Plan  -Continue protonix, pepcid and carafate     Pressure ulcer of sacral region  Assessment & Plan  -Stage IV sacral pressure injury  -Surgery consulted  -S/p bedside debridement on 4/14  -Ongoing local wound care per Surgery  -ID consulted, appreciate recommendations, continue Zosyn     DM [facility]di[facility] mellitus) ([facility] )  Assessment & Plan  No results found for: HGBA1C          Recent Labs     04/15/23  1630 04/15/23  2105   POCGLU 139 152*         Blood Sugar Average: Last 72 hrs:  (P) 145  5      -SSI for glucose control  -Hold home saxagliptin     * Fall at home  Assessment & Plan  -Fall precautions        History of Present Illness   Physician Requesting Evaluation: Jerry Chowdary*  Reason for Evaluation / Principal Problem: Evaluate for trauma    Chief Complaint: Rib fracture, bladder rupture, sepsis from source including urinary and sacral ulcer     HPI:    Lacie Meeks is a 80 y o  male who presents with Fall on eliquis and unknown head strike  Hx of HTN, HLD, Noninsulin dependent diabetes, 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 and 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, stage IV sacral decubitus ulcer that was debrided last week due to some necrotic tissue  Patient has had SP catheter since December 2022  Patient is AOx2 and unaware of time which is his baseline per patient's son  Patient endorses no complaints on exam        Review of Systems   Constitutional: Negative for chills and fever  HENT: Negative for congestion, ear pain and sinus pain  Eyes: Negative for pain, discharge, redness and visual disturbance  Respiratory: Negative for chest tightness and shortness of breath  Cardiovascular: Negative for chest pain, palpitations and leg swelling  Gastrointestinal: Negative for abdominal distention, abdominal pain, nausea and vomiting  Endocrine: Negative  Genitourinary: Negative for difficulty urinating, dysuria and flank pain  Musculoskeletal: Negative for back pain, myalgias and neck pain  Skin: Positive for wound  Allergic/Immunologic: Negative  Neurological: Negative for dizziness, weakness, numbness and headaches  Hematological: Negative  Psychiatric/Behavioral: Negative  All other systems reviewed and are negative  12-point, complete review of systems was reviewed and negative except as stated above       Historical Information     Hx of HTN, HLD, Noninsulin dependent diabetes, 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 1=Yes   2  Since the illness or injury that brought you to the Emergency, have you needed more help than usual to take care of yourself? 0=No   3  Have you been hospitalized for one or more nights during the past 6 months (excluding a stay in the Emergency Department)? 1=Yes   4  In general, do you see well? 1=No   5  In general, do you have serious problems with your memory? 1=Yes   6  Do you take more than three different medications everyday? 1=Yes   TOTAL   4     Did you order a geriatric consult if the score was 2 or greater?: yes     Meds/Allergies   all current active meds have been reviewed     Allergies   Allergen Reactions   • Levofloxacin Rash   • Sulfa Antibiotics Rash       Objective   Initial Vitals:   Temperature: 100 °F (37 8 °C) (04/14/23 1814)  Pulse: 82 (04/14/23 1814)  Respirations: 20 (04/14/23 1814)  Blood Pressure: (!) 175/86 (04/14/23 1814)    Physical Exam:  Physical Exam  Vitals and nursing note reviewed  Constitutional:       General: He is not in acute distress  Appearance: Normal appearance  He is not ill-appearing  HENT:      Head: Normocephalic and atraumatic  Right Ear: Tympanic membrane and external ear normal       Left Ear: Tympanic membrane and external ear normal       Nose: Nose normal       Mouth/Throat:      Mouth: Mucous membranes are moist       Pharynx: Oropharynx is clear  Eyes:      Extraocular Movements: Extraocular movements intact  Conjunctiva/sclera: Conjunctivae normal       Pupils: Pupils are equal, round, and reactive to light     Cardiovascular:      Rate and Rhythm: Normal rate and regular rhythm  Pulses: Normal pulses  Heart sounds: Normal heart sounds  Pulmonary:      Effort: Pulmonary effort is normal  No respiratory distress  Breath sounds: Normal breath sounds  No wheezing  Abdominal:      General: Abdomen is flat  Bowel sounds are normal  There is no distension  Palpations: Abdomen is soft  Tenderness: There is no abdominal tenderness  Genitourinary:     Comments: Hoyt in place   Musculoskeletal:         General: No swelling or tenderness  Normal range of motion  Cervical back: Normal range of motion and neck supple  No rigidity  Skin:     General: Skin is warm and dry  Capillary Refill: Capillary refill takes less than 2 seconds  Coloration: Skin is not jaundiced  Comments: Stage V sacral wound    Neurological:      General: No focal deficit present  Mental Status: He is alert and oriented to person, place, and time  Mental status is at baseline  Cranial Nerves: No cranial nerve deficit  Sensory: No sensory deficit  Motor: No weakness  Gait: Gait normal    Psychiatric:         Mood and Affect: Mood normal          Thought Content:  Thought content normal          Judgment: Judgment normal          Invasive Devices     Peripheral Intravenous Line  Duration           Peripheral IV 04/16/23 Right;Ventral (anterior) Forearm <1 day          Drain  Duration           Suprapubic Catheter 3 days              Lab Results: Results: I have personally reviewed all pertinent laboratory/tests results        Code Status: Level 3 - DNAR and DNI  Advance Directive and Living Will:      Power of :    POLST:    I have spent 30 minutes with Patient and family today in which greater than 50% of this time was spent in counseling/coordination of care regarding Patient and family education, Counseling / Coordination of care, Documenting in the medical record, Reviewing / ordering tests, medicine, procedures  , Obtaining or reviewing history   and Communicating with other healthcare professionals

## 2023-04-17 NOTE — ASSESSMENT & PLAN NOTE
No results found for: HGBA1C    Recent Labs     04/16/23  0714 04/16/23  1047 04/16/23  1601 04/16/23  2041   POCGLU 115 204* 247* 225*       Blood Sugar Average: Last 72 hrs:  (P) 425 [id number]     -[facility] for glucose control  -Hold home saxagliptin

## 2023-04-17 NOTE — ASSESSMENT & PLAN NOTE
-Nondisplaced fracture of the medial aspect of the right 12th rib at the costovertebral junction  - Most likely chronic   Nontender on exam

## 2023-04-17 NOTE — PLAN OF CARE
Problem: PHYSICAL THERAPY ADULT  Goal: Performs mobility at highest level of function for planned discharge setting  See evaluation for individualized goals  Description: Treatment/Interventions: Functional transfer training, LE strengthening/ROM, Therapeutic exercise, Bed mobility, Gait training, Spoke to nursing  Equipment Recommended: Chaitanya Ramos       See flowsheet documentation for full assessment, interventions and recommendations  Outcome: Progressing  Note: Prognosis: Fair  Problem List: Decreased strength, Decreased endurance, Decreased mobility, Pain  Assessment: Pt seen for PT treatment session this date  Therapy session focused on bed mobility, functional transfers, and ambulation in order to improve overall mobility and independence  Pt requires Assist X 1 for bed mobility, STS transfers and ambulation using RW, limited 2* fatigue and endurance deficits  Pt required assist with hygiene care, PT focused on bed mobility and improved functional mobility   Pt was left in recliner at the end of PT session with all needs in reach  Pt would benefit from continued PT services while in hospital to address remaining limitations  The patient's AM-PAC Basic Mobility Inpatient Short Form Raw Score is 17  A Raw score of greater than 16 suggests the patient may benefit from discharge to home  Please also refer to the recommendation of the Physical Therapist for safe discharge planning  Pt does states that son lives with him and assists, post d/c rec is HHPT with increased caregiver support vs rehab pending level of care provided at home, will clarify with CM  Pt will also need stair trial if home plan prior to dc  PT Discharge Recommendation:  (HHPT with increased caregiver support vs rehab pending level of care provided at home)    See flowsheet documentation for full assessment

## 2023-04-17 NOTE — PROGRESS NOTES
Progress Note - Infectious Disease   Bernie Bucio 80 y o  male MRN: [id number]  Unit/Bed#: [facility] 818-01 Encounter: [id number]    Impression/Plan:  1  SIRS   Present on admission  Tachycardia and leukocytosis   Possibly secondary to sepsis from an infected sacral decubitus ulcer   Possibly another source of sepsis  Blood and urine cultures are pending  Possibly a noninfectious etiology such as the bladder rupture   Fortunately the patient remains hemodynamically stable  This morning he is afebrile and his WBC count has normalized  -antibiotic as below  -monitor CBCD and BMP  -follow up blood cultures  -follow up urine culture  -monitor vitals  -supportive care     2   Infected sacral decubitus ulcer   With malodorous drainage and initially necrotic tissue  He is now s/p bedside debridement 4/15/2023  No cultures sent  Suspect wound is likely polymicrobial  He has been started on IV Zosyn which he is tolerating without difficulty  I will continue this antibiotic for now   -continue IV Zosyn  -monitor CBCD and CMP  -serial sacral exams  -frequent turning/repositioning to off-load pressure  -wait decision about surgical debridement versus palliative/comfort care  -local wound care per surgery team  -continue follow up with surgery      3   Bladder rupture   In a patient with previous prostate cancer with prostatectomy, scrotal exploration and explantation of eroded artificial urinary sphincter  Suprapubic catheter placed  Urine culture is preliminarily showing GNR  -follow up urine culture  -monitor urine output  -serial exams and care of SPC     4   Myasthenia gravis   Patient does not appear to be on any treatment currently      5   Ambulatory dysfunction  With multiple falls at home  Above plan was discussed in detail with patient at the bedside  Above plan was discussed in detail with primary service  Antibiotics:  Zosyn 3  Antibiotics 4    Subjective:  Patient reports he's fine today   He denies pain in the sacrum  He denies headaches, dizziness, neck stiffness  He has no fever, chills, sweats, shakes; no nausea, vomiting, abdominal pain; no diarrhea; no cough, shortness of breath, or chest pain  He has no concerns with his suprapubic tube  No new symptoms  Objective:  Vitals:  Temp:  [97 7 °F (36 5 °C)-98 4 °F (36 9 °C)] 97 7 °F (36 5 °C)  HR:  [70-73] 70  Resp:  [18] 18  BP: (111-135)/(62-74) 135/62  SpO2:  [97 %-98 %] 97 %  Temp (24hrs), Av 1 °F (36 7 °C), Min:97 7 °F (36 5 °C), Max:98 4 °F (36 9 °C)  Current: Temperature: 97 7 °F (36 5 °C)    Physical Exam:   General Appearance:  Alert, interactive, nontoxic, no acute distress  Extremely hard of hearing  Throat: Oropharynx moist without lesions  Lungs:   Clear to auscultation bilaterally; no wheezes, rhonchi or rales; respirations unlabored on room air  Heart:  RRR; no murmur, rub or gallop  Abdomen:   Soft, non-tender, non-distended, positive bowel sounds  SPC intact  Extremities: No clubbing or cyanosis, no edema  Skin: No new rashes noted on exposed skin       Labs, Imaging, & Other studies:   All pertinent labs and imaging studies were personally reviewed  Results from last 7 days   Lab Units 2334 04/15/23  0438   WBC Thousand/uL 9 25 10 23* 10 49*   HEMOGLOBIN g/dL 12 4 11 5* 11 2*   PLATELETS Thousands/uL 332 311 315     Results from last 7 days   Lab Units 23  0534 04/15/23  0438 23  1837 23  1833   POTASSIUM mmol/L 3 9 4 0 3 1*   < >  --    CHLORIDE mmol/L 108 109* 106   < >  --    CO2 mmol/L 27 23 27   < >  --    CO2, I-STAT mmol/L  --   --   --   --  30   BUN mg/dL 8 8 12   < >  --    CREATININE mg/dL 0 94 0 76 0 73   < >  --    EGFR ml/min/1 73sq m 68 77 78   < >  --    GLUCOSE, ISTAT mg/dl  --   --   --   --  185*   CALCIUM mg/dL 8 6 8 2* 7 7*   < >  --    AST U/L  --  54* 52*  --   --    ALT U/L  --  51 53   < >  --    ALK PHOS U/L  --  80 90   < >  --     < > = values in this interval not displayed  Results from last 7 days   Lab Units 04/14/23  1913 04/14/23  1825   BLOOD CULTURE   --  No Growth at 48 hrs  No Growth at 48 hrs     URINE CULTURE  >100,000 cfu/ml Gram Negative Shahzad*  --      Results from last 7 days   Lab Units 04/14/23  1837   PROCALCITONIN ng/ml 0 33*

## 2023-04-17 NOTE — PHYSICAL THERAPY NOTE
PHYSICAL THERAPY NOTE          Patient Name: Erick [place]  WGBFE'K Date: 4/17/2023 04/17/23 1115   PT Last Visit   PT Visit Date 04/17/23   Note Type   Note Type Treatment   Pain Assessment   Pain Assessment Tool 0-10   Pain Score No Pain   Restrictions/Precautions   Weight Bearing Precautions Per Order No   Other Precautions Cognitive; Chair Alarm; Bed Alarm; Fall Risk;Pain   General   Chart Reviewed Yes   Cognition   Arousal/Participation Alert; Responsive   Attention Attends with cues to redirect   Following Commands Follows one step commands with increased time or repetition   Comments Pt is pleasant during session   Bed Mobility   Rolling R 4  Minimal assistance   Additional items Assist x 1; Increased time required; Bedrails   Rolling L 4  Minimal assistance   Additional items Assist x 1;Bedrails; Increased time required   Supine to Sit 4  Minimal assistance   Additional items Assist x 1;HOB elevated; Bedrails; Increased time required   Sit to Supine Unable to assess   Additional Comments Pt required asist fro hygiene care , performed rolling with assist X 1, then gets to EOB with assist X 1   Transfers   Sit to Stand 4  Minimal assistance   Additional items Assist x 1; Increased time required; Bedrails   Stand to Sit 4  Minimal assistance   Additional items Assist x 1; Increased time required;Verbal cues   Additional Comments with RW   Ambulation/Elevation   Gait pattern Forward Flexion; Wide CINDY   Gait Assistance 4  Minimal assist   Additional items Assist x 1;Verbal cues; Tactile cues   Assistive Device Rolling walker   Distance 75ft   Ambulation/Elevation Additional Comments Pt required constant cueing to complete task, pt is also very [place]   Balance   Static Sitting Fair +   Dynamic Sitting Fair   Static Standing Fair -   Dynamic Standing Poor +   Ambulatory Poor +   Activity Tolerance   Activity Tolerance Patient limited by fatigue   Nurse Made Aware RN cleared pt for therapy   Assessment   Prognosis Fair   Problem List Decreased strength;Decreased endurance;Decreased mobility;Pain   Assessment Pt seen for PT treatment session this date  Therapy session focused on bed mobility, functional transfers, and ambulation in order to improve overall mobility and independence  Pt requires Assist X 1 for bed mobility, STS transfers and ambulation using RW, limited 2* fatigue and endurance deficits  Pt required assist with hygiene care, PT focused on bed mobility and improved functional mobility   Pt was left in recliner at the end of PT session with all needs in reach  Pt would benefit from continued PT services while in hospital to address remaining limitations  The patient's AM-PAC Basic Mobility Inpatient Short Form Raw Score is 17  A Raw score of greater than 16 suggests the patient may benefit from discharge to home  Please also refer to the recommendation of the Physical Therapist for safe discharge planning  Pt does states that son lives with him and assists, post d/c rec is HHPT with increased caregiver support vs rehab pending level of care provided at home, will clarify with CM  Pt will also need stair trial if home plan prior to dc  Goals   STG Expiration Date 04/30/23   Plan   Treatment/Interventions Functional transfer training;LE strengthening/ROM; Therapeutic exercise; Bed mobility;Gait training;Spoke to nursing   Progress Progressing toward goals   PT Frequency 2-3x/wk   Recommendation   PT Discharge Recommendation   (HHPT with increased caregiver support vs rehab pending level of care provided at home)   Equipment Recommended Walker   AM-PAC Basic Mobility Inpatient   Turning in Flat Bed Without Bedrails 3   Lying on Back to Sitting on Edge of Flat Bed Without Bedrails 3   Moving Bed to Chair 3   Standing Up From Chair Using Arms 3   Walk in Room 3   Climb 3-5 Stairs With Railing 2   Basic Mobility Inpatient Raw Score 17   Basic Mobility Standardized Score 39 67   Highest Level Of Mobility   JH-HLM Goal 5: Stand one or more mins   JH-HLM Achieved 7: Walk 25 feet or more   Education   Education Provided Mobility training   Patient Reinforcement needed     Avis Ledesma PT DPT

## 2023-04-17 NOTE — CASE MANAGEMENT
Case Management Discharge Planning Note    Patient name Otilia Mcdermott  Location [address] [place] [address] [facility] [id number]  : 1927 Date 2023       Current Admission Date: 2023  Current Admission Diagnosis:Fall at home   Patient Active Problem List    Diagnosis Date Noted   • Moderate protein-calorie malnutrition ([address] ) 2023   • Bladder rupture 2023   • Fall at home 04/15/2023   • DM (diabetes mellitus) ([address] ) 04/15/2023   • Pressure ulcer of sacral region 04/15/2023   • Chronic gastritis 04/15/2023   • Myasthenia gravis ([address] ) 04/15/2023   • CKD (chronic kidney disease) 04/15/2023   • Chronic diastolic (congestive) heart failure ([address] ) 04/15/2023   • Atrial fibrillation ([address] ) 04/15/2023   • Suprapubic catheter ([facility] ) 04/15/2023   • Mixed hyperlipidemia 04/15/2023   • Hypothyroidism 04/15/2023   • CAD (coronary artery disease) 04/15/2023   • Rib fracture 04/15/2023   • Anxiety 04/15/2023      LOS (days): 3  Geometric Mean LOS (GMLOS) (days): 4 40  Days to GMLOS:1 6     OBJECTIVE:  Risk of Unplanned Readmission Score: 13 76         Current admission status: Inpatient   Preferred Pharmacy:   [address]  [facility]  [address] [address]  Phone: [phone] Fax: [phone]    Primary Care Provider: Dipak Garcia MD    Primary Insurance: [facility]  Secondary Insurance:     DISCHARGE DETAILS:          [facility] reserved in [place] - per provider, patient does not want hospice at this time  CM TO PROVIDE PATIENT WITH ADDITIONAL PRIVATE PAY CAREGIVER RESOURCES

## 2023-04-17 NOTE — ASSESSMENT & PLAN NOTE
Lab Results   Component Value Date    EGFR 77 04/16/2023    EGFR 78 04/15/2023    EGFR 64 04/14/2023    CREATININE 0 76 04/16/2023    CREATININE 0 73 04/15/2023    CREATININE 0 99 04/14/2023     -Creatinine at baseline 0 73 from 0 99  -Trend daily BMP  -Continue home lasix  -Avoid hypotension and nephrotoxic agents

## 2023-04-17 NOTE — QUICK NOTE
60 minute conversation with son concerning patient care, prognosis, and discharge options including but not limited to: home health aid, facility long term placement, respite care, home hospice  Discussed eliquis risks and benefits, which family would like to continue  3 children, sarkis, anne, maureen; unknown living will, POA, POLST, asked to bring in copies if documents exist  Riky Koo and family to review POLST online to prep for family meeting/discussion    Family meeting to discuss QOL and dispo planning later in the week  Answered all questions to satisfaction  Left Tony Barth with physical list of things to do/think about   Contacted case management in regards to giving patient son home health aid companies/pricing, possible facilities, and other financial waivers

## 2023-04-17 NOTE — DISCHARGE INSTR - OTHER ORDERS
Skin care plans:  1Allevyn foam to bilateral heels nikita with a P and date peel and check skin integrity every shift change every 3 days   2  Sacral wound cleanse with  Soap and water then dakin's moistened kerlis to sacral wound then cover with a ABD or allevyn foam 2 times per day   Per surgery   3-Elevate heels to offload pressure  4-Ehob cushion when out of bed  5-Turn/repoisiton q2h or when medically stable for pressure re-distribution on skin  6-Moisturize skin daily with skin nourishing cream  7   P- 500 low air loss mattress

## 2023-04-17 NOTE — PLAN OF CARE
Problem: PAIN - ADULT  Goal: Verbalizes/displays adequate comfort level or baseline comfort level  Description: Interventions:  - Encourage patient to monitor pain and request assistance  - Assess pain using appropriate pain scale  - Administer analgesics based on type and severity of pain and evaluate response  - Implement non-pharmacological measures as appropriate and evaluate response  - Consider cultural and social influences on pain and pain management  - Notify physician/advanced practitioner if interventions unsuccessful or patient reports new pain  Outcome: Progressing     Problem: INFECTION - ADULT  Goal: Absence or prevention of progression during hospitalization  Description: INTERVENTIONS:  - Assess and monitor for signs and symptoms of infection  - Monitor lab/diagnostic results  - Monitor all insertion sites, i e  indwelling lines, tubes, and drains  - Monitor endotracheal if appropriate and nasal secretions for changes in amount and color  - [place] appropriate cooling/warming therapies per order  - Administer medications as ordered  - Instruct and encourage patient and family to use good hand hygiene technique  - Identify and instruct in appropriate isolation precautions for identified infection/condition  Outcome: Progressing     Problem: Nutrition/Hydration-ADULT  Goal: Nutrient/Hydration intake appropriate for improving, restoring or maintaining nutritional needs  Description: Monitor and assess patient's nutrition/hydration status for malnutrition  Collaborate with interdisciplinary team and initiate plan and interventions as ordered  Monitor patient's weight and dietary intake as ordered or per policy  Utilize nutrition screening tool and intervene as necessary  Determine patient's food preferences and provide high-protein, high-caloric foods as appropriate       INTERVENTIONS:  - Monitor oral intake, urinary output, labs, and treatment plans  - Assess nutrition and hydration status and recommend course of action  - Evaluate amount of meals eaten  - Assist patient with eating if necessary   - Allow adequate time for meals  - Recommend/ encourage appropriate diets, oral nutritional supplements, and vitamin/mineral supplements  - Order, calculate, and assess calorie counts as needed  - Recommend, monitor, and adjust tube feedings and TPN/PPN based on assessed needs  - Assess need for intravenous fluids  - Provide specific nutrition/hydration education as appropriate  - Include patient/family/caregiver in decisions related to nutrition  Outcome: Progressing     Problem: METABOLIC, FLUID AND ELECTROLYTES - ADULT  Goal: Electrolytes maintained within normal limits  Description: INTERVENTIONS:  - Monitor labs and assess patient for signs and symptoms of electrolyte imbalances  - Administer electrolyte replacement as ordered  - Monitor response to electrolyte replacements, including repeat lab results as appropriate  - Instruct patient on fluid and nutrition as appropriate  Outcome: Progressing

## 2023-04-17 NOTE — QUICK NOTE
Attempted to call both daughter and son of patient to give update on plan of care and arrange for [address] meeting  NO answer, left voice mail messages for both children   Will attempt tomorrow

## 2023-04-17 NOTE — PROGRESS NOTES
INTERNAL MEDICINE RESIDENCY PROGRESS NOTE     Name: Sinai Valente   Age & Sex: 80 y o  male   MRN: [id number]  Unit/Bed#: [facility] 818-01   Encounter: [id number]  Team: SOD Team C     PATIENT INFORMATION     Name: Sinai Valente   Age & Sex: 80 y o  male   MRN: [id number]  Hospital Stay Days: 3    ASSESSMENT/PLAN     Principal Problem:    Fall at home  Active Problems:    Pressure ulcer of sacral region    CKD (chronic kidney disease)    DM (diabetes mellitus) (HCC)    Chronic gastritis    Myasthenia gravis (HCC)    Chronic diastolic (congestive) heart failure (HCC)    Atrial fibrillation (HCC)    Suprapubic catheter ([address] )    Mixed hyperlipidemia    Hypothyroidism    CAD (coronary artery disease)    Rib fracture    Anxiety    Bladder rupture      * Fall at home  [address] [place] [address] presenting after an unwitnessed fall hitting the ground complicated by rib fracture  Patient's son reports previous similar unwitnessed fall 1 week prior  Patient at baseline is a poor historian and unable to describe events leading to the fall  Occurred while patient was anticoagulated with Eliquis; given Kcentra to reverse Eliquis  Hemoglobin stable with no signs of ecchymosis or signs of bleeding  Suspect cause of fall may be secondary to sepsis in elderly patient      Plan:  Goals of care discussion with family  PT OT consult, appreciate recs  We will discuss with family the risks of continuing anticoagulation with history of recurrent falls    Pressure ulcer of sacral region  Assessment & Plan  History of chronic sacral pressure ulcer  Noted to have stage IV sacral decubitus ulcer with significant necrotic tissue at the wound base  Sacral wound was debrided roughly 1 week ago by wound care  Status post bedside ED debridement of sacral wound  Given 1 dose of cefepime in the ED    Plan:   We will start Zosyn 4 5 g every 6hr    ID on board, appreciate recs  Wound care    CKD (chronic kidney disease)  Assessment & Plan  Lab Results   Component Value Date    EGFR 68 04/17/2023    EGFR 77 04/16/2023    EGFR 78 04/15/2023    CREATININE 0 94 04/17/2023    CREATININE 0 76 04/16/2023    CREATININE 0 73 04/15/2023     History of CKD currently at baseline kidney    Plan: We will continue to trend creatinine and avoid nephrotoxic agents and maintain adequate perfusion  Home 20 mg Lasix diuretics regimen    Bladder rupture  Assessment & Plan  uro on board, appreciate recs   -Conservative measures with continued catheter drainage    Given this appears to be an extraperitoneal process surgical intervention is not immediately required   -Can consider repeat imaging in approximately 5 to 20 days to reevaluate the bladder for hopeful resolution of  extraperitoneal air    Anxiety  Assessment & Plan  History of anxiety managed with Prozac 40 mg daily at home; will continue regimen while inpatient  Consider d/c to avoid polypharmacy and SIADH in elderly vs consider switching to escitalopram     Rib fracture  Assessment & Plan  Displaced rib fracture of the right 12th rib secondary to fall    Plan:  Pain management with nonopioid analgesics and will escalate as needed  Encourage deep breathing  Symptomatic care with ice, heat, lidocaine patch, etc      CAD (coronary artery disease)  Assessment & Plan  History of coronary disease; will continue home pravastatin and Imdur    Hypothyroidism  Assessment & Plan  History of hypothyroidism managed with 88 mcg levothyroxine  Most recent TSH was 2 957    Plan  We will continue levothyroxine 88 mcg daily    Mixed hyperlipidemia  Assessment & Plan  History of hyperlipidemia managed with pravastatin 20 mg at home which she will continue while inpatient    Suprapubic catheter [facility])  Assessment & Plan  History of prostate cancer status post radical prostatectomy  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  CT scan of the abdomen which showed evidence of extraperitoneal air tracking around the right aspect of the bladder with a suprapubic catheter in place and no obvious signs of intra-peritoneal process; evaluated by urology with no issues    Plan: We will maintain suprapubic catheter and monitor for output and signs of infection    Atrial fibrillation (HCC)  Assessment & Plan  History chronic A-fib managed with amiodarone 200 mg daily  Anticoagulated with Eliquis 2 5 mg twice daily    Plan  Continue Eliquis and amiodarone at this time  HASBLED score 3 (medication pre-dispose to bleeding, age, and HTN)  Will discuss with patient and family the risks and benefits of anticoagulation versus no anticoagulation given patient's history of recurrent falls      Chronic diastolic (congestive) heart failure (HCC)  Assessment & Plan  Wt Readings from Last 3 Encounters:   04/14/23 61 7 kg (136 lb 0 4 oz)     Last echo in 12/12/2022 showed EF of 55% with grade 2 pseudonormal diastolic dysfunction  Patient takes Lasix 20 mg p o  Monday through Friday    Plan:  Lasix 20 mg p o  and titrate as needed to maintain goal net negative 1-2 liters  Daily I&O's  K>4, Mg >2 goal, replenish as needed  Consider fluid restriction  Consider cards consult to maximize GDMT   Limited BB use as naturally hovers around 60/bradycardia   Consider adding Ace-I/ARB, but will discuss risks of falls and orthostasis with family considering patients age  If acutely SOB, IV lasix and STAT CXR        [facility] )  Assessment & Plan  History of myasthenia gravis with no acute complaints    Plan:   We will continue home pyridostigmine    Chronic gastritis  Assessment & Plan  History of chronic gastritis managed with 40 mg twice daily; will continue regimen while inpatient    DM (diabetes mellitus) ([facility] )  Assessment & Plan  History of non-insulin-dependent diabetes mellitus  Last A1c in December of 7 2  Controlled with with saxagliptin 2 5 mg daily    Plan:   carb controlled diet   start correctional insulin to determine basal bolus regimen, titrate as needed with goal 140-180 in hospital  Hold oral home agents        Disposition: Plan for family meeting and discharge home with home health  SUBJECTIVE     Patient seen and examined  No acute events overnight  He is hard of hearing at baseline dementia A&O x1    OBJECTIVE     Vitals:    23 0717 23 1522 23 2222 23 0818   BP: 150/90 111/74 135/62 156/79   Pulse: 69 73 70 70   Resp:  18 18 16   Temp: 98 4 °F (36 9 °C) 98 4 °F (36 9 °C) 97 7 °F (36 5 °C) 98 1 °F (36 7 °C)   TempSrc:       SpO2: 97% 98% 97% 97%   Weight:       Height:          Temperature:   Temp (24hrs), Av 1 °F (36 7 °C), Min:97 7 °F (36 5 °C), Max:98 4 °F (36 9 °C)    Temperature: 98 1 °F (36 7 °C)  Intake & Output:  I/O       04/15 0701   0700  0701   0700  0701   0700    P  O   100     I V  (mL/kg) 822 5 (13 3)      IV Piggyback 100 200     Total Intake(mL/kg) 922 5 (15) 300 (4 9)     Urine (mL/kg/hr) 1500 (1) 1200 (0 8)     Stool 0 0     Total Output 1500 1200     Net -577 5 -900            Unmeasured Stool Occurrence 2 x 2 x         Weights:   IBW (Ideal Body Weight): 70 7 kg    Body mass index is 20 09 kg/m²  Weight (last 2 days)     None        Physical Exam  Constitutional:       General: He is not in acute distress  Appearance: He is not ill-appearing or toxic-appearing  HENT:      Head: Normocephalic and atraumatic  Cardiovascular:      Rate and Rhythm: Normal rate  Heart sounds: No murmur heard  No gallop  Pulmonary:      Effort: No respiratory distress  Breath sounds: No wheezing  Abdominal:      General: Abdomen is flat  There is no distension  Tenderness: There is no abdominal tenderness  There is no guarding  Musculoskeletal:      Right lower leg: No edema  Left lower leg: No edema  Neurological:      Mental Status: He is alert  Comments: Hard of hearing AO x1       LABORATORY DATA     Labs:  I have personally reviewed pertinent reports  Results from last 7 days   Lab Units 04/17/23  0428 04/16/23  0534 04/15/23  0438   WBC Thousand/uL 9 25 10 23* 10 49*   HEMOGLOBIN g/dL 12 4 11 5* 11 2*   HEMATOCRIT % 39 3 36 0* 35 0*   PLATELETS Thousands/uL 332 311 315   NEUTROS PCT % 80* 82* 82*   MONOS PCT % 8 8 9      Results from last 7 days   Lab Units 04/17/23  0428 04/16/23  0534 04/15/23  0438 04/14/23  1837 04/14/23  1833   POTASSIUM mmol/L 3 9 4 0 3 1*   < >  --    CHLORIDE mmol/L 108 109* 106   < >  --    CO2 mmol/L 27 23 27   < >  --    CO2, I-STAT mmol/L  --   --   --   --  30   BUN mg/dL 8 8 12   < >  --    CREATININE mg/dL 0 94 0 76 0 73   < >  --    CALCIUM mg/dL 8 6 8 2* 7 7*   < >  --    ALK PHOS U/L  --  80 90  --   --    ALT U/L  --  51 53  --   --    AST U/L  --  54* 52*  --   --    GLUCOSE, ISTAT mg/dl  --   --   --   --  185*    < > = values in this interval not displayed  Results from last 7 days   Lab Units 04/14/23  1837   INR  1 36*     Results from last 7 days   Lab Units 04/14/23  1837   LACTIC ACID mmol/L 1 5           IMAGING & DIAGNOSTIC TESTING     Radiology Results: I have personally reviewed pertinent reports  TRAUMA - CT head wo contrast    Result Date: 4/14/2023  Impression: No acute intracranial hemorrhage  Chronic microangiopathic changes  Left sphenoid sinusitis and right otomastoiditis  I personally discussed this study with Shreya Castano on 4/14/2023 at 7:06 PM  Workstation performed: [id number]     TRAUMA - CT spine cervical wo contrast    Result Date: 4/14/2023  Impression: No acute fracture or dislocation  I personally discussed this study with Shreya Castano on 4/14/2023 at 7:06 PM   Workstation performed: [id number]     XR chest 1 view    Result Date: 4/17/2023  Impression: No acute cardiopulmonary disease within limitations of supine imaging   Workstation performed: [id number]     TRAUMA - CT chest abdomen pelvis w contrast    Result Date: 4/14/2023  Impression: Right anterior bladder perforation /rupture with extraperitoneal air  Urology/surgical consult  Nondisplaced fracture of the medial aspect of the right 12th rib at the costovertebral junction  I personally discussed this study with Aby San on 4/14/2023 at 7:06 PM  Workstation performed: [id number]     XR Trauma multiple ([facility] trauma bay ONLY)    Result Date: 4/14/2023  Impression: No acute cardiopulmonary disease within limitations of supine imaging  Workstation performed: [id number]     Other Diagnostic Testing: I have personally reviewed pertinent reports  ACTIVE MEDICATIONS     Current Facility-Administered Medications   Medication Dose Route Frequency   • acetaminophen (TYLENOL) tablet 650 mg  650 mg Oral Q6H PRN   • amiodarone tablet 200 mg  200 mg Oral Daily   • apixaban (ELIQUIS) tablet 2 5 mg  2 5 mg Oral BID   • cholecalciferol (VITAMIN D3) tablet 2,000 Units  2,000 Units Oral Daily   • dicyclomine (BENTYL) tablet 20 mg  20 mg Oral BID PRN   • famotidine (PEPCID) tablet 20 mg  20 mg Oral BID   • FLUoxetine (PROzac) capsule 40 mg  40 mg Oral Daily   • furosemide (LASIX) tablet 20 mg  20 mg Oral Daily   • insulin lispro (HumaLOG) 100 units/mL subcutaneous injection 1-5 Units  1-5 Units Subcutaneous TID AC   • insulin lispro (HumaLOG) 100 units/mL subcutaneous injection 1-5 Units  1-5 Units Subcutaneous HS   • isosorbide mononitrate (IMDUR) 24 hr tablet 60 mg  60 mg Oral Daily   • levothyroxine tablet 88 mcg  88 mcg Oral Daily   • oxyCODONE-acetaminophen (PERCOCET) 5-325 mg per tablet 1 tablet  1 tablet Oral Q4H PRN   • pantoprazole (PROTONIX) EC tablet 40 mg  40 mg Oral Daily   • piperacillin-tazobactam (ZOSYN) 4 5 g in sodium chloride 0 9 % 100 mL IVPB  4 5 g Intravenous Q6H   • pravastatin (PRAVACHOL) tablet 20 mg  20 mg Oral Daily   • pyridostigmine (MESTINON) tablet 60 mg  60 mg Oral TID   • sodium hypochlorite (DAKIN'S HALF-STRENGTH) 0 25 percent topical solution 1 application  1 application  "Irrigation Daily   • sucralfate (CARAFATE) tablet 1 g  1 g Oral 4x Daily PRN       VTE Pharmacologic Prophylaxis: Reason for no pharmacologic prophylaxis Anticoagulated with Eliquis  VTE Mechanical Prophylaxis: sequential compression device    Portions of the record may have been created with voice recognition software  Occasional wrong word or \"sound a like\" substitutions may have occurred due to the inherent limitations of voice recognition software    Read the chart carefully and recognize, using context, where substitutions have occurred   ==  Clarissa Atkins, [facility]  Internal Medicine Residency PGY-1     "

## 2023-04-17 NOTE — WOUND OSTOMY CARE
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 , stage 4 ,and  stage 3  He has a suprapubic catheter  Sacral wound is being followed by surgery   Assessment Findings:   1  Bilateral heels dry and intact   2  Sacral - stage 4 - POA full thickness  Wound with slough 40% at the base and 60% pale pink granular tissue   Undermining noted   Rolled chronic wound with maceration   Surgery debrided at the bedside last week  3  Right buttocks stage 3 - POA are full thickness wound bed   20% yellow and 80% pink with rolled edges noted   Skin care plans:  1Allevyn foam to bilateral heels nikita with a P and date peel and check skin integrity every shift change every 3 days   2  Sacral wound cleanse with  Soap and water then dakin's moistened kerlis to sacral wound then cover with a ABD or allevyn foam 2 times per day   Per surgery   3-Elevate heels to offload pressure  4-Ehob cushion when out of bed  5-Turn/repoisiton q2h or when medically stable for pressure re-distribution on skin  6-Moisturize skin daily with skin nourishing cream  7  P- 500 low air loss mattress           Vitals: Blood pressure 156/79, pulse 70, temperature 98 1 °F (36 7 °C), resp  rate 16, height 5' 9\" (1 753 m), weight 61 7 kg (136 lb 0 4 oz), SpO2 97 %  ,Body mass index is 20 09 kg/m²  Wound 04/15/23 Pressure Injury Buttocks (Active)   Wound Image   04/17/23 0853   Wound Description Fragile;Dusky;Slough; Yellow 04/17/23 0900   Pressure Injury Stage 4 04/17/23 0900   Randi-wound Assessment Fragile; Maceration 04/17/23 0853   Wound Length (cm) 5 cm 04/17/23 " 6539   Wound Width (cm) 3 5 cm 04/17/23 0853   Wound Depth (cm) 2 cm 04/17/23 0853   Wound Surface Area (cm^2) 17 5 cm^2 04/17/23 0853   Wound Volume (cm^3) 35 cm^3 04/17/23 0853   Calculated Wound Volume (cm^3) 35 cm^3 04/17/23 0853   Undermining 3 04/17/23 0854   Drainage Amount Small 04/17/23 0853   Drainage Description Serosanguineous 04/17/23 0853   Non-staged Wound Description Full thickness 04/17/23 0853   Treatments Cleansed;Site care;Irrigation with NSS 04/17/23 0853   Dressing Foam, Silicon (eg  Allevyn, etc) 04/17/23 0900   Wound packed? Yes 04/16/23 0756   Dressing Changed Changed 04/17/23 0853   Patient Tolerance Tolerated well 04/17/23 0853   Dressing Status Clean;Dry; Intact 04/17/23 0900       Wound 04/15/23 Pressure Injury Buttocks Right (Active)   Wound Image   04/17/23 0855   Wound Description Beefy red 04/17/23 0855   Pressure Injury Stage 3 04/17/23 0855   Randi-wound Assessment Clean;Fragile; Maceration 04/17/23 0855   Wound Length (cm) 1 cm 04/17/23 0855   Wound Width (cm) 0 8 cm 04/17/23 0855   Wound Depth (cm) 0 1 cm 04/17/23 0855   Wound Surface Area (cm^2) 0 8 cm^2 04/17/23 0855   Wound Volume (cm^3) 0 08 cm^3 04/17/23 0855   Calculated Wound Volume (cm^3) 0 08 cm^3 04/17/23 0855   Drainage Amount Small 04/17/23 0855   Drainage Description Serosanguineous 04/17/23 0855   Non-staged Wound Description Full thickness 04/17/23 0855   Treatments Cleansed;Site care;Irrigation with NSS 04/17/23 0855   Dressing Foam, Silicon (eg  Allevyn, etc) 04/17/23 0855   Dressing Changed Changed 04/17/23 0855   Patient Tolerance Tolerated well 04/17/23 0855       Wound care will sign off due to surgery following sacral wounds        Arthuro Infield RN BSN [facility]

## 2023-04-18 LAB
ANION GAP SERPL CALCULATED.3IONS-SCNC: 2 MMOL/L (ref 4–13)
BASOPHILS # BLD AUTO: 0.04 THOUSANDS/ΜL (ref 0–0.1)
BASOPHILS NFR BLD AUTO: 1 % (ref 0–1)
BUN SERPL-MCNC: 7 MG/DL (ref 5–25)
CALCIUM SERPL-MCNC: 7.9 MG/DL (ref 8.3–10.1)
CHLORIDE SERPL-SCNC: 109 MMOL/L (ref 96–108)
CO2 SERPL-SCNC: 27 MMOL/L (ref 21–32)
CREAT SERPL-MCNC: 0.92 MG/DL (ref 0.6–1.3)
EOSINOPHIL # BLD AUTO: 0.03 THOUSAND/ΜL (ref 0–0.61)
EOSINOPHIL NFR BLD AUTO: 0 % (ref 0–6)
ERYTHROCYTE [DISTWIDTH] IN BLOOD BY AUTOMATED COUNT: 16.9 % (ref 11.6–15.1)
GFR SERPL CREATININE-BSD FRML MDRD: 70 ML/MIN/1.73SQ M
GLUCOSE SERPL-MCNC: 165 MG/DL (ref 65–140)
GLUCOSE SERPL-MCNC: 169 MG/DL (ref 65–140)
GLUCOSE SERPL-MCNC: 223 MG/DL (ref 65–140)
GLUCOSE SERPL-MCNC: 230 MG/DL (ref 65–140)
GLUCOSE SERPL-MCNC: 231 MG/DL (ref 65–140)
GLUCOSE SERPL-MCNC: 314 MG/DL (ref 65–140)
HCT VFR BLD AUTO: 37.6 % (ref 36.5–49.3)
HGB BLD-MCNC: 12.3 G/DL (ref 12–17)
IMM GRANULOCYTES # BLD AUTO: 0.03 THOUSAND/UL (ref 0–0.2)
IMM GRANULOCYTES NFR BLD AUTO: 0 % (ref 0–2)
LYMPHOCYTES # BLD AUTO: 0.99 THOUSANDS/ΜL (ref 0.6–4.47)
LYMPHOCYTES NFR BLD AUTO: 12 % (ref 14–44)
MCH RBC QN AUTO: 27.2 PG (ref 26.8–34.3)
MCHC RBC AUTO-ENTMCNC: 32.7 G/DL (ref 31.4–37.4)
MCV RBC AUTO: 83 FL (ref 82–98)
MONOCYTES # BLD AUTO: 0.65 THOUSAND/ΜL (ref 0.17–1.22)
MONOCYTES NFR BLD AUTO: 8 % (ref 4–12)
NEUTROPHILS # BLD AUTO: 6.54 THOUSANDS/ΜL (ref 1.85–7.62)
NEUTS SEG NFR BLD AUTO: 79 % (ref 43–75)
NRBC BLD AUTO-RTO: 0 /100 WBCS
PLATELET # BLD AUTO: 303 THOUSANDS/UL (ref 149–390)
PMV BLD AUTO: 9 FL (ref 8.9–12.7)
POTASSIUM SERPL-SCNC: 3.7 MMOL/L (ref 3.5–5.3)
RBC # BLD AUTO: 4.52 MILLION/UL (ref 3.88–5.62)
SODIUM SERPL-SCNC: 138 MMOL/L (ref 135–147)
WBC # BLD AUTO: 8.28 THOUSAND/UL (ref 4.31–10.16)

## 2023-04-18 RX ADMIN — PIPERACILLIN SODIUM AND TAZOBACTAM SODIUM 4.5 G: 36; 4.5 INJECTION, POWDER, LYOPHILIZED, FOR SOLUTION INTRAVENOUS at 13:51

## 2023-04-18 RX ADMIN — FAMOTIDINE 20 MG: 20 TABLET, FILM COATED ORAL at 21:40

## 2023-04-18 RX ADMIN — AMIODARONE HYDROCHLORIDE 200 MG: 200 TABLET ORAL at 08:46

## 2023-04-18 RX ADMIN — PIPERACILLIN SODIUM AND TAZOBACTAM SODIUM 4.5 G: 36; 4.5 INJECTION, POWDER, LYOPHILIZED, FOR SOLUTION INTRAVENOUS at 02:34

## 2023-04-18 RX ADMIN — SODIUM HYPOCHLORITE 1 APPLICATION.: 2.5 SOLUTION TOPICAL at 11:24

## 2023-04-18 RX ADMIN — ISOSORBIDE MONONITRATE 60 MG: 60 TABLET, EXTENDED RELEASE ORAL at 08:46

## 2023-04-18 RX ADMIN — INSULIN LISPRO 2 UNITS: 100 INJECTION, SOLUTION INTRAVENOUS; SUBCUTANEOUS at 18:11

## 2023-04-18 RX ADMIN — FAMOTIDINE 20 MG: 20 TABLET, FILM COATED ORAL at 08:46

## 2023-04-18 RX ADMIN — INSULIN LISPRO 3 UNITS: 100 INJECTION, SOLUTION INTRAVENOUS; SUBCUTANEOUS at 21:41

## 2023-04-18 RX ADMIN — PYRIDOSTIGMINE BROMIDE 60 MG: 60 TABLET ORAL at 18:11

## 2023-04-18 RX ADMIN — PRAVASTATIN SODIUM 20 MG: 20 TABLET ORAL at 08:46

## 2023-04-18 RX ADMIN — APIXABAN 2.5 MG: 2.5 TABLET, FILM COATED ORAL at 18:11

## 2023-04-18 RX ADMIN — FUROSEMIDE 20 MG: 20 TABLET ORAL at 08:46

## 2023-04-18 RX ADMIN — INSULIN LISPRO 2 UNITS: 100 INJECTION, SOLUTION INTRAVENOUS; SUBCUTANEOUS at 11:22

## 2023-04-18 RX ADMIN — PYRIDOSTIGMINE BROMIDE 60 MG: 60 TABLET ORAL at 08:47

## 2023-04-18 RX ADMIN — INSULIN LISPRO 1 UNITS: 100 INJECTION, SOLUTION INTRAVENOUS; SUBCUTANEOUS at 08:51

## 2023-04-18 RX ADMIN — PANTOPRAZOLE SODIUM 40 MG: 40 TABLET, DELAYED RELEASE ORAL at 06:05

## 2023-04-18 RX ADMIN — PYRIDOSTIGMINE BROMIDE 60 MG: 60 TABLET ORAL at 21:40

## 2023-04-18 RX ADMIN — PIPERACILLIN SODIUM AND TAZOBACTAM SODIUM 4.5 G: 36; 4.5 INJECTION, POWDER, LYOPHILIZED, FOR SOLUTION INTRAVENOUS at 08:48

## 2023-04-18 RX ADMIN — SUCRALFATE 1 G: 1 TABLET ORAL at 22:36

## 2023-04-18 RX ADMIN — LEVOTHYROXINE SODIUM 88 MCG: 88 TABLET ORAL at 06:05

## 2023-04-18 RX ADMIN — APIXABAN 2.5 MG: 2.5 TABLET, FILM COATED ORAL at 08:46

## 2023-04-18 RX ADMIN — FLUOXETINE 40 MG: 20 CAPSULE ORAL at 08:46

## 2023-04-18 RX ADMIN — CHOLECALCIFEROL TAB 25 MCG (1000 UNIT) 2000 UNITS: 25 TAB at 08:46

## 2023-04-18 RX ADMIN — PIPERACILLIN SODIUM AND TAZOBACTAM SODIUM 4.5 G: 36; 4.5 INJECTION, POWDER, LYOPHILIZED, FOR SOLUTION INTRAVENOUS at 20:13

## 2023-04-18 RX ADMIN — ACETAMINOPHEN 650 MG: 325 TABLET ORAL at 18:11

## 2023-04-18 NOTE — CASE MANAGEMENT
Case Management Discharge Planning Note    Patient name Brendan Malagon  Location [address] 818/[facility] 239-33 MRN [id number]  : 1927 Date 2023       Current Admission Date: 2023  Current Admission Diagnosis:Fall at home   Patient Active Problem List    Diagnosis Date Noted   • Moderate protein-calorie malnutrition ([address] ) 2023   • Bladder rupture 2023   • Fall at home 04/15/2023   • DM (diabetes mellitus) ([facility] [address] ) 04/15/2023   • Pressure ulcer of sacral region 04/15/2023   • Chronic gastritis 04/15/2023   • Myasthenia gravis ([facility] [address] ) 04/15/2023   • CKD (chronic kidney disease) 04/15/2023   • Chronic diastolic (congestive) heart failure ([address] ) 04/15/2023   • Atrial fibrillation ([address] ) 04/15/2023   • Suprapubic catheter ([facility] [address] ) 04/15/2023   • Mixed hyperlipidemia 04/15/2023   • Hypothyroidism 04/15/2023   • CAD (coronary artery disease) 04/15/2023   • Rib fracture 04/15/2023   • Anxiety 04/15/2023      LOS (days): 4  Geometric Mean LOS (GMLOS) (days): 4 40  Days to GMLOS:0 8     OBJECTIVE:  Risk of Unplanned Readmission Score: 14 31         Current admission status: Inpatient   Preferred Pharmacy:   [place] [address] [facility] [address]  [address] [place] [address]  Phone: [phone] Fax: [phone]    Primary Care Provider: Rober Schofield MD    Primary Insurance: [facility] [employer]  Secondary Insurance:     DISCHARGE DETAILS:        CM attempted to s/w patient regarding resource list of private pay caregivers and SNF 's  Patient states he does not understand and to call [employer]  S/w [employer], educated that a list of private pay caregivers and SNF resources would be placed in patient room  Oneil rawls

## 2023-04-18 NOTE — PROGRESS NOTES
INTERNAL MEDICINE RESIDENCY PROGRESS NOTE     Name: Syl Callahan   Age & Sex: 80 y o  male   MRN: [id number]  [facility]   Encounter: [id number]  Team: SOD Team C     PATIENT INFORMATION     Name: Syl Callahan   Age & Sex: 80 y o  male   MRN: [id number]  Hospital Stay Days: 4    ASSESSMENT/PLAN     Principal Problem:    Fall at home  Active Problems:    Pressure ulcer of sacral region    CKD (chronic kidney disease)    DM (diabetes mellitus) (HCC)    Chronic gastritis    Myasthenia gravis (HCC)    Chronic diastolic (congestive) heart failure (HCC)    Atrial fibrillation (HCC)    Suprapubic catheter ([address] )    Mixed hyperlipidemia    Hypothyroidism    CAD (coronary artery disease)    Rib fracture    Anxiety    Bladder rupture    Moderate protein-calorie malnutrition ([address] )      * Fall at home  Assessment & Plan  Reports presenting after an unwitnessed fall hitting the ground complicated by rib fracture  Patient's son reports previous similar unwitnessed fall 1 week prior  Patient at baseline is a poor historian and unable to describe events leading to the fall  Occurred while patient was anticoagulated with Eliquis; given Kcentra to reverse Eliquis  Hemoglobin stable with no signs of ecchymosis or signs of bleeding  Suspect cause of fall may be secondary to sepsis in elderly patient      Plan:  Goals of care discussion with family  PT OT consult, appreciate recs  We will discuss with family the risks of continuing anticoagulation with history of recurrent falls    Pressure ulcer of sacral region  Assessment & Plan  History of chronic sacral pressure ulcer  Noted to have stage IV sacral decubitus ulcer with significant necrotic tissue at the wound base  Sacral wound was debrided roughly 1 week ago by wound care  Status post bedside ED debridement of sacral wound  Given 1 dose of cefepime in the ED    Plan:   We will start Zosyn 4 5 g every 6hr    plan for 7 days of antibiotics for soft tissue coverage of the wound today is day 5  Wound care    CKD (chronic kidney disease)  Assessment & Plan  Lab Results   Component Value Date    EGFR 68 04/17/2023    EGFR 77 04/16/2023    EGFR 78 04/15/2023    CREATININE 0 94 04/17/2023    CREATININE 0 76 04/16/2023    CREATININE 0 73 04/15/2023     History of CKD currently at baseline kidney    Plan: We will continue to trend creatinine and avoid nephrotoxic agents and maintain adequate perfusion  Home 20 mg Lasix diuretics regimen    Bladder rupture  Assessment & Plan  uro on board, appreciate recs   -Conservative measures with continued catheter drainage    Given this appears to be an extraperitoneal process surgical intervention is not immediately required   -Can consider repeat imaging in approximately 5 to 20 days to reevaluate the bladder for hopeful resolution of  extraperitoneal air    Anxiety  Assessment & Plan  History of anxiety managed with Prozac 40 mg daily at home; will continue regimen while inpatient  Consider d/c to avoid polypharmacy and SIADH in elderly vs consider switching to escitalopram     Rib fracture  Assessment & Plan  Displaced rib fracture of the right 12th rib secondary to fall    Plan:  Pain management with nonopioid analgesics and will escalate as needed  Encourage deep breathing  Symptomatic care with ice, heat, lidocaine patch, etc      CAD (coronary artery disease)  Assessment & Plan  History of coronary disease; will continue home pravastatin and Imdur    Hypothyroidism  Assessment & Plan  History of hypothyroidism managed with 88 mcg levothyroxine  Most recent TSH was 2 957    Plan  We will continue levothyroxine 88 mcg daily    Mixed hyperlipidemia  Assessment & Plan  History of hyperlipidemia managed with pravastatin 20 mg at home which she will continue while inpatient    Suprapubic catheter [facility])  Assessment & Plan  History of prostate cancer status post radical prostatectomy  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  CT scan of the abdomen which showed evidence of extraperitoneal air tracking around the right aspect of the bladder with a suprapubic catheter in place and no obvious signs of intra-peritoneal process; evaluated by urology with no issues    Plan: We will maintain suprapubic catheter and monitor for output and signs of infection    Atrial fibrillation (HCC)  Assessment & Plan  History chronic A-fib managed with amiodarone 200 mg daily  Anticoagulated with Eliquis 2 5 mg twice daily    Plan  Continue Eliquis and amiodarone at this time  HASBLED score 3 (medication pre-dispose to bleeding, age, and HTN)  Will discuss with patient and family the risks and benefits of anticoagulation versus no anticoagulation given patient's history of recurrent falls      Chronic diastolic (congestive) heart failure (HCC)  Assessment & Plan  Wt Readings from Last 3 Encounters:   04/14/23 61 7 kg (136 lb 0 4 oz)     Last echo in 12/12/2022 showed EF of 55% with grade 2 pseudonormal diastolic dysfunction  Patient takes Lasix 20 mg p o  Monday through Friday    Plan:  Lasix 20 mg p o  and titrate as needed to maintain goal net negative 1-2 liters  Daily I&O's  K>4, Mg >2 goal, replenish as needed  Consider fluid restriction  Consider cards consult to maximize GDMT   Limited BB use as naturally hovers around 60/bradycardia   Consider adding Ace-I/ARB, but will discuss risks of falls and orthostasis with family considering patients age  If acutely SOB, IV lasix and STAT CXR        Myasthenia gravis ([facility] [address] )  Assessment & Plan  History of myasthenia gravis with no acute complaints    Plan:   We will continue home pyridostigmine    Chronic gastritis  Assessment & Plan  History of chronic gastritis managed with 40 mg twice daily; will continue regimen while inpatient    DM (diabetes mellitus) ([facility] [address] )  Assessment & Plan  History of non-insulin-dependent diabetes mellitus  Last A1c in December of 7 2  Controlled with with saxagliptin 2 5 mg daily    Plan:   carb controlled diet   start correctional insulin to determine basal bolus regimen, titrate as needed with goal 140-180 in hospital  Hold oral home agents        Disposition: Awaiting goals of care discussion with family and continued antibiotic treatment    SUBJECTIVE     Patient seen and examined  No acute events overnight  Patient baseline dementia alert and oriented x1 and unable to provide ROS  Denies any acute complaints  Denies any difficulty eating  OBJECTIVE     Vitals:    23 0818 23 1458 23 2222 23 0709   BP: 156/79 158/79 146/64 134/67   Pulse: 70  60 62   Resp:  18    Temp: 98 1 °F (36 7 °C) 98 1 °F (36 7 °C) 97 9 °F (36 6 °C)    TempSrc:       SpO2: 97%  97% 98%   Weight:       Height:          Temperature:   Temp (24hrs), Av °F (36 7 °C), Min:97 9 °F (36 6 °C), Max:98 1 °F (36 7 °C)    Temperature: 97 9 °F (36 6 °C)  Intake & Output:  I/O        0701   0700  0701   0700  0701   0700    P  O  100 360     I V  (mL/kg)       IV Piggyback 200 100     Total Intake(mL/kg) 300 (4 9) 460 (7 5)     Urine (mL/kg/hr) 1200 (0 8) 1000 (0 7)     Stool 0 0     Total Output 1200 1000     Net -900 -540            Unmeasured Stool Occurrence 2 x 4 x         Weights:   IBW (Ideal Body Weight): 70 7 kg    Body mass index is 20 09 kg/m²  Weight (last 2 days)     None        Physical Exam  Constitutional:       General: He is not in acute distress  Appearance: Normal appearance  He is not ill-appearing or toxic-appearing  HENT:      Head: Normocephalic and atraumatic  Cardiovascular:      Rate and Rhythm: Normal rate  Heart sounds: No murmur heard  No gallop  Pulmonary:      Effort: No respiratory distress  Breath sounds: No wheezing or rales  Abdominal:      General: Abdomen is flat  There is no distension  Tenderness: There is no abdominal tenderness  There is no guarding  Musculoskeletal:      Right lower leg: No edema  Left lower leg: No edema  Neurological:      Mental Status: He is alert  Comments: Alert and oriented x1   Psychiatric:         Mood and Affect: Mood normal          Behavior: Behavior normal        LABORATORY DATA     Labs: I have personally reviewed pertinent reports  Results from last 7 days   Lab Units 04/18/23 0417 04/17/23 0428 04/16/23  0534   WBC Thousand/uL 8 28 9 25 10 23*   HEMOGLOBIN g/dL 12 3 12 4 11 5*   HEMATOCRIT % 37 6 39 3 36 0*   PLATELETS Thousands/uL 303 332 311   NEUTROS PCT % 79* 80* 82*   MONOS PCT % 8 8 8      Results from last 7 days   Lab Units 04/18/23 0417 04/17/23 0428 04/16/23  0534 04/15/23  0438 04/14/23 1837 04/14/23 1833   POTASSIUM mmol/L 3 7 3 9 4 0 3 1*   < >  --    CHLORIDE mmol/L 109* 108 109* 106   < >  --    CO2 mmol/L 27 27 23 27   < >  --    CO2, I-STAT mmol/L  --   --   --   --   --  30   BUN mg/dL 7 8 8 12   < >  --    CREATININE mg/dL 0 92 0 94 0 76 0 73   < >  --    CALCIUM mg/dL 7 9* 8 6 8 2* 7 7*   < >  --    ALK PHOS U/L  --   --  80 90  --   --    ALT U/L  --   --  51 53  --   --    AST U/L  --   --  54* 52*  --   --    GLUCOSE, ISTAT mg/dl  --   --   --   --   --  185*    < > = values in this interval not displayed  Results from last 7 days   Lab Units 04/14/23 1837   INR  1 36*     Results from last 7 days   Lab Units 04/14/23 1837   LACTIC ACID mmol/L 1 5           IMAGING & DIAGNOSTIC TESTING     Radiology Results: I have personally reviewed pertinent reports  TRAUMA - CT head wo contrast    Result Date: 4/14/2023  Impression: No acute intracranial hemorrhage  Chronic microangiopathic changes  Left sphenoid sinusitis and right otomastoiditis  I personally discussed this study with Viktor Nicholas on 4/14/2023 at 7:06 PM  Workstation performed: [id number]     TRAUMA - CT spine cervical wo contrast    Result Date: 4/14/2023  Impression: No acute fracture or dislocation   I personally discussed this study with Yaya Vann on 4/14/2023 at 7:06 PM   Workstation performed: [id number]     XR chest 1 view    Result Date: 4/17/2023  Impression: No acute cardiopulmonary disease within limitations of supine imaging  Workstation performed: [id number]     TRAUMA - CT chest abdomen pelvis w contrast    Result Date: 4/14/2023  Impression: Right anterior bladder perforation /rupture with extraperitoneal air  Urology/surgical consult  Nondisplaced fracture of the medial aspect of the right 12th rib at the costovertebral junction  I personally discussed this study with Yaya Vann on 4/14/2023 at 7:06 PM  Workstation performed: [id number]     XR Trauma multiple (SLB/SLRA trauma bay ONLY)    Result Date: 4/14/2023  Impression: No acute cardiopulmonary disease within limitations of supine imaging  Workstation performed: [id number]     Other Diagnostic Testing: I have personally reviewed pertinent reports      ACTIVE MEDICATIONS     Current Facility-Administered Medications   Medication Dose Route Frequency   • acetaminophen (TYLENOL) tablet 650 mg  650 mg Oral Q6H PRN   • amiodarone tablet 200 mg  200 mg Oral Daily   • apixaban (ELIQUIS) tablet 2 5 mg  2 5 mg Oral BID   • cholecalciferol (VITAMIN D3) tablet 2,000 Units  2,000 Units Oral Daily   • dicyclomine (BENTYL) tablet 20 mg  20 mg Oral BID PRN   • famotidine (PEPCID) tablet 20 mg  20 mg Oral BID   • FLUoxetine (PROzac) capsule 40 mg  40 mg Oral Daily   • furosemide (LASIX) tablet 20 mg  20 mg Oral Daily   • insulin lispro (HumaLOG) 100 units/mL subcutaneous injection 1-5 Units  1-5 Units Subcutaneous TID AC   • insulin lispro (HumaLOG) 100 units/mL subcutaneous injection 1-5 Units  1-5 Units Subcutaneous HS   • isosorbide mononitrate (IMDUR) 24 hr tablet 60 mg  60 mg Oral Daily   • levothyroxine tablet 88 mcg  88 mcg Oral Daily   • oxyCODONE-acetaminophen (PERCOCET) 5-325 mg per tablet 1 tablet  1 tablet Oral Q4H PRN   • pantoprazole (PROTONIX) EC "tablet 40 mg  40 mg Oral Daily   • piperacillin-tazobactam (ZOSYN) 4 5 g in sodium chloride 0 9 % 100 mL IVPB  4 5 g Intravenous Q6H   • pravastatin (PRAVACHOL) tablet 20 mg  20 mg Oral Daily   • pyridostigmine (MESTINON) tablet 60 mg  60 mg Oral TID   • sodium hypochlorite (DAKIN'S HALF-STRENGTH) 0 25 percent topical solution 1 application  1 application  Irrigation Daily   • sucralfate (CARAFATE) tablet 1 g  1 g Oral 4x Daily PRN       VTE Pharmacologic Prophylaxis: Reason for no pharmacologic prophylaxis Anticoagulated on Eliquis  VTE Mechanical Prophylaxis: sequential compression device    Portions of the record may have been created with voice recognition software  Occasional wrong word or \"sound a like\" substitutions may have occurred due to the inherent limitations of voice recognition software    Read the chart carefully and recognize, using context, where substitutions have occurred   ==  Richmond Gonzalez, [address] [facility]  Internal Medicine Residency PGY-1     "

## 2023-04-18 NOTE — PLAN OF CARE
Problem: PAIN - ADULT  Goal: Verbalizes/displays adequate comfort level or baseline comfort level  Description: Interventions:  - Encourage patient to monitor pain and request assistance  - Assess pain using appropriate pain scale  - Administer analgesics based on type and severity of pain and evaluate response  - Implement non-pharmacological measures as appropriate and evaluate response  - Consider cultural and social influences on pain and pain management  - Notify physician/advanced practitioner if interventions unsuccessful or patient reports new pain  Outcome: Progressing     Problem: INFECTION - ADULT  Goal: Absence or prevention of progression during hospitalization  Description: INTERVENTIONS:  - Assess and monitor for signs and symptoms of infection  - Monitor lab/diagnostic results  - Monitor all insertion sites, i e  indwelling lines, tubes, and drains  - Monitor endotracheal if appropriate and nasal secretions for changes in amount and color  - [place] appropriate cooling/warming therapies per order  - Administer medications as ordered  - Instruct and encourage patient and family to use good hand hygiene technique  - Identify and instruct in appropriate isolation precautions for identified infection/condition  Outcome: Progressing     Problem: DISCHARGE PLANNING  Goal: Discharge to home or other facility with appropriate resources  Description: INTERVENTIONS:  - Identify barriers to discharge w/patient and caregiver  - Arrange for needed discharge resources and transportation as appropriate  - Identify discharge learning needs (meds, wound care, etc )  - Arrange for interpretive services to assist at discharge as needed  - Refer to Case Management Department for coordinating discharge planning if the patient needs post-hospital services based on physician/advanced practitioner order or complex needs related to functional status, cognitive ability, or social support system  Outcome: Progressing Problem: Knowledge Deficit  Goal: Patient/family/caregiver demonstrates understanding of disease process, treatment plan, medications, and discharge instructions  Description: Complete learning assessment and assess knowledge base  Interventions:  - Provide teaching at level of understanding  - Provide teaching via preferred learning methods  Outcome: Progressing     Problem: Nutrition/Hydration-ADULT  Goal: Nutrient/Hydration intake appropriate for improving, restoring or maintaining nutritional needs  Description: Monitor and assess patient's nutrition/hydration status for malnutrition  Collaborate with interdisciplinary team and initiate plan and interventions as ordered  Monitor patient's weight and dietary intake as ordered or per policy  Utilize nutrition screening tool and intervene as necessary  Determine patient's food preferences and provide high-protein, high-caloric foods as appropriate       INTERVENTIONS:  - Monitor oral intake, urinary output, labs, and treatment plans  - Assess nutrition and hydration status and recommend course of action  - Evaluate amount of meals eaten  - Assist patient with eating if necessary   - Allow adequate time for meals  - Recommend/ encourage appropriate diets, oral nutritional supplements, and vitamin/mineral supplements  - Order, calculate, and assess calorie counts as needed  - Recommend, monitor, and adjust tube feedings and TPN/PPN based on assessed needs  - Assess need for intravenous fluids  - Provide specific nutrition/hydration education as appropriate  - Include patient/family/caregiver in decisions related to nutrition  Outcome: Progressing     Problem: METABOLIC, FLUID AND ELECTROLYTES - ADULT  Goal: Electrolytes maintained within normal limits  Description: INTERVENTIONS:  - Monitor labs and assess patient for signs and symptoms of electrolyte imbalances  - Administer electrolyte replacement as ordered  - Monitor response to electrolyte replacements, including repeat lab results as appropriate  - Instruct patient on fluid and nutrition as appropriate  Outcome: Progressing     Problem: SKIN/TISSUE INTEGRITY - ADULT  Goal: Incision(s), wounds(s) or drain site(s) healing without S/S of infection  Description: INTERVENTIONS  - Assess and document dressing, incision, wound bed, drain sites and surrounding tissue    Outcome: Progressing     Problem: Prexisting or High Potential for Compromised Skin Integrity  Goal: Skin integrity is maintained or improved  Description: INTERVENTIONS:  - Identify patients at risk for skin breakdown  - Assess and monitor skin integrity  - Assess and monitor nutrition and hydration status  - Monitor labs   - Assess for incontinence   - Turn and reposition patient  - Assist with mobility/ambulation  - Relieve pressure over bony prominences  - Avoid friction and shearing  - Provide appropriate hygiene as needed including keeping skin clean and dry  - Evaluate need for skin moisturizer/barrier cream  - Collaborate with interdisciplinary team   - Patient/family teaching  - Consider wound care consult   Outcome: Progressing

## 2023-04-18 NOTE — PROGRESS NOTES
Progress Note - Infectious Disease   Dante Larsen 80 y o  male MRN: [id number]  Unit/Bed#: [facility] 818-01 Encounter: [id number]    Impression/Plan:  1  SIRS   Present on admission  Tachycardia and leukocytosis   Possibly secondary to sepsis from an infected sacral decubitus ulcer   Possibly another source of sepsis  Blood cultures are negative >72 hours  Urine culture with growth of GNR , wagnerab working on further information on GNR isolates  Possibly a noninfectious etiology such as the bladder rupture   Fortunately the patient remains hemodynamically stable  This morning he is afebrile and his WBC count has normalized  -antibiotic as below  -monitor CBCD and BMP  -follow up blood cultures  -follow up urine culture  -monitor vitals  -supportive care     2   Infected sacral decubitus ulcer   With malodorous drainage and initially necrotic tissue  He is now s/p bedside debridement 4/15/2023  No cultures sent  Suspect wound is likely polymicrobial  He has been started on IV Zosyn which he is tolerating without difficulty  I will continue this antibiotic for now   -continue IV Zosyn  -anticipate 7 days of antibiotics  -monitor CBCD and CMP  -serial sacral exams  -frequent turning/repositioning to off-load pressure  -wait decision about surgical debridement versus palliative/comfort care  -local wound care per surgery team  -continue follow up with surgery      3   Bladder rupture   In a patient with previous prostate cancer with prostatectomy, scrotal exploration and explantation of eroded artificial urinary sphincter  Suprapubic catheter placed  Urine culture showing 4 isolates of GNR, microlab working on further information on isolates  -follow up urine culture  -monitor urine output  -serial exams and care of SPC     4   Myasthenia gravis   Patient does not appear to be on any treatment currently      5   Ambulatory dysfunction  With multiple falls at home      Above plan was discussed in detail with patient at the bedside  Above plan was discussed in detail with primary service  Antibiotics:  Zosyn 4  Antibiotics 5    Subjective:  Patient reports he's doing well this morning, is ready for breakfast  He denies chills, sweats, shakes, nausea, vomiting, abdominal pain, diarrhea, cough, shortness of breath, or chest pain  He has no concerns with his SPC  He has no pain in his sacral/buttock wounds  No new symptoms  Objective:  Vitals:  Temp:  [97 9 °F (36 6 °C)-98 1 °F (36 7 °C)] 97 9 °F (36 6 °C)  HR:  [60-70] 60  Resp:  [16-18] 18  BP: (146-158)/(64-79) 146/64  SpO2:  [97 %] 97 %  Temp (24hrs), Av °F (36 7 °C), Min:97 9 °F (36 6 °C), Max:98 1 °F (36 7 °C)  Current: Temperature: 97 9 °F (36 6 °C)    Physical Exam:   General Appearance:  Alert, interactive with mild confusion, nontoxic, no acute distress  Hard of hearing  He appears comfortable laying on his R side in bed  Throat: Oropharynx moist without lesions  Lungs:   Clear to auscultation bilaterally; no wheezes, rhonchi or rales; respirations unlabored on room air  Heart:  RRR; no murmur, rub or gallop  Abdomen:   Soft, non-tender, non-distended, positive bowel sounds  Back: Sacrum and buttock dressings partial visible, intact without breakthrough drainage  Extremities: No clubbing or cyanosis, no edema  B/L heel foam intact  Skin: No new rashes noted on exposed skin       Labs, Imaging, & Other studies:   All pertinent labs and imaging studies were personally reviewed  Results from last 7 days   Lab Units 23  0534   WBC Thousand/uL 8 28 9 25 10 23*   HEMOGLOBIN g/dL 12 3 12 4 11 5*   PLATELETS Thousands/uL 303 332 311     Results from last 7 days   Lab Units 238 23  0534 04/15/23  0438 23  1837 23  1833   POTASSIUM mmol/L 3 7   < > 4 0 3 1*   < >  --    CHLORIDE mmol/L 109*   < > 109* 106   < >  --    CO2 mmol/L 27   < > 23 27   < >  --    CO2, I-STAT mmol/L  -- --   --   --   --  30   BUN mg/dL 7   < > 8 12   < >  --    CREATININE mg/dL 0 92   < > 0 76 0 73   < >  --    EGFR ml/min/1 73sq m 70   < > 77 78   < >  --    GLUCOSE, ISTAT mg/dl  --   --   --   --   --  185*   CALCIUM mg/dL 7 9*   < > 8 2* 7 7*   < >  --    AST U/L  --   --  54* 52*  --   --    ALT U/L  --   --  51 53   < >  --    ALK PHOS U/L  --   --  80 90   < >  --     < > = values in this interval not displayed  Results from last 7 days   Lab Units 04/14/23  1913 04/14/23  1825   BLOOD CULTURE   --  No Growth at 72 hrs  No Growth at 72 hrs     URINE CULTURE  >100,000 cfu/ml Gram Negative Shahzad*  --      Results from last 7 days   Lab Units 04/14/23  1837   PROCALCITONIN ng/ml 0 33*

## 2023-04-19 LAB
BACTERIA BLD CULT: NORMAL
BACTERIA BLD CULT: NORMAL
BACTERIA UR CULT: ABNORMAL
BACTERIA UR CULT: ABNORMAL
GLUCOSE SERPL-MCNC: 166 MG/DL (ref 65–140)
GLUCOSE SERPL-MCNC: 225 MG/DL (ref 65–140)
GLUCOSE SERPL-MCNC: 264 MG/DL (ref 65–140)
GLUCOSE SERPL-MCNC: 273 MG/DL (ref 65–140)

## 2023-04-19 RX ADMIN — PIPERACILLIN SODIUM AND TAZOBACTAM SODIUM 4.5 G: 36; 4.5 INJECTION, POWDER, LYOPHILIZED, FOR SOLUTION INTRAVENOUS at 16:16

## 2023-04-19 RX ADMIN — INSULIN LISPRO 3 UNITS: 100 INJECTION, SOLUTION INTRAVENOUS; SUBCUTANEOUS at 22:40

## 2023-04-19 RX ADMIN — PANTOPRAZOLE SODIUM 40 MG: 40 TABLET, DELAYED RELEASE ORAL at 06:17

## 2023-04-19 RX ADMIN — PYRIDOSTIGMINE BROMIDE 60 MG: 60 TABLET ORAL at 08:48

## 2023-04-19 RX ADMIN — ISOSORBIDE MONONITRATE 60 MG: 60 TABLET, EXTENDED RELEASE ORAL at 08:43

## 2023-04-19 RX ADMIN — PRAVASTATIN SODIUM 20 MG: 20 TABLET ORAL at 08:43

## 2023-04-19 RX ADMIN — PYRIDOSTIGMINE BROMIDE 60 MG: 60 TABLET ORAL at 17:32

## 2023-04-19 RX ADMIN — FAMOTIDINE 20 MG: 20 TABLET, FILM COATED ORAL at 08:43

## 2023-04-19 RX ADMIN — PIPERACILLIN SODIUM AND TAZOBACTAM SODIUM 4.5 G: 36; 4.5 INJECTION, POWDER, LYOPHILIZED, FOR SOLUTION INTRAVENOUS at 02:05

## 2023-04-19 RX ADMIN — INSULIN LISPRO 2 UNITS: 100 INJECTION, SOLUTION INTRAVENOUS; SUBCUTANEOUS at 12:01

## 2023-04-19 RX ADMIN — CHOLECALCIFEROL TAB 25 MCG (1000 UNIT) 2000 UNITS: 25 TAB at 08:43

## 2023-04-19 RX ADMIN — LEVOTHYROXINE SODIUM 88 MCG: 88 TABLET ORAL at 06:17

## 2023-04-19 RX ADMIN — INSULIN LISPRO 1 UNITS: 100 INJECTION, SOLUTION INTRAVENOUS; SUBCUTANEOUS at 08:49

## 2023-04-19 RX ADMIN — FUROSEMIDE 20 MG: 20 TABLET ORAL at 08:43

## 2023-04-19 RX ADMIN — SODIUM HYPOCHLORITE 1 APPLICATION.: 2.5 SOLUTION TOPICAL at 08:48

## 2023-04-19 RX ADMIN — PYRIDOSTIGMINE BROMIDE 60 MG: 60 TABLET ORAL at 22:40

## 2023-04-19 RX ADMIN — APIXABAN 2.5 MG: 2.5 TABLET, FILM COATED ORAL at 17:32

## 2023-04-19 RX ADMIN — APIXABAN 2.5 MG: 2.5 TABLET, FILM COATED ORAL at 08:43

## 2023-04-19 RX ADMIN — PIPERACILLIN SODIUM AND TAZOBACTAM SODIUM 4.5 G: 36; 4.5 INJECTION, POWDER, LYOPHILIZED, FOR SOLUTION INTRAVENOUS at 08:55

## 2023-04-19 RX ADMIN — FAMOTIDINE 20 MG: 20 TABLET, FILM COATED ORAL at 22:40

## 2023-04-19 RX ADMIN — PIPERACILLIN SODIUM AND TAZOBACTAM SODIUM 4.5 G: 36; 4.5 INJECTION, POWDER, LYOPHILIZED, FOR SOLUTION INTRAVENOUS at 22:40

## 2023-04-19 RX ADMIN — INSULIN LISPRO 2 UNITS: 100 INJECTION, SOLUTION INTRAVENOUS; SUBCUTANEOUS at 17:32

## 2023-04-19 RX ADMIN — AMIODARONE HYDROCHLORIDE 200 MG: 200 TABLET ORAL at 08:43

## 2023-04-19 RX ADMIN — FLUOXETINE 40 MG: 20 CAPSULE ORAL at 08:43

## 2023-04-19 NOTE — ACP (ADVANCE CARE PLANNING)
Advanced Care Planning Progress Note    Serious Illness Conversation    1  What is your understanding now of where you are with your illness? Prognostic Understanding: appropriate understanding of prognosis  All questions asked and collectively answered by children during family meeting 4/19 lasting from [phone]; final answer provided by anne, son and POA     2  How much information about what is likely to be ahead with your illness would you like to have? Family wants full information     3  What did you (clinician) communicate to the patient? Prognostic Communication: Uncertain - It can be difficult to predict what will happen with your illness  I hope you will continue to live well for a long time but I’m worried that you could get sick quickly, and I think it is important to prepare for that possibility  4  If your health situation worsens, what are your most important goals? Goals: have my medical decisions respected, be physically comfortable, be at home, provide support for family  As per advanced directive and previous conversations with children     5  What are the biggest fears and worries about the future and your health? Fears/Worries: being an emotional burden, loss of control, pain, being a physical burden, burdening others, getting treatments I do not want     6  What abilities are so critical to your life that you cannot imagine living without them? Unacceptable Function: being chronically confused or not being myself, being in pain or very uncomfortable, not being myself, being in chronic severe pain     7  What gives you strength as you think about the future with your illness? Strong support among children, open communication and discussion regularly     8  If you become sicker, how much are you willing to go through for the possibility of gaining more time?   Be in the hospital: No Have a feeding tube: No   Be in the ICU: No Live in a nursing home: No   Be on a ventilator: No Be uncomfortable: No   Be on dialysis: No Undergo aggressive test and/or procedures: No   9  How much does your proxy and family know about your priorities and wishes? Discussion Discussion: extensive discussion with family about goals and wishes     Tucker Sanon heard you say that being home and limiting pain is really important to you  Keeping that in mind, and what we know about your illness, I recommend that we send you home with home health, consult palliative to establish care, order a hospital bed, and look into private home health aids  We also will need to fill out DO NOT HOSPITALIZE before transport to ensure your wishes and family's concerns are met  This will help us make sure that your treatment plans reflect what’s important to you  How does this plan sound to you? I will do everything I can to help you through this       I have spent 65 minutes speaking with my patient on advanced care planning today or during this visit     Advanced directives  Does have living will and POA, scanned into chart  POA, son, Audra Goodpasture as per documentation  POLST in patient chart (physical)         Caridad Zuniga, DO

## 2023-04-19 NOTE — PROGRESS NOTES
Progress Note - Infectious Disease   Nitin Mckeon 80 y o  male MRN: [id number]  Unit/Bed#: [facility] 818-01 Encounter: [id number]    Impression/Plan:  1  SIRS   Present on admission  Tachycardia and leukocytosis   Possibly secondary to sepsis from an infected sacral decubitus ulcer   Possibly another source of sepsis  Blood cultures are negative >4 days  Urine culture with growth of GNR , microlab working on further information on GNR isolates  Possibly a noninfectious etiology such as the bladder rupture   Fortunately the patient remains hemodynamically stable  This morning he is afebrile  His WBC count has normalized  -antibiotic as below  -monitor CBCD and BMP  -follow up blood cultures  -follow up urine culture  -monitor vitals  -supportive care     2   Infected sacral decubitus ulcer   With malodorous drainage and initially necrotic tissue  He is now s/p bedside debridement 4/15/2023  No cultures sent  Suspect wound is likely polymicrobial  He has been started on IV Zosyn which he is tolerating without difficulty  I will continue this antibiotic for now   -continue IV Zosyn  -anticipate 7 days of antibiotics through 4/20/2023  -monitor CBCD and CMP  -serial sacral exams  -frequent turning/repositioning to off-load pressure  -wait decision about surgical debridement versus palliative/comfort care  -local wound care per surgery team  -continue follow up with surgery      3   Bladder rupture   In a patient with previous prostate cancer with prostatectomy, scrotal exploration and explantation of eroded artificial urinary sphincter  Suprapubic catheter placed  Urine culture showing 4 isolates of GNR, microlab working on further information on isolates  -follow up urine culture  -monitor urine output  -serial exams and care of SPC     4   Myasthenia gravis   Patient does not appear to be on any treatment currently      5   Ambulatory dysfunction  With multiple falls at home      Above plan was discussed in detail with patient at the bedside  Above plan was discussed in detail with primary service  Antibiotics:  Zosyn 6  Antibiotics 6    Subjective:  Patient reports he has some mild pain in the sacrum today, did not want to be repositioned as he told me he was comfortable laying towards his R side  He has no fever, chills, sweats, shakes; no nausea, vomiting, abdominal pain, diarrhea, or dysuria; no cough, shortness of breath, or chest pain  No new symptoms  Objective:  Vitals:  Temp:  [98 2 °F (36 8 °C)-98 4 °F (36 9 °C)] 98 4 °F (36 9 °C)  HR:  [61-72] 61  Resp:  [18] 18  BP: (121-134)/(55-67) 130/55  SpO2:  [97 %-98 %] 98 %  Temp (24hrs), Av 3 °F (36 8 °C), Min:98 2 °F (36 8 °C), Max:98 4 °F (36 9 °C)  Current: Temperature: 98 4 °F (36 9 °C)    Physical Exam:   General Appearance:  Alert, interactive but somewhat confused, nontoxic, no acute distress  He appears comfortable supine leaning towards his R  He is hard of hearing  Throat: Oropharynx moist without lesions  Lungs:   Clear to auscultation bilaterally; no wheezes, rhonchi or rales; respirations unlabored on room air  Heart:  RRR; no murmur, rub or gallop  Abdomen:   Soft, non-tender, non-distended, positive bowel sounds  SPC intact, urine output pale yellow without sediment  Extremities: No clubbing or cyanosis, no edema  Feet with protective foam dressings  Skin: No new rashesnoted on exposed skin       Labs, Imaging, & Other studies:   All pertinent labs and imaging studies were personally reviewed  Results from last 7 days   Lab Units 238 23  0534   WBC Thousand/uL 8 28 9 25 10 23*   HEMOGLOBIN g/dL 12 3 12 4 11 5*   PLATELETS Thousands/uL 303 332 311     Results from last 7 days   Lab Units 238 23  0534 04/15/23  0438 23  1837 23  1833   POTASSIUM mmol/L 3 7   < > 4 0 3 1*   < >  --    CHLORIDE mmol/L 109*   < > 109* 106   < >  --    CO2 mmol/L 27   < > 23 27 < >  --    CO2, I-STAT mmol/L  --   --   --   --   --  30   BUN mg/dL 7   < > 8 12   < >  --    CREATININE mg/dL 0 92   < > 0 76 0 73   < >  --    EGFR ml/min/1 73sq m 70   < > 77 78   < >  --    GLUCOSE, ISTAT mg/dl  --   --   --   --   --  185*   CALCIUM mg/dL 7 9*   < > 8 2* 7 7*   < >  --    AST U/L  --   --  54* 52*  --   --    ALT U/L  --   --  51 53   < >  --    ALK PHOS U/L  --   --  80 90   < >  --     < > = values in this interval not displayed  Results from last 7 days   Lab Units 04/14/23  1913 04/14/23  1825   BLOOD CULTURE   --  No Growth After 4 Days  No Growth After 4 Days     URINE CULTURE  >100,000 cfu/ml Gram Negative Shahzad*  --      Results from last 7 days   Lab Units 04/14/23  1837   PROCALCITONIN ng/ml 0 33*

## 2023-04-19 NOTE — PROGRESS NOTES
INTERNAL MEDICINE RESIDENCY PROGRESS NOTE     Name: Jatin Phillips   Age & Sex: 80 y o  male   MRN: [id number]  [facility]   Encounter: [id number]  Team: SOD Team C     PATIENT INFORMATION     Name: Jatin Phillips   Age & Sex: 80 y o  male   MRN: [id number]  Hospital Stay Days: 5    ASSESSMENT/PLAN     Principal Problem:    Fall at home  Active Problems:    Pressure ulcer of sacral region    CKD (chronic kidney disease)    DM (diabetes mellitus) (HCC)    Chronic gastritis    Myasthenia gravis (HCC)    Chronic diastolic (congestive) heart failure (HCC)    Atrial fibrillation (HCC)    Suprapubic catheter ([address] )    Mixed hyperlipidemia    Hypothyroidism    CAD (coronary artery disease)    Rib fracture    Anxiety    Bladder rupture    Moderate protein-calorie malnutrition ([address] )      * Fall at home  Assessment & Plan  Reports presenting after an unwitnessed fall hitting the ground complicated by rib fracture  Patient's son reports previous similar unwitnessed fall 1 week prior  Patient at baseline is a poor historian and unable to describe events leading to the fall  Occurred while patient was anticoagulated with Eliquis; given Kcentra to reverse Eliquis  Hemoglobin stable with no signs of ecchymosis or signs of bleeding  Suspect cause of fall may be secondary to sepsis in elderly patient      Plan:  Goals of care discussion with family  PT OT consult, appreciate recs  We will discuss with family the risks of continuing anticoagulation with history of recurrent falls    Pressure ulcer of sacral region  Assessment & Plan  History of chronic sacral pressure ulcer  Noted to have stage IV sacral decubitus ulcer with significant necrotic tissue at the wound base  Sacral wound was debrided roughly 1 week ago by wound care  Status post bedside ED debridement of sacral wound  Given 1 dose of cefepime in the ED    Plan:   We will start Zosyn 4 5 g every 6hr   plan for 7 days of antibiotics we will continue till 4/20/2023   wound care    CKD (chronic kidney disease)  Assessment & Plan  Lab Results   Component Value Date    EGFR 68 04/17/2023    EGFR 77 04/16/2023    EGFR 78 04/15/2023    CREATININE 0 94 04/17/2023    CREATININE 0 76 04/16/2023    CREATININE 0 73 04/15/2023     History of CKD currently at baseline kidney    Plan: We will continue to trend creatinine and avoid nephrotoxic agents and maintain adequate perfusion  Home 20 mg Lasix diuretics regimen    Bladder rupture  Assessment & Plan  uro on board, appreciate recs   -Conservative measures with continued catheter drainage    Given this appears to be an extraperitoneal process surgical intervention is not immediately required   -Can consider repeat imaging in approximately 5 to 20 days to reevaluate the bladder for hopeful resolution of  extraperitoneal air    Anxiety  Assessment & Plan  History of anxiety managed with Prozac 40 mg daily at home; will continue regimen while inpatient  Consider d/c to avoid polypharmacy and SIADH in elderly vs consider switching to escitalopram     Rib fracture  Assessment & Plan  Displaced rib fracture of the right 12th rib secondary to fall    Plan:  Pain management with nonopioid analgesics and will escalate as needed  Encourage deep breathing  Symptomatic care with ice, heat, lidocaine patch, etc      CAD (coronary artery disease)  Assessment & Plan  History of coronary disease; will continue home pravastatin and Imdur    Hypothyroidism  Assessment & Plan  History of hypothyroidism managed with 88 mcg levothyroxine  Most recent TSH was 2 957    Plan  We will continue levothyroxine 88 mcg daily    Mixed hyperlipidemia  Assessment & Plan  History of hyperlipidemia managed with pravastatin 20 mg at home which she will continue while inpatient    Suprapubic catheter [facility])  Assessment & Plan  History of prostate cancer status post radical prostatectomy  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  CT scan of the abdomen which showed evidence of extraperitoneal air tracking around the right aspect of the bladder with a suprapubic catheter in place and no obvious signs of intra-peritoneal process; evaluated by urology with no issues    Plan: We will maintain suprapubic catheter and monitor for output and signs of infection    Atrial fibrillation (HCC)  Assessment & Plan  History chronic A-fib managed with amiodarone 200 mg daily  Anticoagulated with Eliquis 2 5 mg twice daily    Plan  Continue Eliquis and amiodarone at this time  HASBLED score 3 (medication pre-dispose to bleeding, age, and HTN)  Will discuss with patient and family the risks and benefits of anticoagulation versus no anticoagulation given patient's history of recurrent falls      Chronic diastolic (congestive) heart failure (HCC)  Assessment & Plan  Wt Readings from Last 3 Encounters:   04/14/23 61 7 kg (136 lb 0 4 oz)     Last echo in 12/12/2022 showed EF of 55% with grade 2 pseudonormal diastolic dysfunction  Patient takes Lasix 20 mg p o  Monday through Friday    Plan:  Lasix 20 mg p o  and titrate as needed to maintain goal net negative 1-2 liters  Daily I&O's  K>4, Mg >2 goal, replenish as needed  Consider fluid restriction  Consider cards consult to maximize GDMT   Limited BB use as naturally hovers around 60/bradycardia   Consider adding Ace-I/ARB, but will discuss risks of falls and orthostasis with family considering patients age  If acutely SOB, IV lasix and STAT CXR        Myasthenia gravis ([facility] )  Assessment & Plan  History of myasthenia gravis with no acute complaints    Plan:   We will continue home pyridostigmine    Chronic gastritis  Assessment & Plan  History of chronic gastritis managed with 40 mg twice daily; will continue regimen while inpatient    DM (diabetes mellitus) ([facility] [address] )  Assessment & Plan  History of non-insulin-dependent diabetes mellitus  Last A1c in December of 7 2  Controlled with with saxagliptin 2 5 mg daily    Plan:   carb controlled diet   start correctional insulin to determine basal bolus regimen, titrate as needed with goal 140-180 in hospital  Hold oral home agents        Disposition: Discuss goals of care with patient family and continue antibiotics for 1 more day    SUBJECTIVE     Patient seen and examined  No acute events overnight  Patient has baseline dementia denies any symptoms today  Reports eating without difficulty  Review of Systems   Constitutional: Negative for chills and fever  Respiratory: Negative for shortness of breath and wheezing  Cardiovascular: Negative for chest pain  Gastrointestinal: Negative for abdominal distention  Genitourinary: Negative for difficulty urinating  OBJECTIVE     Vitals:    23 0709 23 1458 23 2212 23 0744   BP: 134/67 121/63 130/55 153/78   Pulse: 62 72 61 65   Resp:     Temp:  98 2 °F (36 8 °C) 98 4 °F (36 9 °C) 97 7 °F (36 5 °C)   TempSrc:       SpO2: 98% 97% 98% 98%   Weight:       Height:          Temperature:   Temp (24hrs), Av 1 °F (36 7 °C), Min:97 7 °F (36 5 °C), Max:98 4 °F (36 9 °C)    Temperature: 97 7 °F (36 5 °C)  Intake & Output:  I/O        0701   0700  0701   0700  0701   0700    P  O  360      IV Piggyback 100 400     Total Intake(mL/kg) 460 (7 5) 400 (6 5)     Urine (mL/kg/hr) 1000 (0 7) 975 (0 7)     Stool 0 0     Total Output 1000 975     Net -540 -575            Unmeasured Stool Occurrence 4 x 2 x         Weights:   IBW (Ideal Body Weight): 70 7 kg    Body mass index is 20 09 kg/m²  Weight (last 2 days)     None        Physical Exam  Constitutional:       General: He is not in acute distress  Appearance: Normal appearance  He is normal weight  He is not ill-appearing or toxic-appearing  HENT:      Head: Normocephalic and atraumatic  Cardiovascular:      Rate and Rhythm: Normal rate  Heart sounds: No murmur heard      No gallop  Pulmonary:      Effort: No respiratory distress  Breath sounds: No wheezing or rales  Abdominal:      General: Abdomen is flat  There is no distension  Neurological:      Mental Status: He is alert  LABORATORY DATA     Labs: I have personally reviewed pertinent reports  Results from last 7 days   Lab Units 04/18/23 0417 04/17/23 0428 04/16/23  0534   WBC Thousand/uL 8 28 9 25 10 23*   HEMOGLOBIN g/dL 12 3 12 4 11 5*   HEMATOCRIT % 37 6 39 3 36 0*   PLATELETS Thousands/uL 303 332 311   NEUTROS PCT % 79* 80* 82*   MONOS PCT % 8 8 8      Results from last 7 days   Lab Units 04/18/23 0417 04/17/23 0428 04/16/23  0534 04/15/23  0438 04/14/23 1837 04/14/23 1833   POTASSIUM mmol/L 3 7 3 9 4 0 3 1*   < >  --    CHLORIDE mmol/L 109* 108 109* 106   < >  --    CO2 mmol/L 27 27 23 27   < >  --    CO2, I-STAT mmol/L  --   --   --   --   --  30   BUN mg/dL 7 8 8 12   < >  --    CREATININE mg/dL 0 92 0 94 0 76 0 73   < >  --    CALCIUM mg/dL 7 9* 8 6 8 2* 7 7*   < >  --    ALK PHOS U/L  --   --  80 90  --   --    ALT U/L  --   --  51 53  --   --    AST U/L  --   --  54* 52*  --   --    GLUCOSE, ISTAT mg/dl  --   --   --   --   --  185*    < > = values in this interval not displayed  Results from last 7 days   Lab Units 04/14/23 1837   INR  1 36*     Results from last 7 days   Lab Units 04/14/23 1837   LACTIC ACID mmol/L 1 5           IMAGING & DIAGNOSTIC TESTING     Radiology Results: I have personally reviewed pertinent reports  TRAUMA - CT head wo contrast    Result Date: 4/14/2023  Impression: No acute intracranial hemorrhage  Chronic microangiopathic changes  Left sphenoid sinusitis and right otomastoiditis  I personally discussed this study with Yaya Vann on 4/14/2023 at 7:06 PM  Workstation performed: [id number]     TRAUMA - CT spine cervical wo contrast    Result Date: 4/14/2023  Impression: No acute fracture or dislocation   I personally discussed this study with [facility] STELLA on 4/14/2023 at 7:06 PM   Workstation performed: [id number]     XR chest 1 view    Result Date: 4/17/2023  Impression: No acute cardiopulmonary disease within limitations of supine imaging  Workstation performed: [id number]     TRAUMA - CT chest abdomen pelvis w contrast    Result Date: 4/14/2023  Impression: Right anterior bladder perforation /rupture with extraperitoneal air  Urology/surgical consult  Nondisplaced fracture of the medial aspect of the right 12th rib at the costovertebral junction  I personally discussed this study with Hari Ramires on 4/14/2023 at 7:06 PM  Workstation performed: [id number]     XR Trauma multiple (SLB/SLRA trauma bay ONLY)    Result Date: 4/14/2023  Impression: No acute cardiopulmonary disease within limitations of supine imaging  Workstation performed: [id number]     Other Diagnostic Testing: I have personally reviewed pertinent reports      ACTIVE MEDICATIONS     Current Facility-Administered Medications   Medication Dose Route Frequency   • acetaminophen (TYLENOL) tablet 650 mg  650 mg Oral Q6H PRN   • amiodarone tablet 200 mg  200 mg Oral Daily   • apixaban (ELIQUIS) tablet 2 5 mg  2 5 mg Oral BID   • cholecalciferol (VITAMIN D3) tablet 2,000 Units  2,000 Units Oral Daily   • dicyclomine (BENTYL) tablet 20 mg  20 mg Oral BID PRN   • famotidine (PEPCID) tablet 20 mg  20 mg Oral BID   • FLUoxetine (PROzac) capsule 40 mg  40 mg Oral Daily   • furosemide (LASIX) tablet 20 mg  20 mg Oral Daily   • insulin lispro (HumaLOG) 100 units/mL subcutaneous injection 1-5 Units  1-5 Units Subcutaneous TID AC   • insulin lispro (HumaLOG) 100 units/mL subcutaneous injection 1-5 Units  1-5 Units Subcutaneous HS   • isosorbide mononitrate (IMDUR) 24 hr tablet 60 mg  60 mg Oral Daily   • levothyroxine tablet 88 mcg  88 mcg Oral Daily   • oxyCODONE-acetaminophen (PERCOCET) 5-325 mg per tablet 1 tablet  1 tablet Oral Q4H PRN   • pantoprazole (PROTONIX) EC tablet 40 mg  40 mg Oral Daily   • "piperacillin-tazobactam (ZOSYN) 4 5 g in sodium chloride 0 9 % 100 mL IVPB  4 5 g Intravenous Q6H   • pravastatin (PRAVACHOL) tablet 20 mg  20 mg Oral Daily   • pyridostigmine (MESTINON) tablet 60 mg  60 mg Oral TID   • sodium hypochlorite (DAKIN'S HALF-STRENGTH) 0 25 percent topical solution 1 application  1 application  Irrigation Daily   • sucralfate (CARAFATE) tablet 1 g  1 g Oral 4x Daily PRN       VTE Pharmacologic Prophylaxis: Reason for no pharmacologic prophylaxis Anticoagulated on Eliquis  VTE Mechanical Prophylaxis: sequential compression device    Portions of the record may have been created with voice recognition software  Occasional wrong word or \"sound a like\" substitutions may have occurred due to the inherent limitations of voice recognition software    Read the chart carefully and recognize, using context, where substitutions have occurred   ==  Marily Betancourt, [facility]  Internal Medicine Residency PGY-1       "

## 2023-04-19 NOTE — QUICK NOTE
Extensive C meeting, please see ACP note from 4/19, with three children, Bev Luong, and Abdiaziz Bharathi (Pily Angelo [phone] and Edwardo Arrington over phone)  Patient family would like to pursue palliative measures, understanding progression to hospice likely in the future  Family would prefer home hospice in future, but understand [facility] hospice also an options if uncontrolled symptom management  Patient family would NOT like patient further hospitalized   Will complete POLST form tomorrow with POA once on campus

## 2023-04-20 VITALS
HEART RATE: 67 BPM | HEIGHT: 69 IN | WEIGHT: 136.02 LBS | RESPIRATION RATE: 18 BRPM | TEMPERATURE: 98.4 F | DIASTOLIC BLOOD PRESSURE: 76 MMHG | OXYGEN SATURATION: 98 % | BODY MASS INDEX: 20.15 KG/M2 | SYSTOLIC BLOOD PRESSURE: 139 MMHG

## 2023-04-20 LAB
ANION GAP SERPL CALCULATED.3IONS-SCNC: 2 MMOL/L (ref 4–13)
BUN SERPL-MCNC: 10 MG/DL (ref 5–25)
CALCIUM SERPL-MCNC: 8 MG/DL (ref 8.3–10.1)
CHLORIDE SERPL-SCNC: 109 MMOL/L (ref 96–108)
CO2 SERPL-SCNC: 27 MMOL/L (ref 21–32)
CREAT SERPL-MCNC: 0.98 MG/DL (ref 0.6–1.3)
GFR SERPL CREATININE-BSD FRML MDRD: 65 ML/MIN/1.73SQ M
GLUCOSE SERPL-MCNC: 146 MG/DL (ref 65–140)
GLUCOSE SERPL-MCNC: 161 MG/DL (ref 65–140)
GLUCOSE SERPL-MCNC: 210 MG/DL (ref 65–140)
GLUCOSE SERPL-MCNC: 286 MG/DL (ref 65–140)
POTASSIUM SERPL-SCNC: 3.9 MMOL/L (ref 3.5–5.3)
SODIUM SERPL-SCNC: 138 MMOL/L (ref 135–147)

## 2023-04-20 RX ADMIN — AMIODARONE HYDROCHLORIDE 200 MG: 200 TABLET ORAL at 09:18

## 2023-04-20 RX ADMIN — PYRIDOSTIGMINE BROMIDE 60 MG: 60 TABLET ORAL at 09:23

## 2023-04-20 RX ADMIN — FUROSEMIDE 20 MG: 20 TABLET ORAL at 09:19

## 2023-04-20 RX ADMIN — PANTOPRAZOLE SODIUM 40 MG: 40 TABLET, DELAYED RELEASE ORAL at 06:00

## 2023-04-20 RX ADMIN — PIPERACILLIN SODIUM AND TAZOBACTAM SODIUM 4.5 G: 36; 4.5 INJECTION, POWDER, LYOPHILIZED, FOR SOLUTION INTRAVENOUS at 09:25

## 2023-04-20 RX ADMIN — CHOLECALCIFEROL TAB 25 MCG (1000 UNIT) 2000 UNITS: 25 TAB at 09:18

## 2023-04-20 RX ADMIN — SODIUM HYPOCHLORITE 1 APPLICATION.: 2.5 SOLUTION TOPICAL at 09:22

## 2023-04-20 RX ADMIN — LEVOTHYROXINE SODIUM 88 MCG: 88 TABLET ORAL at 06:00

## 2023-04-20 RX ADMIN — APIXABAN 2.5 MG: 2.5 TABLET, FILM COATED ORAL at 09:18

## 2023-04-20 RX ADMIN — PRAVASTATIN SODIUM 20 MG: 20 TABLET ORAL at 09:21

## 2023-04-20 RX ADMIN — PIPERACILLIN SODIUM AND TAZOBACTAM SODIUM 4.5 G: 36; 4.5 INJECTION, POWDER, LYOPHILIZED, FOR SOLUTION INTRAVENOUS at 15:55

## 2023-04-20 RX ADMIN — FLUOXETINE 40 MG: 20 CAPSULE ORAL at 09:18

## 2023-04-20 RX ADMIN — INSULIN LISPRO 3 UNITS: 100 INJECTION, SOLUTION INTRAVENOUS; SUBCUTANEOUS at 13:08

## 2023-04-20 RX ADMIN — FAMOTIDINE 20 MG: 20 TABLET, FILM COATED ORAL at 09:18

## 2023-04-20 RX ADMIN — INSULIN LISPRO 1 UNITS: 100 INJECTION, SOLUTION INTRAVENOUS; SUBCUTANEOUS at 09:19

## 2023-04-20 RX ADMIN — ISOSORBIDE MONONITRATE 60 MG: 60 TABLET, EXTENDED RELEASE ORAL at 09:18

## 2023-04-20 NOTE — CASE MANAGEMENT
Case Management Discharge Planning Note    Patient name Yamil Andresgood  Location [address] 818/PPHP 418-52 MRN [id number]  : 1927 Date 2023       Current Admission Date: 2023  Current Admission Diagnosis:Fall at home   Patient Active Problem List    Diagnosis Date Noted   • Moderate protein-calorie malnutrition ([address] ) 2023   • Bladder rupture 2023   • Fall at home 04/15/2023   • DM (diabetes mellitus) ([address] ) 04/15/2023   • Pressure ulcer of sacral region 04/15/2023   • Chronic gastritis 04/15/2023   • Myasthenia gravis ([address] ) 04/15/2023   • CKD (chronic kidney disease) 04/15/2023   • Chronic diastolic (congestive) heart failure ([address] ) 04/15/2023   • Atrial fibrillation ([address] ) 04/15/2023   • Suprapubic catheter ([facility] [address] ) 04/15/2023   • Mixed hyperlipidemia 04/15/2023   • Hypothyroidism 04/15/2023   • CAD (coronary artery disease) 04/15/2023   • Rib fracture 04/15/2023   • Anxiety 04/15/2023      LOS (days): 6  Geometric Mean LOS (GMLOS) (days): 4 40  Days to GMLOS:-1 1     OBJECTIVE:  Risk of Unplanned Readmission Score: 14 75         Current admission status: Inpatient   Preferred Pharmacy:   [address]  [facility] [address] [place] [address]  Phone: [phone] Fax: [phone]    Primary Care Provider: Yfn New MD    Primary Insurance: [facility] [employer]  Secondary Insurance:     DISCHARGE DETAILS:     Spoke with doug castillo to order hospital bed, confirmed delivery address, order placed via parachute, order signed per provider  UPDATE: Per son, bed will be delivered tomorrow but patient is good to dc today as he has a bed in the common area to sleep in tonight  Doug Monterroso will transport to home via family vehicle and will be here around 1600  Provider and bedside RN updated

## 2023-04-20 NOTE — CASE MANAGEMENT
Case Management Discharge Planning Note    Patient name Otilia Mcdermott  [facility] [address] [facility] [id number]  : 1927 Date 2023       Current Admission Date: 2023  Current Admission Diagnosis:Fall at home   Patient Active Problem List    Diagnosis Date Noted   • Moderate protein-calorie malnutrition ([address] ) 2023   • Bladder rupture 2023   • Fall at home 04/15/2023   • DM (diabetes mellitus) ([address] ) 04/15/2023   • Pressure ulcer of sacral region 04/15/2023   • Chronic gastritis 04/15/2023   • Myasthenia gravis ([address] ) 04/15/2023   • CKD (chronic kidney disease) 04/15/2023   • Chronic diastolic (congestive) heart failure ([address] ) 04/15/2023   • Atrial fibrillation ([address] ) 04/15/2023   • Suprapubic catheter ([facility] [address] ) 04/15/2023   • Mixed hyperlipidemia 04/15/2023   • Hypothyroidism 04/15/2023   • CAD (coronary artery disease) 04/15/2023   • Rib fracture 04/15/2023   • Anxiety 04/15/2023      LOS (days): 6  Geometric Mean LOS (GMLOS) (days): 4 40  Days to GMLOS:-1 3     OBJECTIVE:  Risk of Unplanned Readmission Score: 14 78         Current admission status: Inpatient   Preferred Pharmacy:   [address]  [facility] [address] [place] [address]  Phone: [phone] Fax: [phone]    Primary Care Provider:  Dipak Garcia MD    Primary Insurance: [facility] [employer] [employer]  Secondary Insurance:     DISCHARGE DETAILS:                                     DME Referral Provided  Referral made for DME?: Yes  DME referral completed for the following items[de-identified] Hospital Bed  DME Supplier Name[de-identified] [employer]              Treatment Team Recommendation: [address]  Discharge Destination Plan[de-identified] Home with [place] at Discharge : Family                                IMM Given to[de-identified] Patient  Family notified[de-identified] Son Lubna Offer

## 2023-04-20 NOTE — PROGRESS NOTES
Progress Note - Infectious Disease   Chiquita Fey 80 y o  male MRN: [id number]  Unit/Bed#: [facility] 818-01 Encounter: [id number]    Impression/Plan:  1  SIRS   Present on admission  Tachycardia and leukocytosis   Possibly secondary to sepsis from an infected sacral decubitus ulcer   Possibly another source of sepsis  Blood cultures are negative >5 days  Urine culture with growth of e coli and pseudomonas  Possibly a noninfectious etiology such as the bladder rupture   Fortunately the patient remains hemodynamically stable  This morning he is afebrile  His WBC count has normalized  -antibiotic as below  -monitor CBCD and BMP  -monitor vitals  -supportive care     2   Infected sacral decubitus ulcer   With malodorous drainage and initially necrotic tissue  He is now s/p bedside debridement 4/15/2023  No cultures sent  Suspect wound is likely polymicrobial  He has been started on IV Zosyn which he is tolerating without difficulty  He will complete antibiotic treatment today for 7 full days of treatment   -complete IV Zosyn today, 4/20/2023, for 7 full days of antibiotic treatment  -monitor CBCD and CMP  -serial sacral exams  -frequent turning/repositioning to off-load pressure  -local wound care per surgery team  -continue follow up with surgery      3   Bladder rupture   In a patient with previous prostate cancer with prostatectomy, scrotal exploration and explantation of eroded artificial urinary sphincter  Suprapubic catheter placed  Urine culture with growth of e coli and pseudomonas which were covered by antibiotic above   -monitor urine output  -serial exams and care of SPC     4   Myasthenia gravis   Patient does not appear to be on any treatment currently      5   Ambulatory dysfunction  With multiple falls at home  The infectious disease team will sign off at this time  Please call sooner with questions or concerns  Above plan was discussed in detail with patient at the bedside    Above plan was discussed in detail with primary service  Antibiotics:  Zosyn 7  Antibiotics 7    Subjective:  Patient reports he's feeling find this morning, is a bit chilly and asks for help with his blankets  He denies sweats, shakes, nausea, vomiting, abdominal pain, diarrhea, cough, shortness of breath, or chest pain  He has no concerns with his SPC  He has no pain in his back, sacrum, or buttocks  He offers no other symptoms  Objective:  Vitals:  Temp:  [97 7 °F (36 5 °C)-98 1 °F (36 7 °C)] 98 1 °F (36 7 °C)  HR:  [65-74] 67  Resp:  [16-20] 18  BP: (153-168)/(78-87) 168/87  SpO2:  [97 %-98 %] 98 %  Temp (24hrs), Av 9 °F (36 6 °C), Min:97 7 °F (36 5 °C), Max:98 1 °F (36 7 °C)  Current: Temperature: 98 1 °F (36 7 °C)    Physical Exam:   General Appearance:  Alert, interactive, nontoxic, in no acute distress  He appears comfortable supine leaning towards the right  Very hard of hearing  Throat: Oropharynx moist without lesions  Lungs:   Clear to auscultation bilaterally; no wheezes, rhonchi or rales; respirations unlabored on room air  Heart:  RRR; no murmur, rub or gallop  Abdomen:   Soft, non-tender, non-distended, positive bowel sounds  SPC intact, urine output is su yellow  Extremities: No clubbing or cyanosis, no edema  Protective foam in place on B/L feet  Skin: No new rashes noted on exposed skin       Labs, Imaging, & Other studies:   All pertinent labs and imaging studies were personally reviewed  Results from last 7 days   Lab Units 23  0417 23  0428 23  0534   WBC Thousand/uL 8 28 9 25 10 23*   HEMOGLOBIN g/dL 12 3 12 4 11 5*   PLATELETS Thousands/uL 303 332 311     Results from last 7 days   Lab Units 23  0431 23  0428 23  0534 04/15/23  0438 23  1837 23  1833   POTASSIUM mmol/L 3 9   < > 4 0 3 1*   < >  --    CHLORIDE mmol/L 109*   < > 109* 106   < >  --    CO2 mmol/L 27   < > 23 27   < >  --    CO2, I-STAT mmol/L  --   --   --   --   --  30 BUN mg/dL 10   < > 8 12   < >  --    CREATININE mg/dL 0 98   < > 0 76 0 73   < >  --    EGFR ml/min/1 73sq m 65   < > 77 78   < >  --    GLUCOSE, ISTAT mg/dl  --   --   --   --   --  185*   CALCIUM mg/dL 8 0*   < > 8 2* 7 7*   < >  --    AST U/L  --   --  54* 52*  --   --    ALT U/L  --   --  51 53   < >  --    ALK PHOS U/L  --   --  80 90   < >  --     < > = values in this interval not displayed  Results from last 7 days   Lab Units 04/14/23  1913 04/14/23  1825   BLOOD CULTURE   --  No Growth After 5 Days  No Growth After 5 Days     URINE CULTURE  >100,000 cfu/ml Escherichia coli*  80,000-89,000 cfu/ml Pseudomonas aeruginosa*  --      Results from last 7 days   Lab Units 04/14/23  1837   PROCALCITONIN ng/ml 0 33*

## 2023-04-20 NOTE — PLAN OF CARE
Problem: PAIN - ADULT  Goal: Verbalizes/displays adequate comfort level or baseline comfort level  Description: Interventions:  - Encourage patient to monitor pain and request assistance  - Assess pain using appropriate pain scale  - Administer analgesics based on type and severity of pain and evaluate response  - Implement non-pharmacological measures as appropriate and evaluate response  - Consider cultural and social influences on pain and pain management  - Notify physician/advanced practitioner if interventions unsuccessful or patient reports new pain  Outcome: Progressing     Problem: INFECTION - ADULT  Goal: Absence or prevention of progression during hospitalization  Description: INTERVENTIONS:  - Assess and monitor for signs and symptoms of infection  - Monitor lab/diagnostic results  - Monitor all insertion sites, i e  indwelling lines, tubes, and drains  - Monitor endotracheal if appropriate and nasal secretions for changes in amount and color  - [place] appropriate cooling/warming therapies per order  - Administer medications as ordered  - Instruct and encourage patient and family to use good hand hygiene technique  - Identify and instruct in appropriate isolation precautions for identified infection/condition  Outcome: Progressing

## 2023-04-20 NOTE — PROGRESS NOTES
INTERNAL MEDICINE RESIDENCY DISCHARGE SUMMARY     Ramya Mathews   80 y o  male  MRN: [id number]  Room/Bed: [facility] 818/[facility] 357-96     [address] [address]   Encounter: [id number]    Principal Problem:    Fall at home  Active Problems:    Pressure ulcer of sacral region    CKD (chronic kidney disease)    DM (diabetes mellitus) (HCC)    Chronic gastritis    Myasthenia gravis (HCC)    Chronic diastolic (congestive) heart failure (HCC)    Atrial fibrillation (HCC)    Suprapubic catheter ([address] )    Mixed hyperlipidemia    Hypothyroidism    CAD (coronary artery disease)    Rib fracture    Anxiety    Bladder rupture    Moderate protein-calorie malnutrition ([address] )      * Fall at home  Assessment & Plan  Reports presenting after an unwitnessed fall hitting the ground complicated by rib fracture  Patient's son reports previous similar unwitnessed fall 1 week prior  Patient at baseline is a poor historian and unable to describe events leading to the fall  Occurred while patient was anticoagulated with Eliquis; given Kcentra to reverse Eliquis  Hemoglobin stable with no signs of ecchymosis or signs of bleeding  Suspect cause of fall may be secondary to sepsis in elderly patient      Plan:  Goals of care discussion with family  PT OT consult, appreciate recs  We will discuss with family the risks of continuing anticoagulation with history of recurrent falls    Pressure ulcer of sacral region  Assessment & Plan  History of chronic sacral pressure ulcer  Noted to have stage IV sacral decubitus ulcer with significant necrotic tissue at the wound base  Sacral wound was debrided roughly 1 week ago by wound care  Status post bedside ED debridement of sacral wound  Given 1 dose of cefepime in the ED    Plan:   We will start Zosyn 4 5 g every 6hr   plan for 7 days of antibiotics we will continue till 4/20/2023   wound care    CKD (chronic kidney disease)  Assessment & Plan  Lab Results Component Value Date    EGFR 68 04/17/2023    EGFR 77 04/16/2023    EGFR 78 04/15/2023    CREATININE 0 94 04/17/2023    CREATININE 0 76 04/16/2023    CREATININE 0 73 04/15/2023     History of CKD currently at baseline kidney    Plan: We will continue to trend creatinine and avoid nephrotoxic agents and maintain adequate perfusion  Home 20 mg Lasix diuretics regimen    Bladder rupture  Assessment & Plan  uro on board, appreciate recs   -Conservative measures with continued catheter drainage    Given this appears to be an extraperitoneal process surgical intervention is not immediately required   -Can consider repeat imaging in approximately 5 to 20 days to reevaluate the bladder for hopeful resolution of  extraperitoneal air    Anxiety  Assessment & Plan  History of anxiety managed with Prozac 40 mg daily at home; will continue regimen while inpatient  Consider d/c to avoid polypharmacy and SIADH in elderly vs consider switching to escitalopram     Rib fracture  Assessment & Plan  Displaced rib fracture of the right 12th rib secondary to fall    Plan:  Pain management with nonopioid analgesics and will escalate as needed  Encourage deep breathing  Symptomatic care with ice, heat, lidocaine patch, etc      CAD (coronary artery disease)  Assessment & Plan  History of coronary disease; will continue home pravastatin and Imdur    Hypothyroidism  Assessment & Plan  History of hypothyroidism managed with 88 mcg levothyroxine  Most recent TSH was 2 957    Plan  We will continue levothyroxine 88 mcg daily    Mixed hyperlipidemia  Assessment & Plan  History of hyperlipidemia managed with pravastatin 20 mg at home which she will continue while inpatient    Suprapubic catheter [facility])  Assessment & Plan  History of prostate cancer status post radical prostatectomy  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  CT scan of the abdomen which showed evidence of extraperitoneal air tracking around the right aspect of the bladder with a suprapubic catheter in place and no obvious signs of intra-peritoneal process; evaluated by urology with no issues    Plan: We will maintain suprapubic catheter and monitor for output and signs of infection    Atrial fibrillation (HCC)  Assessment & Plan  History chronic A-fib managed with amiodarone 200 mg daily  Anticoagulated with Eliquis 2 5 mg twice daily    Plan  Continue  amiodarone at this time  HASBLED score 3 (medication pre-dispose to bleeding, age, and HTN)  Will discuss with patient and family the risks and benefits of anticoagulation versus no anticoagulation given patient's history of recurrent falls  Plan to discontinue Eliquis given patient's high risk of falls with anticoagulation      Chronic diastolic (congestive) heart failure (HCC)  Assessment & Plan  Wt Readings from Last 3 Encounters:   04/14/23 61 7 kg (136 lb 0 4 oz)     Last echo in 12/12/2022 showed EF of 55% with grade 2 pseudonormal diastolic dysfunction  Patient takes Lasix 20 mg p o  Monday through Friday    Plan:  Lasix 20 mg p o  and titrate as needed to maintain goal net negative 1-2 liters  Daily I&O's  K>4, Mg >2 goal, replenish as needed  Consider fluid restriction  Consider cards consult to maximize GDMT   Limited BB use as naturally hovers around 60/bradycardia   Consider adding Ace-I/ARB, but will discuss risks of falls and orthostasis with family considering patients age  If acutely SOB, IV lasix and STAT CXR        Myasthenia gravis ([facility] )  Assessment & Plan  History of myasthenia gravis with no acute complaints    Plan:   We will continue home pyridostigmine    Chronic gastritis  Assessment & Plan  History of chronic gastritis managed with 40 mg twice daily; will continue regimen while inpatient    DM (diabetes mellitus) ([facility] )  Assessment & Plan  History of non-insulin-dependent diabetes mellitus  Last A1c in December of 7 2  Controlled with with saxagliptin 2 5 mg daily    Plan:   carb controlled diet   start correctional insulin to determine basal bolus regimen, titrate as needed with goal 140-180 in hospital  Hold oral home agents        [address]     The patient is a 79-year-old male with past medical history of A-fib on Eliquis, prostatectomy complicated by urinary drainage and 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 now managed with suprapubic catheter, stage IV sacral decubitus ulcer last debrided last week, DM, myasthenia gravis, HFpEF, CAD, hypothyroidism, and CKD presenting to the ED after unwitnessed fall while anticoagulated for A-fib  The patient fell on the ground while trying to get up and landed on his chest  patient has baseline dementia and is AOx2 at his baseline confirmed by patient's son at bedside  Patient's son also explained that he had another unwitnessed fall 1 week prior  Patient is unable to explain the events leading to the fall  Trauma work-up revealed nondisplaced fracture of the right 12th rib and right anterior bladder perforation  Urology recommended conservative management with continued catheter drainage  Patient was started on IV fluids along with cefepime in the ED transition to Zosyn for active sacral wound per ID recommendations  He underwent surgical debridement for the cervical ulcer  Discussion with goals of care with the family, family had discussed interest in progression to hospice likely the future as a family would not like further hospitalization and would prefer home with home health as patient's family reports not being able to take care of him  POA is son and completed POLST form prior to discharge  It was decided prior to discharge to hold his Eliquis given his risk of recurrent falls  She denies any symptoms at time of discharge  He has alert and oriented x1 and at his baseline  He reports good appetite and denies any significant pain    He is being discharged home with home health as the family would not like to pursue further hospitalization  He has completed full course of antibiotics for the sacral ulcer  Physical Exam  Constitutional:       General: He is not in acute distress  Appearance: Normal appearance  He is normal weight  He is not ill-appearing or toxic-appearing  HENT:      Head: Normocephalic and atraumatic  Cardiovascular:      Rate and Rhythm: Normal rate  Heart sounds: No murmur heard  No gallop  Pulmonary:      Effort: Pulmonary effort is normal  No respiratory distress  Breath sounds: No wheezing or rales  Abdominal:      General: Abdomen is flat  There is no distension  Tenderness: There is no abdominal tenderness  There is no guarding  Musculoskeletal:      Right lower leg: No edema  Left lower leg: No edema  Neurological:      Mental Status: He is alert  Comments: Alert and oriented x1 at baseline           DISCHARGE INFORMATION     PCP at Discharge: Jose L Ceja MD    Admitting Provider: Slade Mendieta DO  Admission Date: 4/14/2023    Discharge Provider: Antonia Su MD  Discharge Date: 4/20/23    Discharge Disposition: Final discharge disposition not confirmed  Discharge Condition: stable  Discharge with Lines: no    Discharge Diet: diabetic diet  Activity Restrictions: none  Test Results Pending at Discharge: none    Discharge Diagnoses:  Principal Problem:    Fall at home  Active Problems:    Pressure ulcer of sacral region    CKD (chronic kidney disease)    DM (diabetes mellitus) ([facility] )    Chronic gastritis    Myasthenia gravis ([facility] [address] )    Chronic diastolic (congestive) heart failure (HCC)    Atrial fibrillation (HCC)    Suprapubic catheter (HCC)    Mixed hyperlipidemia    Hypothyroidism    CAD (coronary artery disease)    Rib fracture    Anxiety    Bladder rupture    Moderate protein-calorie malnutrition ([facility] [address] )  Resolved Problems:    * No resolved hospital problems   *      Consulting Providers:      Diagnostic & Therapeutic Procedures Performed:  TRAUMA - CT head wo contrast    Result Date: 4/14/2023  Impression: No acute intracranial hemorrhage  Chronic microangiopathic changes  Left sphenoid sinusitis and right otomastoiditis  I personally discussed this study with Elpidio Skinner on 4/14/2023 at 7:06 PM  Workstation performed: [id number]     TRAUMA - CT spine cervical wo contrast    Result Date: 4/14/2023  Impression: No acute fracture or dislocation  I personally discussed this study with Elpidio Skinner on 4/14/2023 at 7:06 PM   Workstation performed: [id number]     XR chest 1 view    Result Date: 4/17/2023  Impression: No acute cardiopulmonary disease within limitations of supine imaging  Workstation performed: [id number]     TRAUMA - CT chest abdomen pelvis w contrast    Result Date: 4/14/2023  Impression: Right anterior bladder perforation /rupture with extraperitoneal air  Urology/surgical consult  Nondisplaced fracture of the medial aspect of the right 12th rib at the costovertebral junction  I personally discussed this study with Elpidio Skinner on 4/14/2023 at 7:06 PM  Workstation performed: [id number]     XR Trauma multiple (SLB/SLRA trauma bay ONLY)    Result Date: 4/14/2023  Impression: No acute cardiopulmonary disease within limitations of supine imaging   Workstation performed: [id number]       Code Status: Level 3 - DNAR and DNI  Advance Directive & Living Will: <no information>  Power of :    POLST:      Medications:  Current Discharge Medication List      STOP taking these medications       apixaban (ELIQUIS) 2 5 mg Comments:   Reason for Stopping:             Current Discharge Medication List        Current Discharge Medication List      CONTINUE these medications which have NOT CHANGED    Details   acetaminophen-codeine (TYLENOL #3) 300-30 mg per tablet Take 1 tablet by mouth every 6 (six) hours as needed for moderate pain      amiodarone 200 mg tablet Take 200 mg by mouth daily "     cholecalciferol (VITAMIN D3) 1,000 units tablet Take 2,000 Units by mouth daily      dicyclomine (BENTYL) 20 mg tablet Take 20 mg by mouth 2 (two) times a day as needed (cramping)      famotidine (PEPCID) 20 mg tablet Take 20 mg by mouth 2 (two) times a day      FLUoxetine (PROzac) 40 MG capsule Take 40 mg by mouth daily      furosemide (LASIX) 20 mg tablet Take 20 mg by mouth daily Monday through Friday only      isosorbide mononitrate (IMDUR) 60 mg 24 hr tablet Take 60 mg by mouth daily      levothyroxine (Euthyrox) 88 mcg tablet Take 88 mcg by mouth daily      Mirabegron ER (Myrbetriq) 25 MG TB24 Take by mouth      pantoprazole (PROTONIX) 40 mg tablet Take 40 mg by mouth daily      pravastatin (PRAVACHOL) 20 mg tablet Take 20 mg by mouth daily      pyridostigmine (MESTINON) 60 mg tablet Take 60 mg by mouth 3 (three) times a day      saxagliptin (ONGLYZA) 2 5 MG tablet Take 2 5 mg by mouth daily      sucralfate (CARAFATE) 1 g tablet Take 1 g by mouth 4 (four) times a day as needed             Allergies: Allergies   Allergen Reactions   • Levofloxacin Rash   • Sulfa Antibiotics Rash       FOLLOW-UP     PCP Outpatient Follow-up:  Follow with PCP in 1 week    Consulting Providers Follow-up:  none required     Active Issues Requiring Follow-up:   Chronic sacral wound    Discharge Statement:   I spent 30 minutes minutes discharging the patient  This time was spent on the day of discharge  I had direct contact with the patient on the day of discharge  Additional documentation is required if more than 30 minutes were spent on discharge  Portions of the record may have been created with voice recognition software  Occasional wrong word or \"sound a like\" substitutions may have occurred due to the inherent limitations of voice recognition software    Read the chart carefully and recognize, using context, where substitutions have occurred     ==  Steve Santiago, [facility] " Medicine Resident PGY-1

## 2023-04-21 LAB

## 2023-04-21 NOTE — UTILIZATION REVIEW
NOTIFICATION OF ADMISSION DISCHARGE   This is a Notification of Discharge from [address]  Please be advised that this patient has been discharge from our facility  Below you will find the admission and discharge date and time including the patient’s disposition  UTILIZATION REVIEW CONTACT:  Melany Hernadez  Utilization   Network Utilization Review Department  Phone: [phone] x carefully listen to the prompts  All voicemails are confidential   Email: [email or web address]     ADMISSION INFORMATION  PRESENTATION DATE: 4/14/2023  6:10 PM  OBERVATION ADMISSION DATE:   INPATIENT ADMISSION DATE: 4/14/23  9:21 PM   DISCHARGE DATE: 4/20/2023  5:30 PM   DISPOSITION:Home with Home Health Care    IMPORTANT INFORMATION:  Send all requests for admission clinical reviews, approved or denied determinations and any other requests to dedicated fax number below belonging to the campus where the patient is receiving treatment   List of dedicated fax numbers:  [address] DENIALS (Administrative/Medical Necessity) [phone]   [address] (Maternity/NICU/Pediatrics) [phone]   [facility] [phone]   [facility] 87 [phone]   [address] [phone]   220 [facility] [phone]   90 [facility] [phone]   [address] [place] [address] [phone]   [facility]  [phone]   [facility] [facility] [phone]   412 [facility] [address] [facility] [phone]

## 2023-04-23 ENCOUNTER — HOSPICE ADMISSION (OUTPATIENT)
Dept: HOME HOSPICE | Facility: HOSPICE | Age: 88
End: 2023-04-23

## 2023-04-23 ENCOUNTER — HOME CARE VISIT (OUTPATIENT)
Dept: HOME HEALTH SERVICES | Facility: HOME HEALTHCARE | Age: 88
End: 2023-04-23

## 2023-04-23 VITALS
DIASTOLIC BLOOD PRESSURE: 80 MMHG | RESPIRATION RATE: 18 BRPM | TEMPERATURE: 97.4 F | HEART RATE: 60 BPM | SYSTOLIC BLOOD PRESSURE: 180 MMHG | OXYGEN SATURATION: 98 %

## 2023-04-23 DIAGNOSIS — I50.32 CHRONIC DIASTOLIC CONGESTIVE HEART FAILURE (HCC): ICD-10-CM

## 2023-04-23 DIAGNOSIS — E11.65 TYPE 2 DIABETES MELLITUS WITH HYPERGLYCEMIA, WITHOUT LONG-TERM CURRENT USE OF INSULIN (HCC): Primary | ICD-10-CM

## 2023-04-23 DIAGNOSIS — L89.151 PRESSURE ULCER OF SACRAL REGION, STAGE 1: ICD-10-CM

## 2023-04-23 DIAGNOSIS — E44.0 MODERATE PROTEIN-CALORIE MALNUTRITION (HCC): ICD-10-CM

## 2023-04-23 DIAGNOSIS — G70.00 MYASTHENIA GRAVIS (HCC): Chronic | ICD-10-CM

## 2023-04-23 NOTE — CASE COMMUNICATION
For Alison Mares  6 28 27   The pt is a 80- yo male,PMI of A-fib on Eliquis, prostatectomy  suprapubic catheter, stage IV sacral decubitus ulcer last debrided last week, DM, myasthenia gravis, HFpEF, CAD, hypothyroidism, and CKD  Came tot he ED SP fall  Trauma work-up significant for nondisplaced fracture of the right 12th rib and right anterior bladder perforation  Urology was consulted who recommended conservative manage ment with continued catheter drainage  Patient got IV ABX for sepsis  He underwent surgical debridement of this sacral ulcer  PCM with Alb of 1 7 Was dcd with [facility] services  Approve RLOC?     approved for RLOC by Dr Marylen Litter 4 23 23  E 46

## 2023-04-24 ENCOUNTER — HOME CARE VISIT (OUTPATIENT)
Dept: HOME HEALTH SERVICES | Facility: HOME HEALTHCARE | Age: 88
End: 2023-04-24

## 2023-04-24 ENCOUNTER — HOME CARE VISIT (OUTPATIENT)
Dept: HOME HOSPICE | Facility: HOSPICE | Age: 88
End: 2023-04-24

## 2023-04-24 VITALS
OXYGEN SATURATION: 95 % | RESPIRATION RATE: 18 BRPM | TEMPERATURE: 98.2 F | HEART RATE: 65 BPM | SYSTOLIC BLOOD PRESSURE: 120 MMHG | DIASTOLIC BLOOD PRESSURE: 82 MMHG

## 2023-04-24 VITALS
RESPIRATION RATE: 16 BRPM | TEMPERATURE: 98 F | SYSTOLIC BLOOD PRESSURE: 122 MMHG | HEART RATE: 66 BPM | DIASTOLIC BLOOD PRESSURE: 64 MMHG

## 2023-04-24 NOTE — CASE COMMUNICATION
Ship to     Home       Cleansers  Saline 100ml [id number]_____14      Dry Dressings   Gauze 4x4 N/S 200 4x4s per unit  [id number] ____3   ABD 5x9 532134 ____30    Moist Wound Products  Vaseline gauze ______20    Adapt skin barrier no sting wipes 50 per box  [id number] ____2  Gloves  Medium [id number] ____2

## 2023-04-25 ENCOUNTER — HOME CARE VISIT (OUTPATIENT)
Dept: HOME HEALTH SERVICES | Facility: HOME HEALTHCARE | Age: 88
End: 2023-04-25

## 2023-04-25 ENCOUNTER — TELEMEDICINE (OUTPATIENT)
Dept: DIABETES SERVICES | Facility: HOSPITAL | Age: 88
End: 2023-04-25

## 2023-04-25 DIAGNOSIS — E11.65 TYPE 2 DIABETES MELLITUS WITH HYPERGLYCEMIA, WITHOUT LONG-TERM CURRENT USE OF INSULIN (HCC): Primary | ICD-10-CM

## 2023-04-25 NOTE — PROGRESS NOTES
Virtual Regular Visit    Verification of patient location:    Patient is located at Home in the following state in which I hold an active license [place]      Assessment/Plan:    Problem List Items Addressed This Visit        Endocrine    [facility]            Reason for visit is   Chief Complaint   Patient presents with   • Virtual Regular Visit        Encounter provider Luís Mayer RD    Provider located at [place] [address] [address] [place] [address]      Recent Visits  No visits were found meeting these conditions  Showing recent visits within past 7 days and meeting all other requirements  Future Appointments  No visits were found meeting these conditions  Showing future appointments within next 150 days and meeting all other requirements       The patient was identified by name and date of birth  Simone Leonardo was informed that this is a telemedicine visit and that the visit is being conducted through the [employer]  He agrees to proceed     My office door was closed  No one else was in the room  He acknowledged consent and understanding of privacy and security of the video platform  The patient has agreed to participate and understands they can discontinue the visit at any time  Patient is aware this is a billable service  Medical Nutrition Therapy     Assessment    Visit Type: Initial visit  Chief complaint:  Type 2 Diabetes     HPI:  Met with Ralph Hutchison and his son for initial medical nutrition therapy  Visit primarily with his son  His son Freddy Vargas is his caretaker, Freddy Vargas initiated scheduling a session a few weeks ago for help with meal planning and food suggestions for his dad  Since then, his dad's health has decreased and he has been signed up for hospice  A1c 7 2%, just on Onglyza  GFR 60  Discussed that from a diabetes standpoint, everything looks good    Discussed shifting focus "away from restriction of carbohydrate foods more towards enjoyment of eating  He does note his dad's oral intake has declined and it has become more challenging to get him to eat and drink  Discussed giving him options, 2 choices to pick from as this provides dignity and the opportunity for him to chew something he might find enjoyable  Discussed allowing him to enjoy some sweet treats that he does not normally have  Discussed focusing on making sure he is drinking enough, discussed other ways of getting hydration such as Jell-O, popsicles, ice chips  Discussed nutritional supplements such as Glucerna and Ensure  Discussed utilizing the home hospice nurses if his oral intake declines or if he starts to have problems with chewing and swallowing to make sure that he is being fed in a safe way  I answered Rosita Alves his questions, he knows he can call anytime with questions or concerns  Ht Readings from Last 1 Encounters:   04/14/23 5' 9\" (1 753 m)     Wt Readings from Last 3 Encounters:   04/14/23 61 7 kg (136 lb 0 4 oz)   04/05/23 63 5 kg (140 lb)   02/15/23 60 9 kg (134 lb 3 2 oz)        There is no height or weight on file to calculate BMI       Lab Results   Component Value Date    HGBA1C 7 2 (H) 12/12/2022       No results found for: CHOL  No results found for: HDL  No results found for: LDLCALC  No results found for: TRIG  No results found for: CHOLHDL    Weight Change: No    Medical Diagnosis/ICD 10 Code:  E11 65    Barriers to Learning: no barriers    Do you follow any special diet presently?: No  Who shops: son  Who cooks: son    Food Log: Completed via the method of food recall    Breakfast:toast and jelly  Morning Snack:  Lunch:PBJ sandwich  Afternoon Snack:   Dinner:Pizza, a hot meal  Evening Snack:  Beverages: not enough, but everything sugar free  Eating out/Take out:yes   Exercise none     Nutrition Diagnosis:  Food and nutrition related knowledge deficit  related to Lack of prior exposure to " accurate nutrition related information as evidenced by Verbalizes inaccurate or incomplete information    Intervention: increased protein intake and overal increase of calories     Treatment Goals: Patient understands education and recommendations    Education Material Given  Ana Maria Bergman was provided the Portion Booklet and Planning Healthy Meals     Monitoring and evaluation:    Term code indicator  FH 1 6 3 Carbohydrate Intake Criteria: moderate    Patient’s Response to Instruction:  Dari Ruggiero  Expected Compliancegood    Thank you for coming to the [address] [place] [address] for education today  Please feel free to call with any questions or concerns  Mitch Bojorquez, RD  [address] [facility] [phone]          Subjective  Clydene Aase is a 80 y o  male see notes above         HPI     Past Medical History:   Diagnosis Date   • Anxiety    • Arthritis    • Coronary artery disease    • 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    Last Assessment & Plan:  Formatting of this note might be different from the original  - may be related to amidoarone use -Taken off levothyroxinein 02/2020, then developed overt hypothyroidism -Labs from July 2020 show TSH of 61 and free T4 of 0 71 -Patient currently on levothyroxine 88 mcg -Labs from 12/11/20 TSH 1 4 and Free T4 1 4 -Continue with same dose -We will repeat TFTs today   • Hiatal hernia    • Hypertension    • Irregular heart beat    • Myasthenia gravis ([facility] )        Past Surgical History:   Procedure Laterality Date   • CYSTECTOMY, RADICAL WITH ILEOCONDUIT N/A 12/13/2022    Procedure: REMOVAL ARTIFICIAL URINARY SPINCTER BILATERAL;  Surgeon: Meet Fisher MD;  [facility];  Service: Urology   • CYSTOSCOPY N/A 12/13/2022    Procedure: Alexandra Menezes;  Surgeon: Meet Fisher MD;  [facility];  Service: Urology   • EYE SURGERY  2014    cataracts   • HERNIA REPAIR  1971    right inguinal hernia repair   • IR SUPRAPUBIC CATHETER PLACEMENT  12/13/2022   • CT LAPAROSCOPY COLECTOMY PARTIAL W/ANASTOMOSIS N/A 6/3/2016    Procedure: LAPAROSCOPIC SIGMOID RESECTION ;  Surgeon: Kait Foster MD;  [facility];  Service: Colorectal   • CT LAPS COLECTOMY PRTL W/COLOPXTSTMY LW ANAST N/A 6/3/2016    Procedure: COLECTOMY LAPAROSCOPIC ASSISTED;  Surgeon: Kait Foster MD;  [facility];  Service: Colorectal   • CT SIGMOIDOSCOPY FLX DX W/COLLJ SPEC BR/WA IF PFRMD N/A 6/3/2016    Procedure: Nannette Rodriguez;  Surgeon: Kait Foster MD;  [facility];  Service: Colorectal   • PROSTATECTOMY     • REPLACEMENT TOTAL KNEE Right    • URINARY SPHINCTER IMPLANT  2001    s/p prostatectomy- pt had urinary leakage issues       Current Outpatient Medications   Medication Sig Dispense Refill   • amiodarone 200 mg tablet Take 200 mg by mouth daily  Indications: Irreg HR     • bisacodyl (DULCOLAX) 10 mg suppository Insert 10 mg into the rectum daily as needed for constipation  CP medication On Hold    Indications: Constipation     • dicyclomine (BENTYL) 20 mg tablet Take 20 mg by mouth 4 (four) times a day as needed (Abdominal cramping)  Indications: Abdominal cramping     • famotidine (PEPCID) 20 mg tablet Take 20 mg by mouth 2 (two) times a day  Indications: Gastroesophageal Reflux Disease, Heartburn     • FLUoxetine (PROzac) 40 MG capsule Take 40 mg by mouth daily  Indications: Depression     • haloperidol (HALDOL) oral concentrated solution Take 1 mg by mouth every 6 (six) hours as needed for agitation (n/v)  CP medication On Hold      Indications: agitation, n/v     • hyoscyamine (LEVSIN/SL) 0 125 mg SL tablet Take 0 125 mg by mouth every 4 (four) hours as needed (secretions)  CP medication On Hold      Indications: secretions     • isosorbide mononitrate (IMDUR) 60 mg 24 hr tablet Take 60 mg by mouth daily   Indications: Stable Angina Pectoris     • levothyroxine 88 mcg tablet Take 88 mcg by mouth     • levothyroxine 88 mcg tablet Take 88 mcg by mouth daily  Indications: Underactive Thyroid     • LORazepam (ATIVAN) 0 5 mg tablet Take 0 5 mg by mouth every 6 (six) hours as needed for anxiety (Insomnia/SOB)  Indications: Feeling Anxious, Trouble Sleeping, SOB     • Metronidazole (Flagyl) 5% Apply 1 application  topically in the morning  Indications: Wound care     • Morphine Sulfate, Concentrate, 20 mg/mL concentrated solution Take 5 mg by mouth every 3 (three) hours as needed for moderate pain (sob)  CP medication On Hold    Indications: Difficulty Breathing, Pain     • omeprazole (PriLOSEC) 20 mg delayed release capsule Take 20 mg by mouth 2 (two) times a day  Indications: Heartburn     • oxyCODONE (ROXICODONE) 5 immediate release tablet Take 5 mg by mouth every 8 (eight) hours  May also take 5mg Q2 hr prn  TAKE 5MG 30 MIN PRIOR TO WOUND CARE  Indications: Acute Pain, Chronic Pain     • pantoprazole (PROTONIX) 40 mg tablet Take 40 mg by mouth daily     • prochlorperazine (COMPAZINE) 10 mg tablet Take 10 mg by mouth every 6 (six) hours as needed for nausea or vomiting  CP medication On Hold        Indications: Nausea and Vomiting     • pyridostigmine (MESTINON) 60 mg tablet Take 60 mg by mouth 4 (four) times a day  Indications: Myasthenia Gravis     • saxagliptin (ONGLYZA) 2 5 MG tablet Take 2 5 mg by mouth daily  Indications: Type 2 Diabetes     • senna (SENOKOT) 8 6 mg Take 8 6 mg by mouth in the morning  Indications: Constipation     • sucralfate (Carafate) 1 g tablet Take 1 g by mouth 4 (four) times a day  Indications: Peptic Ulcer     • sucralfate (CARAFATE) 1 g/10 mL suspension Take 1 g by mouth       No current facility-administered medications for this visit  Allergies   Allergen Reactions   • Levofloxacin Other (See Comments) and Rash   • Levofloxacin Rash   • Sulfa Antibiotics Rash       Review of Systems    Video Exam    There were no vitals filed for this visit      Physical Exam     Visit Time  Total Visit Duration: 30min

## 2023-04-26 ENCOUNTER — HOME CARE VISIT (OUTPATIENT)
Dept: HOME HEALTH SERVICES | Facility: HOME HEALTHCARE | Age: 88
End: 2023-04-26

## 2023-04-26 ENCOUNTER — HOME CARE VISIT (OUTPATIENT)
Dept: HOME HOSPICE | Facility: HOSPICE | Age: 88
End: 2023-04-26

## 2023-04-27 ENCOUNTER — HOME CARE VISIT (OUTPATIENT)
Dept: HOME HEALTH SERVICES | Facility: HOME HEALTHCARE | Age: 88
End: 2023-04-27

## 2023-04-28 ENCOUNTER — HOME CARE VISIT (OUTPATIENT)
Dept: HOME HEALTH SERVICES | Facility: HOME HEALTHCARE | Age: 88
End: 2023-04-28

## 2023-04-30 ENCOUNTER — HOME CARE VISIT (OUTPATIENT)
Dept: HOME HOSPICE | Facility: HOSPICE | Age: 88
End: 2023-04-30

## 2023-04-30 ENCOUNTER — HOME CARE VISIT (OUTPATIENT)
Dept: HOME HEALTH SERVICES | Facility: HOME HEALTHCARE | Age: 88
End: 2023-04-30

## 2023-04-30 VITALS — DIASTOLIC BLOOD PRESSURE: 62 MMHG | SYSTOLIC BLOOD PRESSURE: 142 MMHG | RESPIRATION RATE: 20 BRPM | HEART RATE: 50 BPM

## 2023-05-01 ENCOUNTER — HOME CARE VISIT (OUTPATIENT)
Dept: HOME HEALTH SERVICES | Facility: HOME HEALTHCARE | Age: 88
End: 2023-05-01

## 2023-05-01 NOTE — DISCHARGE SUMMARY
INTERNAL MEDICINE RESIDENCY DISCHARGE SUMMARY      Tabby Lai   80 y o  male  MRN: [id number]  Room/Bed: [facility] 81/[facility] [address] [place] [address]   Encounter: [id number]     Principal Problem:    Fall at home  Active Problems:    Pressure ulcer of sacral region    CKD (chronic kidney disease)    DM (diabetes mellitus) (HCC)    Chronic gastritis    Myasthenia gravis (HCC)    Chronic diastolic (congestive) heart failure (HCC)    Atrial fibrillation (HCC)    Suprapubic catheter (HCC)    Mixed hyperlipidemia    Hypothyroidism    CAD (coronary artery disease)    Rib fracture    Anxiety    Bladder rupture    Moderate protein-calorie malnutrition ([address] )        * Fall at home  Assessment & Plan  Reports presenting after an unwitnessed fall hitting the ground complicated by rib fracture  Patient's son reports previous similar unwitnessed fall 1 week prior  Patient at baseline is a poor historian and unable to describe events leading to the fall  Occurred while patient was anticoagulated with Eliquis; given Kcentra to reverse Eliquis  Hemoglobin stable with no signs of ecchymosis or signs of bleeding  Suspect cause of fall may be secondary to sepsis in elderly patient        Plan:  Goals of care discussion with family  PT OT consult, appreciate recs  We will discuss with family the risks of continuing anticoagulation with history of recurrent falls     Pressure ulcer of sacral region  Assessment & Plan  History of chronic sacral pressure ulcer  Noted to have stage IV sacral decubitus ulcer with significant necrotic tissue at the wound base  Sacral wound was debrided roughly 1 week ago by wound care  Status post bedside ED debridement of sacral wound  Sepsis of unspecified organism         Suspect cause of fall may be secondary to sepsis in elderly patient         SIRS  Present on admission  Tachycardia and leukocytosis   Possibly               secondary to sepsis from an infected sacral decubitus ulcer        4/14 ED VS - T 100 82 97% 20 175/86         4/14 WBC 12 93    Given 1 dose of cefepime in the ED     Plan: We will start Zosyn 4 5 g every 6hr   plan for 7 days of antibiotics we will continue till 4/20/2023   wound care     CKD (chronic kidney disease)  Assessment & Plan        Lab Results   Component Value Date     EGFR 68 04/17/2023     EGFR 77 04/16/2023     EGFR 78 04/15/2023     CREATININE 0 94 04/17/2023     CREATININE 0 76 04/16/2023     CREATININE 0 73 04/15/2023      History of CKD currently at baseline kidney     Plan:   We will continue to trend creatinine and avoid nephrotoxic agents and maintain adequate perfusion  Home 20 mg Lasix diuretics regimen     Bladder rupture  Assessment & Plan  uro on board, appreciate recs          -Conservative measures with continued catheter drainage   Given this appears to be an extraperitoneal process surgical intervention is not immediately required          -Can consider repeat imaging in approximately 5 to 20 days to reevaluate the bladder for hopeful resolution of   extraperitoneal air     Anxiety  Assessment & Plan  History of anxiety managed with Prozac 40 mg daily at home; will continue regimen while inpatient  Consider d/c to avoid polypharmacy and SIADH in elderly vs consider switching to escitalopram      Rib fracture  Assessment & Plan  Displaced rib fracture of the right 12th rib secondary to fall     Plan:  Pain management with nonopioid analgesics and will escalate as needed  Encourage deep breathing  Symptomatic care with ice, heat, lidocaine patch, etc        CAD (coronary artery disease)  Assessment & Plan  History of coronary disease; will continue home pravastatin and Imdur     Hypothyroidism  Assessment & Plan  History of hypothyroidism managed with 88 mcg levothyroxine  Most recent TSH was 2 957     Plan  We will continue levothyroxine 88 mcg daily     Mixed hyperlipidemia  Assessment & Plan  History of hyperlipidemia managed with pravastatin 20 mg at home which she will continue while inpatient     Suprapubic catheter [facility])  Assessment & Plan  History of prostate cancer status post radical prostatectomy  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  CT scan of the abdomen which showed evidence of extraperitoneal air tracking around the right aspect of the bladder with a suprapubic catheter in place and no obvious signs of intra-peritoneal process; evaluated by urology with no issues     Plan: We will maintain suprapubic catheter and monitor for output and signs of infection     Atrial fibrillation (HCC)  Assessment & Plan  History chronic A-fib managed with amiodarone 200 mg daily  Anticoagulated with Eliquis 2 5 mg twice daily     Plan  Continue  amiodarone at this time  HASBLED score 3 (medication pre-dispose to bleeding, age, and HTN)  Will discuss with patient and family the risks and benefits of anticoagulation versus no anticoagulation given patient's history of recurrent falls  Plan to discontinue Eliquis given patient's high risk of falls with anticoagulation        Chronic diastolic (congestive) heart failure (HCC)  Assessment & Plan      Wt Readings from Last 3 Encounters:   04/14/23 61 7 kg (136 lb 0 4 oz)      Last echo in 12/12/2022 showed EF of 55% with grade 2 pseudonormal diastolic dysfunction  Patient takes Lasix 20 mg p o   Monday through Friday     Plan:  Lasix 20 mg p o  and titrate as needed to maintain goal net negative 1-2 liters  Daily I&O's  K>4, Mg >2 goal, replenish as needed  Consider fluid restriction  Consider cards consult to maximize GDMT          Limited BB use as naturally hovers around 60/bradycardia          Consider adding Ace-I/ARB, but will discuss risks of falls and orthostasis with family considering patients age  If acutely SOB, IV lasix and STAT CXR           Myasthenia gravis [facility])  Assessment & Plan  History of myasthenia gravis with no acute complaints     Plan: We will continue home pyridostigmine     Chronic gastritis  Assessment & Plan  History of chronic gastritis managed with 40 mg twice daily; will continue regimen while inpatient     DM (diabetes mellitus) ([facility] [address] )  Assessment & Plan  History of non-insulin-dependent diabetes mellitus  Last A1c in December of 7 2  Controlled with with saxagliptin 2 5 mg daily     Plan:   carb controlled diet   start correctional insulin to determine basal bolus regimen, titrate as needed with goal 140-180 in hospital  Hold oral home agents    Sepsis (SIRS)  Sepsis of unspecified organism         Suspect cause of fall may be secondary to sepsis in elderly patient         SIRS  Present on admission  Tachycardia and leukocytosis  Possibly               secondary to sepsis from an infected sacral decubitus ulcer        4/14 ED VS - T 100 82 97% 20 175/86         4/14 WBC 12 93  Given 1 dose of cefepime in the ED    Plan:   - see plan for sacral ulcer            DETAILS OF HOSPITAL COURSE      The patient is a 80-year-old male with past medical history of A-fib on Eliquis, prostatectomy complicated by urinary drainage and 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 now managed with suprapubic catheter, stage IV sacral decubitus ulcer last debrided last week, DM, myasthenia gravis, HFpEF, CAD, hypothyroidism, and CKD presenting to the ED after unwitnessed fall while anticoagulated for A-fib   The patient fell on the ground while trying to get up and landed on his chest  patient has baseline dementia and is AOx2 at his baseline confirmed by patient's son at bedside   Patient's son also explained that he had another unwitnessed fall 1 week prior  Angelique Haile is unable to explain the events leading to the fall  Trauma work-up revealed nondisplaced fracture of the right 12th rib and right anterior bladder perforation    Urology recommended conservative management with continued catheter drainage  Patient was started on IV fluids along with cefepime in the ED transition to Zosyn for active sacral wound per ID recommendations  He underwent surgical debridement for the cervical ulcer  Discussion with goals of care with the family, family had discussed interest in progression to hospice likely the future as a family would not like further hospitalization and would prefer home with home health as patient's family reports not being able to take care of him  POA is son and completed POLST form prior to discharge  It was decided prior to discharge to hold his Eliquis given his risk of recurrent falls      She denies any symptoms at time of discharge  He has alert and oriented x1 and at his baseline  He reports good appetite and denies any significant pain  He is being discharged home with home health as the family would not like to pursue further hospitalization  He has completed full course of antibiotics for the sacral ulcer            Physical Exam  Constitutional:       General: He is not in acute distress  Appearance: Normal appearance  He is normal weight  He is not ill-appearing or toxic-appearing  HENT:      Head: Normocephalic and atraumatic  Cardiovascular:      Rate and Rhythm: Normal rate  Heart sounds: No murmur heard  No gallop  Pulmonary:      Effort: Pulmonary effort is normal  No respiratory distress  Breath sounds: No wheezing or rales  Abdominal:      General: Abdomen is flat  There is no distension  Tenderness: There is no abdominal tenderness  There is no guarding  Musculoskeletal:      Right lower leg: No edema  Left lower leg: No edema  Neurological:      Mental Status: He is alert  Comments: Alert and oriented x1 at baseline             DISCHARGE INFORMATION      PCP at Discharge:  Antione Kitchen MD     Admitting Provider: Leslee Robertson DO  Admission Date: 4/14/2023     Discharge Provider: Rehana Patel MD  Discharge Date: 4/20/23     Discharge Disposition: Final discharge disposition not confirmed  Discharge Condition: stable  Discharge with Lines: no    Discharge Diet: diabetic diet  Activity Restrictions: none  Test Results Pending at Discharge: none     Discharge Diagnoses:  Principal Problem:    Fall at home  Active Problems:    Pressure ulcer of sacral region    CKD (chronic kidney disease)    DM (diabetes mellitus) (HCC)    Chronic gastritis    Myasthenia gravis (HCC)    Chronic diastolic (congestive) heart failure (HCC)    Atrial fibrillation (HCC)    Suprapubic catheter (HCC)    Mixed hyperlipidemia    Hypothyroidism    CAD (coronary artery disease)    Rib fracture    Anxiety    Bladder rupture    Moderate protein-calorie malnutrition ([address] )  Resolved Problems:    * No resolved hospital problems  *        Consulting Providers:        Diagnostic & Therapeutic Procedures Performed:  TRAUMA - CT head wo contrast     Result Date: 4/14/2023  Impression: No acute intracranial hemorrhage  Chronic microangiopathic changes  Left sphenoid sinusitis and right otomastoiditis  I personally discussed this study with Pete Heath on 4/14/2023 at 7:06 PM  Workstation performed: [id number]      TRAUMA - CT spine cervical wo contrast     Result Date: 4/14/2023  Impression: No acute fracture or dislocation  I personally discussed this study with Pete Heath on 4/14/2023 at 7:06 PM   Workstation performed: [id number]      XR chest 1 view     Result Date: 4/17/2023  Impression: No acute cardiopulmonary disease within limitations of supine imaging  Workstation performed: [id number]      TRAUMA - CT chest abdomen pelvis w contrast     Result Date: 4/14/2023  Impression: Right anterior bladder perforation /rupture with extraperitoneal air  Urology/surgical consult  Nondisplaced fracture of the medial aspect of the right 12th rib at the costovertebral junction   I personally discussed this study with Pete Heath on 4/14/2023 at 7:06 PM  Workstation performed: [id number]      XR Trauma multiple (SLB/SLRA trauma bay ONLY)     Result Date: 4/14/2023  Impression: No acute cardiopulmonary disease within limitations of supine imaging  Workstation performed: [id number]         Code Status: Level 3 - DNAR and DNI  Advance Directive & Living Will: <no information>  Power of :    POLST:       Medications:       Current Discharge Medication List            STOP taking these medications         apixaban (ELIQUIS) 2 5 mg Comments:   Reason for Stopping:                 Current Discharge Medication List              Current Discharge Medication List           CONTINUE these medications which have NOT CHANGED     Details   acetaminophen-codeine (TYLENOL #3) 300-30 mg per tablet Take 1 tablet by mouth every 6 (six) hours as needed for moderate pain       amiodarone 200 mg tablet Take 200 mg by mouth daily       cholecalciferol (VITAMIN D3) 1,000 units tablet Take 2,000 Units by mouth daily       dicyclomine (BENTYL) 20 mg tablet Take 20 mg by mouth 2 (two) times a day as needed (cramping)       famotidine (PEPCID) 20 mg tablet Take 20 mg by mouth 2 (two) times a day       FLUoxetine (PROzac) 40 MG capsule Take 40 mg by mouth daily       furosemide (LASIX) 20 mg tablet Take 20 mg by mouth daily Monday through Friday only       isosorbide mononitrate (IMDUR) 60 mg 24 hr tablet Take 60 mg by mouth daily       levothyroxine (Euthyrox) 88 mcg tablet Take 88 mcg by mouth daily       Mirabegron ER (Myrbetriq) 25 MG TB24 Take by mouth       pantoprazole (PROTONIX) 40 mg tablet Take 40 mg by mouth daily       pravastatin (PRAVACHOL) 20 mg tablet Take 20 mg by mouth daily       pyridostigmine (MESTINON) 60 mg tablet Take 60 mg by mouth 3 (three) times a day       saxagliptin (ONGLYZA) 2 5 MG tablet Take 2 5 mg by mouth daily       sucralfate (CARAFATE) 1 g tablet Take 1 g by mouth 4 (four) times a day as needed                 Allergies:   Allergies        Allergies "  Allergen Reactions    Levofloxacin Rash    Sulfa Antibiotics Rash            FOLLOW-UP      PCP Outpatient Follow-up:  Follow with PCP in 1 week     Consulting Providers Follow-up:  none required      Active Issues Requiring Follow-up:   Chronic sacral wound     Discharge Statement:   I spent 30 minutes minutes discharging the patient  This time was spent on the day of discharge  I had direct contact with the patient on the day of discharge   Additional documentation is required if more than 30 minutes were spent on discharge      Portions of the record may have been created with voice recognition software   Occasional wrong word or \"sound a like\" substitutions may have occurred due to the inherent limitations of voice recognition software   Read the chart carefully and recognize, using context, where substitutions have occurred      ==  Het D Georgianna Felty, [employer]  Internal Medicine Resident PGY-1        "

## 2023-05-02 ENCOUNTER — HOME CARE VISIT (OUTPATIENT)
Dept: HOME HEALTH SERVICES | Facility: HOME HEALTHCARE | Age: 88
End: 2023-05-02

## 2023-05-03 ENCOUNTER — HOME CARE VISIT (OUTPATIENT)
Dept: HOME HEALTH SERVICES | Facility: HOME HEALTHCARE | Age: 88
End: 2023-05-03

## 2023-05-04 ENCOUNTER — HOME CARE VISIT (OUTPATIENT)
Dept: HOME HEALTH SERVICES | Facility: HOME HEALTHCARE | Age: 88
End: 2023-05-04

## 2023-05-05 ENCOUNTER — HOME CARE VISIT (OUTPATIENT)
Dept: HOME HEALTH SERVICES | Facility: HOME HEALTHCARE | Age: 88
End: 2023-05-05

## 2023-05-06 ENCOUNTER — HOME CARE VISIT (OUTPATIENT)
Dept: HOME HOSPICE | Facility: HOSPICE | Age: 88
End: 2023-05-06

## 2023-05-06 ENCOUNTER — HOME CARE VISIT (OUTPATIENT)
Dept: HOME HEALTH SERVICES | Facility: HOME HEALTHCARE | Age: 88
End: 2023-05-06

## 2023-05-08 ENCOUNTER — HOME CARE VISIT (OUTPATIENT)
Dept: HOME HOSPICE | Facility: HOSPICE | Age: 88
End: 2023-05-08

## 2023-05-08 ENCOUNTER — HOME CARE VISIT (OUTPATIENT)
Dept: HOME HEALTH SERVICES | Facility: HOME HEALTHCARE | Age: 88
End: 2023-05-08

## 2023-05-08 VITALS — RESPIRATION RATE: 14 BRPM | SYSTOLIC BLOOD PRESSURE: 140 MMHG | DIASTOLIC BLOOD PRESSURE: 90 MMHG | HEART RATE: 80 BPM

## 2023-05-09 ENCOUNTER — HOME CARE VISIT (OUTPATIENT)
Dept: HOME HEALTH SERVICES | Facility: HOME HEALTHCARE | Age: 88
End: 2023-05-09

## 2023-05-10 ENCOUNTER — HOME CARE VISIT (OUTPATIENT)
Dept: HOME HEALTH SERVICES | Facility: HOME HEALTHCARE | Age: 88
End: 2023-05-10

## 2023-05-10 ENCOUNTER — HOME CARE VISIT (OUTPATIENT)
Dept: HOME HOSPICE | Facility: HOSPICE | Age: 88
End: 2023-05-10

## 2023-05-11 ENCOUNTER — RA CDI HCC (OUTPATIENT)
Dept: OTHER | Facility: HOSPITAL | Age: 88
End: 2023-05-11

## 2023-05-11 ENCOUNTER — HOME CARE VISIT (OUTPATIENT)
Dept: HOME HOSPICE | Facility: HOSPICE | Age: 88
End: 2023-05-11

## 2023-05-11 NOTE — PROGRESS NOTES
[address]  coding opportunities       Chart reviewed, no opportunity found: CHART REVIEWED, NO OPPORTUNITY FOUND        Patients Insurance     Medicare Insurance: Medicare

## 2023-05-12 ENCOUNTER — HOME CARE VISIT (OUTPATIENT)
Dept: HOME HEALTH SERVICES | Facility: HOME HEALTHCARE | Age: 88
End: 2023-05-12

## 2023-05-13 ENCOUNTER — HOME CARE VISIT (OUTPATIENT)
Dept: HOME HOSPICE | Facility: HOSPICE | Age: 88
End: 2023-05-13

## 2023-05-14 ENCOUNTER — HOME CARE VISIT (OUTPATIENT)
Dept: HOME HOSPICE | Facility: HOSPICE | Age: 88
End: 2023-05-14

## 2023-05-14 ENCOUNTER — HOME CARE VISIT (OUTPATIENT)
Dept: HOME HEALTH SERVICES | Facility: HOME HEALTHCARE | Age: 88
End: 2023-05-14

## 2023-05-15 ENCOUNTER — HOME CARE VISIT (OUTPATIENT)
Dept: HOME HEALTH SERVICES | Facility: HOME HEALTHCARE | Age: 88
End: 2023-05-15

## 2023-05-15 ENCOUNTER — HOME CARE VISIT (OUTPATIENT)
Dept: HOME HOSPICE | Facility: HOSPICE | Age: 88
End: 2023-05-15

## 2023-05-16 ENCOUNTER — HOME CARE VISIT (OUTPATIENT)
Dept: HOME HEALTH SERVICES | Facility: HOME HEALTHCARE | Age: 88
End: 2023-05-16

## 2023-05-16 ENCOUNTER — HOME CARE VISIT (OUTPATIENT)
Dept: HOME HOSPICE | Facility: HOSPICE | Age: 88
End: 2023-05-16

## 2023-05-16 VITALS — RESPIRATION RATE: 16 BRPM | HEART RATE: 68 BPM

## 2023-05-18 ENCOUNTER — HOME CARE VISIT (OUTPATIENT)
Dept: HOME HOSPICE | Facility: HOSPICE | Age: 88
End: 2023-05-18

## 2023-05-18 ENCOUNTER — HOME CARE VISIT (OUTPATIENT)
Dept: HOME HEALTH SERVICES | Facility: HOME HEALTHCARE | Age: 88
End: 2023-05-18

## 2023-05-20 ENCOUNTER — HOME CARE VISIT (OUTPATIENT)
Dept: HOME HEALTH SERVICES | Facility: HOME HEALTHCARE | Age: 88
End: 2023-05-20

## 2023-05-22 ENCOUNTER — HOME CARE VISIT (OUTPATIENT)
Dept: HOME HEALTH SERVICES | Facility: HOME HEALTHCARE | Age: 88
End: 2023-05-22

## 2023-05-23 ENCOUNTER — HOME CARE VISIT (OUTPATIENT)
Dept: HOME HEALTH SERVICES | Facility: HOME HEALTHCARE | Age: 88
End: 2023-05-23

## 2023-05-24 ENCOUNTER — HOME CARE VISIT (OUTPATIENT)
Dept: HOME HEALTH SERVICES | Facility: HOME HEALTHCARE | Age: 88
End: 2023-05-24

## 2023-05-24 ENCOUNTER — HOME CARE VISIT (OUTPATIENT)
Dept: HOME HOSPICE | Facility: HOSPICE | Age: 88
End: 2023-05-24

## 2023-05-25 ENCOUNTER — HOME CARE VISIT (OUTPATIENT)
Dept: HOME HOSPICE | Facility: HOSPICE | Age: 88
End: 2023-05-25

## 2023-05-26 ENCOUNTER — HOME CARE VISIT (OUTPATIENT)
Dept: HOME HEALTH SERVICES | Facility: HOME HEALTHCARE | Age: 88
End: 2023-05-26

## 2023-05-26 ENCOUNTER — HOME CARE VISIT (OUTPATIENT)
Dept: HOME HOSPICE | Facility: HOSPICE | Age: 88
End: 2023-05-26

## 2023-05-28 ENCOUNTER — HOME CARE VISIT (OUTPATIENT)
Dept: HOME HEALTH SERVICES | Facility: HOME HEALTHCARE | Age: 88
End: 2023-05-28

## 2023-05-28 ENCOUNTER — HOME CARE VISIT (OUTPATIENT)
Dept: HOME HOSPICE | Facility: HOSPICE | Age: 88
End: 2023-05-28

## 2023-05-28 VITALS — RESPIRATION RATE: 18 BRPM | DIASTOLIC BLOOD PRESSURE: 64 MMHG | HEART RATE: 70 BPM | SYSTOLIC BLOOD PRESSURE: 118 MMHG

## 2023-05-29 ENCOUNTER — HOME CARE VISIT (OUTPATIENT)
Dept: HOME HEALTH SERVICES | Facility: HOME HEALTHCARE | Age: 88
End: 2023-05-29

## 2023-05-30 ENCOUNTER — HOME CARE VISIT (OUTPATIENT)
Dept: HOME HEALTH SERVICES | Facility: HOME HEALTHCARE | Age: 88
End: 2023-05-30

## 2023-05-30 VITALS — RESPIRATION RATE: 12 BRPM | HEART RATE: 50 BPM

## 2023-05-31 ENCOUNTER — HOME CARE VISIT (OUTPATIENT)
Dept: HOME HEALTH SERVICES | Facility: HOME HEALTHCARE | Age: 88
End: 2023-05-31

## 2023-06-01 ENCOUNTER — HOME CARE VISIT (OUTPATIENT)
Dept: HOME HEALTH SERVICES | Facility: HOME HEALTHCARE | Age: 88
End: 2023-06-01

## 2023-06-01 ENCOUNTER — HOME CARE VISIT (OUTPATIENT)
Dept: HOME HOSPICE | Facility: HOSPICE | Age: 88
End: 2023-06-01

## 2023-06-03 ENCOUNTER — HOME CARE VISIT (OUTPATIENT)
Dept: HOME HEALTH SERVICES | Facility: HOME HEALTHCARE | Age: 88
End: 2023-06-03

## 2023-06-03 ENCOUNTER — HOME CARE VISIT (OUTPATIENT)
Dept: HOME HOSPICE | Facility: HOSPICE | Age: 88
End: 2023-06-03

## 2023-06-05 ENCOUNTER — HOME CARE VISIT (OUTPATIENT)
Dept: HOME HOSPICE | Facility: HOSPICE | Age: 88
End: 2023-06-05
Payer: MEDICARE

## 2023-06-05 ENCOUNTER — HOME CARE VISIT (OUTPATIENT)
Dept: HOME HEALTH SERVICES | Facility: HOME HEALTHCARE | Age: 88
End: 2023-06-05
Payer: MEDICARE

## 2023-06-05 PROCEDURE — G0299 HHS/HOSPICE OF RN EA 15 MIN: HCPCS

## 2023-06-05 PROCEDURE — G0155 HHCP-SVS OF CSW,EA 15 MIN: HCPCS

## 2023-06-06 ENCOUNTER — HOME CARE VISIT (OUTPATIENT)
Dept: HOME HEALTH SERVICES | Facility: HOME HEALTHCARE | Age: 88
End: 2023-06-06
Payer: MEDICARE

## 2023-06-06 ENCOUNTER — HOME CARE VISIT (OUTPATIENT)
Dept: HOME HOSPICE | Facility: HOSPICE | Age: 88
End: 2023-06-06
Payer: MEDICARE

## 2023-06-06 PROCEDURE — G0156 HHCP-SVS OF AIDE,EA 15 MIN: HCPCS

## 2023-06-07 ENCOUNTER — HOME CARE VISIT (OUTPATIENT)
Dept: HOME HEALTH SERVICES | Facility: HOME HEALTHCARE | Age: 88
End: 2023-06-07
Payer: MEDICARE

## 2023-06-07 DIAGNOSIS — G70.00 MYASTHENIA GRAVIS (HCC): Primary | Chronic | ICD-10-CM

## 2023-06-07 PROCEDURE — G0156 HHCP-SVS OF AIDE,EA 15 MIN: HCPCS

## 2023-06-07 PROCEDURE — G0299 HHS/HOSPICE OF RN EA 15 MIN: HCPCS

## 2023-06-07 RX ORDER — PYRIDOSTIGMINE BROMIDE 60 MG/1
60 TABLET ORAL 4 TIMES DAILY
Qty: 120 TABLET | Refills: 0 | Status: SHIPPED | OUTPATIENT
Start: 2023-06-07 | End: 2023-06-16 | Stop reason: CLARIF

## 2023-06-07 NOTE — TELEPHONE ENCOUNTER
6/7/2023 11:04 AM  [facility] home patient requests medication refill  Filled electronically via Epic as per [place] State Law  Requested Prescriptions     Signed Prescriptions Disp Refills   • pyridostigmine (MESTINON) 60 mg tablet 120 tablet 0     Sig: Take 1 tablet (60 mg total) by mouth 4 (four) times a day     Authorizing Provider: [facility]  Hospice Answering Service: [phone]  You can find me on [employer]!

## 2023-06-08 ENCOUNTER — HOME CARE VISIT (OUTPATIENT)
Dept: HOME HOSPICE | Facility: HOSPICE | Age: 88
End: 2023-06-08
Payer: MEDICARE

## 2023-06-08 ENCOUNTER — HOME CARE VISIT (OUTPATIENT)
Dept: HOME HEALTH SERVICES | Facility: HOME HEALTHCARE | Age: 88
End: 2023-06-08
Payer: MEDICARE

## 2023-06-08 PROCEDURE — G0156 HHCP-SVS OF AIDE,EA 15 MIN: HCPCS

## 2023-06-09 ENCOUNTER — HOME CARE VISIT (OUTPATIENT)
Dept: HOME HEALTH SERVICES | Facility: HOME HEALTHCARE | Age: 88
End: 2023-06-09
Payer: MEDICARE

## 2023-06-09 PROCEDURE — G0156 HHCP-SVS OF AIDE,EA 15 MIN: HCPCS

## 2023-06-09 PROCEDURE — G0299 HHS/HOSPICE OF RN EA 15 MIN: HCPCS

## 2023-06-12 PROBLEM — E43 SEVERE PROTEIN-CALORIE MALNUTRITION (HCC): Status: ACTIVE | Noted: 2023-01-01

## 2023-06-16 ENCOUNTER — HOME CARE VISIT (OUTPATIENT)
Dept: HOME HOSPICE | Facility: HOSPICE | Age: 88
End: 2023-06-16
Payer: MEDICARE

## 2023-06-16 ENCOUNTER — TELEPHONE (OUTPATIENT)
Dept: INTERNAL MEDICINE CLINIC | Facility: OTHER | Age: 88
End: 2023-06-16

## 2023-06-16 NOTE — TELEPHONE ENCOUNTER
Mariposa Lopez from [address] [place] [address] called to inform us of patient's passing yesterday

## 2023-06-22 ENCOUNTER — HOME CARE VISIT (OUTPATIENT)
Dept: HOME HOSPICE | Facility: HOSPICE | Age: 88
End: 2023-06-22
Payer: MEDICARE

## 2024-11-21 NOTE — ASSESSMENT & PLAN NOTE
Wt Readings from Last 3 Encounters:   04/14/23 61 7 kg (136 lb 0 4 oz)       -Continue Imdur  -Continue home lasix  -Daily weights No

## (undated) DEVICE — SUT ETHILON 3-0 FS-1 18 IN 663G

## (undated) DEVICE — GLOVE SRG BIOGEL ECLIPSE 7

## (undated) DEVICE — SUT ETHILON 2-0 FSLX 30 IN 1674H

## (undated) DEVICE — CATH SUPRAPUBIC PEELAWAY SET  20FR X 12CM

## (undated) DEVICE — PLUMEPEN PRO 10FT

## (undated) DEVICE — ABDOMINAL PAD: Brand: DERMACEA

## (undated) DEVICE — NEEDLE FASCIAL INCISING 18G 5CM

## (undated) DEVICE — BAG URINE DRAINAGE 2000ML ANTI RFLX LF

## (undated) DEVICE — CYSTOSCOPY PACK: Brand: CONVERTORS

## (undated) DEVICE — IODOFORM PACKING STRIP: Brand: CURITY

## (undated) DEVICE — POOLE SUCTION HANDLE: Brand: CARDINAL HEALTH

## (undated) DEVICE — INTENDED FOR TISSUE SEPARATION, AND OTHER PROCEDURES THAT REQUIRE A SHARP SURGICAL BLADE TO PUNCTURE OR CUT.: Brand: BARD-PARKER SAFETY BLADES SIZE 15, STERILE

## (undated) DEVICE — SUT VICRYL 2-0 CT-1 27 IN J259H

## (undated) DEVICE — GAUZE SPONGES,16 PLY: Brand: CURITY

## (undated) DEVICE — CATH FOLEY COUNCIL 16FR 5ML 2 WAY LUBRICATH

## (undated) DEVICE — INTENDED FOR TISSUE SEPARATION, AND OTHER PROCEDURES THAT REQUIRE A SHARP SURGICAL BLADE TO PUNCTURE OR CUT.: Brand: BARD-PARKER SAFETY BLADES SIZE 10, STERILE

## (undated) DEVICE — PREMIUM DRY TRAY LF: Brand: MEDLINE INDUSTRIES, INC.

## (undated) DEVICE — SUPRAPUBIC OBRIEN ACCESS CATH 26 FR

## (undated) DEVICE — SUT VICRYL 3-0 SH 27 IN J416H

## (undated) DEVICE — HEYMAN DILATOR 14 FR

## (undated) DEVICE — TUBING SUCTION 5MM X 12 FT

## (undated) DEVICE — HEYMAN DILATOR 12 FR

## (undated) DEVICE — JP PERF DRN SIL FLT 7MM FULL: Brand: CARDINAL HEALTH

## (undated) DEVICE — HEYMAN DILATOR 10 FR

## (undated) DEVICE — JACKSON-PRATT 100CC BULB RESERVOIR: Brand: CARDINAL HEALTH

## (undated) DEVICE — HEYMAN DILATOR 18 FR

## (undated) DEVICE — MEDI-VAC YANK SUCT HNDL W/TPRD BULBOUS TIP: Brand: CARDINAL HEALTH

## (undated) DEVICE — HEYMAN DILATOR 20 FR

## (undated) DEVICE — GLOVE INDICATOR PI UNDERGLOVE SZ 7 BLUE

## (undated) DEVICE — BETHLEHEM MAJOR GENERAL PACK: Brand: CARDINAL HEALTH

## (undated) DEVICE — HEYMAN DILATOR 16 FR

## (undated) DEVICE — GUIDEWIRE STRGHT TIP 0.035 IN  SOLO PLUS

## (undated) DEVICE — CHLORHEXIDINE 4PCT 4 OZ

## (undated) DEVICE — GLOVE SRG BIOGEL 7